# Patient Record
Sex: MALE | Race: WHITE | Employment: OTHER | URBAN - METROPOLITAN AREA
[De-identification: names, ages, dates, MRNs, and addresses within clinical notes are randomized per-mention and may not be internally consistent; named-entity substitution may affect disease eponyms.]

---

## 2017-02-12 ENCOUNTER — COMMUNICATION - HEALTHEAST (OUTPATIENT)
Dept: INTERNAL MEDICINE | Facility: CLINIC | Age: 72
End: 2017-02-12

## 2017-02-12 DIAGNOSIS — E11.9 TYPE 2 DIABETES MELLITUS (H): ICD-10-CM

## 2017-04-05 ENCOUNTER — COMMUNICATION - HEALTHEAST (OUTPATIENT)
Dept: INTERNAL MEDICINE | Facility: CLINIC | Age: 72
End: 2017-04-05

## 2017-04-17 ENCOUNTER — RECORDS - HEALTHEAST (OUTPATIENT)
Dept: ADMINISTRATIVE | Facility: OTHER | Age: 72
End: 2017-04-17

## 2017-04-24 ENCOUNTER — OFFICE VISIT - HEALTHEAST (OUTPATIENT)
Dept: SLEEP MEDICINE | Facility: CLINIC | Age: 72
End: 2017-04-24

## 2017-04-24 DIAGNOSIS — G47.33 OSA ON CPAP: ICD-10-CM

## 2017-04-24 ASSESSMENT — MIFFLIN-ST. JEOR: SCORE: 1560.92

## 2017-04-25 ENCOUNTER — COMMUNICATION - HEALTHEAST (OUTPATIENT)
Dept: SLEEP MEDICINE | Facility: CLINIC | Age: 72
End: 2017-04-25

## 2017-04-26 ENCOUNTER — COMMUNICATION - HEALTHEAST (OUTPATIENT)
Dept: INTERNAL MEDICINE | Facility: CLINIC | Age: 72
End: 2017-04-26

## 2017-05-01 ENCOUNTER — OFFICE VISIT - HEALTHEAST (OUTPATIENT)
Dept: INTERNAL MEDICINE | Facility: CLINIC | Age: 72
End: 2017-05-01

## 2017-05-01 DIAGNOSIS — H26.9 CATARACT: ICD-10-CM

## 2017-05-01 DIAGNOSIS — Z01.818 PRE-OP EXAMINATION: ICD-10-CM

## 2017-05-01 ASSESSMENT — MIFFLIN-ST. JEOR: SCORE: 1552.98

## 2017-05-30 ENCOUNTER — COMMUNICATION - HEALTHEAST (OUTPATIENT)
Dept: INTERNAL MEDICINE | Facility: CLINIC | Age: 72
End: 2017-05-30

## 2017-05-30 DIAGNOSIS — E11.9 TYPE 2 DIABETES MELLITUS (H): ICD-10-CM

## 2017-05-31 ENCOUNTER — OFFICE VISIT - HEALTHEAST (OUTPATIENT)
Dept: INTERNAL MEDICINE | Facility: CLINIC | Age: 72
End: 2017-05-31

## 2017-05-31 ENCOUNTER — OFFICE VISIT - HEALTHEAST (OUTPATIENT)
Dept: SLEEP MEDICINE | Facility: CLINIC | Age: 72
End: 2017-05-31

## 2017-05-31 ENCOUNTER — RECORDS - HEALTHEAST (OUTPATIENT)
Dept: ADMINISTRATIVE | Facility: OTHER | Age: 72
End: 2017-05-31

## 2017-05-31 ENCOUNTER — COMMUNICATION - HEALTHEAST (OUTPATIENT)
Dept: INTERNAL MEDICINE | Facility: CLINIC | Age: 72
End: 2017-05-31

## 2017-05-31 ENCOUNTER — RECORDS - HEALTHEAST (OUTPATIENT)
Dept: GENERAL RADIOLOGY | Facility: CLINIC | Age: 72
End: 2017-05-31

## 2017-05-31 DIAGNOSIS — E11.9 TYPE 2 DIABETES MELLITUS (H): ICD-10-CM

## 2017-05-31 DIAGNOSIS — M25.559 HIP PAIN: ICD-10-CM

## 2017-05-31 DIAGNOSIS — M25.559 PAIN IN UNSPECIFIED HIP: ICD-10-CM

## 2017-05-31 DIAGNOSIS — R53.83 FATIGUE: ICD-10-CM

## 2017-05-31 DIAGNOSIS — G47.33 OBSTRUCTIVE SLEEP APNEA (ADULT) (PEDIATRIC): ICD-10-CM

## 2017-05-31 DIAGNOSIS — G47.33 OSA (OBSTRUCTIVE SLEEP APNEA): ICD-10-CM

## 2017-05-31 DIAGNOSIS — F33.9 MAJOR DEPRESSIVE DISORDER, RECURRENT EPISODE (H): ICD-10-CM

## 2017-05-31 DIAGNOSIS — D69.6 THROMBOCYTOPENIA, UNSPECIFIED (H): ICD-10-CM

## 2017-05-31 DIAGNOSIS — E55.9 VITAMIN D DEFICIENCY: ICD-10-CM

## 2017-05-31 LAB — HBA1C MFR BLD: 7.5 % (ref 3.5–6)

## 2017-05-31 ASSESSMENT — MIFFLIN-ST. JEOR: SCORE: 1559.78

## 2017-06-07 ENCOUNTER — OFFICE VISIT - HEALTHEAST (OUTPATIENT)
Dept: PHYSICAL THERAPY | Facility: REHABILITATION | Age: 72
End: 2017-06-07

## 2017-06-07 DIAGNOSIS — M24.551 HIP FLEXOR TIGHTNESS, RIGHT: ICD-10-CM

## 2017-06-07 DIAGNOSIS — M62.81 GENERALIZED MUSCLE WEAKNESS: ICD-10-CM

## 2017-06-07 DIAGNOSIS — M25.551 RIGHT HIP PAIN: ICD-10-CM

## 2017-06-09 ENCOUNTER — COMMUNICATION - HEALTHEAST (OUTPATIENT)
Dept: INTERNAL MEDICINE | Facility: CLINIC | Age: 72
End: 2017-06-09

## 2017-06-09 DIAGNOSIS — E78.5 HYPERLIPEMIA: ICD-10-CM

## 2017-06-14 ENCOUNTER — COMMUNICATION - HEALTHEAST (OUTPATIENT)
Dept: PHYSICAL THERAPY | Facility: REHABILITATION | Age: 72
End: 2017-06-14

## 2017-06-15 ENCOUNTER — COMMUNICATION - HEALTHEAST (OUTPATIENT)
Dept: INTERNAL MEDICINE | Facility: CLINIC | Age: 72
End: 2017-06-15

## 2017-06-16 ENCOUNTER — COMMUNICATION - HEALTHEAST (OUTPATIENT)
Dept: INTERNAL MEDICINE | Facility: CLINIC | Age: 72
End: 2017-06-16

## 2017-06-16 DIAGNOSIS — I63.9 CVA (CEREBRAL VASCULAR ACCIDENT) (H): ICD-10-CM

## 2017-06-21 ENCOUNTER — OFFICE VISIT - HEALTHEAST (OUTPATIENT)
Dept: PHYSICAL THERAPY | Facility: REHABILITATION | Age: 72
End: 2017-06-21

## 2017-06-21 DIAGNOSIS — M62.81 GENERALIZED MUSCLE WEAKNESS: ICD-10-CM

## 2017-06-21 DIAGNOSIS — M25.551 RIGHT HIP PAIN: ICD-10-CM

## 2017-06-21 DIAGNOSIS — M24.551 HIP FLEXOR TIGHTNESS, RIGHT: ICD-10-CM

## 2017-06-28 ENCOUNTER — OFFICE VISIT - HEALTHEAST (OUTPATIENT)
Dept: PHYSICAL THERAPY | Facility: REHABILITATION | Age: 72
End: 2017-06-28

## 2017-06-28 DIAGNOSIS — M24.551 HIP FLEXOR TIGHTNESS, RIGHT: ICD-10-CM

## 2017-06-28 DIAGNOSIS — M62.81 GENERALIZED MUSCLE WEAKNESS: ICD-10-CM

## 2017-06-28 DIAGNOSIS — M25.551 RIGHT HIP PAIN: ICD-10-CM

## 2017-07-12 ENCOUNTER — OFFICE VISIT - HEALTHEAST (OUTPATIENT)
Dept: PHYSICAL THERAPY | Facility: REHABILITATION | Age: 72
End: 2017-07-12

## 2017-07-12 DIAGNOSIS — M62.81 GENERALIZED MUSCLE WEAKNESS: ICD-10-CM

## 2017-07-12 DIAGNOSIS — M24.551 HIP FLEXOR TIGHTNESS, RIGHT: ICD-10-CM

## 2017-07-12 DIAGNOSIS — M25.551 RIGHT HIP PAIN: ICD-10-CM

## 2017-07-14 ENCOUNTER — COMMUNICATION - HEALTHEAST (OUTPATIENT)
Dept: INTERNAL MEDICINE | Facility: CLINIC | Age: 72
End: 2017-07-14

## 2017-08-15 ENCOUNTER — AMBULATORY - HEALTHEAST (OUTPATIENT)
Dept: SLEEP MEDICINE | Facility: CLINIC | Age: 72
End: 2017-08-15

## 2017-08-18 ENCOUNTER — COMMUNICATION - HEALTHEAST (OUTPATIENT)
Dept: SLEEP MEDICINE | Facility: CLINIC | Age: 72
End: 2017-08-18

## 2017-08-30 ENCOUNTER — RECORDS - HEALTHEAST (OUTPATIENT)
Dept: ADMINISTRATIVE | Facility: OTHER | Age: 72
End: 2017-08-30

## 2017-09-18 ENCOUNTER — COMMUNICATION - HEALTHEAST (OUTPATIENT)
Dept: INTERNAL MEDICINE | Facility: CLINIC | Age: 72
End: 2017-09-18

## 2017-09-18 DIAGNOSIS — I63.9 CVA (CEREBRAL VASCULAR ACCIDENT) (H): ICD-10-CM

## 2017-09-20 ENCOUNTER — COMMUNICATION - HEALTHEAST (OUTPATIENT)
Dept: SLEEP MEDICINE | Facility: CLINIC | Age: 72
End: 2017-09-20

## 2017-09-20 DIAGNOSIS — G47.33 OSA (OBSTRUCTIVE SLEEP APNEA): ICD-10-CM

## 2017-10-12 ENCOUNTER — RECORDS - HEALTHEAST (OUTPATIENT)
Dept: SLEEP MEDICINE | Facility: CLINIC | Age: 72
End: 2017-10-12

## 2017-10-12 ENCOUNTER — RECORDS - HEALTHEAST (OUTPATIENT)
Dept: ADMINISTRATIVE | Facility: OTHER | Age: 72
End: 2017-10-12

## 2017-10-12 DIAGNOSIS — G47.33 OBSTRUCTIVE SLEEP APNEA (ADULT) (PEDIATRIC): ICD-10-CM

## 2017-10-20 ENCOUNTER — COMMUNICATION - HEALTHEAST (OUTPATIENT)
Dept: SLEEP MEDICINE | Facility: CLINIC | Age: 72
End: 2017-10-20

## 2017-11-20 ENCOUNTER — RECORDS - HEALTHEAST (OUTPATIENT)
Dept: ADMINISTRATIVE | Facility: OTHER | Age: 72
End: 2017-11-20

## 2017-12-07 ENCOUNTER — COMMUNICATION - HEALTHEAST (OUTPATIENT)
Dept: INTERNAL MEDICINE | Facility: CLINIC | Age: 72
End: 2017-12-07

## 2017-12-07 DIAGNOSIS — E78.5 HYPERLIPEMIA: ICD-10-CM

## 2017-12-27 ENCOUNTER — COMMUNICATION - HEALTHEAST (OUTPATIENT)
Dept: INTERNAL MEDICINE | Facility: CLINIC | Age: 72
End: 2017-12-27

## 2018-03-18 ENCOUNTER — COMMUNICATION - HEALTHEAST (OUTPATIENT)
Dept: INTERNAL MEDICINE | Facility: CLINIC | Age: 73
End: 2018-03-18

## 2018-03-18 DIAGNOSIS — I63.9 CVA (CEREBRAL VASCULAR ACCIDENT) (H): ICD-10-CM

## 2018-04-27 ENCOUNTER — OFFICE VISIT - HEALTHEAST (OUTPATIENT)
Dept: INTERNAL MEDICINE | Facility: CLINIC | Age: 73
End: 2018-04-27

## 2018-04-27 DIAGNOSIS — F33.9 MAJOR DEPRESSIVE DISORDER, RECURRENT EPISODE (H): ICD-10-CM

## 2018-04-27 DIAGNOSIS — E78.5 HYPERLIPEMIA: ICD-10-CM

## 2018-04-27 DIAGNOSIS — I63.9 CVA (CEREBRAL VASCULAR ACCIDENT) (H): ICD-10-CM

## 2018-04-27 DIAGNOSIS — E11.9 TYPE 2 DIABETES MELLITUS (H): ICD-10-CM

## 2018-04-27 DIAGNOSIS — Z12.5 ENCOUNTER FOR SCREENING FOR MALIGNANT NEOPLASM OF PROSTATE: ICD-10-CM

## 2018-04-27 DIAGNOSIS — N40.0 BPH (BENIGN PROSTATIC HYPERPLASIA): ICD-10-CM

## 2018-04-27 DIAGNOSIS — D69.6 THROMBOCYTOPENIA (H): ICD-10-CM

## 2018-04-27 DIAGNOSIS — E55.9 VITAMIN D DEFICIENCY: ICD-10-CM

## 2018-04-27 DIAGNOSIS — Z00.00 HEALTH CARE MAINTENANCE: ICD-10-CM

## 2018-04-27 DIAGNOSIS — Z98.890 H/O CAROTID ENDARTERECTOMY: ICD-10-CM

## 2018-04-27 DIAGNOSIS — G47.33 OBSTRUCTIVE SLEEP APNEA: ICD-10-CM

## 2018-04-27 DIAGNOSIS — Z00.00 ROUTINE GENERAL MEDICAL EXAMINATION AT A HEALTH CARE FACILITY: ICD-10-CM

## 2018-04-27 ASSESSMENT — MIFFLIN-ST. JEOR: SCORE: 1546.18

## 2018-04-30 ENCOUNTER — AMBULATORY - HEALTHEAST (OUTPATIENT)
Dept: LAB | Facility: CLINIC | Age: 73
End: 2018-04-30

## 2018-04-30 DIAGNOSIS — Z12.5 ENCOUNTER FOR SCREENING FOR MALIGNANT NEOPLASM OF PROSTATE: ICD-10-CM

## 2018-04-30 DIAGNOSIS — N40.0 BPH (BENIGN PROSTATIC HYPERPLASIA): ICD-10-CM

## 2018-04-30 DIAGNOSIS — E11.9 TYPE 2 DIABETES MELLITUS (H): ICD-10-CM

## 2018-04-30 DIAGNOSIS — E55.9 VITAMIN D DEFICIENCY: ICD-10-CM

## 2018-04-30 DIAGNOSIS — Z00.00 HEALTH CARE MAINTENANCE: ICD-10-CM

## 2018-04-30 DIAGNOSIS — E78.5 HYPERLIPEMIA: ICD-10-CM

## 2018-04-30 LAB
ALBUMIN SERPL-MCNC: 3.7 G/DL (ref 3.5–5)
ALBUMIN UR-MCNC: NEGATIVE MG/DL
ALP SERPL-CCNC: 89 U/L (ref 45–120)
ALT SERPL W P-5'-P-CCNC: 48 U/L (ref 0–45)
ANION GAP SERPL CALCULATED.3IONS-SCNC: 10 MMOL/L (ref 5–18)
APPEARANCE UR: CLEAR
AST SERPL W P-5'-P-CCNC: 28 U/L (ref 0–40)
BILIRUB SERPL-MCNC: 0.5 MG/DL (ref 0–1)
BILIRUB UR QL STRIP: NEGATIVE
BUN SERPL-MCNC: 15 MG/DL (ref 8–28)
CALCIUM SERPL-MCNC: 9.6 MG/DL (ref 8.5–10.5)
CHLORIDE BLD-SCNC: 103 MMOL/L (ref 98–107)
CHOLEST SERPL-MCNC: 133 MG/DL
CO2 SERPL-SCNC: 27 MMOL/L (ref 22–31)
COLOR UR AUTO: YELLOW
CREAT SERPL-MCNC: 0.81 MG/DL (ref 0.7–1.3)
ERYTHROCYTE [DISTWIDTH] IN BLOOD BY AUTOMATED COUNT: 11.6 % (ref 11–14.5)
FASTING STATUS PATIENT QL REPORTED: YES
GFR SERPL CREATININE-BSD FRML MDRD: >60 ML/MIN/1.73M2
GLUCOSE BLD-MCNC: 164 MG/DL (ref 70–125)
GLUCOSE UR STRIP-MCNC: NEGATIVE MG/DL
HBA1C MFR BLD: 7 % (ref 3.5–6)
HCT VFR BLD AUTO: 41.5 % (ref 40–54)
HDLC SERPL-MCNC: 41 MG/DL
HGB BLD-MCNC: 14.7 G/DL (ref 14–18)
HGB UR QL STRIP: NEGATIVE
KETONES UR STRIP-MCNC: NEGATIVE MG/DL
LDLC SERPL CALC-MCNC: 57 MG/DL
LEUKOCYTE ESTERASE UR QL STRIP: NEGATIVE
MCH RBC QN AUTO: 35.2 PG (ref 27–34)
MCHC RBC AUTO-ENTMCNC: 35.4 G/DL (ref 32–36)
MCV RBC AUTO: 99 FL (ref 80–100)
NITRATE UR QL: NEGATIVE
PH UR STRIP: 6 [PH] (ref 5–8)
PLATELET # BLD AUTO: 124 THOU/UL (ref 140–440)
PMV BLD AUTO: 8.1 FL (ref 7–10)
POTASSIUM BLD-SCNC: 4.4 MMOL/L (ref 3.5–5)
PROT SERPL-MCNC: 6.8 G/DL (ref 6–8)
PSA SERPL-MCNC: 0.4 NG/ML (ref 0–6.5)
RBC # BLD AUTO: 4.17 MILL/UL (ref 4.4–6.2)
SODIUM SERPL-SCNC: 140 MMOL/L (ref 136–145)
SP GR UR STRIP: 1.01 (ref 1–1.03)
TRIGL SERPL-MCNC: 174 MG/DL
UROBILINOGEN UR STRIP-ACNC: NORMAL
WBC: 4.6 THOU/UL (ref 4–11)

## 2018-05-01 LAB
25(OH)D3 SERPL-MCNC: 53.6 NG/ML (ref 30–80)
25(OH)D3 SERPL-MCNC: 53.6 NG/ML (ref 30–80)

## 2018-05-13 ENCOUNTER — COMMUNICATION - HEALTHEAST (OUTPATIENT)
Dept: INTERNAL MEDICINE | Facility: CLINIC | Age: 73
End: 2018-05-13

## 2018-05-13 DIAGNOSIS — E11.9 TYPE 2 DIABETES MELLITUS (H): ICD-10-CM

## 2018-05-21 ENCOUNTER — COMMUNICATION - HEALTHEAST (OUTPATIENT)
Dept: INTERNAL MEDICINE | Facility: CLINIC | Age: 73
End: 2018-05-21

## 2018-05-21 DIAGNOSIS — E78.5 HYPERLIPEMIA: ICD-10-CM

## 2018-06-18 ENCOUNTER — COMMUNICATION - HEALTHEAST (OUTPATIENT)
Dept: INTERNAL MEDICINE | Facility: CLINIC | Age: 73
End: 2018-06-18

## 2018-06-18 DIAGNOSIS — I63.9 CVA (CEREBRAL VASCULAR ACCIDENT) (H): ICD-10-CM

## 2018-06-20 ENCOUNTER — COMMUNICATION - HEALTHEAST (OUTPATIENT)
Dept: SLEEP MEDICINE | Facility: CLINIC | Age: 73
End: 2018-06-20

## 2018-06-20 ENCOUNTER — OFFICE VISIT - HEALTHEAST (OUTPATIENT)
Dept: INTERNAL MEDICINE | Facility: CLINIC | Age: 73
End: 2018-06-20

## 2018-06-20 DIAGNOSIS — R53.83 FATIGUE: ICD-10-CM

## 2018-06-20 LAB
ALBUMIN SERPL-MCNC: 4 G/DL (ref 3.5–5)
ALP SERPL-CCNC: 93 U/L (ref 45–120)
ALT SERPL W P-5'-P-CCNC: 54 U/L (ref 0–45)
ANION GAP SERPL CALCULATED.3IONS-SCNC: 10 MMOL/L (ref 5–18)
AST SERPL W P-5'-P-CCNC: 28 U/L (ref 0–40)
BILIRUB SERPL-MCNC: 0.6 MG/DL (ref 0–1)
BUN SERPL-MCNC: 17 MG/DL (ref 8–28)
CALCIUM SERPL-MCNC: 10.1 MG/DL (ref 8.5–10.5)
CHLORIDE BLD-SCNC: 102 MMOL/L (ref 98–107)
CO2 SERPL-SCNC: 27 MMOL/L (ref 22–31)
CREAT SERPL-MCNC: 0.8 MG/DL (ref 0.7–1.3)
ERYTHROCYTE [DISTWIDTH] IN BLOOD BY AUTOMATED COUNT: 11.7 % (ref 11–14.5)
GFR SERPL CREATININE-BSD FRML MDRD: >60 ML/MIN/1.73M2
GLUCOSE BLD-MCNC: 175 MG/DL (ref 70–125)
HCT VFR BLD AUTO: 42.9 % (ref 40–54)
HGB BLD-MCNC: 14.6 G/DL (ref 14–18)
MCH RBC QN AUTO: 34.1 PG (ref 27–34)
MCHC RBC AUTO-ENTMCNC: 33.9 G/DL (ref 32–36)
MCV RBC AUTO: 101 FL (ref 80–100)
MONOCYTES NFR BLD AUTO: NEGATIVE %
PLATELET # BLD AUTO: 119 THOU/UL (ref 140–440)
PMV BLD AUTO: 8.6 FL (ref 7–10)
POTASSIUM BLD-SCNC: 4.4 MMOL/L (ref 3.5–5)
PROT SERPL-MCNC: 6.8 G/DL (ref 6–8)
RBC # BLD AUTO: 4.27 MILL/UL (ref 4.4–6.2)
SODIUM SERPL-SCNC: 139 MMOL/L (ref 136–145)
T4 FREE SERPL-MCNC: 1 NG/DL (ref 0.7–1.8)
TSH SERPL DL<=0.005 MIU/L-ACNC: 2.28 UIU/ML (ref 0.3–5)
VIT B12 SERPL-MCNC: 326 PG/ML (ref 213–816)
WBC: 4.5 THOU/UL (ref 4–11)

## 2018-06-21 LAB
B BURGDOR IGG+IGM SER QL: 0.04 INDEX VALUE
HCV AB SERPL QL IA: NEGATIVE

## 2018-08-03 ENCOUNTER — OFFICE VISIT - HEALTHEAST (OUTPATIENT)
Dept: SLEEP MEDICINE | Facility: CLINIC | Age: 73
End: 2018-08-03

## 2018-08-03 DIAGNOSIS — G47.31 CENTRAL SLEEP APNEA: ICD-10-CM

## 2018-08-03 DIAGNOSIS — G47.33 OSA (OBSTRUCTIVE SLEEP APNEA): ICD-10-CM

## 2018-08-03 ASSESSMENT — MIFFLIN-ST. JEOR: SCORE: 1537.1

## 2018-08-06 ENCOUNTER — AMBULATORY - HEALTHEAST (OUTPATIENT)
Dept: SLEEP MEDICINE | Facility: CLINIC | Age: 73
End: 2018-08-06

## 2018-08-10 ENCOUNTER — COMMUNICATION - HEALTHEAST (OUTPATIENT)
Dept: OTHER | Facility: CLINIC | Age: 73
End: 2018-08-10

## 2018-08-13 ENCOUNTER — AMBULATORY - HEALTHEAST (OUTPATIENT)
Dept: OTHER | Facility: CLINIC | Age: 73
End: 2018-08-13

## 2018-08-19 ENCOUNTER — COMMUNICATION - HEALTHEAST (OUTPATIENT)
Dept: INTERNAL MEDICINE | Facility: CLINIC | Age: 73
End: 2018-08-19

## 2018-08-19 DIAGNOSIS — E78.5 HYPERLIPEMIA: ICD-10-CM

## 2018-08-22 ENCOUNTER — COMMUNICATION - HEALTHEAST (OUTPATIENT)
Dept: OTHER | Facility: CLINIC | Age: 73
End: 2018-08-22

## 2018-08-30 ENCOUNTER — RECORDS - HEALTHEAST (OUTPATIENT)
Dept: ADMINISTRATIVE | Facility: OTHER | Age: 73
End: 2018-08-30

## 2018-09-04 ENCOUNTER — COMMUNICATION - HEALTHEAST (OUTPATIENT)
Dept: OTHER | Facility: CLINIC | Age: 73
End: 2018-09-04

## 2018-10-25 ENCOUNTER — OFFICE VISIT - HEALTHEAST (OUTPATIENT)
Dept: SLEEP MEDICINE | Facility: CLINIC | Age: 73
End: 2018-10-25

## 2018-10-25 DIAGNOSIS — G47.33 OSA (OBSTRUCTIVE SLEEP APNEA): ICD-10-CM

## 2018-10-25 DIAGNOSIS — G47.31 CENTRAL SLEEP APNEA: ICD-10-CM

## 2018-10-25 ASSESSMENT — MIFFLIN-ST. JEOR: SCORE: 1528.03

## 2018-10-30 ENCOUNTER — OFFICE VISIT - HEALTHEAST (OUTPATIENT)
Dept: INTERNAL MEDICINE | Facility: CLINIC | Age: 73
End: 2018-10-30

## 2018-10-30 DIAGNOSIS — I63.9 CVA (CEREBRAL VASCULAR ACCIDENT) (H): ICD-10-CM

## 2018-10-30 DIAGNOSIS — G47.33 OBSTRUCTIVE SLEEP APNEA: ICD-10-CM

## 2018-10-30 DIAGNOSIS — E11.9 TYPE 2 DIABETES MELLITUS (H): ICD-10-CM

## 2018-10-30 DIAGNOSIS — D69.6 THROMBOCYTOPENIA (H): ICD-10-CM

## 2018-10-30 DIAGNOSIS — Z00.00 ROUTINE HEALTH MAINTENANCE: ICD-10-CM

## 2018-10-30 LAB
ALBUMIN SERPL-MCNC: 4.1 G/DL (ref 3.5–5)
ALP SERPL-CCNC: 83 U/L (ref 45–120)
ALT SERPL W P-5'-P-CCNC: 46 U/L (ref 0–45)
ANION GAP SERPL CALCULATED.3IONS-SCNC: 10 MMOL/L (ref 5–18)
AST SERPL W P-5'-P-CCNC: 27 U/L (ref 0–40)
BILIRUB SERPL-MCNC: 0.7 MG/DL (ref 0–1)
BUN SERPL-MCNC: 16 MG/DL (ref 8–28)
CALCIUM SERPL-MCNC: 10 MG/DL (ref 8.5–10.5)
CHLORIDE BLD-SCNC: 99 MMOL/L (ref 98–107)
CO2 SERPL-SCNC: 28 MMOL/L (ref 22–31)
CREAT SERPL-MCNC: 0.86 MG/DL (ref 0.7–1.3)
ERYTHROCYTE [DISTWIDTH] IN BLOOD BY AUTOMATED COUNT: 11.7 % (ref 11–14.5)
GFR SERPL CREATININE-BSD FRML MDRD: >60 ML/MIN/1.73M2
GLUCOSE BLD-MCNC: 226 MG/DL (ref 70–125)
HBA1C MFR BLD: 7.6 % (ref 3.5–6)
HCT VFR BLD AUTO: 40.3 % (ref 40–54)
HGB BLD-MCNC: 13.8 G/DL (ref 14–18)
MCH RBC QN AUTO: 34.6 PG (ref 27–34)
MCHC RBC AUTO-ENTMCNC: 34.3 G/DL (ref 32–36)
MCV RBC AUTO: 101 FL (ref 80–100)
PLATELET # BLD AUTO: 122 THOU/UL (ref 140–440)
PMV BLD AUTO: 8.1 FL (ref 7–10)
POTASSIUM BLD-SCNC: 4.4 MMOL/L (ref 3.5–5)
PROT SERPL-MCNC: 6.6 G/DL (ref 6–8)
RBC # BLD AUTO: 4 MILL/UL (ref 4.4–6.2)
SODIUM SERPL-SCNC: 137 MMOL/L (ref 136–145)
WBC: 4 THOU/UL (ref 4–11)

## 2018-11-09 ENCOUNTER — COMMUNICATION - HEALTHEAST (OUTPATIENT)
Dept: INTERNAL MEDICINE | Facility: CLINIC | Age: 73
End: 2018-11-09

## 2018-11-09 DIAGNOSIS — E11.9 TYPE 2 DIABETES MELLITUS (H): ICD-10-CM

## 2018-11-17 ENCOUNTER — COMMUNICATION - HEALTHEAST (OUTPATIENT)
Dept: INTERNAL MEDICINE | Facility: CLINIC | Age: 73
End: 2018-11-17

## 2018-11-17 DIAGNOSIS — E78.5 HYPERLIPEMIA: ICD-10-CM

## 2018-11-19 ENCOUNTER — AMBULATORY - HEALTHEAST (OUTPATIENT)
Dept: EDUCATION SERVICES | Facility: CLINIC | Age: 73
End: 2018-11-19

## 2018-11-19 DIAGNOSIS — E11.9 TYPE 2 DIABETES MELLITUS WITHOUT COMPLICATION, WITHOUT LONG-TERM CURRENT USE OF INSULIN (H): ICD-10-CM

## 2018-12-16 ENCOUNTER — COMMUNICATION - HEALTHEAST (OUTPATIENT)
Dept: INTERNAL MEDICINE | Facility: CLINIC | Age: 73
End: 2018-12-16

## 2018-12-16 DIAGNOSIS — I63.9 CVA (CEREBRAL VASCULAR ACCIDENT) (H): ICD-10-CM

## 2019-02-28 ENCOUNTER — COMMUNICATION - HEALTHEAST (OUTPATIENT)
Dept: INTERNAL MEDICINE | Facility: CLINIC | Age: 74
End: 2019-02-28

## 2019-02-28 DIAGNOSIS — E11.9 TYPE 2 DIABETES MELLITUS (H): ICD-10-CM

## 2019-05-07 ENCOUNTER — OFFICE VISIT - HEALTHEAST (OUTPATIENT)
Dept: INTERNAL MEDICINE | Facility: CLINIC | Age: 74
End: 2019-05-07

## 2019-05-07 DIAGNOSIS — Z00.00 ROUTINE GENERAL MEDICAL EXAMINATION AT A HEALTH CARE FACILITY: ICD-10-CM

## 2019-05-07 DIAGNOSIS — E11.8 TYPE 2 DIABETES MELLITUS WITH COMPLICATION, WITHOUT LONG-TERM CURRENT USE OF INSULIN (H): ICD-10-CM

## 2019-05-07 DIAGNOSIS — I63.9 CEREBROVASCULAR ACCIDENT (CVA), UNSPECIFIED MECHANISM (H): ICD-10-CM

## 2019-05-07 DIAGNOSIS — E11.9 TYPE 2 DIABETES MELLITUS WITHOUT COMPLICATION, WITHOUT LONG-TERM CURRENT USE OF INSULIN (H): ICD-10-CM

## 2019-05-07 DIAGNOSIS — Z12.5 ENCOUNTER FOR SCREENING FOR MALIGNANT NEOPLASM OF PROSTATE: ICD-10-CM

## 2019-05-07 DIAGNOSIS — N40.0 BENIGN PROSTATIC HYPERPLASIA WITHOUT LOWER URINARY TRACT SYMPTOMS: ICD-10-CM

## 2019-05-07 DIAGNOSIS — R53.82 CHRONIC FATIGUE: ICD-10-CM

## 2019-05-07 DIAGNOSIS — R09.89 LUNG CRACKLES: ICD-10-CM

## 2019-05-07 DIAGNOSIS — D69.6 THROMBOCYTOPENIA (H): ICD-10-CM

## 2019-05-07 DIAGNOSIS — G47.33 OBSTRUCTIVE SLEEP APNEA: ICD-10-CM

## 2019-05-07 DIAGNOSIS — F33.0 MILD EPISODE OF RECURRENT MAJOR DEPRESSIVE DISORDER (H): ICD-10-CM

## 2019-05-07 DIAGNOSIS — E55.9 VITAMIN D DEFICIENCY: ICD-10-CM

## 2019-05-07 DIAGNOSIS — G25.0 BENIGN ESSENTIAL TREMOR: ICD-10-CM

## 2019-05-07 DIAGNOSIS — E78.5 HYPERLIPIDEMIA, UNSPECIFIED HYPERLIPIDEMIA TYPE: ICD-10-CM

## 2019-05-07 DIAGNOSIS — Z98.890 H/O CAROTID ENDARTERECTOMY: ICD-10-CM

## 2019-05-08 ENCOUNTER — HOSPITAL ENCOUNTER (OUTPATIENT)
Dept: ULTRASOUND IMAGING | Facility: CLINIC | Age: 74
Discharge: HOME | End: 2019-05-08
Attending: INTERNAL MEDICINE

## 2019-05-08 ENCOUNTER — COMMUNICATION - HEALTHEAST (OUTPATIENT)
Dept: INTERNAL MEDICINE | Facility: CLINIC | Age: 74
End: 2019-05-08

## 2019-05-08 DIAGNOSIS — E11.9 TYPE 2 DIABETES MELLITUS (H): ICD-10-CM

## 2019-05-08 DIAGNOSIS — I63.9 CEREBROVASCULAR ACCIDENT (CVA), UNSPECIFIED MECHANISM (H): ICD-10-CM

## 2019-05-09 ENCOUNTER — AMBULATORY - HEALTHEAST (OUTPATIENT)
Dept: LAB | Facility: CLINIC | Age: 74
End: 2019-05-09

## 2019-05-09 ENCOUNTER — COMMUNICATION - HEALTHEAST (OUTPATIENT)
Dept: INTERNAL MEDICINE | Facility: CLINIC | Age: 74
End: 2019-05-09

## 2019-05-09 DIAGNOSIS — R31.29 MICROSCOPIC HEMATURIA: ICD-10-CM

## 2019-05-09 DIAGNOSIS — Z00.00 ROUTINE GENERAL MEDICAL EXAMINATION AT A HEALTH CARE FACILITY: ICD-10-CM

## 2019-05-09 DIAGNOSIS — R53.82 CHRONIC FATIGUE: ICD-10-CM

## 2019-05-09 DIAGNOSIS — E78.5 HYPERLIPIDEMIA, UNSPECIFIED HYPERLIPIDEMIA TYPE: ICD-10-CM

## 2019-05-09 DIAGNOSIS — E55.9 VITAMIN D DEFICIENCY: ICD-10-CM

## 2019-05-09 DIAGNOSIS — E11.9 TYPE 2 DIABETES MELLITUS WITHOUT COMPLICATION, WITHOUT LONG-TERM CURRENT USE OF INSULIN (H): ICD-10-CM

## 2019-05-09 DIAGNOSIS — Z12.5 ENCOUNTER FOR SCREENING FOR MALIGNANT NEOPLASM OF PROSTATE: ICD-10-CM

## 2019-05-09 DIAGNOSIS — D69.6 THROMBOCYTOPENIA (H): ICD-10-CM

## 2019-05-09 LAB
ALBUMIN SERPL-MCNC: 4 G/DL (ref 3.5–5)
ALBUMIN UR-MCNC: NEGATIVE MG/DL
ALP SERPL-CCNC: 77 U/L (ref 45–120)
ALT SERPL W P-5'-P-CCNC: 26 U/L (ref 0–45)
ANION GAP SERPL CALCULATED.3IONS-SCNC: 8 MMOL/L (ref 5–18)
APPEARANCE UR: CLEAR
AST SERPL W P-5'-P-CCNC: 18 U/L (ref 0–40)
BACTERIA #/AREA URNS HPF: ABNORMAL HPF
BASOPHILS # BLD AUTO: 0 THOU/UL (ref 0–0.2)
BASOPHILS NFR BLD AUTO: 0 % (ref 0–2)
BILIRUB SERPL-MCNC: 0.8 MG/DL (ref 0–1)
BILIRUB UR QL STRIP: NEGATIVE
BUN SERPL-MCNC: 17 MG/DL (ref 8–28)
CALCIUM SERPL-MCNC: 10 MG/DL (ref 8.5–10.5)
CHLORIDE BLD-SCNC: 104 MMOL/L (ref 98–107)
CHOLEST SERPL-MCNC: 121 MG/DL
CO2 SERPL-SCNC: 30 MMOL/L (ref 22–31)
COLOR UR AUTO: YELLOW
CREAT SERPL-MCNC: 0.86 MG/DL (ref 0.7–1.3)
CREAT UR-MCNC: 106.7 MG/DL
EOSINOPHIL # BLD AUTO: 0.3 THOU/UL (ref 0–0.4)
EOSINOPHIL NFR BLD AUTO: 6 % (ref 0–6)
ERYTHROCYTE [DISTWIDTH] IN BLOOD BY AUTOMATED COUNT: 12.1 % (ref 11–14.5)
FASTING STATUS PATIENT QL REPORTED: YES
GFR SERPL CREATININE-BSD FRML MDRD: >60 ML/MIN/1.73M2
GLUCOSE BLD-MCNC: 163 MG/DL (ref 70–125)
GLUCOSE UR STRIP-MCNC: NEGATIVE MG/DL
HBA1C MFR BLD: 7.7 % (ref 3.5–6)
HCT VFR BLD AUTO: 41.5 % (ref 40–54)
HDLC SERPL-MCNC: 37 MG/DL
HGB BLD-MCNC: 14.3 G/DL (ref 14–18)
HGB UR QL STRIP: ABNORMAL
KETONES UR STRIP-MCNC: NEGATIVE MG/DL
LDLC SERPL CALC-MCNC: 66 MG/DL
LEUKOCYTE ESTERASE UR QL STRIP: NEGATIVE
LYMPHOCYTES # BLD AUTO: 1.8 THOU/UL (ref 0.8–4.4)
LYMPHOCYTES NFR BLD AUTO: 42 % (ref 20–40)
MAGNESIUM SERPL-MCNC: 1.8 MG/DL (ref 1.8–2.6)
MCH RBC QN AUTO: 34.6 PG (ref 27–34)
MCHC RBC AUTO-ENTMCNC: 34.5 G/DL (ref 32–36)
MCV RBC AUTO: 100 FL (ref 80–100)
MICROALBUMIN UR-MCNC: 1.36 MG/DL (ref 0–1.99)
MICROALBUMIN/CREAT UR: 12.7 MG/G
MONOCYTES # BLD AUTO: 0.4 THOU/UL (ref 0–0.9)
MONOCYTES NFR BLD AUTO: 8 % (ref 2–10)
NEUTROPHILS # BLD AUTO: 1.9 THOU/UL (ref 2–7.7)
NEUTROPHILS NFR BLD AUTO: 44 % (ref 50–70)
NITRATE UR QL: NEGATIVE
PH UR STRIP: 5.5 [PH] (ref 5–8)
PLATELET # BLD AUTO: 114 THOU/UL (ref 140–440)
PMV BLD AUTO: 8.1 FL (ref 7–10)
POTASSIUM BLD-SCNC: 5 MMOL/L (ref 3.5–5)
PROT SERPL-MCNC: 6.5 G/DL (ref 6–8)
PSA SERPL-MCNC: 0.3 NG/ML (ref 0–6.5)
RBC # BLD AUTO: 4.14 MILL/UL (ref 4.4–6.2)
RBC #/AREA URNS AUTO: ABNORMAL HPF
SODIUM SERPL-SCNC: 142 MMOL/L (ref 136–145)
SP GR UR STRIP: 1.02 (ref 1–1.03)
SQUAMOUS #/AREA URNS AUTO: ABNORMAL LPF
TRIGL SERPL-MCNC: 89 MG/DL
TSH SERPL DL<=0.005 MIU/L-ACNC: 2.22 UIU/ML (ref 0.3–5)
UROBILINOGEN UR STRIP-ACNC: ABNORMAL
VIT B12 SERPL-MCNC: 333 PG/ML (ref 213–816)
WBC #/AREA URNS AUTO: ABNORMAL HPF
WBC: 4.4 THOU/UL (ref 4–11)

## 2019-05-10 ENCOUNTER — AMBULATORY - HEALTHEAST (OUTPATIENT)
Dept: INTERNAL MEDICINE | Facility: CLINIC | Age: 74
End: 2019-05-10

## 2019-05-10 DIAGNOSIS — D61.818 PANCYTOPENIA (H): ICD-10-CM

## 2019-05-10 LAB
25(OH)D3 SERPL-MCNC: 43.1 NG/ML (ref 30–80)
25(OH)D3 SERPL-MCNC: 43.1 NG/ML (ref 30–80)
BASOPHILS # BLD AUTO: 0 THOU/UL (ref 0–0.2)
BASOPHILS NFR BLD AUTO: 1 % (ref 0–2)
EOSINOPHIL # BLD AUTO: 0.3 THOU/UL (ref 0–0.4)
EOSINOPHIL NFR BLD AUTO: 7 % (ref 0–6)
ERYTHROCYTE [DISTWIDTH] IN BLOOD BY AUTOMATED COUNT: 12.2 % (ref 11–14.5)
HCT VFR BLD AUTO: 40.2 % (ref 40–54)
HGB BLD-MCNC: 14.2 G/DL (ref 14–18)
LAB AP CHARGES (HE HISTORICAL CONVERSION): NORMAL
LYMPHOCYTES # BLD AUTO: 1.8 THOU/UL (ref 0.8–4.4)
LYMPHOCYTES NFR BLD AUTO: 42 % (ref 20–40)
MCH RBC QN AUTO: 34.4 PG (ref 27–34)
MCHC RBC AUTO-ENTMCNC: 35.3 G/DL (ref 32–36)
MCV RBC AUTO: 97 FL (ref 80–100)
MONOCYTES # BLD AUTO: 0.4 THOU/UL (ref 0–0.9)
MONOCYTES NFR BLD AUTO: 9 % (ref 2–10)
NEUTROPHILS # BLD AUTO: 1.8 THOU/UL (ref 2–7.7)
NEUTROPHILS NFR BLD AUTO: 42 % (ref 50–70)
PATH REPORT.COMMENTS IMP SPEC: NORMAL
PATH REPORT.COMMENTS IMP SPEC: NORMAL
PATH REPORT.FINAL DX SPEC: NORMAL
PATH REPORT.MICROSCOPIC SPEC OTHER STN: ABNORMAL
PATH REPORT.MICROSCOPIC SPEC OTHER STN: NORMAL
PLATELET # BLD AUTO: 115 THOU/UL (ref 140–440)
PMV BLD AUTO: 11.8 FL (ref 8.5–12.5)
RBC # BLD AUTO: 4.13 MILL/UL (ref 4.4–6.2)
WBC: 4.4 THOU/UL (ref 4–11)

## 2019-05-14 ENCOUNTER — AMBULATORY - HEALTHEAST (OUTPATIENT)
Dept: ONCOLOGY | Facility: HOSPITAL | Age: 74
End: 2019-05-14

## 2019-05-14 LAB
SHBG SERPL-SCNC: 51 NMOL/L (ref 11–80)
TESTOST FREE SERPL-MCNC: 6.32 NG/DL (ref 4.7–24.4)
TESTOST SERPL-MCNC: 413 NG/DL (ref 240–950)

## 2019-05-30 ENCOUNTER — RECORDS - HEALTHEAST (OUTPATIENT)
Dept: ADMINISTRATIVE | Facility: OTHER | Age: 74
End: 2019-05-30

## 2019-06-10 ENCOUNTER — OFFICE VISIT - HEALTHEAST (OUTPATIENT)
Dept: ONCOLOGY | Facility: CLINIC | Age: 74
End: 2019-06-10

## 2019-06-10 DIAGNOSIS — D69.6 THROMBOCYTOPENIA (H): ICD-10-CM

## 2019-06-10 DIAGNOSIS — D70.8 OTHER NEUTROPENIA (H): ICD-10-CM

## 2019-06-10 DIAGNOSIS — D75.89 MACROCYTOSIS WITHOUT ANEMIA: ICD-10-CM

## 2019-06-10 ASSESSMENT — MIFFLIN-ST. JEOR: SCORE: 1515.57

## 2019-06-14 ENCOUNTER — COMMUNICATION - HEALTHEAST (OUTPATIENT)
Dept: INTERNAL MEDICINE | Facility: CLINIC | Age: 74
End: 2019-06-14

## 2019-06-14 DIAGNOSIS — I63.9 CVA (CEREBRAL VASCULAR ACCIDENT) (H): ICD-10-CM

## 2019-09-04 ENCOUNTER — COMMUNICATION - HEALTHEAST (OUTPATIENT)
Dept: INTERNAL MEDICINE | Facility: CLINIC | Age: 74
End: 2019-09-04

## 2019-10-10 ENCOUNTER — RECORDS - HEALTHEAST (OUTPATIENT)
Dept: HEALTH INFORMATION MANAGEMENT | Facility: CLINIC | Age: 74
End: 2019-10-10

## 2019-10-15 ENCOUNTER — AMBULATORY - HEALTHEAST (OUTPATIENT)
Dept: MULTI SPECIALTY CLINIC | Facility: CLINIC | Age: 74
End: 2019-10-15

## 2019-10-25 ENCOUNTER — OFFICE VISIT - HEALTHEAST (OUTPATIENT)
Dept: SLEEP MEDICINE | Facility: CLINIC | Age: 74
End: 2019-10-25

## 2019-10-25 DIAGNOSIS — G47.39 COMPLEX SLEEP APNEA SYNDROME: ICD-10-CM

## 2019-10-25 ASSESSMENT — MIFFLIN-ST. JEOR: SCORE: 1538.25

## 2019-11-01 ENCOUNTER — OFFICE VISIT - HEALTHEAST (OUTPATIENT)
Dept: INTERNAL MEDICINE | Facility: CLINIC | Age: 74
End: 2019-11-01
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
## 2019-11-01 DIAGNOSIS — R31.29 MICROSCOPIC HEMATURIA: ICD-10-CM

## 2019-11-01 DIAGNOSIS — D69.6 THROMBOCYTOPENIA (H): ICD-10-CM

## 2019-11-01 DIAGNOSIS — E78.5 HYPERLIPIDEMIA, UNSPECIFIED HYPERLIPIDEMIA TYPE: ICD-10-CM

## 2019-11-01 DIAGNOSIS — E11.8 TYPE 2 DIABETES MELLITUS WITH COMPLICATION, WITHOUT LONG-TERM CURRENT USE OF INSULIN (H): ICD-10-CM

## 2019-11-01 LAB
ALBUMIN SERPL-MCNC: 4 G/DL (ref 3.5–5)
ALBUMIN UR-MCNC: NEGATIVE MG/DL
ALP SERPL-CCNC: 81 U/L (ref 45–120)
ALT SERPL W P-5'-P-CCNC: 27 U/L (ref 0–45)
ANION GAP SERPL CALCULATED.3IONS-SCNC: 13 MMOL/L (ref 5–18)
APPEARANCE UR: CLEAR
AST SERPL W P-5'-P-CCNC: 17 U/L (ref 0–40)
BASOPHILS # BLD AUTO: 0 THOU/UL (ref 0–0.2)
BASOPHILS NFR BLD AUTO: 0 % (ref 0–2)
BILIRUB SERPL-MCNC: 0.6 MG/DL (ref 0–1)
BILIRUB UR QL STRIP: NEGATIVE
BUN SERPL-MCNC: 14 MG/DL (ref 8–28)
CALCIUM SERPL-MCNC: 9.6 MG/DL (ref 8.5–10.5)
CHLORIDE BLD-SCNC: 102 MMOL/L (ref 98–107)
CO2 SERPL-SCNC: 25 MMOL/L (ref 22–31)
COLOR UR AUTO: YELLOW
CREAT SERPL-MCNC: 0.83 MG/DL (ref 0.7–1.3)
EOSINOPHIL # BLD AUTO: 0.1 THOU/UL (ref 0–0.4)
EOSINOPHIL NFR BLD AUTO: 2 % (ref 0–6)
ERYTHROCYTE [DISTWIDTH] IN BLOOD BY AUTOMATED COUNT: 11.1 % (ref 11–14.5)
GFR SERPL CREATININE-BSD FRML MDRD: >60 ML/MIN/1.73M2
GLUCOSE BLD-MCNC: 212 MG/DL (ref 70–125)
GLUCOSE UR STRIP-MCNC: NEGATIVE MG/DL
HBA1C MFR BLD: 7.9 % (ref 3.5–6)
HCT VFR BLD AUTO: 41.5 % (ref 40–54)
HGB BLD-MCNC: 14.5 G/DL (ref 14–18)
HGB UR QL STRIP: NEGATIVE
KETONES UR STRIP-MCNC: NEGATIVE MG/DL
LEUKOCYTE ESTERASE UR QL STRIP: NEGATIVE
LYMPHOCYTES # BLD AUTO: 1.2 THOU/UL (ref 0.8–4.4)
LYMPHOCYTES NFR BLD AUTO: 26 % (ref 20–40)
MCH RBC QN AUTO: 35.9 PG (ref 27–34)
MCHC RBC AUTO-ENTMCNC: 34.9 G/DL (ref 32–36)
MCV RBC AUTO: 103 FL (ref 80–100)
MONOCYTES # BLD AUTO: 0.3 THOU/UL (ref 0–0.9)
MONOCYTES NFR BLD AUTO: 7 % (ref 2–10)
NEUTROPHILS # BLD AUTO: 2.9 THOU/UL (ref 2–7.7)
NEUTROPHILS NFR BLD AUTO: 65 % (ref 50–70)
NITRATE UR QL: NEGATIVE
PH UR STRIP: 5.5 [PH] (ref 5–8)
PLATELET # BLD AUTO: 131 THOU/UL (ref 140–440)
PMV BLD AUTO: 8.7 FL (ref 7–10)
POTASSIUM BLD-SCNC: 4.1 MMOL/L (ref 3.5–5)
PROT SERPL-MCNC: 6.5 G/DL (ref 6–8)
RBC # BLD AUTO: 4.02 MILL/UL (ref 4.4–6.2)
SODIUM SERPL-SCNC: 140 MMOL/L (ref 136–145)
SP GR UR STRIP: 1.02 (ref 1–1.03)
UROBILINOGEN UR STRIP-ACNC: ABNORMAL
WBC: 4.4 THOU/UL (ref 4–11)

## 2019-11-04 LAB — B BURGDOR IGG+IGM SER QL: 0.07 INDEX VALUE

## 2019-11-13 ENCOUNTER — COMMUNICATION - HEALTHEAST (OUTPATIENT)
Dept: INTERNAL MEDICINE | Facility: CLINIC | Age: 74
End: 2019-11-13

## 2019-11-13 DIAGNOSIS — E78.5 HYPERLIPEMIA: ICD-10-CM

## 2019-11-25 ENCOUNTER — COMMUNICATION - HEALTHEAST (OUTPATIENT)
Dept: INTERNAL MEDICINE | Facility: CLINIC | Age: 74
End: 2019-11-25

## 2019-11-25 DIAGNOSIS — E11.9 TYPE 2 DIABETES MELLITUS (H): ICD-10-CM

## 2019-12-11 ENCOUNTER — COMMUNICATION - HEALTHEAST (OUTPATIENT)
Dept: INTERNAL MEDICINE | Facility: CLINIC | Age: 74
End: 2019-12-11

## 2019-12-11 DIAGNOSIS — I63.9 CVA (CEREBRAL VASCULAR ACCIDENT) (H): ICD-10-CM

## 2020-05-02 ENCOUNTER — COMMUNICATION - HEALTHEAST (OUTPATIENT)
Dept: INTERNAL MEDICINE | Facility: CLINIC | Age: 75
End: 2020-05-02

## 2020-05-02 DIAGNOSIS — E11.9 TYPE 2 DIABETES MELLITUS (H): ICD-10-CM

## 2020-06-08 ENCOUNTER — COMMUNICATION - HEALTHEAST (OUTPATIENT)
Dept: INTERNAL MEDICINE | Facility: CLINIC | Age: 75
End: 2020-06-08

## 2020-06-08 DIAGNOSIS — I63.9 CVA (CEREBRAL VASCULAR ACCIDENT) (H): ICD-10-CM

## 2020-07-29 ENCOUNTER — OFFICE VISIT - HEALTHEAST (OUTPATIENT)
Dept: INTERNAL MEDICINE | Facility: CLINIC | Age: 75
End: 2020-07-29

## 2020-07-29 DIAGNOSIS — E55.9 VITAMIN D DEFICIENCY: ICD-10-CM

## 2020-07-29 DIAGNOSIS — E78.5 HYPERLIPIDEMIA, UNSPECIFIED HYPERLIPIDEMIA TYPE: ICD-10-CM

## 2020-07-29 DIAGNOSIS — F33.0 MILD EPISODE OF RECURRENT MAJOR DEPRESSIVE DISORDER (H): ICD-10-CM

## 2020-07-29 DIAGNOSIS — E11.8 TYPE 2 DIABETES MELLITUS WITH COMPLICATION, WITHOUT LONG-TERM CURRENT USE OF INSULIN (H): ICD-10-CM

## 2020-07-29 DIAGNOSIS — D70.8 OTHER NEUTROPENIA (H): ICD-10-CM

## 2020-07-29 DIAGNOSIS — R53.83 FATIGUE, UNSPECIFIED TYPE: ICD-10-CM

## 2020-07-29 DIAGNOSIS — Z98.890 H/O CAROTID ENDARTERECTOMY: ICD-10-CM

## 2020-07-29 DIAGNOSIS — N40.0 BENIGN PROSTATIC HYPERPLASIA WITHOUT LOWER URINARY TRACT SYMPTOMS: ICD-10-CM

## 2020-07-29 DIAGNOSIS — D69.6 THROMBOCYTOPENIA (H): ICD-10-CM

## 2020-07-29 DIAGNOSIS — G47.39 COMPLEX SLEEP APNEA SYNDROME: ICD-10-CM

## 2020-07-29 DIAGNOSIS — Z12.5 ENCOUNTER FOR SCREENING FOR MALIGNANT NEOPLASM OF PROSTATE: ICD-10-CM

## 2020-07-29 DIAGNOSIS — Z00.00 ROUTINE GENERAL MEDICAL EXAMINATION AT A HEALTH CARE FACILITY: ICD-10-CM

## 2020-07-29 DIAGNOSIS — R25.1 TREMOR: ICD-10-CM

## 2020-07-29 DIAGNOSIS — I63.9 CEREBROVASCULAR ACCIDENT (CVA), UNSPECIFIED MECHANISM (H): ICD-10-CM

## 2020-07-29 ASSESSMENT — PATIENT HEALTH QUESTIONNAIRE - PHQ9: SUM OF ALL RESPONSES TO PHQ QUESTIONS 1-9: 4

## 2020-07-29 ASSESSMENT — MIFFLIN-ST. JEOR: SCORE: 1531.44

## 2020-08-03 RX ORDER — ATORVASTATIN CALCIUM 80 MG/1
TABLET, FILM COATED ORAL
COMMUNITY
Start: 2019-11-13 | End: 2022-01-16

## 2020-08-03 RX ORDER — CHLORAL HYDRATE 500 MG
2 CAPSULE ORAL
COMMUNITY

## 2020-08-03 RX ORDER — CLOPIDOGREL BISULFATE 75 MG/1
TABLET ORAL
COMMUNITY
Start: 2020-06-10 | End: 2022-02-15

## 2020-08-03 RX ORDER — CITALOPRAM HYDROBROMIDE 40 MG/1
40 TABLET ORAL
COMMUNITY
End: 2021-06-10

## 2020-08-03 RX ORDER — ACETAMINOPHEN 500 MG
650 TABLET ORAL
COMMUNITY

## 2020-08-03 RX ORDER — MULTIPLE VITAMINS W/ MINERALS TAB 9MG-400MCG
1 TAB ORAL
COMMUNITY

## 2020-08-03 RX ORDER — ASPIRIN 81 MG/1
81 TABLET ORAL
COMMUNITY

## 2020-08-03 NOTE — PROGRESS NOTES
"Yong Guillermo is a 74 year old male who is being evaluated via a billable telephone visit.      The patient has been notified of following:     \"This Video visit will be conducted via a call between you and your physician/provider. We have found that certain health care needs can be provided without the need for a physical exam.  This service lets us provide the care you need with a video conversation.  If a prescription is necessary we can send it directly to your pharmacy.  If lab work is needed we can place an order for that and you can then stop by our lab to have the test done at a later time.    Video visits are billed at different rates depending on your insurance coverage. During this emergency period, for some insurers they may be billed the same as an in-person visit.  Please reach out to your insurance provider with any questions.    If during the course of the call the physician/provider feels a video visit is not appropriate, you will not be charged for this service.\"    Patient has given verbal consent for Video visit?  Yes    What phone number would you like to be contacted at? [phone]    How would you like to obtain your AVS? Mail a copy    Thank you for the opportunity to participate in the care of Yong Guillermo.     He is a 74 year old y/o male patient who comes to the sleep medicine clinic for follow up.  Since the patient's last clinical visit with our clinic, he reports that despite adequate hours of usage on his ASV, he continued to feel tired upon awakening.  In fact, he that he has been feeling tired for at least a year.  He feels current pressure setting is comfortable.     Assessment and Plan:  In summary Yong Guillermo is a 74 year old year old male who is here for follow-up.    1. Obstructive sleep apnea  I congratulated the patient on his excellent ASV usage.  I will keep him on the same pressure settings for now.  - COMPREHENSIVE DME    2. Hypersomnia  I recommend that the patient take " scheduled naps with his ASV for at least 3 months.  Upon return, if his symptoms fail to improve, I may consider an in lab titration study with ALBERT CHARLY.  - COMPREHENSIVE DME    Compliance Download data for 30 days:  Pressure setting: ASV  Residual AHI: 0.1 events per hour  Leak: Minimal  Compliance: 100%  Mask Tolerance: Good  Skin irritation: None  DME: [facility]      Sleep-Wake Cycle:    The patient likes to initiate sleep at around 11 PM with a sleep latency of 30 minutes to 1 hour. The patient has 1 nocturnal awakenings. Final wake up time is around 8-10 AM.    No past medical history on file.    No past surgical history on file.    Social History     Socioeconomic History     Marital status:      Spouse name: Not on file     Number of children: Not on file     Years of education: Not on file     Highest education level: Not on file   Occupational History     Not on file   Social Needs     Financial resource strain: Not on file     Food insecurity     Worry: Not on file     Inability: Not on file     Transportation needs     Medical: Not on file     Non-medical: Not on file   Tobacco Use     Smoking status: Not on file   Substance and Sexual Activity     Alcohol use: Not on file     Drug use: Not on file     Sexual activity: Not on file   Lifestyle     Physical activity     Days per week: Not on file     Minutes per session: Not on file     Stress: Not on file   Relationships     Social connections     Talks on phone: Not on file     Gets together: Not on file     Attends Druze service: Not on file     Active member of club or organization: Not on file     Attends meetings of clubs or organizations: Not on file     Relationship status: Not on file     Intimate partner violence     Fear of current or ex partner: Not on file     Emotionally abused: Not on file     Physically abused: Not on file     Forced sexual activity: Not on file   Other Topics Concern     Not on file   Social History  Narrative     Not on file       Current Outpatient Medications   Medication Sig Dispense Refill     acetaminophen (TYLENOL) 500 MG tablet Take 500 mg by mouth       aspirin 81 MG EC tablet Take 81 mg by mouth       atorvastatin (LIPITOR) 80 MG tablet        Cholecalciferol (VITAMIN D3) 25 MCG (1000 UT) CAPS Take 1,000 Units by mouth       citalopram (CELEXA) 40 MG tablet Take 40 mg by mouth       clopidogrel (PLAVIX) 75 MG tablet TAKE 1 TABLET DAILY       metFORMIN (GLUCOPHAGE) 500 MG tablet Take 1,000 mg by mouth       multivitamin w/minerals (MULTI-VITAMIN) tablet Take 1 tablet by mouth       Omega-3 1000 MG capsule Take 2 g by mouth       sitagliptin (JANUVIA) 100 MG tablet          Allergies   Allergen Reactions     Sulfa Drugs        No flowsheet data found.    Physical Exam:  There were no vitals taken for this visit.  BMI:There is no height or weight on file to calculate BMI.   GEN: NAD,  Psych: normal mood, normal affect    Labs/Studies:    I reviewed the efficacy and compliance report from his device. Data summarized on the HPI and the PAP compliance flow sheet.        Patient verbalized understanding of these issues, agrees with the plan and all questions were answered today. Patient was given an opportuntity to voice any other symptoms or concerns not listed above. Patient did not have any other symptoms or concerns.      Andre Kemp DO  Board Certified in Internal Medicine and Sleep Medicine    (Note created with Dragon voice recognition and unintended spelling errors and word substitutions may occur)     I spent a total of 22 minutes of face-to-face encounter and more than 50% of the encounter was used for counseling or coordination of care.    St[employer] was used for the video visit.    Audio and visual devices were used for this virtual clinic visit with permission from patient.

## 2020-08-04 ENCOUNTER — TELEPHONE (OUTPATIENT)
Dept: SLEEP MEDICINE | Facility: CLINIC | Age: 75
End: 2020-08-04

## 2020-08-04 ENCOUNTER — RECORDS - HEALTHEAST (OUTPATIENT)
Dept: ADMINISTRATIVE | Facility: OTHER | Age: 75
End: 2020-08-04

## 2020-08-04 ENCOUNTER — VIRTUAL VISIT (OUTPATIENT)
Dept: SLEEP MEDICINE | Facility: CLINIC | Age: 75
End: 2020-08-04
Payer: MEDICARE

## 2020-08-04 DIAGNOSIS — G47.33 OBSTRUCTIVE SLEEP APNEA: Primary | ICD-10-CM

## 2020-08-04 DIAGNOSIS — G47.10 HYPERSOMNIA: ICD-10-CM

## 2020-08-04 PROCEDURE — 99213 OFFICE O/P EST LOW 20 MIN: CPT | Mod: 95 | Performed by: INTERNAL MEDICINE

## 2020-08-07 ENCOUNTER — RECORDS - HEALTHEAST (OUTPATIENT)
Dept: ADMINISTRATIVE | Facility: OTHER | Age: 75
End: 2020-08-07

## 2020-08-07 LAB — RETINOPATHY: NEGATIVE

## 2020-08-11 ENCOUNTER — AMBULATORY - HEALTHEAST (OUTPATIENT)
Dept: LAB | Facility: CLINIC | Age: 75
End: 2020-08-11

## 2020-08-11 ENCOUNTER — RECORDS - HEALTHEAST (OUTPATIENT)
Dept: HEALTH INFORMATION MANAGEMENT | Facility: CLINIC | Age: 75
End: 2020-08-11

## 2020-08-11 DIAGNOSIS — E11.8 TYPE 2 DIABETES MELLITUS WITH COMPLICATION, WITHOUT LONG-TERM CURRENT USE OF INSULIN (H): ICD-10-CM

## 2020-08-11 DIAGNOSIS — Z00.00 ROUTINE GENERAL MEDICAL EXAMINATION AT A HEALTH CARE FACILITY: ICD-10-CM

## 2020-08-11 DIAGNOSIS — D69.6 THROMBOCYTOPENIA (H): ICD-10-CM

## 2020-08-11 DIAGNOSIS — I63.9 CEREBROVASCULAR ACCIDENT (CVA), UNSPECIFIED MECHANISM (H): ICD-10-CM

## 2020-08-11 DIAGNOSIS — E55.9 VITAMIN D DEFICIENCY: ICD-10-CM

## 2020-08-11 DIAGNOSIS — Z12.5 ENCOUNTER FOR SCREENING FOR MALIGNANT NEOPLASM OF PROSTATE: ICD-10-CM

## 2020-08-11 DIAGNOSIS — N40.0 BENIGN PROSTATIC HYPERPLASIA WITHOUT LOWER URINARY TRACT SYMPTOMS: ICD-10-CM

## 2020-08-11 DIAGNOSIS — R53.83 FATIGUE, UNSPECIFIED TYPE: ICD-10-CM

## 2020-08-11 DIAGNOSIS — G47.39 COMPLEX SLEEP APNEA SYNDROME: ICD-10-CM

## 2020-08-11 LAB
ALBUMIN SERPL-MCNC: 4.1 G/DL (ref 3.5–5)
ALBUMIN UR-MCNC: NEGATIVE MG/DL
ALP SERPL-CCNC: 68 U/L (ref 45–120)
ALT SERPL W P-5'-P-CCNC: 43 U/L (ref 0–45)
ANION GAP SERPL CALCULATED.3IONS-SCNC: 10 MMOL/L (ref 5–18)
APPEARANCE UR: CLEAR
AST SERPL W P-5'-P-CCNC: 24 U/L (ref 0–40)
BILIRUB SERPL-MCNC: 0.8 MG/DL (ref 0–1)
BILIRUB UR QL STRIP: NEGATIVE
BUN SERPL-MCNC: 16 MG/DL (ref 8–28)
CALCIUM SERPL-MCNC: 9.9 MG/DL (ref 8.5–10.5)
CHLORIDE BLD-SCNC: 101 MMOL/L (ref 98–107)
CHOLEST SERPL-MCNC: 118 MG/DL
CO2 SERPL-SCNC: 30 MMOL/L (ref 22–31)
COLOR UR AUTO: YELLOW
CREAT SERPL-MCNC: 0.85 MG/DL (ref 0.7–1.3)
CREAT UR-MCNC: 74.7 MG/DL
ERYTHROCYTE [DISTWIDTH] IN BLOOD BY AUTOMATED COUNT: 11.6 % (ref 11–14.5)
FASTING STATUS PATIENT QL REPORTED: YES
GFR SERPL CREATININE-BSD FRML MDRD: >60 ML/MIN/1.73M2
GLUCOSE BLD-MCNC: 239 MG/DL (ref 70–125)
GLUCOSE UR STRIP-MCNC: NEGATIVE MG/DL
HBA1C MFR BLD: 7.6 %
HCT VFR BLD AUTO: 42.1 % (ref 40–54)
HDLC SERPL-MCNC: 33 MG/DL
HGB BLD-MCNC: 14.5 G/DL (ref 14–18)
HGB UR QL STRIP: NEGATIVE
KETONES UR STRIP-MCNC: NEGATIVE MG/DL
LDLC SERPL CALC-MCNC: 61 MG/DL
LEUKOCYTE ESTERASE UR QL STRIP: NEGATIVE
MAGNESIUM SERPL-MCNC: 1.6 MG/DL (ref 1.8–2.6)
MCH RBC QN AUTO: 34.6 PG (ref 27–34)
MCHC RBC AUTO-ENTMCNC: 34.3 G/DL (ref 32–36)
MCV RBC AUTO: 101 FL (ref 80–100)
MICROALBUMIN UR-MCNC: <0.5 MG/DL (ref 0–1.99)
MICROALBUMIN/CREAT UR: NORMAL MG/G{CREAT}
NITRATE UR QL: NEGATIVE
PH UR STRIP: 6 [PH] (ref 5–8)
PLATELET # BLD AUTO: 136 THOU/UL (ref 140–440)
PMV BLD AUTO: 8.1 FL (ref 7–10)
POTASSIUM BLD-SCNC: 4.6 MMOL/L (ref 3.5–5)
PROT SERPL-MCNC: 6.8 G/DL (ref 6–8)
PSA SERPL-MCNC: 0.3 NG/ML (ref 0–6.5)
RBC # BLD AUTO: 4.17 MILL/UL (ref 4.4–6.2)
SODIUM SERPL-SCNC: 141 MMOL/L (ref 136–145)
SP GR UR STRIP: 1.01 (ref 1–1.03)
TRIGL SERPL-MCNC: 118 MG/DL
TSH SERPL DL<=0.005 MIU/L-ACNC: 1.39 UIU/ML (ref 0.3–5)
UROBILINOGEN UR STRIP-ACNC: ABNORMAL
VIT B12 SERPL-MCNC: 331 PG/ML (ref 213–816)
WBC: 4.3 THOU/UL (ref 4–11)

## 2020-08-12 LAB
25(OH)D3 SERPL-MCNC: 50.5 NG/ML (ref 30–80)
25(OH)D3 SERPL-MCNC: 50.5 NG/ML (ref 30–80)

## 2020-08-13 ENCOUNTER — OFFICE VISIT (OUTPATIENT)
Dept: NEUROLOGY | Facility: CLINIC | Age: 75
End: 2020-08-13
Payer: MEDICARE

## 2020-08-13 VITALS — HEIGHT: 71 IN | WEIGHT: 170 LBS | BODY MASS INDEX: 23.8 KG/M2

## 2020-08-13 DIAGNOSIS — R25.1 TREMOR OF RIGHT HAND: ICD-10-CM

## 2020-08-13 DIAGNOSIS — G25.0 BENIGN HEAD TREMOR: Primary | ICD-10-CM

## 2020-08-13 PROBLEM — F32.A DEPRESSIVE DISORDER: Status: ACTIVE | Noted: 2020-08-13

## 2020-08-13 PROBLEM — E78.5 HYPERLIPEMIA: Status: ACTIVE | Noted: 2020-08-13

## 2020-08-13 PROBLEM — E11.8 TYPE 2 DIABETES MELLITUS WITH COMPLICATION, WITHOUT LONG-TERM CURRENT USE OF INSULIN (H): Status: ACTIVE | Noted: 2020-08-13

## 2020-08-13 PROCEDURE — 99203 OFFICE O/P NEW LOW 30 MIN: CPT | Mod: 95 | Performed by: PSYCHIATRY & NEUROLOGY

## 2020-08-13 ASSESSMENT — MIFFLIN-ST. JEOR: SCORE: 1539.27

## 2020-08-13 NOTE — PATIENT INSTRUCTIONS
I would agree that your head tremor is most likely related to an essential tremor and would not require any direct treatment at this time.  If it were to become problematic, use of a beta-blocker is the medication most likely to help.  I would like to see you in our office in a 3-4-week period of time to look at the right arm tremor.  I did see a slight tremor with you walking involving your right arm but I do not see other changes that I can do on a video visit that would clarify if it was Parkinson's or not.  We will plan to see you at that time.

## 2020-08-13 NOTE — LETTER
"    8/13/2020         RE: Yong Guillermo  [address]  [address]  [place] [address]        Dear Colleague,    Thank you for referring your patient, Yong Guillermo, to the [facility] NEUROLOG[facility] [place]. Please see a copy of my visit note below.        Yong Guillermo  Age:74 year old  MRN [id number]  PCP Anita Pineda    Consult requested by: Anita Pineda  Regarding: Tremor    Allergies: Sulfa drugs  Medications:  Current Outpatient Medications   Medication Sig Dispense Refill     acetaminophen (TYLENOL) 500 MG tablet Take 500 mg by mouth       aspirin 81 MG EC tablet Take 81 mg by mouth       atorvastatin (LIPITOR) 80 MG tablet        Cholecalciferol (VITAMIN D3) 25 MCG (1000 UT) CAPS Take 1,000 Units by mouth       citalopram (CELEXA) 40 MG tablet Take 40 mg by mouth       clopidogrel (PLAVIX) 75 MG tablet TAKE 1 TABLET DAILY       metFORMIN (GLUCOPHAGE) 500 MG tablet Take 1,000 mg by mouth       multivitamin w/minerals (MULTI-VITAMIN) tablet Take 1 tablet by mouth       Omega-3 1000 MG capsule Take 2 g by mouth       sitagliptin (JANUVIA) 100 MG tablet        Yong Guillermo is a 74 year old male who is being evaluated via a billable video visit.      The patient has been notified of following:     \"This video visit will be conducted via a call between you and your physician/provider. We have found that certain health care needs can be provided without the need for an in-person physical exam.  This service lets us provide the care you need with a video conversation.  If a prescription is necessary we can send it directly to your pharmacy.  If lab work is needed we can place an order for that and you can then stop by our lab to have the test done at a later time.    Video visits are billed at different rates depending on your insurance coverage.  Please reach out to your insurance provider with any questions.    If during the course of the call the physician/provider feels a video visit is not " "appropriate, you will not be charged for this service.\"    Patient has given verbal consent for Video visit? {YES  How would you like to obtain your AVS? Mail a copy  If you are dropped from the video visit, the video invite should be resent to:smart phone  Will anyone else be joining your video visitwife        Video-Visit Details    Type of service:  Video Visit    Video Start Time:  9:25 AM  Video End Time: 9:57AM    Originating Location (pt. Location): home    Distant Location (provider location):  [facility] NEUROLOGY [facility]          History of Present Illness: 74-year-old left handed male seen at the request of Dr Pineda regarding head tremor and also a question of tremor involving his right arm.  He has noted problems of head tremor that is gone on for a two-year period of time there is no family history of tremor that he knows of.  He has also noted an occasional tremor on the right side that may be pill-rolling.  He has a significant neurologic history of having had a CVA in January 2009 that affected the left side of his brain, with 2 small areas of stroke being noted, though had resolution of symptoms.  He had undergone a right carotid endarterectomy in 2007 as a result of a right hemisphere stroke with there being a stenosis of the right internal carotid artery noted.  In terms of the tremor he does drink caffeine in the morning 2 to 3 cups-but then will drink decaffeinated later in the day.  There is no voice tremor noted.  There is been no hallucinations.  He reports his gait is not been as good as he would like to be though some of it related to right hip problems.  No speech or swallowing difficulties.  No new weakness problems.  He reports his writing is poor.  He does have some numbness involving his right foot.  He has risk factors for stroke including hyperlipidemia, diabetes mellitus and sleep apnea.  He does know about Parkinson's disease as his wife has Parkinson's " disease.        PAST ILLNESSES:   Medical: History reviewed. No pertinent past medical history.   Patient Active Problem List   Diagnosis     Benign essential tremor     Complex sleep apnea syndrome     CVA (cerebral vascular accident) (H)     Type 2 diabetes mellitus with complication, without long-term current use of insulin (H)     H/O carotid endarterectomy     Hyperlipemia     Depressive disorder        Surgical:  Surgical History    Surgery Date Site/Laterality Comments   FL EXCIS CERV DISK,ONE LEVEL     Description: Laminectomy With Disc Removal; Recorded: 04/29/2008; Comments: Lumbar level     FL EXCIS TENDON SHEATH LESION, HAND/FINGER     Description: Hand Excision Of A Tendon Cyst; Recorded: 04/29/2008;     FL EXCISION,BENIGN TUMOR,MANDIBLE     Description: Jaw Excision Benign Cyst / Tumor; Proc Date: 05/01/2004;     TONSILLECTOMY          VASECTOMY          HERNIA REPAIR 5/21/104   Right inguinal hernia and umbilical hernia right cheek cyst on the face.     CAROTID ENDARTERECTOMY 09/27/2006 Right      ROTATOR CUFF REPAIR   Right      CERVICAL DISC SURGERY 07/29/2005   C5/6 and C6/7 lamina foraminotomy, scope with partial facetectomy and excision of hard disc herniation.       M        SOCIAL: Patient is .  Wife has Parkinson's disease.  Social History     Socioeconomic History     Marital status:      Spouse name: Not on file     Number of children: Not on file     Years of education: Not on file     Highest education level: Not on file   Occupational History     Not on file   Social Needs     Financial resource strain: Not on file     Food insecurity     Worry: Not on file     Inability: Not on file     Transportation needs     Medical: Not on file     Non-medical: Not on file   Tobacco Use     Smoking status: Former Smoker     Types: Cigars, Pipe     Smokeless tobacco: Current User     Tobacco comment: Quit 20 years ago.   Substance and Sexual Activity     Alcohol use: Not Currently      Drug use: Never     Sexual activity: Not on file   Lifestyle     Physical activity     Days per week: Not on file     Minutes per session: Not on file     Stress: Not on file   Relationships     Social connections     Talks on phone: Not on file     Gets together: Not on file     Attends Jehovah's witness service: Not on file     Active member of club or organization: Not on file     Attends meetings of clubs or organizations: Not on file     Relationship status: Not on file     Intimate partner violence     Fear of current or ex partner: Not on file     Emotionally abused: Not on file     Physically abused: Not on file     Forced sexual activity: Not on file   Other Topics Concern     Parent/sibling w/ CABG, MI or angioplasty before 65F 55M? Not Asked   Social History Narrative     Not on file     Employment: Retired.  FAMILY HISTORY:  Family History   Problem Relation Age of Onset     Snoring Mother      Diabetes Mother      Cancer Mother         Brain     Heart Disease Brother      Parents: Mother  from metastatic lung cancer with it having spread to the brain mother  age 68.  Father  at age 70.  Had diabetes mellitus.  :                        REVIEW OF SYSTEMS  Constitutional: No fever, chills, weight loss/weight gain.  Skin: No rash.  HEENT: No tinnitus.  No loss of vision or double vision.  No hearing loss or vertigo.  Respiratory: No shortness of breath or cough.  Cardiovascular no chest pain or rapid heart rate.  Gastrointestinal: No constipation.  No abdominal pain.  Genitourinary: Positive slow flow.  Frequency.  Musculoskeletal: Positive right hip pain.  Neurologic: See above.  Psychiatric: Ashen.  Hematologic/Lymphatic/Immunologic: Positive history of thrombocytopenia  Endocrine: Positive diabetes last A1c 7.1        PHYSICAL EXAMINATION:    General appearance: No acute distress.  :     NEUROLOGIC EXAMINATION:  Oriented x3.  Can tell me the name of the president but not the governor.  Could spell  "world forward and backward naming and repetition appeared normal.  Voice was of normal volume.  No tremor of voice.  [place] 3/3 objects.  Remembered 3/3 objects.  Positive head tremor \"no \"tremor.  Shoulder shrug equal.  Extraocular movements full.  Visual fields full to confrontation.  Face moves symmetrically.  Tongue midline.  Good facial expressivity.  Motor-no drift noted.  No significant arm tremor with hands held out straight.  No resting tremor seen.  Arose from a chair without difficulty    Gait: Normal stride.  Does have an occasional resting tremor of walking.  Armswing equal.    LAB DATA: B12 level 331.  TSH 1.39    IMPRESSION: 1.  Head tremor.  This is most consistent with essential tremor.  It is not causing him any difficulty at this time and as such I do not think any medical treatment would be necessary.  Typically beta-blockers will be used for individuals with head tremor that becomes  Right arm tremor.  He did see a slight tremor of his right arm with walking.  Otherwise I did not see other changes that would clearly suggest Parkinson's disease.  I do recommend a follow-up in the office in a 3 to 4-week period of time to look further at the possibility of Parkinson's disease.        Lester Luna MD    Again, thank you for allowing me to participate in the care of your patient.        Sincerely,        Lester Luna MD, MD    "

## 2020-08-13 NOTE — NURSING NOTE
Smart video visit  [phone].  Chief Complaint   Patient presents with     Tremors     Head      Britany Lamb on 8/13/2020 at 9:05 AM

## 2020-08-13 NOTE — PROGRESS NOTES
"    Yong Guillermo  Age:74 year old  MRN [id number]  PCP Anita Pineda    Consult requested by: Anita Pineda  Regarding: Tremor    Allergies: Sulfa drugs  Medications:  Current Outpatient Medications   Medication Sig Dispense Refill     acetaminophen (TYLENOL) 500 MG tablet Take 500 mg by mouth       aspirin 81 MG EC tablet Take 81 mg by mouth       atorvastatin (LIPITOR) 80 MG tablet        Cholecalciferol (VITAMIN D3) 25 MCG (1000 UT) CAPS Take 1,000 Units by mouth       citalopram (CELEXA) 40 MG tablet Take 40 mg by mouth       clopidogrel (PLAVIX) 75 MG tablet TAKE 1 TABLET DAILY       metFORMIN (GLUCOPHAGE) 500 MG tablet Take 1,000 mg by mouth       multivitamin w/minerals (MULTI-VITAMIN) tablet Take 1 tablet by mouth       Omega-3 1000 MG capsule Take 2 g by mouth       sitagliptin (JANUVIA) 100 MG tablet        Yong Guillermo is a 74 year old male who is being evaluated via a billable video visit.      The patient has been notified of following:     \"This video visit will be conducted via a call between you and your physician/provider. We have found that certain health care needs can be provided without the need for an in-person physical exam.  This service lets us provide the care you need with a video conversation.  If a prescription is necessary we can send it directly to your pharmacy.  If lab work is needed we can place an order for that and you can then stop by our lab to have the test done at a later time.    Video visits are billed at different rates depending on your insurance coverage.  Please reach out to your insurance provider with any questions.    If during the course of the call the physician/provider feels a video visit is not appropriate, you will not be charged for this service.\"    Patient has given verbal consent for Video visit? {YES  How would you like to obtain your AVS? Mail a copy  If you are dropped from the video visit, the video invite should be resent to:smart phone  Will " anyone else be joining your video visitwife        Video-Visit Details    Type of service:  Video Visit    Video Start Time:  9:25 AM  Video End Time: 9:57AM    Originating Location (pt. Location): home    Distant Location (provider location):  [facility] NEUROLOGY [facility]          History of Present Illness: 74-year-old left handed male seen at the request of Dr Pineda regarding head tremor and also a question of tremor involving his right arm.  He has noted problems of head tremor that is gone on for a two-year period of time there is no family history of tremor that he knows of.  He has also noted an occasional tremor on the right side that may be pill-rolling.  He has a significant neurologic history of having had a CVA in January 2009 that affected the left side of his brain, with 2 small areas of stroke being noted, though had resolution of symptoms.  He had undergone a right carotid endarterectomy in 2007 as a result of a right hemisphere stroke with there being a stenosis of the right internal carotid artery noted.  In terms of the tremor he does drink caffeine in the morning 2 to 3 cups-but then will drink decaffeinated later in the day.  There is no voice tremor noted.  There is been no hallucinations.  He reports his gait is not been as good as he would like to be though some of it related to right hip problems.  No speech or swallowing difficulties.  No new weakness problems.  He reports his writing is poor.  He does have some numbness involving his right foot.  He has risk factors for stroke including hyperlipidemia, diabetes mellitus and sleep apnea.  He does know about Parkinson's disease as his wife has Parkinson's disease.        PAST ILLNESSES:   Medical: History reviewed. No pertinent past medical history.   Patient Active Problem List   Diagnosis     Benign essential tremor     Complex sleep apnea syndrome     CVA (cerebral vascular accident) (H)     Type 2 diabetes  mellitus with complication, without long-term current use of insulin (H)     H/O carotid endarterectomy     Hyperlipemia     Depressive disorder        Surgical:  Surgical History    Surgery Date Site/Laterality Comments   WA EXCIS CERV DISK,ONE LEVEL     Description: Laminectomy With Disc Removal; Recorded: 04/29/2008; Comments: Lumbar level     WA EXCIS TENDON SHEATH LESION, HAND/FINGER     Description: Hand Excision Of A Tendon Cyst; Recorded: 04/29/2008;     WA EXCISION,BENIGN TUMOR,MANDIBLE     Description: Jaw Excision Benign Cyst / Tumor; Proc Date: 05/01/2004;     TONSILLECTOMY          VASECTOMY          HERNIA REPAIR 5/21/104   Right inguinal hernia and umbilical hernia right cheek cyst on the face.     CAROTID ENDARTERECTOMY 09/27/2006 Right      ROTATOR CUFF REPAIR   Right      CERVICAL DISC SURGERY 07/29/2005   C5/6 and C6/7 lamina foraminotomy, scope with partial facetectomy and excision of hard disc herniation.       M        SOCIAL: Patient is .  Wife has Parkinson's disease.  Social History     Socioeconomic History     Marital status:      Spouse name: Not on file     Number of children: Not on file     Years of education: Not on file     Highest education level: Not on file   Occupational History     Not on file   Social Needs     Financial resource strain: Not on file     Food insecurity     Worry: Not on file     Inability: Not on file     Transportation needs     Medical: Not on file     Non-medical: Not on file   Tobacco Use     Smoking status: Former Smoker     Types: Cigars, Pipe     Smokeless tobacco: Current User     Tobacco comment: Quit 20 years ago.   Substance and Sexual Activity     Alcohol use: Not Currently     Drug use: Never     Sexual activity: Not on file   Lifestyle     Physical activity     Days per week: Not on file     Minutes per session: Not on file     Stress: Not on file   Relationships     Social connections     Talks on phone: Not on file     Gets  "together: Not on file     Attends Gnosticist service: Not on file     Active member of club or organization: Not on file     Attends meetings of clubs or organizations: Not on file     Relationship status: Not on file     Intimate partner violence     Fear of current or ex partner: Not on file     Emotionally abused: Not on file     Physically abused: Not on file     Forced sexual activity: Not on file   Other Topics Concern     Parent/sibling w/ CABG, MI or angioplasty before 65F 55M? Not Asked   Social History Narrative     Not on file     Employment: Retired.  FAMILY HISTORY:  Family History   Problem Relation Age of Onset     Snoring Mother      Diabetes Mother      Cancer Mother         Brain     Heart Disease Brother      Parents: Mother  from metastatic lung cancer with it having spread to the brain mother  age 68.  Father  at age 70.  Had diabetes mellitus.  :                        REVIEW OF SYSTEMS  Constitutional: No fever, chills, weight loss/weight gain.  Skin: No rash.  HEENT: No tinnitus.  No loss of vision or double vision.  No hearing loss or vertigo.  Respiratory: No shortness of breath or cough.  Cardiovascular no chest pain or rapid heart rate.  Gastrointestinal: No constipation.  No abdominal pain.  Genitourinary: Positive slow flow.  Frequency.  Musculoskeletal: Positive right hip pain.  Neurologic: See above.  Psychiatric: Ashen.  Hematologic/Lymphatic/Immunologic: Positive history of thrombocytopenia  Endocrine: Positive diabetes last A1c 7.1        PHYSICAL EXAMINATION:    General appearance: No acute distress.  :     NEUROLOGIC EXAMINATION:  Oriented x3.  Can tell me the name of the president but not the governor.  Could spell world forward and backward naming and repetition appeared normal.  Voice was of normal volume.  No tremor of voice.  [place] 3/3 objects.  Remembered 3/3 objects.  Positive head tremor \"no \"tremor.  Shoulder shrug equal.  Extraocular movements full.  " Visual fields full to confrontation.  Face moves symmetrically.  Tongue midline.  Good facial expressivity.  Motor-no drift noted.  No significant arm tremor with hands held out straight.  No resting tremor seen.  Arose from a chair without difficulty    Gait: Normal stride.  Does have an occasional resting tremor of walking.  Armswing equal.    LAB DATA: B12 level 331.  TSH 1.39    IMPRESSION: 1.  Head tremor.  This is most consistent with essential tremor.  It is not causing him any difficulty at this time and as such I do not think any medical treatment would be necessary.  Typically beta-blockers will be used for individuals with head tremor that becomes  Right arm tremor.  He did see a slight tremor of his right arm with walking.  Otherwise I did not see other changes that would clearly suggest Parkinson's disease.  I do recommend a follow-up in the office in a 3 to 4-week period of time to look further at the possibility of Parkinson's disease.        Lester Luna MD

## 2020-09-06 ENCOUNTER — COMMUNICATION - HEALTHEAST (OUTPATIENT)
Dept: INTERNAL MEDICINE | Facility: CLINIC | Age: 75
End: 2020-09-06

## 2020-09-06 DIAGNOSIS — I63.9 CVA (CEREBRAL VASCULAR ACCIDENT) (H): ICD-10-CM

## 2020-09-08 ENCOUNTER — RECORDS - HEALTHEAST (OUTPATIENT)
Dept: ADMINISTRATIVE | Facility: OTHER | Age: 75
End: 2020-09-08

## 2020-09-08 ENCOUNTER — OFFICE VISIT (OUTPATIENT)
Dept: NEUROLOGY | Facility: CLINIC | Age: 75
End: 2020-09-08
Payer: MEDICARE

## 2020-09-08 VITALS
HEART RATE: 79 BPM | WEIGHT: 179 LBS | SYSTOLIC BLOOD PRESSURE: 113 MMHG | HEIGHT: 71 IN | DIASTOLIC BLOOD PRESSURE: 59 MMHG | BODY MASS INDEX: 25.06 KG/M2

## 2020-09-08 DIAGNOSIS — G25.0 BENIGN HEAD TREMOR: Primary | ICD-10-CM

## 2020-09-08 DIAGNOSIS — R25.1 TREMOR OF RIGHT HAND: ICD-10-CM

## 2020-09-08 PROCEDURE — 99214 OFFICE O/P EST MOD 30 MIN: CPT | Performed by: PSYCHIATRY & NEUROLOGY

## 2020-09-08 RX ORDER — MULTIVIT-MIN/IRON/FOLIC ACID/K 18-600-40
1000 CAPSULE ORAL DAILY
COMMUNITY

## 2020-09-08 RX ORDER — B-COMPLEX WITH VITAMIN C
1 TABLET ORAL DAILY
COMMUNITY
End: 2022-09-28

## 2020-09-08 ASSESSMENT — MIFFLIN-ST. JEOR: SCORE: 1571.13

## 2020-09-08 NOTE — PROGRESS NOTES
Yong Guillermo  75 year old  MRN:[id number]  PCP: Anita Pineda  No ref. provider found    Allergies   Allergen Reactions     Alteplase      Sulfa Drugs        Current Outpatient Medications   Medication Sig Dispense Refill     acetaminophen (TYLENOL) 500 MG tablet Take 500 mg by mouth       Ascorbic Acid (VITAMIN C) 500 MG CAPS Take by mouth daily       aspirin 81 MG EC tablet Take 81 mg by mouth       atorvastatin (LIPITOR) 80 MG tablet        Calcium Carbonate-Vitamin D (CALCIUM 600/VITAMIN D PO) Take by mouth daily       Cholecalciferol (VITAMIN D3) 25 MCG (1000 UT) CAPS Take 1,000 Units by mouth       citalopram (CELEXA) 40 MG tablet Take 40 mg by mouth       clopidogrel (PLAVIX) 75 MG tablet TAKE 1 TABLET DAILY       metFORMIN (GLUCOPHAGE) 500 MG tablet Take 1,000 mg by mouth       multivitamin w/minerals (MULTI-VITAMIN) tablet Take 1 tablet by mouth       Omega-3 1000 MG capsule Take 2 g by mouth       sitagliptin (JANUVIA) 100 MG tablet        vitamin B complex with vitamin C (VITAMIN  B COMPLEX) tablet Take 1 tablet by mouth daily         CHIEF COMPLAINT: Follow-up of tremor.    HISTORY SINCE LAST VISIT: I saw Mr. Guillermo in the office today in follow-up of tremor.  When I had seen him on a tele-visit previously, he did have evidence of a head tremor that there was an occasional right hand tremor noted.  He does have a history of a cervical laminectomy having been done in 2005 that was at C5-6 and C6-7.  He reports after the surgery had persistent numbness of his thumb and index finger on the right and will at times rub those fingers together.  He has not had any loss of vision or double vision no speech problems or swallowing difficulties at this time.  He does feel little bit unsteady on his walking and has inserts in his shoes.  He does report some difficulty with his handwriting-he is left-handed-which had gotten worse after his stroke.  There have been no hallucinations.  He has an active tremor.   He reports there have been some concerns of memory difficulties and [facility] where he was told some things he did well and some things he did not do well.      PAST MEDICAL HISTORY:   History reviewed. No pertinent past medical history.  Patient Active Problem List   Diagnosis     Benign essential tremor     Complex sleep apnea syndrome     CVA (cerebral vascular accident) (H)     Type 2 diabetes mellitus with complication, without long-term current use of insulin (H)     H/O carotid endarterectomy     Hyperlipemia     Depressive disorder   Status post cervical [id number] and C6-7.  Status post right carotid endarterectomy  Status post right inguinal hernia surgery   Excision of tendon cyst   Status post right rotator cuff repair   Status post vastectomy.  Status post tonsillectomy  FAMILY HISTORY:  Family History   Problem Relation Age of Onset     Snoring Mother      Diabetes Mother      Cancer Mother         Brain     Heart Disease Brother        SOCIAL HISTORY:  Social History     Socioeconomic History     Marital status:      Spouse name: Not on file     Number of children: Not on file     Years of education: Not on file     Highest education level: Not on file   Occupational History     Not on file   Social Needs     Financial resource strain: Not on file     Food insecurity     Worry: Not on file     Inability: Not on file     Transportation needs     Medical: Not on file     Non-medical: Not on file   Tobacco Use     Smoking status: Former Smoker     Types: Cigars, Pipe     Smokeless tobacco: Current User     Tobacco comment: Quit 40 years ago.   Substance and Sexual Activity     Alcohol use: Not Currently     Drug use: Never     Sexual activity: Not on file   Lifestyle     Physical activity     Days per week: Not on file     Minutes per session: Not on file     Stress: Not on file   Relationships     Social connections     Talks on phone: Not on file      "Gets together: Not on file     Attends Gnosticist service: Not on file     Active member of club or organization: Not on file     Attends meetings of clubs or organizations: Not on file     Relationship status: Not on file     Intimate partner violence     Fear of current or ex partner: Not on file     Emotionally abused: Not on file     Physically abused: Not on file     Forced sexual activity: Not on file   Other Topics Concern     Parent/sibling w/ CABG, MI or angioplasty before 65F 55M? Not Asked   Social History Narrative     Not on file       REVIEW OF SYSTEMS:    Constitutional: No fever chills weight loss weight gain.    Eyes: No loss of vision or double vision.     Respiratory: No shortness of breath.    Gastrointestinal: No abdominal pain or constipation.  Genitourinary:   Musculoskeletal: Does have rotator cuff problems on the right  Neurologic:   Psychiatric:         PHYSICAL EXAMINATION:    General appearance: No acute distress.  Appropriately attired and groomed.  :   HEENT:     Neck: No bruits.    Heart: S1-S2       NEUROLOGIC EXAMINATION:    Alert oriented x3.  Speech is fluent.  Good naming and repetition.  [place] 3/3 objects.  Remembered 3/3 objects at 15 minutes time.  Extraocular movements full.  Visual fields full.  Face moves symmetrically.  Palate symmetric.  Tongue midline.  Positive good facial expressivity.  Neck without bruits.  Motor strength 5/5.  Tone normal.  Mild decrease of opening and closing of hands on the left or right.  He occasionally will rub his index finger on his thumb but not antiarrhythmic.  DTRs were 1+/4 upper extremity 1/4 knees absent ankle reflexes Babinski downgoing.  He derick from a chair without problem.  Gait is of normal stride.  He turns in one-step.  Slightly slowed left arm swing relative to right.  No tremor noted.  Negative pull test.  Positive head tremor-\"no \" type of tremor      IMPRESSION: 1.  Right hand tremor.  Think this is more of a habit that he " has just rubbing his index finger and thumb on occasion.  He does not have other signs of Parkinson's disease and that there is no rigidity no decrease of opening closing of hands on the right (it was slower on the left which is likely related to his prior stroke) has no postural instability and otherwise does okay.  We did discuss that I would not consider doing anything with it anyways and he is in agreement with that.  We did discuss seeing him back in 1 year to see if there is anything different that might suggest the possibility of Parkinson's disease or other process.  He does report that he did have [place] dance as a child, which we discussed is most commonly associated with rheumatic fever but does not remember any history of rheumatic fever.  2.  Essential tremor-he does have a mild head tremor that is present which again he would not wish to do anything treatment wise with and I think it is appropriate as it is not causing any difficulties.  We discussed if that were to cause a problem, a low-dose of propranolol might provide greatest benefit.  Follow-up: 1 year.  Time with patient: 25 minutes, 15 minutes which was counseling/coordination of care.            Lester Luna MD, MD

## 2020-09-08 NOTE — NURSING NOTE
Rm 5  Chief Complaint   Patient presents with     Head tremor     Tremor is the same.     Britany Lamb on 9/8/2020 at 10:02 AM

## 2020-09-08 NOTE — LETTER
9/8/2020         RE: Yong Guillermo  [address]  [address]  [place] [address]        Dear Colleague,    Thank you for referring your patient, Yong Guillermo, to the [facility] NEUROLOGY [place]. Please see a copy of my visit note below.        Yong Guillermo  75 year old  MRN:[id number]  PCP: Anita Pineda  No ref. provider found    Allergies   Allergen Reactions     Alteplase      Sulfa Drugs        Current Outpatient Medications   Medication Sig Dispense Refill     acetaminophen (TYLENOL) 500 MG tablet Take 500 mg by mouth       Ascorbic Acid (VITAMIN C) 500 MG CAPS Take by mouth daily       aspirin 81 MG EC tablet Take 81 mg by mouth       atorvastatin (LIPITOR) 80 MG tablet        Calcium Carbonate-Vitamin D (CALCIUM 600/VITAMIN D PO) Take by mouth daily       Cholecalciferol (VITAMIN D3) 25 MCG (1000 UT) CAPS Take 1,000 Units by mouth       citalopram (CELEXA) 40 MG tablet Take 40 mg by mouth       clopidogrel (PLAVIX) 75 MG tablet TAKE 1 TABLET DAILY       metFORMIN (GLUCOPHAGE) 500 MG tablet Take 1,000 mg by mouth       multivitamin w/minerals (MULTI-VITAMIN) tablet Take 1 tablet by mouth       Omega-3 1000 MG capsule Take 2 g by mouth       sitagliptin (JANUVIA) 100 MG tablet        vitamin B complex with vitamin C (VITAMIN  B COMPLEX) tablet Take 1 tablet by mouth daily         CHIEF COMPLAINT: Follow-up of tremor.    HISTORY SINCE LAST VISIT: I saw Mr. Guillermo in the office today in follow-up of tremor.  When I had seen him on a tele-visit previously, he did have evidence of a head tremor that there was an occasional right hand tremor noted.  He does have a history of a cervical laminectomy having been done in 2005 that was at C5-6 and C6-7.  He reports after the surgery had persistent numbness of his thumb and index finger on the right and will at times rub those fingers together.  He has not had any loss of vision or double vision no speech problems or swallowing difficulties at  this time.  He does feel little bit unsteady on his walking and has inserts in his shoes.  He does report some difficulty with his handwriting-he is left-handed-which had gotten worse after his stroke.  There have been no hallucinations.  He has an active tremor.  He reports there have been some concerns of memory difficulties and [facility], where he was told some things he did well and some things he did not do well.      PAST MEDICAL HISTORY:   History reviewed. No pertinent past medical history.  Patient Active Problem List   Diagnosis     Benign essential tremor     Complex sleep apnea syndrome     CVA (cerebral vascular accident) (H)     Type 2 diabetes mellitus with complication, without long-term current use of insulin (H)     H/O carotid endarterectomy     Hyperlipemia     Depressive disorder   Status post cervical [id number] and C6-7.  Status post right carotid endarterectomy  Status post right inguinal hernia surgery   Excision of tendon cyst   Status post right rotator cuff repair   Status post vastectomy.  Status post tonsillectomy  FAMILY HISTORY:  Family History   Problem Relation Age of Onset     Snoring Mother      Diabetes Mother      Cancer Mother         Brain     Heart Disease Brother        SOCIAL HISTORY:  Social History     Socioeconomic History     Marital status:      Spouse name: Not on file     Number of children: Not on file     Years of education: Not on file     Highest education level: Not on file   Occupational History     Not on file   Social Needs     Financial resource strain: Not on file     Food insecurity     Worry: Not on file     Inability: Not on file     Transportation needs     Medical: Not on file     Non-medical: Not on file   Tobacco Use     Smoking status: Former Smoker     Types: Cigars, Pipe     Smokeless tobacco: Current User     Tobacco comment: Quit 40 years ago.   Substance and Sexual Activity     Alcohol use: Not  Currently     Drug use: Never     Sexual activity: Not on file   Lifestyle     Physical activity     Days per week: Not on file     Minutes per session: Not on file     Stress: Not on file   Relationships     Social connections     Talks on phone: Not on file     Gets together: Not on file     Attends Yarsanism service: Not on file     Active member of club or organization: Not on file     Attends meetings of clubs or organizations: Not on file     Relationship status: Not on file     Intimate partner violence     Fear of current or ex partner: Not on file     Emotionally abused: Not on file     Physically abused: Not on file     Forced sexual activity: Not on file   Other Topics Concern     Parent/sibling w/ CABG, MI or angioplasty before 65F 55M? Not Asked   Social History Narrative     Not on file       REVIEW OF SYSTEMS:    Constitutional: No fever chills weight loss weight gain.    Eyes: No loss of vision or double vision.     Respiratory: No shortness of breath.    Gastrointestinal: No abdominal pain or constipation.  Genitourinary:   Musculoskeletal: Does have rotator cuff problems on the right  Neurologic:   Psychiatric:         PHYSICAL EXAMINATION:    General appearance: No acute distress.  Appropriately attired and groomed.  :   HEENT:     Neck: No bruits.    Heart: S1-S2       NEUROLOGIC EXAMINATION:    Alert oriented x3.  Speech is fluent.  Good naming and repetition.  [place] 3/3 objects.  Remembered 3/3 objects at 15 minutes time.  Extraocular movements full.  Visual fields full.  Face moves symmetrically.  Palate symmetric.  Tongue midline.  Positive good facial expressivity.  Neck without bruits.  Motor strength 5/5.  Tone normal.  Mild decrease of opening and closing of hands on the left or right.  He occasionally will rub his index finger on his thumb but not antiarrhythmic.  DTRs were 1+/4 upper extremity 1/4 knees absent ankle reflexes Babinski downgoing.  He derick from a chair without problem.   "Gait is of normal stride.  He turns in one-step.  Slightly slowed left arm swing relative to right.  No tremor noted.  Negative pull test.  Positive head tremor-\"no \" type of tremor      IMPRESSION: 1.  Right hand tremor.  Think this is more of a habit that he has just rubbing his index finger and thumb on occasion.  He does not have other signs of Parkinson's disease and that there is no rigidity no decrease of opening closing of hands on the right (it was slower on the left which is likely related to his prior stroke) has no postural instability and otherwise does okay.  We did discuss that I would not consider doing anything with it anyways and he is in agreement with that.  We did discuss seeing him back in 1 year to see if there is anything different that might suggest the possibility of Parkinson's disease or other process.  He does report that he did have [employer] dance as a child, which we discussed is most commonly associated with rheumatic fever but does not remember any history of rheumatic fever.  2.  Essential tremor-he does have a mild head tremor that is present which again he would not wish to do anything treatment wise with and I think it is appropriate as it is not causing any difficulties.  We discussed if that were to cause a problem, a low-dose of propranolol might provide greatest benefit.  Follow-up: 1 year.  Time with patient: 25 minutes, 15 minutes which was counseling/coordination of care.            Lester Luna MD, MD    Again, thank you for allowing me to participate in the care of your patient.        Sincerely,        Lester Luna MD, MD    "

## 2020-09-08 NOTE — PATIENT INSTRUCTIONS
I don't think your right hand tremor is not related to Parkinson's disease. Would not do anything about it at this time and would recommend seeing you back in one year to see if the ris any difference.

## 2020-10-08 ENCOUNTER — OFFICE VISIT - HEALTHEAST (OUTPATIENT)
Dept: INTERNAL MEDICINE | Facility: CLINIC | Age: 75
End: 2020-10-08

## 2020-10-08 DIAGNOSIS — Z01.818 PREOPERATIVE EXAMINATION: ICD-10-CM

## 2020-10-08 DIAGNOSIS — H25.9 SENILE CATARACT OF RIGHT EYE, UNSPECIFIED AGE-RELATED CATARACT TYPE: ICD-10-CM

## 2020-10-08 ASSESSMENT — MIFFLIN-ST. JEOR: SCORE: 1534.84

## 2020-11-07 ENCOUNTER — COMMUNICATION - HEALTHEAST (OUTPATIENT)
Dept: INTERNAL MEDICINE | Facility: CLINIC | Age: 75
End: 2020-11-07

## 2020-11-07 DIAGNOSIS — E78.5 HYPERLIPEMIA: ICD-10-CM

## 2020-11-19 ENCOUNTER — COMMUNICATION - HEALTHEAST (OUTPATIENT)
Dept: INTERNAL MEDICINE | Facility: CLINIC | Age: 75
End: 2020-11-19

## 2020-11-19 DIAGNOSIS — E11.9 TYPE 2 DIABETES MELLITUS (H): ICD-10-CM

## 2020-12-29 DIAGNOSIS — G47.33 OBSTRUCTIVE SLEEP APNEA (ADULT) (PEDIATRIC): Primary | ICD-10-CM

## 2021-01-28 ENCOUNTER — OFFICE VISIT - HEALTHEAST (OUTPATIENT)
Dept: INTERNAL MEDICINE | Facility: CLINIC | Age: 76
End: 2021-01-28

## 2021-01-28 DIAGNOSIS — E78.5 HYPERLIPIDEMIA, UNSPECIFIED HYPERLIPIDEMIA TYPE: ICD-10-CM

## 2021-01-28 DIAGNOSIS — F33.1 MODERATE EPISODE OF RECURRENT MAJOR DEPRESSIVE DISORDER (H): ICD-10-CM

## 2021-01-28 DIAGNOSIS — D69.6 THROMBOCYTOPENIA (H): ICD-10-CM

## 2021-01-28 DIAGNOSIS — E11.8 TYPE 2 DIABETES MELLITUS WITH COMPLICATION, WITHOUT LONG-TERM CURRENT USE OF INSULIN (H): ICD-10-CM

## 2021-01-28 LAB
ALBUMIN SERPL-MCNC: 4.1 G/DL (ref 3.5–5)
ALP SERPL-CCNC: 87 U/L (ref 45–120)
ALT SERPL W P-5'-P-CCNC: 62 U/L (ref 0–45)
ANION GAP SERPL CALCULATED.3IONS-SCNC: 10 MMOL/L (ref 5–18)
AST SERPL W P-5'-P-CCNC: 28 U/L (ref 0–40)
BILIRUB SERPL-MCNC: 0.7 MG/DL (ref 0–1)
BUN SERPL-MCNC: 13 MG/DL (ref 8–28)
CALCIUM SERPL-MCNC: 9.8 MG/DL (ref 8.5–10.5)
CHLORIDE BLD-SCNC: 99 MMOL/L (ref 98–107)
CO2 SERPL-SCNC: 29 MMOL/L (ref 22–31)
CREAT SERPL-MCNC: 0.94 MG/DL (ref 0.7–1.3)
ERYTHROCYTE [DISTWIDTH] IN BLOOD BY AUTOMATED COUNT: 11.7 % (ref 11–14.5)
GFR SERPL CREATININE-BSD FRML MDRD: >60 ML/MIN/1.73M2
GLUCOSE BLD-MCNC: 287 MG/DL (ref 70–125)
HBA1C MFR BLD: 10.4 %
HCT VFR BLD AUTO: 43.5 % (ref 40–54)
HGB BLD-MCNC: 15.3 G/DL (ref 14–18)
MCH RBC QN AUTO: 33.6 PG (ref 27–34)
MCHC RBC AUTO-ENTMCNC: 35.2 G/DL (ref 32–36)
MCV RBC AUTO: 96 FL (ref 80–100)
PLATELET # BLD AUTO: 152 THOU/UL (ref 140–440)
PMV BLD AUTO: 10.3 FL (ref 7–10)
POTASSIUM BLD-SCNC: 4.5 MMOL/L (ref 3.5–5)
PROT SERPL-MCNC: 6.8 G/DL (ref 6–8)
RBC # BLD AUTO: 4.55 MILL/UL (ref 4.4–6.2)
SODIUM SERPL-SCNC: 138 MMOL/L (ref 136–145)
WBC: 5.2 THOU/UL (ref 4–11)

## 2021-02-01 ENCOUNTER — COMMUNICATION - HEALTHEAST (OUTPATIENT)
Dept: INTERNAL MEDICINE | Facility: CLINIC | Age: 76
End: 2021-02-01

## 2021-02-09 ENCOUNTER — OFFICE VISIT - HEALTHEAST (OUTPATIENT)
Dept: INTERNAL MEDICINE | Facility: CLINIC | Age: 76
End: 2021-02-09

## 2021-02-09 DIAGNOSIS — R03.0 ELEVATED BLOOD PRESSURE READING WITHOUT DIAGNOSIS OF HYPERTENSION: ICD-10-CM

## 2021-02-09 DIAGNOSIS — E11.8 TYPE 2 DIABETES MELLITUS WITH COMPLICATION, WITHOUT LONG-TERM CURRENT USE OF INSULIN (H): ICD-10-CM

## 2021-02-09 ASSESSMENT — PATIENT HEALTH QUESTIONNAIRE - PHQ9: SUM OF ALL RESPONSES TO PHQ QUESTIONS 1-9: 0

## 2021-03-02 ENCOUNTER — OFFICE VISIT - HEALTHEAST (OUTPATIENT)
Dept: INTERNAL MEDICINE | Facility: CLINIC | Age: 76
End: 2021-03-02

## 2021-03-02 DIAGNOSIS — R03.0 ELEVATED BLOOD PRESSURE READING WITHOUT DIAGNOSIS OF HYPERTENSION: ICD-10-CM

## 2021-03-02 DIAGNOSIS — E11.8 TYPE 2 DIABETES MELLITUS WITH COMPLICATION, WITHOUT LONG-TERM CURRENT USE OF INSULIN (H): ICD-10-CM

## 2021-03-11 ENCOUNTER — COMMUNICATION - HEALTHEAST (OUTPATIENT)
Dept: INTERNAL MEDICINE | Facility: CLINIC | Age: 76
End: 2021-03-11

## 2021-03-11 DIAGNOSIS — E11.9 TYPE 2 DIABETES MELLITUS (H): ICD-10-CM

## 2021-03-16 ENCOUNTER — AMBULATORY - HEALTHEAST (OUTPATIENT)
Dept: EDUCATION SERVICES | Facility: CLINIC | Age: 76
End: 2021-03-16

## 2021-03-16 ENCOUNTER — COMMUNICATION - HEALTHEAST (OUTPATIENT)
Dept: EDUCATION SERVICES | Facility: CLINIC | Age: 76
End: 2021-03-16

## 2021-03-16 ENCOUNTER — OFFICE VISIT - HEALTHEAST (OUTPATIENT)
Dept: EDUCATION SERVICES | Facility: CLINIC | Age: 76
End: 2021-03-16

## 2021-03-16 DIAGNOSIS — E11.8 TYPE 2 DIABETES MELLITUS WITH COMPLICATION, WITHOUT LONG-TERM CURRENT USE OF INSULIN (H): ICD-10-CM

## 2021-04-01 ENCOUNTER — AMBULATORY - HEALTHEAST (OUTPATIENT)
Dept: LAB | Facility: CLINIC | Age: 76
End: 2021-04-01

## 2021-04-01 DIAGNOSIS — E11.8 TYPE 2 DIABETES MELLITUS WITH COMPLICATION, WITHOUT LONG-TERM CURRENT USE OF INSULIN (H): ICD-10-CM

## 2021-04-01 LAB — HBA1C MFR BLD: 8.1 %

## 2021-04-07 ENCOUNTER — OFFICE VISIT - HEALTHEAST (OUTPATIENT)
Dept: EDUCATION SERVICES | Facility: CLINIC | Age: 76
End: 2021-04-07

## 2021-04-07 DIAGNOSIS — E11.9 TYPE 2 DIABETES MELLITUS (H): ICD-10-CM

## 2021-04-10 ENCOUNTER — COMMUNICATION - HEALTHEAST (OUTPATIENT)
Dept: SCHEDULING | Facility: CLINIC | Age: 76
End: 2021-04-10

## 2021-04-10 DIAGNOSIS — E11.9 DIABETES MELLITUS, TYPE 2 (H): ICD-10-CM

## 2021-04-27 ENCOUNTER — COMMUNICATION - HEALTHEAST (OUTPATIENT)
Dept: INTERNAL MEDICINE | Facility: CLINIC | Age: 76
End: 2021-04-27

## 2021-04-27 DIAGNOSIS — E11.9 TYPE 2 DIABETES MELLITUS (H): ICD-10-CM

## 2021-05-05 ENCOUNTER — COMMUNICATION - HEALTHEAST (OUTPATIENT)
Dept: INTERNAL MEDICINE | Facility: CLINIC | Age: 76
End: 2021-05-05

## 2021-05-05 ENCOUNTER — OFFICE VISIT - HEALTHEAST (OUTPATIENT)
Dept: EDUCATION SERVICES | Facility: CLINIC | Age: 76
End: 2021-05-05

## 2021-05-05 ENCOUNTER — RECORDS - HEALTHEAST (OUTPATIENT)
Dept: ADMINISTRATIVE | Facility: OTHER | Age: 76
End: 2021-05-05

## 2021-05-05 DIAGNOSIS — E11.9 TYPE 2 DIABETES MELLITUS (H): ICD-10-CM

## 2021-05-05 DIAGNOSIS — E11.8 TYPE 2 DIABETES MELLITUS WITH COMPLICATION, WITHOUT LONG-TERM CURRENT USE OF INSULIN (H): ICD-10-CM

## 2021-05-14 ENCOUNTER — RECORDS - HEALTHEAST (OUTPATIENT)
Dept: ADMINISTRATIVE | Facility: OTHER | Age: 76
End: 2021-05-14

## 2021-05-14 LAB — RETINOPATHY: NEGATIVE

## 2021-05-18 ENCOUNTER — RECORDS - HEALTHEAST (OUTPATIENT)
Dept: HEALTH INFORMATION MANAGEMENT | Facility: CLINIC | Age: 76
End: 2021-05-18

## 2021-05-26 ENCOUNTER — RECORDS - HEALTHEAST (OUTPATIENT)
Dept: ADMINISTRATIVE | Facility: CLINIC | Age: 76
End: 2021-05-26

## 2021-05-27 VITALS — HEART RATE: 97 BPM | DIASTOLIC BLOOD PRESSURE: 70 MMHG | OXYGEN SATURATION: 98 % | SYSTOLIC BLOOD PRESSURE: 140 MMHG

## 2021-05-27 ASSESSMENT — PATIENT HEALTH QUESTIONNAIRE - PHQ9
SUM OF ALL RESPONSES TO PHQ QUESTIONS 1-9: 4
SUM OF ALL RESPONSES TO PHQ QUESTIONS 1-9: 0

## 2021-05-28 NOTE — PROGRESS NOTES
In Basket message received for Hematology Consult,refering physician Dr. Anita Pineda.    Placed call to Yong to set up appointment. Same scheduled for June 10th 2019 at 1:00 pm  with , with a nurse apt at  12:30 pm. Packet sent via [employer]  to Yong with informational letter, MD bio card, [facility] intake form, medication and allergy list, and appointment letter. Work up for Pancytopenia has been done at [facility].     Medical records will collect any outside records. Yong was seen by Dr Garcia in the past 2013 and 2014. Other old hematology records in the epic record. [facility] were requested in Care everywhere on 5/9/2019 No [facility] records in Care Everywhere as of 5/14/2019.      I explained that Yong would becoming to a cancer center and that the doctors are both cancer and blood doctors. Explained the appointment process of arriving early and bringing Health History and medication allergy list along to appointment.     Given [facility] Number to call if Yong has further questions or concerns. [phone]

## 2021-05-28 NOTE — PROGRESS NOTES
Assessment and Plan:       1. Routine general medical examination at a health care facility    - PSA (Prostatic-Specific Antigen), Annual Screen; Future  - Urinalysis-UC if Indicated; Future    2. Mild episode of recurrent major depressive disorder (H)  On Citalopram, [facility].    3. Thrombocytopenia (H)  Stable. He does not drink alcohol for 1 year. No easy bruising or bleeding.  - HM1(CBC and Differential); Future  - Morphology,Smear Review (MORP); Future    4. Type 2 diabetes mellitus without complication, without long-term current use of insulin (H)  On metformin and Januvia.  - Glycosylated Hemoglobin A1c; Future  - Microalbumin, Random Urine; Future    5. Chronic fatigue  Since he retired, does not have a lot of energy and ideas. 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 He use BiPAP over the night, sleeps 8-9h. He lost 10 Ibs since last year, but intentionally.  - Thyroid Stimulating Hormone (TSH); Future  - Vitamin D, Total (25-Hydroxy); Future  - Comprehensive Metabolic Panel; Future  - Testosterone, Free and Total (BTEST); Future  - Magnesium; Future  - Vitamin B12; Future  - HM1(CBC and Differential); Future    6. Obstructive sleep apnea  On BiPAP    7. Hyperlipidemia, unspecified hyperlipidemia type  On statin  - Lipid Profile; Future    8. Cerebrovascular accident (CVA), unspecified mechanism (H)  He is due for US carotid, taking Plavix and ASA.  - US Carotid Bilateral; Future    9. Benign prostatic hyperplasia without lower urinary tract symptoms      10. Benign essential tremor      11. Vitamin D deficiency     - Vitamin D, Total (25-Hydroxy); Future    12. Encounter for screening for malignant neoplasm of prostate     - PSA (Prostatic-Specific Antigen), Annual Screen; Future    13. H/O carotid endarterectomy      14. Lung crackles  On the exam today, I heard left lung base crackles.  - XR Chest 2 Views     The patient's current medical problems were  reviewed.    I have had an Advance Directives discussion with the patient.  The following health maintenance schedule was reviewed with the patient and provided in printed form in the after visit summary:   Health Maintenance   Topic Date Due     ZOSTER VACCINES (2 of 3) 08/15/2012     DIABETES URINE MICROALBUMIN  05/31/2018     DEPRESSION FOLLOW UP  10/27/2018     DIABETES FOLLOW-UP  10/27/2018     DIABETES HEMOGLOBIN A1C  04/30/2019     DIABETES OPHTHALMOLOGY EXAM  05/03/2019     DIABETES FOOT EXAM  09/07/2019 (Originally 5/31/2018)     FALL RISK ASSESSMENT  05/07/2020     ADVANCE DIRECTIVES DISCUSSED WITH PATIENT  04/27/2023     COLONOSCOPY  08/14/2023     TD 18+ HE  04/27/2028     PNEUMOCOCCAL POLYSACCHARIDE VACCINE AGE 65 AND OVER  Completed     INFLUENZA VACCINE RULE BASED  Completed     PNEUMOCOCCAL CONJUGATE VACCINE FOR ADULTS (PCV13 OR PREVNAR)  Completed        Subjective:   Chief Complaint: Yong Guillermo is an 73 y.o. male here for an Annual Wellness visit.   HPI:  Acute an chronic medical problems discussed as above.    Review of Systems:    Please see above.  The rest of the review of systems are negative for all systems.    Patient Care Team:  Anita Pineda MD as PCP - General     Patient Active Problem List   Diagnosis     Thrombocytopenia (H)     Type 2 diabetes mellitus (H)     Hyperlipemia     Major Depression, Recurrent     Obstructive sleep apnea     BPH (benign prostatic hyperplasia)     H/O carotid endarterectomy     Benign essential tremor     CVA (cerebral vascular accident) (H)     Past Medical History:   Diagnosis Date     Diabetes mellitus (H)      Stroke (H)       Past Surgical History:   Procedure Laterality Date     HI ABDOMEN SURGERY PROC UNLISTED      Description: Hernia Repair;  Proc Date: 05/01/2004;  Comments: Right Inguinal and Umbilical     HI EXCIS CERV DISK,ONE LEVEL      Description: Laminectomy With Disc Removal;  Recorded: 04/29/2008;  Comments: Lumbar level     HI EXCIS  CERV DISK,ONE LEVEL      Description: Laminectomy With Disc Removal;  Proc Date: 07/01/2005;  Comments: C5/6 and C6/7     OR EXCIS TENDON SHEATH LESION, HAND/FINGER      Description: Hand Excision Of A Tendon Cyst;  Recorded: 04/29/2008;     OR EXCISION,BENIGN TUMOR,MANDIBLE      Description: Jaw Excision Benign Cyst / Tumor;  Proc Date: 05/01/2004;     OR REMOVAL OF SPERM DUCT(S)      Description: Surgery Of Male Genitalia Vasectomy;  Recorded: 04/29/2008;     OR REMOVAL OF TONSILS,<11 Y/O      Description: Tonsillectomy;  Recorded: 04/25/2008;     OR THROMBOENDARTECTMY NECK,NECK INCIS      Description: Carotid Thromboendarterectomy;  Proc Date: 09/01/2006;      Family History   Problem Relation Age of Onset     Diabetes Mother      Heart disease Brother      Diabetes Brother      Diabetes Paternal Grandmother       Social History     Socioeconomic History     Marital status:      Spouse name: Not on file     Number of children: Not on file     Years of education: Not on file     Highest education level: Not on file   Occupational History     Not on file   Social Needs     Financial resource strain: Not on file     Food insecurity:     Worry: Not on file     Inability: Not on file     Transportation needs:     Medical: Not on file     Non-medical: Not on file   Tobacco Use     Smoking status: Former Smoker     Types: Pipe     Smokeless tobacco: Current User   Substance and Sexual Activity     Alcohol use: No     Drug use: No     Sexual activity: Yes     Partners: Female     Birth control/protection: None   Lifestyle     Physical activity:     Days per week: Not on file     Minutes per session: Not on file     Stress: Not on file   Relationships     Social connections:     Talks on phone: Not on file     Gets together: Not on file     Attends [employer] service: Not on file     Active member of club or organization: Not on file     Attends meetings of clubs or organizations: Not on file     Relationship  status: Not on file     Intimate partner violence:     Fear of current or ex partner: Not on file     Emotionally abused: Not on file     Physically abused: Not on file     Forced sexual activity: Not on file   Other Topics Concern     Not on file   Social History Narrative     Not on file      Current Outpatient Medications   Medication Sig Dispense Refill     acetaminophen (TYLENOL) 500 MG tablet Take 500 mg by mouth every 6 (six) hours as needed for pain.       ascorbic acid (ASCORBIC ACID WITH DANNA HIPS) 500 MG tablet Take 1,000 mg by mouth daily.       aspirin 325 MG tablet Take 1 tablet (325 mg total) by mouth daily. (Patient taking differently: Take 81 mg by mouth daily.       )  0     atorvastatin (LIPITOR) 80 MG tablet TAKE 1 TABLET DAILY 90 tablet 3     blood glucose test (FREESTYLE LITE STRIPS) strips Use 1 strip as Directed daily 100 strip 3     CALCIUM CARBONATE/VITAMIN D3 (CALCIUM 600 + D,3, ORAL) Take by mouth.       cholecalciferol, vitamin D3, (VITAMIN D3) 1,000 unit capsule        citalopram (CELEXA) 40 MG tablet Take 40 mg by mouth daily.       clopidogrel (PLAVIX) 75 mg tablet TAKE 1 TABLET DAILY 90 tablet 1     JANUVIA 100 mg tablet TAKE 1 TABLET DAILY 90 tablet 1     lancets (BD ULTRA-FINE II LANCETS) 30 gauge Misc Once daily 100 each 0     metFORMIN (GLUCOPHAGE) 500 MG tablet Take 2 tablets (1,000 mg total) by mouth 2 (two) times a day with meals. 360 tablet 2     multivitamin (ONE A DAY) per tablet Take 1 tablet by mouth daily.       OMEGA-3/DHA/EPA/FISH OIL (FISH OIL-OMEGA-3 FATTY ACIDS) 300-1,000 mg capsule Take 2 g by mouth daily.       No current facility-administered medications for this visit.       Objective:   Vital Signs:   Visit Vitals  /53   Pulse 86   Wt 167 lb 11.2 oz (76.1 kg)   SpO2 98%   BMI 24.41 kg/m         VisionScreening:  No exam data present     PHYSICAL EXAM  General Appearance: Alert, cooperative, no distress, appears stated age.  Head: Normocephalic, without  obvious abnormality, atraumatic  Eyes: PERRL, conjunctiva/corneas clear, EOM's intact  Ears: Normal TM's and external ear canals, both ears  Nose: Nares normal, septum midline,mucosa normal, no drainage  Throat: Lips, mucosa, and tongue normal; teeth and gums normal  Neck: Supple, symmetrical, trachea midline, no adenopathy;  thyroid: not enlarged, symmetric, no tenderness/mass/nodules; no carotid bruit or JVD  Back: Symmetric, no curvature, ROM normal, no CVA tenderness.  Lungs: Clear to auscultation bilaterally, respirations unlabored.  Breasts: No breast masses, tenderness, asymmetry, or nipple discharge.  Heart: Regular rate and rhythm, S1 and S2 normal, no murmur, rub, or gallop.  Abdomen: Soft, non-tender, bowel sounds active all four quadrants,  no masses, no organomegaly.  Pelvic:Normal external genitalia, prostate exam - mildly enlarged, symmetric, non tender prostate.  Extremities: Extremities normal, atraumatic, no cyanosis or edema.  Skin: Skin color, texture, turgor normal, no rashes or lesions.  Lymph nodes: Cervical, supraclavicular, and axillary nodes normal.  Neurologic: No focal neurological findings.      Assessment Results 5/7/2019   Activities of Daily Living No help needed   Instrumental Activities of Daily Living No help needed   Get Up and Go Score Less than 12 seconds   Mini Cog Total Score 4   Some recent data might be hidden     A Mini-Cog score of 0-2 suggests the possibility of dementia, score of 3-5 suggests no dementia    Identified Health Risks:     He is at risk for lack of exercise and has been provided with information to increase physical activity for the benefit of his well-being.  The patient was counseled and encouraged to consider modifying their diet and eating habits. He was provided with information on recommended healthy diet options.  Information on urinary incontinence and treatment options given to patient.  The patient was provided with suggestions to help him develop  a healthy emotional lifestyle.   The patient's PHQ-9 score is consistent with mild depression. He was provided with information regarding depression and was advised to schedule a follow up appointment in 12 weeks to further address this issue.  He is at risk for falling and has been provided with information to reduce the risk of falling at home.  Patient's advanced directive was discussed and I am comfortable with the patient's wishes.

## 2021-05-28 NOTE — TELEPHONE ENCOUNTER
RN cannot approve Refill Request    RN can NOT refill this medication Protocol failed and NO refill given.       Kathya Reyes, Care Connection Triage/Med Refill 5/8/2019    Requested Prescriptions   Pending Prescriptions Disp Refills     JANUVIA 100 mg tablet [Pharmacy Med Name: JANUVIA TABS 100MG] 90 tablet 3     Sig: TAKE 1 TABLET DAILY       Oral Hypoglycemics Refill Protocol Failed - 5/8/2019  2:07 AM        Failed - A1C in last 6 months     Hemoglobin A1c   Date Value Ref Range Status   10/30/2018 7.6 (H) 3.5 - 6.0 % Final               Failed - Microalbumin in last year      Microalbumin, Random Urine   Date Value Ref Range Status   05/31/2017 <0.50 0.00 - 1.99 mg/dL Final                  Passed - Visit with PCP or prescribing provider visit in last 6 months       Last office visit with prescriber/PCP: Visit date not found OR same dept: 10/30/2018 Anita Pineda MD OR same specialty: 10/30/2018 Anita Pineda MD Last physical: 5/7/2019 Last MTM visit: Visit date not found         Next appt within 3 mo: Visit date not found  Next physical within 3 mo: Visit date not found  Prescriber OR PCP: Anita Pineda MD  Last diagnosis associated with med order: 1. Type 2 diabetes mellitus (H)  - JANUVIA 100 mg tablet [Pharmacy Med Name: JANUVIA TABS 100MG]; TAKE 1 TABLET DAILY  Dispense: 90 tablet; Refill: 1     If protocol passes may refill for 12 months if within 3 months of last provider visit (or a total of 15 months).           Passed - Blood pressure in last year     BP Readings from Last 1 Encounters:   05/07/19 115/53             Passed - Serum creatinine in last year     Creatinine   Date Value Ref Range Status   10/30/2018 0.86 0.70 - 1.30 mg/dL Final

## 2021-05-29 ENCOUNTER — RECORDS - HEALTHEAST (OUTPATIENT)
Dept: ADMINISTRATIVE | Facility: CLINIC | Age: 76
End: 2021-05-29

## 2021-05-29 NOTE — TELEPHONE ENCOUNTER
Refill Approved    Rx renewed per Medication Renewal Policy. Medication was last renewed on 12 17 18  LOV 05 07 19  [facility] Connection Triage/Med Refill 6/16/2019     Requested Prescriptions   Pending Prescriptions Disp Refills     clopidogrel (PLAVIX) 75 mg tablet [Pharmacy Med Name: CLOPIDOGREL BISULFATE TABS 75MG] 90 tablet 1     Sig: TAKE 1 TABLET DAILY       Clopidogrel/Prasugrel/Ticagrelor Refill Protocol Passed - 6/14/2019  2:33 AM        Passed - PCP or prescribing provider visit in past 6 months       Last office visit with prescriber/PCP: Visit date not found OR same dept: 10/30/2018 Anita Pineda MD OR same specialty: 10/30/2018 Anita Pineda MD Last physical: 5/7/2019 Last MTM visit: Visit date not found     Next appt within 3 mo: Visit date not found  Next physical within 3 mo: Visit date not found  Prescriber OR PCP: Anita Pineda MD  Last diagnosis associated with med order: 1. CVA (cerebral vascular accident) (H)  - clopidogrel (PLAVIX) 75 mg tablet [Pharmacy Med Name: CLOPIDOGREL BISULFATE TABS 75MG]; TAKE 1 TABLET DAILY  Dispense: 90 tablet; Refill: 1    If protocol passes may refill for 6 months if within 3 months of last provider visit (or a total of 9 months).              Passed - Hemoglobin in past 12 months     Hemoglobin   Date Value Ref Range Status   05/09/2019 14.2 14.0 - 18.0 g/dL Final   05/09/2019 14.3 14.0 - 18.0 g/dL Final

## 2021-05-29 NOTE — CONSULTS
[facility] Hematology and Oncology Consult Note    Patient: Yong Guillermo  MRN: [id number]  Date of Service: 06/10/2019        Reason for Visit    I was consulted by Dr. Pineda regarding pancytopenia.    Assessment/Plan    Mr. Yong Guillermo is a 73 y.o. gentleman who has a long-standing history of mild thrombocytopenia, mild neutropenia and macrocytosis.  This is a long-standing issue.  Goes back to at least 2009.  He has been seen by Dr. Garcia back in 2010 for the same reason.  Before that he was seen by Dr. Goldy Hyman.  These blood count issues have been going on for at least a decade.  Perhaps even longer.  The earliest blood count available to me is from 9/8/2009.  In the detailed work-up that has been done in the past nothing particular was found.  This was thought to be due to some bone marrow suppression that he had from heavy alcohol use in the past.  He has quit alcohol use completely.  Since then his blood counts have remained fairly stable.  He is really not anemic.  He has some mild microcytosis.  Generally his MCV has remained in the 100 to 101 range.  Thrombocytopenia has also been persistent for the last decade.  Platelets have always been more than 100,000.  He also has a mild neutropenia off and on.  The last neutrophil count was 1.8.  That is the lowest ANC on record.  Thus the neutropenia is also very mild.  He has had no problems with bleeding or bruising or infections.  I have gone through his past medical records in detail.  I have reviewed his labs.    1.  I discussed with the patient that I think that the mild cytopenias that he has is most likely due to some mild problem with the bone marrow.  However his blood counts have been quite stable over the last 10 years.  So far we have not really seen any progression.  He was worried about the possibility of preleukemia.  I told him that there is a possibility that something may be happening in his bone marrow but the fact that things have  not progressed in the last 10 years is reassuring.  Dr. Pineda has already read in some of the evaluation by checking his vitamin B12 level and a TSH level and the peripheral blood smear.  The levels are normal and in the peripheral blood smear we are not seeing any dysplastic cells or abnormal cells.  This is reassuring.    2.  I discussed with him that the only test left to do is a bone marrow aspiration biopsy.  If he wants we can proceed in that direction.  However I do not think there is a compelling reason to do the bone marrow biopsy at this point given how stable the blood counts are.  He was also not interested in doing a bone marrow biopsy.    3.  Finally I recommended to him that he should continue taking the multivitamin tablet daily.  He can have his blood counts checked may be 2 times a year when he is seen in the primary care clinic.  If there is any significant worsening of his blood counts then he can be sent back to me.  If it is slightly worsening her blood counts have been sometime in the future then we can go for further work-up.  He was agreeable to the plan.    4.  He has the contact information for the [facility] cancer clinic.      ECOG Performance   ECOG Performance Status: 1  Distress Assessment  Distress Assessment Score: No distress        Problem List    1. Thrombocytopenia (H)     2. Other neutropenia (H)     3. Macrocytosis without anemia             CC: Anita Pineda MD      ______________________________________________________________________________      History    Mr. Yong Guillermo is here for the consultation alone.    He is here to discuss about the mildly decreased platelet count and white blood cell count.  He remembers that he was seen by hematology years ago for the same problem but cannot remember the details of what he was told.  He admits that his memory has not been doing too well at nowadays.    He also feels that he is more tired and has lost some weight over the  last year.  Activity level has also come down somewhat.  On the other hand he says that he does not use any alcohol anymore.  He continues to chew tobacco.  He usually ends of chewing a cigar.  He says that he got into the habit of doing that when he was in the [employer].  He does not think that he can stop.    He says that he sleeps well but even then he feels sleepy during the day.  He feels somewhat lethargic.  He does not know what that is from.  He says that he had his BiPAP machine checked last year and it was working okay.    He has not had any recent infections or fevers.  No lumps or bumps anywhere.  He has not had any bleeding or bruising.    He says that he is eating well.  No nausea or vomiting.  No difficulty with breathing.  No dizziness.  No skin rashes.  No numbness or tingling.    Please see below.  A 14 point review system is otherwise completely negative.    Past History  Past Medical History:   Diagnosis Date     Benign essential tremor 9/29/2016     BPH (benign prostatic hyperplasia) 5/26/2015     CVA (cerebral vascular accident) (H) 06/22/2006    Left arm weakness.     CVA (cerebral vascular accident) (H) 01/14/2009    Right arm clumsiness.  MRI showed 2 infarcts in the left hemisphere.     Depression      Diabetes mellitus, type 2 (H)      Hyperlipidemia      Obstructive sleep apnea 07/22/2013     Shingles      Past Surgical History:   Procedure Laterality Date     CAROTID ENDARTERECTOMY Right 09/27/2006     CERVICAL DISC SURGERY  07/29/2005    C5/6 and C6/7 lamina foraminotomy, scope with partial facetectomy and excision of hard disc herniation.     HERNIA REPAIR  5/21/104    Right inguinal hernia and umbilical hernia right cheek cyst on the face.     GA EXCIS CERV DISK,ONE LEVEL      Description: Laminectomy With Disc Removal;  Recorded: 04/29/2008;  Comments: Lumbar level     GA EXCIS TENDON SHEATH LESION, HAND/FINGER      Description: Hand Excision Of A Tendon Cyst;  Recorded: 04/29/2008;      PA EXCISION,BENIGN TUMOR,MANDIBLE      Description: Jaw Excision Benign Cyst / Tumor;  Proc Date: 05/01/2004;     ROTATOR CUFF REPAIR Right      TONSILLECTOMY       VASECTOMY       Family History   Problem Relation Age of Onset     Diabetes Mother      Heart disease Brother      Diabetes Brother      Diabetes Paternal Grandmother      Social History     Socioeconomic History     Marital status:      Spouse name: None     Number of children: None     Years of education: None     Highest education level: None   Occupational History     Occupation: Retired.     Comment: Worked in the [employer] and the [employer].   Social Needs     Financial resource strain: None     Food insecurity:     Worry: None     Inability: None     Transportation needs:     Medical: None     Non-medical: None   Tobacco Use     Smoking status: Former Smoker     Types: Pipe     Smokeless tobacco: Current User     Types: Chew   Substance and Sexual Activity     Alcohol use: No     Drug use: No     Sexual activity: Yes     Partners: Female     Birth control/protection: None   Lifestyle     Physical activity:     Days per week: None     Minutes per session: None     Stress: None   Relationships     Social connections:     Talks on phone: None     Gets together: None     Attends [employer]ian service: None     Active member of club or organization: None     Attends meetings of clubs or organizations: None     Relationship status: None     Intimate partner violence:     Fear of current or ex partner: None     Emotionally abused: None     Physically abused: None     Forced sexual activity: None   Other Topics Concern     None   Social History Narrative     None     Allergies    Allergies   Allergen Reactions     Other Allergy (See Comments)      Other, 01/26/2009.  Action: TPA  - med to reverse TIA.  Muscles in back tensed up severely.       Sulfa (Sulfonamide Antibiotics)        Review of Systems    General  General (WDL): Exceptions to WDL  Fatigue: Yes  - Chronic (Greater than 3 months)  Fever: None  Generalized Muscle Weakness: Yes - Recent (Less than 3 months)  Weight Loss: Yes - Recent (Greater than 3 months)(10 lbs)  ENT  ENT (WDL): Exceptions to WDL  Vertigo (Dizziness): None  Changes in vision: Yes - Recent (Less than 3 months)  Glasses or Contacts: Yes - Chronic (Greater than 3 months)  Hearing loss: None  Hearing Aids: Yes - Chronic (Greater than 3 months)  Tinnitus: Yes - Chronic (Greater than 3 months)  Pain/Pressure in ears: None  Sinus Congestion/Drainage: Yes - Chronic (Greater than 3 months)  Hoarseness: None  Sore Throat: None  Dental Problems: None  Dentures: None  Respiratory  Respiratory (WDL): All respiratory elements are within defined limits  Cardiovascular  Cardiovascular (WDL): Exceptions to WDL  Palpitations: None  Edema Limbs: None  Irregular Heart Beat: None  Chest Pain: None  Lightheadedness: Yes - Recent (Less than 3 months)  Endocrine  Endocrine (WDL): All endocrine elements are within defined limits  Gastrointestinal  Gastrointestinal (WDL): Exceptions to WDL  Difficulty Swallowing: None  Heartburn: None  Constipation: Yes - Recent (Less than 3 months)  Yellowish skin and/or eyes: None  Blood from rectum: None  Nausea and Vomiting: None  Abdominal Pain: Yes - Recent (Less than 3 months)  Diarrhea: Yes - Recent (Less than 3 months)  Have had black or tan stools?: None  Hemorrhoids: None  Poor Appetite: None  Musculoskeletal  Musculoskeletal (WDL): Exceptions to WDL  Range of Motion Limitation: Yes - Chronic (Greater than 3 months)  Joint pain: Yes - Chronic (Greater than 3 months)  Back Pain: Yes - Chronic (Greater than 3 months)  Activity Assistance: None  Difficulty to lie flat for more than 30 minutes: None  Pain interfering with walking: Yes - Recent (Less than 3 months)  Muscle pain or stiffness: Yes - Recent (Less than 3 months)  Recent fall: None  Neurological  Neurological (WDL): Exceptions to WDL  History of LOC?:  "None  Headaches: None  Difficulty with speech: None  Difficulty with memory: Yes - Chronic (Greater than 3 months)  Vertigo (Dizziness): None  Dominant Hand: Left  Seizures: None  Difficulty with Balance: Yes - Recent (Less than 3 months)  Numbness and/or tingling: None  Psychological/Emotional  Psychological/Emotional (WDL): Exceptions to WDL  Depression: Yes - Chronic (Greater than 3 months)  Insomnia: None  Panic attacks: None  Anxiety: None  Daytime sleepiness: Yes - Recent (Less than 3 months)  Hallucinations: None  Psychosocial needs or not coping well: None  Hematological/Lymphatic  Hematological/Lymphatic (WDL): All hematological/lymphatic elements are within defined limits  Dermatological  Dermatologic (WDL): All dermatological elements are within defined limits  Genitourinary/Reproductive  Genitourinary/Reproductive (WDL): Exceptions to WDL  Urinary Frequency: Yes - Chronic (Greater than 3 months)  Urinary Incontinence: None  Painful urination: None  Urination more than 2 times a night: Yes - Recent (Less than 3 months)  Urinary Urgency: Yes - Chronic (Greater than 3 months)  Difficulty Initiating Urine Stream: None  Blood in urine: Yes - Recent (Less than 3 months)  Sensation of incomplete emptying of bladder: None  Reproductive (Females only)     Pain  Currently in Pain: Yes  Pain Score (Initial OR Reassessment): 4  Pain Frequency: Intermittent  Location: Rt hip, Rt low back  Pain Characteristics : Aching;Sore  Pain Intervention(s): Home medication  Response to Interventions: helps    Physical Exam    Recent Vitals 6/10/2019   Height 5' 9\"   Weight 172 lbs   BSA (m2) 1.95 m2   /61   Pulse 75   Temp 98.7   Temp src 1   SpO2 98   Some recent data might be hidden       GENERAL: Alert and oriented to time place and person. Seated comfortably. In no distress.  He looks well overall.  Normal build.    HEAD: Atraumatic and normocephalic.    EYES: COLTON, EOMI.  No pallor.  No icterus.    Oral cavity: no " mucosal lesion or tonsillar enlargement.    NECK: supple. JVP normal.  No thyroid enlargement.    LYMPH NODES: No palpable, cervical, axillary or inguinal lymphadenopathy.    CHEST: clear to auscultation bilaterally.  Resonant to percussion throughout bilaterally.  Symmetrical breath movements bilaterally.    CVS: S1 and S2 are heard. Regular rate and rhythm.  No murmur or gallop or rub heard.  No peripheral edema.    ABDOMEN: Soft. Not tender. Not distended.  No palpable hepatomegaly or splenomegaly.  No other mass palpable.  Bowel sounds heard.    EXTREMITIES: Warm.    SKIN: no rash, or bruising or purpura.  Has a full head of hair.      Lab Results  Results for WIN CHEN (MRN [id number]) as of 6/10/2019 13:22   Ref. Range 4/30/2018 08:24 6/20/2018 11:08 10/30/2018 16:54 5/9/2019 07:27 5/9/2019 07:27   WBC Latest Ref Range: 4.0 - 11.0 thou/uL 4.6 4.5 4.0 4.4 4.4   RBC Latest Ref Range: 4.40 - 6.20 mill/uL 4.17 (L) 4.27 (L) 4.00 (L) 4.14 (L) 4.13 (L)   Hemoglobin Latest Ref Range: 14.0 - 18.0 g/dL 14.7 14.6 13.8 (L) 14.3 14.2   Hematocrit Latest Ref Range: 40.0 - 54.0 % 41.5 42.9 40.3 41.5 40.2   MCV Latest Ref Range: 80 - 100 fL 99 101 (H) 101 (H) 100 97   MCH Latest Ref Range: 27.0 - 34.0 pg 35.2 (H) 34.1 (H) 34.6 (H) 34.6 (H) 34.4 (H)   MCHC Latest Ref Range: 32.0 - 36.0 g/dL 35.4 33.9 34.3 34.5 35.3   RDW Latest Ref Range: 11.0 - 14.5 % 11.6 11.7 11.7 12.1 12.2   Platelets Latest Ref Range: 140 - 440 thou/uL 124 (L) 119 (L) 122 (L) 114 (L) 115 (L)   MPV Latest Ref Range: 8.5 - 12.5 fL 8.1 8.6 8.1 8.1 11.8   Neutrophils % Latest Ref Range: 50 - 70 %    44 (L) 42 (L)   Lymphocytes % Latest Ref Range: 20 - 40 %    42 (H) 42 (H)   Monocytes % Latest Ref Range: 2 - 10 %    8 9   Eosinophils % Latest Ref Range: 0 - 6 %    6 7 (H)   Basophils % Latest Ref Range: 0 - 2 %    0 1   Neutrophils Absolute Latest Ref Range: 2.0 - 7.7 thou/uL    1.9 (L) 1.8 (L)   Lymphocytes Absolute Latest Ref Range: 0.8 - 4.4 thou/uL     1.8 1.8   Monocytes Absolute Latest Ref Range: 0.0 - 0.9 thou/uL    0.4 0.4   Eosinophils Absolute Latest Ref Range: 0.0 - 0.4 thou/uL    0.3 0.3   Basophils Absolute Latest Ref Range: 0.0 - 0.2 thou/uL    0.0 0.0   Results for WIN CHEN (MRN [id number]) as of 6/10/2019 13:22   Ref. Range 10/30/2018 16:54 5/9/2019 07:27   Sodium Latest Ref Range: 136 - 145 mmol/L 137 142   Potassium Latest Ref Range: 3.5 - 5.0 mmol/L 4.4 5.0   Chloride Latest Ref Range: 98 - 107 mmol/L 99 104   CO2 Latest Ref Range: 22 - 31 mmol/L 28 30   Anion Gap, Calculation Latest Ref Range: 5 - 18 mmol/L 10 8   BUN Latest Ref Range: 8 - 28 mg/dL 16 17   Creatinine Latest Ref Range: 0.70 - 1.30 mg/dL 0.86 0.86   GFR MDRD Af Amer Latest Ref Range: >60 mL/min/1.73m2 >60 >60   GFR MDRD Non Af Amer Latest Ref Range: >60 mL/min/1.73m2 >60 >60   Calcium Latest Ref Range: 8.5 - 10.5 mg/dL 10.0 10.0   AST Latest Ref Range: 0 - 40 U/L 27 18   ALT Latest Ref Range: 0 - 45 U/L 46 (H) 26   ALBUMIN Latest Ref Range: 3.5 - 5.0 g/dL 4.1 4.0   Protein, Total Latest Ref Range: 6.0 - 8.0 g/dL 6.6 6.5   Cholesterol Latest Ref Range: <=199 mg/dL  121   Alkaline Phosphatase Latest Ref Range: 45 - 120 U/L 83 77   Bilirubin, Total Latest Ref Range: 0.0 - 1.0 mg/dL 0.7 0.8   Magnesium Latest Ref Range: 1.8 - 2.6 mg/dL  1.8   Triglycerides Latest Ref Range: <=149 mg/dL  89   HDL Cholesterol Latest Ref Range: >=40 mg/dL  37 (L)   LDL Calculated Latest Ref Range: <=129 mg/dL  66   Glucose Latest Ref Range: 70 - 125 mg/dL 226 (H) 163 (H)   Vitamin D, Total (25-Hydroxy) Latest Ref Range: 30.0 - 80.0 ng/mL  43.1   Hemoglobin A1c Latest Ref Range: 3.5 - 6.0 % 7.6 (H) 7.7 (H)   Vitamin B-12 Latest Ref Range: 213 - 816 pg/mL  333   PSA Latest Ref Range: 0.0 - 6.5 ng/mL  0.3   TSH Latest Ref Range: 0.30 - 5.00 uIU/mL  2.22     Peripheral blood smear results from 5/9/2019 reviewed.    Imaging Results    No results found.      Signed by: Pat Solano MD

## 2021-05-30 ENCOUNTER — RECORDS - HEALTHEAST (OUTPATIENT)
Dept: ADMINISTRATIVE | Facility: CLINIC | Age: 76
End: 2021-05-30

## 2021-05-30 VITALS — HEIGHT: 70 IN | BODY MASS INDEX: 25.56 KG/M2 | WEIGHT: 178.5 LBS

## 2021-05-30 VITALS — HEIGHT: 70 IN | WEIGHT: 178.5 LBS | BODY MASS INDEX: 25.56 KG/M2

## 2021-05-31 ENCOUNTER — RECORDS - HEALTHEAST (OUTPATIENT)
Dept: ADMINISTRATIVE | Facility: CLINIC | Age: 76
End: 2021-05-31

## 2021-05-31 VITALS — BODY MASS INDEX: 26.2 KG/M2 | WEIGHT: 180 LBS

## 2021-05-31 VITALS — WEIGHT: 180 LBS | HEIGHT: 70 IN | BODY MASS INDEX: 25.77 KG/M2

## 2021-06-01 ENCOUNTER — RECORDS - HEALTHEAST (OUTPATIENT)
Dept: ADMINISTRATIVE | Facility: CLINIC | Age: 76
End: 2021-06-01

## 2021-06-01 VITALS — WEIGHT: 174 LBS | BODY MASS INDEX: 25.33 KG/M2

## 2021-06-01 VITALS — WEIGHT: 175 LBS | HEIGHT: 70 IN | BODY MASS INDEX: 25.05 KG/M2

## 2021-06-01 VITALS — WEIGHT: 177 LBS | HEIGHT: 70 IN | BODY MASS INDEX: 25.34 KG/M2

## 2021-06-02 ENCOUNTER — OFFICE VISIT - HEALTHEAST (OUTPATIENT)
Dept: INTERNAL MEDICINE | Facility: CLINIC | Age: 76
End: 2021-06-02

## 2021-06-02 ENCOUNTER — RECORDS - HEALTHEAST (OUTPATIENT)
Dept: ADMINISTRATIVE | Facility: CLINIC | Age: 76
End: 2021-06-02

## 2021-06-02 VITALS — HEIGHT: 70 IN | BODY MASS INDEX: 24.77 KG/M2 | WEIGHT: 173 LBS

## 2021-06-02 VITALS — WEIGHT: 170 LBS | BODY MASS INDEX: 24.74 KG/M2

## 2021-06-02 VITALS — BODY MASS INDEX: 24.86 KG/M2 | WEIGHT: 170.8 LBS

## 2021-06-02 DIAGNOSIS — E11.9 DIABETES MELLITUS, TYPE 2 (H): ICD-10-CM

## 2021-06-02 DIAGNOSIS — E11.9 TYPE 2 DIABETES MELLITUS (H): ICD-10-CM

## 2021-06-02 DIAGNOSIS — E78.5 HYPERLIPIDEMIA, UNSPECIFIED HYPERLIPIDEMIA TYPE: ICD-10-CM

## 2021-06-02 DIAGNOSIS — G47.39 COMPLEX SLEEP APNEA SYNDROME: ICD-10-CM

## 2021-06-02 DIAGNOSIS — D69.6 THROMBOCYTOPENIA (H): ICD-10-CM

## 2021-06-02 DIAGNOSIS — D70.8 OTHER NEUTROPENIA (H): ICD-10-CM

## 2021-06-02 DIAGNOSIS — Z12.5 SCREENING FOR PROSTATE CANCER: ICD-10-CM

## 2021-06-02 DIAGNOSIS — E11.8 TYPE 2 DIABETES MELLITUS WITH COMPLICATION, WITHOUT LONG-TERM CURRENT USE OF INSULIN (H): ICD-10-CM

## 2021-06-02 DIAGNOSIS — I63.9 CEREBROVASCULAR ACCIDENT (CVA), UNSPECIFIED MECHANISM (H): ICD-10-CM

## 2021-06-02 DIAGNOSIS — R63.4 WEIGHT LOSS: ICD-10-CM

## 2021-06-02 LAB
ALBUMIN SERPL-MCNC: 4.3 G/DL (ref 3.5–5)
ALP SERPL-CCNC: 98 U/L (ref 45–120)
ALT SERPL W P-5'-P-CCNC: 68 U/L (ref 0–45)
ANION GAP SERPL CALCULATED.3IONS-SCNC: 12 MMOL/L (ref 5–18)
AST SERPL W P-5'-P-CCNC: 36 U/L (ref 0–40)
BASOPHILS # BLD AUTO: 0 THOU/UL (ref 0–0.2)
BASOPHILS NFR BLD AUTO: 0 % (ref 0–2)
BILIRUB SERPL-MCNC: 0.6 MG/DL (ref 0–1)
BUN SERPL-MCNC: 18 MG/DL (ref 8–28)
CALCIUM SERPL-MCNC: 10.1 MG/DL (ref 8.5–10.5)
CHLORIDE BLD-SCNC: 102 MMOL/L (ref 98–107)
CO2 SERPL-SCNC: 28 MMOL/L (ref 22–31)
CREAT SERPL-MCNC: 0.78 MG/DL (ref 0.7–1.3)
CREAT UR-MCNC: 39.5 MG/DL
EOSINOPHIL # BLD AUTO: 0.4 THOU/UL (ref 0–0.4)
EOSINOPHIL NFR BLD AUTO: 8 % (ref 0–6)
ERYTHROCYTE [DISTWIDTH] IN BLOOD BY AUTOMATED COUNT: 11.9 % (ref 11–14.5)
GFR SERPL CREATININE-BSD FRML MDRD: >60 ML/MIN/1.73M2
GLUCOSE BLD-MCNC: 147 MG/DL (ref 70–125)
HBA1C MFR BLD: 7.1 %
HCT VFR BLD AUTO: 43.5 % (ref 40–54)
HGB BLD-MCNC: 15.1 G/DL (ref 14–18)
IMM GRANULOCYTES # BLD: 0 THOU/UL
IMM GRANULOCYTES NFR BLD: 0 %
LDLC SERPL CALC-MCNC: 72 MG/DL
LYMPHOCYTES # BLD AUTO: 1.4 THOU/UL (ref 0.8–4.4)
LYMPHOCYTES NFR BLD AUTO: 26 % (ref 20–40)
MCH RBC QN AUTO: 34.3 PG (ref 27–34)
MCHC RBC AUTO-ENTMCNC: 34.7 G/DL (ref 32–36)
MCV RBC AUTO: 99 FL (ref 80–100)
MICROALBUMIN UR-MCNC: <0.5 MG/DL (ref 0–1.99)
MICROALBUMIN/CREAT UR: NORMAL MG/G{CREAT}
MONOCYTES # BLD AUTO: 0.5 THOU/UL (ref 0–0.9)
MONOCYTES NFR BLD AUTO: 9 % (ref 2–10)
NEUTROPHILS # BLD AUTO: 3.1 THOU/UL (ref 2–7.7)
NEUTROPHILS NFR BLD AUTO: 56 % (ref 50–70)
PLATELET # BLD AUTO: 130 THOU/UL (ref 140–440)
PMV BLD AUTO: 10 FL (ref 7–10)
POTASSIUM BLD-SCNC: 5.3 MMOL/L (ref 3.5–5)
PROT SERPL-MCNC: 7 G/DL (ref 6–8)
PSA SERPL-MCNC: 0.4 NG/ML (ref 0–6.5)
RBC # BLD AUTO: 4.4 MILL/UL (ref 4.4–6.2)
SODIUM SERPL-SCNC: 142 MMOL/L (ref 136–145)
TSH SERPL DL<=0.005 MIU/L-ACNC: 1.89 UIU/ML (ref 0.3–5)
WBC: 5.5 THOU/UL (ref 4–11)

## 2021-06-02 NOTE — PROGRESS NOTES
Assessment/Plan:        1. Type 2 diabetes mellitus with complication, without long-term current use of insulin (H)  Glycosylated Hemoglobin A1c    Comprehensive Metabolic Panel   2. Hyperlipidemia, unspecified hyperlipidemia type     3. Thrombocytopenia (H)  HM1(CBC and Differential)    HM1 (CBC with Diff)   4. Microscopic hematuria  Urinalysis-UC if Indicated   5. Tick bite, initial encounter  Lyme Antibody Agra       1. DM II - HgbA1c today  2. Hyperlipidemia - on statin  3. Tick bite 2 months ago - Lyme test today.        This note has been dictated using voice recognition software. Any grammatical or context distortions are unintentional and inherent to the software.      Return in about 6 months (around 5/1/2020) for Annual physical.    Patient Instructions   Eye exam 2 weeks ago          Subjective:    Yong Guillermo is a 74 y.o. male  here for    Chief Complaint   Patient presents with     Diabetes     Diabetic checkup. Nonfasting.     The patient is here with the multiple chronic medical problems.  1.  Patient has DM II, which is controlled with current medications.No reported hypoglycemia.  2.  Patient had microscopic hematuria in May and saw Urologist. Had cystoscopy and CT done.  3.  Patient has hyperlipidemia and is taking statin.  4. Low PLt and WBC - saw Oncology in June. Recommended CBC q 6 months.  5. He had a tick bite and some rash that looked like bull eye rash on the right upper arm  2 months ago. No other symptoms.    Social History     Socioeconomic History     Marital status:      Spouse name: Not on file     Number of children: Not on file     Years of education: Not on file     Highest education level: Not on file   Occupational History     Occupation: Retired.     Comment: Worked in the [employer] and the [employer].   Social Needs     Financial resource strain: Not on file     Food insecurity:     Worry: Not on file     Inability: Not on file     Transportation needs:     Medical: Not on  file     Non-medical: Not on file   Tobacco Use     Smoking status: Former Smoker     Types: Pipe     Smokeless tobacco: Current User     Types: Chew   Substance and Sexual Activity     Alcohol use: No     Drug use: No     Sexual activity: Yes     Partners: Female     Birth control/protection: None   Lifestyle     Physical activity:     Days per week: Not on file     Minutes per session: Not on file     Stress: Not on file   Relationships     Social connections:     Talks on phone: Not on file     Gets together: Not on file     Attends Gnosticist service: Not on file     Active member of club or organization: Not on file     Attends meetings of clubs or organizations: Not on file     Relationship status: Not on file     Intimate partner violence:     Fear of current or ex partner: Not on file     Emotionally abused: Not on file     Physically abused: Not on file     Forced sexual activity: Not on file   Other Topics Concern     Not on file   Social History Narrative     Not on file       Family History   Problem Relation Age of Onset     Diabetes Mother      Heart disease Brother      Diabetes Brother      Diabetes Paternal Grandmother      Review of Systems:  A 12 point comprehensive review of systems was negative except as noted in HPI.        Objective:    Physical Exam   /60 (Patient Site: Right Arm, Patient Position: Sitting, Cuff Size: Adult Regular)   Pulse 96   Wt 176 lb 14.4 oz (80.2 kg)   BMI 26.12 kg/m    Body mass index is 26.12 kg/m .    Constitutional: oriented to person, place, and time, appears well-nourished. No distress.   HENT:   Head: Normocephalic.   Mouth/Throat: Oropharynx is clear and moist.   Eyes: Conjunctivae are normal. Pupils are equal, round, and reactive to light.   Neck: Normal range of motion. Neck supple.   Cardiovascular: Normal rate, regular rhythm and normal heart sounds.    Pulmonary/Chest: Effort normal and breath sounds normal.  Abdominal: Soft. Bowel sounds are  normal.   Musculoskeletal: Normal range of motion.   Neurological: alert and oriented to person, place, and time.  Psychiatric:  normal mood and affect.      Patient Active Problem List   Diagnosis     Thrombocytopenia (H)     Type 2 diabetes mellitus with complication, without long-term current use of insulin (H)     Hyperlipemia     Major Depression, Recurrent     Complex sleep apnea syndrome     BPH (benign prostatic hyperplasia)     H/O carotid endarterectomy     Benign essential tremor     CVA (cerebral vascular accident) (H)     Other neutropenia (H)     Macrocytosis without anemia       Current Outpatient Medications on File Prior to Visit   Medication Sig Dispense Refill     acetaminophen (TYLENOL) 500 MG tablet Take 500 mg by mouth every 6 (six) hours as needed for pain.       ascorbic acid (ASCORBIC ACID WITH DANNA HIPS) 500 MG tablet Take 1,000 mg by mouth daily.       aspirin 81 MG EC tablet Take 81 mg by mouth daily.       atorvastatin (LIPITOR) 80 MG tablet TAKE 1 TABLET DAILY 90 tablet 3     blood glucose test (FREESTYLE LITE STRIPS) strips Use 1 strip as Directed daily 100 strip 3     CALCIUM CARBONATE/VITAMIN D3 (CALCIUM 600 + D,3, ORAL) Take 1 tablet by mouth daily.              cholecalciferol, vitamin D3, (VITAMIN D3) 1,000 unit capsule Take 1,000 Units by mouth daily.              citalopram (CELEXA) 40 MG tablet Take 40 mg by mouth daily.       clopidogrel (PLAVIX) 75 mg tablet TAKE 1 TABLET DAILY 90 tablet 1     JANUVIA 100 mg tablet TAKE 1 TABLET DAILY 90 tablet 3     lancets (BD ULTRA-FINE II LANCETS) 30 gauge Misc Once daily 100 each 0     metFORMIN (GLUCOPHAGE) 500 MG tablet Take 2 tablets (1,000 mg total) by mouth 2 (two) times a day with meals. 360 tablet 2     multivitamin (ONE A DAY) per tablet Take 1 tablet by mouth daily.       OMEGA-3/DHA/EPA/FISH OIL (FISH OIL-OMEGA-3 FATTY ACIDS) 300-1,000 mg capsule Take 2 g by mouth daily.       No current facility-administered medications on  file prior to visit.                Anita Pineda  11/1/2019

## 2021-06-02 NOTE — PROGRESS NOTES
"He is a 74 y.o. y/o male patient who comes to the sleep medicine clinic for PAP therapy follow up.    DME: [facility]    History of moderate DANIELLE on CPAP.  Had elevated residual and periodic breathing on CPAP so switched to MAD, but developed jaw problems.   Titration study demonstrated high burden of CSA (presumed treatment emergent) so he was put on ASV.    Current PAP settings: ASV EPAP 6 PS 3 - 10 cmH2O with FFM    His symptoms are improved since he started using ASV.  He feels more refreshed when he wakes up.    He denies any PAP intolerance. He is using the device nightly and tolerates the pressure well.     30-Days Efficacy and Compliance Data  Residual AHI: 0.3  Leak: 9.8 LPM (95%)  Days used > 4 hours: 28/30 (30/30 with use)  Average usage per night: 7:42 hours  95th percentile P: 12.3 / 6 cmH2O  Median VT: 491 mL  Median RR: 13 bpm  Median MV: 6.5 LPM    Mask Tolerance: Fine  Skin irritation: None    Physical Exam:  /54 (Patient Site: Right Arm, Patient Position: Sitting, Cuff Size: Adult Regular)   Pulse 86   Ht 5' 9\" (1.753 m)   Wt 177 lb (80.3 kg)   SpO2 95%   BMI 26.14 kg/m    General appearance: No apparent distress, well groomed.  Head: Normocephalic, atraumatic.  Musculoskeletal: No edema noted.  No clubbing or cyanosis.  Skin: Warm, dry, intact.  Neurologic: Alert and oriented to person, place and time   Gait is normal.  Psychiatric: Affect pleasant.  Mood good.     Labs/Studies:  I reviewed the efficacy and compliance report from his device. Data summarized on the HPI.     Assessment and Plan:  1. Complex sleep apnea syndrome  - CPAP DME Sleep Medicine [facility]    Residual AHI is great  Compliance is good  Patient is benefiting from using PAP therapy.    Continue with P = same.    We counseled the patient on the importance of using his PAP device every night and the risks of not treating sleep apnea.      Patient verbalized understanding of these issues, agrees with the plan and all " questions were answered today. Patient was given an opportuntity to voice any other symptoms or concerns not listed above. Patient did not have any other symptoms or concerns.      Patient told to return in 12 months.     Khadar TRAYLOR Board Certified in Internal Medicine and Sleep Medicine  [facility].    We spent a total of 15 minutes of face-to-face encounter and more than 50% of the encounter was used for counseling or coordination of care.

## 2021-06-02 NOTE — PROGRESS NOTES
Order for Durable Medical Equipment was processed and equipment ordered.     Date: 10/25/2019    Equipment Ordered: CPAP supplies    DME Company: [employer]    Fax Number: Pacee Her ([employer]/[email or web address])    Ordering Provider: Khadar Guevara MD Danielle S Gant, [facility] 10/25/2019 2:30 PM

## 2021-06-03 VITALS
BODY MASS INDEX: 26.22 KG/M2 | WEIGHT: 177 LBS | SYSTOLIC BLOOD PRESSURE: 120 MMHG | HEART RATE: 86 BPM | DIASTOLIC BLOOD PRESSURE: 54 MMHG | OXYGEN SATURATION: 95 % | HEIGHT: 69 IN

## 2021-06-03 VITALS — WEIGHT: 167.7 LBS | BODY MASS INDEX: 24.41 KG/M2

## 2021-06-03 VITALS — WEIGHT: 172 LBS | HEIGHT: 69 IN | BODY MASS INDEX: 25.48 KG/M2

## 2021-06-03 VITALS
HEART RATE: 96 BPM | BODY MASS INDEX: 26.12 KG/M2 | DIASTOLIC BLOOD PRESSURE: 60 MMHG | SYSTOLIC BLOOD PRESSURE: 138 MMHG | WEIGHT: 176.9 LBS

## 2021-06-03 NOTE — TELEPHONE ENCOUNTER
Refill Approved    Rx renewed per Medication Renewal Policy. Medication was last renewed on 2/28/2019 for 360/2.  Last OV 11/1/2019  Brandy Taylor, [facility] Triage/Med Refill 11/25/2019     Requested Prescriptions   Pending Prescriptions Disp Refills     metFORMIN (GLUCOPHAGE) 500 MG tablet [Pharmacy Med Name: METFORMIN HCL TABS 500MG] 360 tablet 4     Sig: TAKE 2 TABLETS TWICE A DAY WITH MEALS       Metformin Refill Protocol Passed - 11/25/2019  1:18 AM        Passed - Blood pressure in last 12 months     BP Readings from Last 1 Encounters:   11/01/19 138/60             Passed - LFT or AST or ALT in last 12 months     Albumin   Date Value Ref Range Status   11/01/2019 4.0 3.5 - 5.0 g/dL Final     Bilirubin, Total   Date Value Ref Range Status   11/01/2019 0.6 0.0 - 1.0 mg/dL Final     Alkaline Phosphatase   Date Value Ref Range Status   11/01/2019 81 45 - 120 U/L Final     AST   Date Value Ref Range Status   11/01/2019 17 0 - 40 U/L Final     ALT   Date Value Ref Range Status   11/01/2019 27 0 - 45 U/L Final     Protein, Total   Date Value Ref Range Status   11/01/2019 6.5 6.0 - 8.0 g/dL Final                Passed - GFR or Serum Creatinine in last 6 months     GFR MDRD Non Af Amer   Date Value Ref Range Status   11/01/2019 >60 >60 mL/min/1.73m2 Final     GFR MDRD Af Amer   Date Value Ref Range Status   11/01/2019 >60 >60 mL/min/1.73m2 Final             Passed - Visit with PCP or prescribing provider visit in last 6 months or next 3 months     Last office visit with prescriber/PCP: 11/1/2019 OR same dept: 11/1/2019 Anita Pineda MD OR same specialty: 11/1/2019 Anita Pineda MD Last physical: Visit date not found Last MTM visit: Visit date not found         Next appt within 3 mo: Visit date not found  Next physical within 3 mo: Visit date not found  Prescriber OR PCP: Anita Pineda MD  Last diagnosis associated with med order: 1. Type 2 diabetes mellitus (H)  - metFORMIN (GLUCOPHAGE) 500 MG tablet  [Pharmacy Med Name: METFORMIN HCL TABS 500MG]; TAKE 2 TABLETS TWICE A DAY WITH MEALS  Dispense: 360 tablet; Refill: 4     If protocol passes may refill for 12 months if within 3 months of last provider visit (or a total of 15 months).           Passed - A1C in last 6 months     Hemoglobin A1c   Date Value Ref Range Status   11/01/2019 7.9 (H) 3.5 - 6.0 % Final               Passed - Microalbumin in last year      Microalbumin, Random Urine   Date Value Ref Range Status   05/09/2019 1.36 0.00 - 1.99 mg/dL Final

## 2021-06-03 NOTE — TELEPHONE ENCOUNTER
Refill Approved    Rx renewed per Medication Renewal Policy. Medication was last renewed on 11/19/18.    Kathya Reyes, [facility] Triage/Med Refill 11/13/2019     Requested Prescriptions   Pending Prescriptions Disp Refills     atorvastatin (LIPITOR) 80 MG tablet [Pharmacy Med Name: ATORVASTATIN TABS 80MG] 90 tablet 4     Sig: TAKE 1 TABLET DAILY       Statins Refill Protocol (Hmg CoA Reductase Inhibitors) Passed - 11/13/2019  1:25 AM        Passed - PCP or prescribing provider visit in past 12 months      Last office visit with prescriber/PCP: 11/1/2019 Anita Pineda MD OR same dept: 11/1/2019 Anita Pineda MD OR same specialty: 11/1/2019 Anita Pineda MD  Last physical: 5/7/2019 Last MTM visit: Visit date not found   Next visit within 3 mo: Visit date not found  Next physical within 3 mo: Visit date not found  Prescriber OR PCP: Anita Pineda MD  Last diagnosis associated with med order: 1. Hyperlipemia  - atorvastatin (LIPITOR) 80 MG tablet [Pharmacy Med Name: ATORVASTATIN TABS 80MG]; TAKE 1 TABLET DAILY  Dispense: 90 tablet; Refill: 4    If protocol passes may refill for 12 months if within 3 months of last provider visit (or a total of 15 months).

## 2021-06-04 VITALS
DIASTOLIC BLOOD PRESSURE: 60 MMHG | BODY MASS INDEX: 26 KG/M2 | OXYGEN SATURATION: 96 % | SYSTOLIC BLOOD PRESSURE: 137 MMHG | HEIGHT: 69 IN | HEART RATE: 83 BPM | WEIGHT: 175.5 LBS

## 2021-06-04 NOTE — TELEPHONE ENCOUNTER
Droplet+Contact precautions Refill Approved    Rx renewed per Medication Renewal Policy. Medication was last renewed on 6/16/19.    Kathya Reyes, [facility] Triage/Med Refill 12/12/2019     Requested Prescriptions   Pending Prescriptions Disp Refills     clopidogrel (PLAVIX) 75 mg tablet [Pharmacy Med Name: CLOPIDOGREL BISULFATE TABS 75MG] 90 tablet 4     Sig: TAKE 1 TABLET DAILY       Clopidogrel/Prasugrel/Ticagrelor Refill Protocol Passed - 12/11/2019  1:20 AM        Passed - PCP or prescribing provider visit in past 6 months       Last office visit with prescriber/PCP: 11/1/2019 OR same dept: 11/1/2019 Anita Pineda MD OR same specialty: 11/1/2019 Anita Pineda MD Last physical: Visit date not found Last MTM visit: Visit date not found     Next appt within 3 mo: Visit date not found  Next physical within 3 mo: Visit date not found  Prescriber OR PCP: Anita Pineda MD  Last diagnosis associated with med order: 1. CVA (cerebral vascular accident) (H)  - clopidogrel (PLAVIX) 75 mg tablet [Pharmacy Med Name: CLOPIDOGREL BISULFATE TABS 75MG]; TAKE 1 TABLET DAILY  Dispense: 90 tablet; Refill: 4    If protocol passes may refill for 6 months if within 3 months of last provider visit (or a total of 9 months).              Passed - Hemoglobin in past 12 months     Hemoglobin   Date Value Ref Range Status   11/01/2019 14.5 14.0 - 18.0 g/dL Final

## 2021-06-05 VITALS
HEART RATE: 98 BPM | OXYGEN SATURATION: 99 % | BODY MASS INDEX: 24.02 KG/M2 | SYSTOLIC BLOOD PRESSURE: 122 MMHG | WEIGHT: 165 LBS | DIASTOLIC BLOOD PRESSURE: 68 MMHG

## 2021-06-05 VITALS
HEIGHT: 70 IN | HEART RATE: 76 BPM | BODY MASS INDEX: 24.98 KG/M2 | WEIGHT: 174.5 LBS | DIASTOLIC BLOOD PRESSURE: 70 MMHG | SYSTOLIC BLOOD PRESSURE: 128 MMHG | OXYGEN SATURATION: 97 %

## 2021-06-07 ENCOUNTER — AMBULATORY - HEALTHEAST (OUTPATIENT)
Dept: INTERNAL MEDICINE | Facility: CLINIC | Age: 76
End: 2021-06-07

## 2021-06-07 DIAGNOSIS — E87.5 HYPERKALEMIA: ICD-10-CM

## 2021-06-07 NOTE — TELEPHONE ENCOUNTER
RN cannot approve Refill Request    RN can NOT refill this medication Protocol failed and NO refill given.     Kathya Reyes, [facility] Triage/Med Refill 5/4/2020    Requested Prescriptions   Pending Prescriptions Disp Refills     JANUVIA 100 mg tablet [Pharmacy Med Name: JANUVIA TABS 100MG] 90 tablet 3     Sig: TAKE 1 TABLET DAILY       Oral Hypoglycemics Refill Protocol Failed - 5/2/2020  2:28 AM        Failed - Visit with PCP or prescribing provider visit in last 6 months       Last office visit with prescriber/PCP: Visit date not found OR same dept: 11/1/2019 Anita Pineda MD OR same specialty: 11/1/2019 Anita Pineda MD Last physical: Visit date not found Last MTM visit: Visit date not found         Next appt within 3 mo: Visit date not found  Next physical within 3 mo: Visit date not found  Prescriber OR PCP: Anita Pineda MD  Last diagnosis associated with med order: 1. Type 2 diabetes mellitus (H)  - JANUVIA 100 mg tablet [Pharmacy Med Name: JANUVIA TABS 100MG]; TAKE 1 TABLET DAILY  Dispense: 90 tablet; Refill: 3     If protocol passes may refill for 12 months if within 3 months of last provider visit (or a total of 15 months).           Failed - A1C in last 6 months     Hemoglobin A1c   Date Value Ref Range Status   11/01/2019 7.9 (H) 3.5 - 6.0 % Final               Passed - Microalbumin in last year      Microalbumin, Random Urine   Date Value Ref Range Status   05/09/2019 1.36 0.00 - 1.99 mg/dL Final                  Passed - Blood pressure in last year     BP Readings from Last 1 Encounters:   11/01/19 138/60             Passed - Serum creatinine in last year     Creatinine   Date Value Ref Range Status   11/01/2019 0.83 0.70 - 1.30 mg/dL Final

## 2021-06-08 NOTE — TELEPHONE ENCOUNTER
Protocol not passed, upcoming appointment next month scheduled. Will send refill until then.     Latanya Rae), PharmD  Medication Therapy Management Pharmacist  [facility]

## 2021-06-10 ENCOUNTER — APPOINTMENT (OUTPATIENT)
Dept: CT IMAGING | Facility: CLINIC | Age: 76
End: 2021-06-10
Attending: EMERGENCY MEDICINE
Payer: MEDICARE

## 2021-06-10 ENCOUNTER — HOSPITAL ENCOUNTER (EMERGENCY)
Facility: CLINIC | Age: 76
Discharge: HOME | End: 2021-06-10
Attending: EMERGENCY MEDICINE | Admitting: EMERGENCY MEDICINE
Payer: MEDICARE

## 2021-06-10 VITALS
TEMPERATURE: 97.9 F | DIASTOLIC BLOOD PRESSURE: 69 MMHG | HEART RATE: 101 BPM | SYSTOLIC BLOOD PRESSURE: 126 MMHG | RESPIRATION RATE: 18 BRPM | WEIGHT: 150 LBS | OXYGEN SATURATION: 97 % | BODY MASS INDEX: 21.47 KG/M2 | HEIGHT: 70 IN

## 2021-06-10 DIAGNOSIS — N20.1 LEFT URETERAL STONE: ICD-10-CM

## 2021-06-10 LAB
ALBUMIN SERPL-MCNC: 3.8 G/DL (ref 3.4–5)
ALBUMIN UR-MCNC: 10 MG/DL
ALP SERPL-CCNC: 112 U/L (ref 40–150)
ALT SERPL W P-5'-P-CCNC: 135 U/L (ref 0–70)
ANION GAP SERPL CALCULATED.3IONS-SCNC: 11 MMOL/L (ref 3–14)
APPEARANCE UR: CLEAR
AST SERPL W P-5'-P-CCNC: 62 U/L (ref 0–45)
BASOPHILS # BLD AUTO: 0 10E9/L (ref 0–0.2)
BASOPHILS NFR BLD AUTO: 0.5 %
BILIRUB SERPL-MCNC: 0.7 MG/DL (ref 0.2–1.3)
BILIRUB UR QL STRIP: NEGATIVE
BUN SERPL-MCNC: 23 MG/DL (ref 7–30)
CALCIUM SERPL-MCNC: 9.7 MG/DL (ref 8.5–10.1)
CHLORIDE SERPL-SCNC: 103 MMOL/L (ref 94–109)
CO2 SERPL-SCNC: 21 MMOL/L (ref 20–32)
COLOR UR AUTO: ABNORMAL
CREAT SERPL-MCNC: 0.87 MG/DL (ref 0.66–1.25)
DIFFERENTIAL METHOD BLD: ABNORMAL
EOSINOPHIL # BLD AUTO: 0.1 10E9/L (ref 0–0.7)
EOSINOPHIL NFR BLD AUTO: 2.6 %
ERYTHROCYTE [DISTWIDTH] IN BLOOD BY AUTOMATED COUNT: 11.9 % (ref 10–15)
GFR SERPL CREATININE-BSD FRML MDRD: 85 ML/MIN/{1.73_M2}
GLUCOSE SERPL-MCNC: 182 MG/DL (ref 70–99)
GLUCOSE UR STRIP-MCNC: >1000 MG/DL
HCT VFR BLD AUTO: 39 % (ref 40–53)
HGB BLD-MCNC: 13.7 G/DL (ref 13.3–17.7)
HGB UR QL STRIP: ABNORMAL
IMM GRANULOCYTES # BLD: 0 10E9/L (ref 0–0.4)
IMM GRANULOCYTES NFR BLD: 0.2 %
KETONES UR STRIP-MCNC: 60 MG/DL
LACTATE BLD-SCNC: 3.3 MMOL/L (ref 0.7–2)
LEUKOCYTE ESTERASE UR QL STRIP: NEGATIVE
LIPASE SERPL-CCNC: 103 U/L (ref 73–393)
LYMPHOCYTES # BLD AUTO: 1.1 10E9/L (ref 0.8–5.3)
LYMPHOCYTES NFR BLD AUTO: 20 %
MCH RBC QN AUTO: 34.3 PG (ref 26.5–33)
MCHC RBC AUTO-ENTMCNC: 35.1 G/DL (ref 31.5–36.5)
MCV RBC AUTO: 98 FL (ref 78–100)
MONOCYTES # BLD AUTO: 0.4 10E9/L (ref 0–1.3)
MONOCYTES NFR BLD AUTO: 7.1 %
NEUTROPHILS # BLD AUTO: 3.8 10E9/L (ref 1.6–8.3)
NEUTROPHILS NFR BLD AUTO: 69.6 %
NITRATE UR QL: NEGATIVE
NRBC # BLD AUTO: 0 10*3/UL
NRBC BLD AUTO-RTO: 0 /100
PH UR STRIP: 6.5 PH (ref 5–7)
PLATELET # BLD AUTO: 133 10E9/L (ref 150–450)
POTASSIUM SERPL-SCNC: 4.5 MMOL/L (ref 3.4–5.3)
PROT SERPL-MCNC: 7.4 G/DL (ref 6.8–8.8)
RBC # BLD AUTO: 4 10E12/L (ref 4.4–5.9)
RBC #/AREA URNS AUTO: 16 /HPF (ref 0–2)
SODIUM SERPL-SCNC: 135 MMOL/L (ref 133–144)
SOURCE: ABNORMAL
SP GR UR STRIP: 1.03 (ref 1–1.03)
SQUAMOUS #/AREA URNS AUTO: <1 /HPF (ref 0–1)
UROBILINOGEN UR STRIP-MCNC: 0 MG/DL (ref 0–2)
WBC # BLD AUTO: 5.5 10E9/L (ref 4–11)
WBC #/AREA URNS AUTO: 2 /HPF (ref 0–5)

## 2021-06-10 PROCEDURE — 83690 ASSAY OF LIPASE: CPT | Performed by: EMERGENCY MEDICINE

## 2021-06-10 PROCEDURE — 250N000013 HC RX MED GY IP 250 OP 250 PS 637: Performed by: EMERGENCY MEDICINE

## 2021-06-10 PROCEDURE — 74177 CT ABD & PELVIS W/CONTRAST: CPT | Mod: ME

## 2021-06-10 PROCEDURE — 96375 TX/PRO/DX INJ NEW DRUG ADDON: CPT

## 2021-06-10 PROCEDURE — 258N000003 HC RX IP 258 OP 636: Performed by: EMERGENCY MEDICINE

## 2021-06-10 PROCEDURE — 250N000011 HC RX IP 250 OP 636: Performed by: EMERGENCY MEDICINE

## 2021-06-10 PROCEDURE — 81001 URINALYSIS AUTO W/SCOPE: CPT | Performed by: EMERGENCY MEDICINE

## 2021-06-10 PROCEDURE — 96374 THER/PROPH/DIAG INJ IV PUSH: CPT | Mod: 59

## 2021-06-10 PROCEDURE — 80053 COMPREHEN METABOLIC PANEL: CPT | Performed by: EMERGENCY MEDICINE

## 2021-06-10 PROCEDURE — 250N000009 HC RX 250: Performed by: EMERGENCY MEDICINE

## 2021-06-10 PROCEDURE — 85025 COMPLETE CBC W/AUTO DIFF WBC: CPT | Performed by: EMERGENCY MEDICINE

## 2021-06-10 PROCEDURE — 83605 ASSAY OF LACTIC ACID: CPT | Performed by: EMERGENCY MEDICINE

## 2021-06-10 PROCEDURE — 76705 ECHO EXAM OF ABDOMEN: CPT

## 2021-06-10 PROCEDURE — 96361 HYDRATE IV INFUSION ADD-ON: CPT

## 2021-06-10 PROCEDURE — 99285 EMERGENCY DEPT VISIT HI MDM: CPT | Mod: 25

## 2021-06-10 RX ORDER — VENLAFAXINE 37.5 MG/1
37.5 TABLET ORAL DAILY
COMMUNITY
End: 2024-05-20

## 2021-06-10 RX ORDER — IOPAMIDOL 755 MG/ML
75 INJECTION, SOLUTION INTRAVASCULAR ONCE
Status: COMPLETED | OUTPATIENT
Start: 2021-06-10 | End: 2021-06-10

## 2021-06-10 RX ORDER — TAMSULOSIN HYDROCHLORIDE 0.4 MG/1
0.4 CAPSULE ORAL ONCE
Status: COMPLETED | OUTPATIENT
Start: 2021-06-10 | End: 2021-06-10

## 2021-06-10 RX ORDER — VENLAFAXINE 100 MG/1
150 TABLET ORAL
COMMUNITY
End: 2024-05-20

## 2021-06-10 RX ORDER — KETOROLAC TROMETHAMINE 15 MG/ML
10 INJECTION, SOLUTION INTRAMUSCULAR; INTRAVENOUS ONCE
Status: COMPLETED | OUTPATIENT
Start: 2021-06-10 | End: 2021-06-10

## 2021-06-10 RX ORDER — HYDROMORPHONE HYDROCHLORIDE 1 MG/ML
0.3 INJECTION, SOLUTION INTRAMUSCULAR; INTRAVENOUS; SUBCUTANEOUS ONCE
Status: COMPLETED | OUTPATIENT
Start: 2021-06-10 | End: 2021-06-10

## 2021-06-10 RX ORDER — EMPAGLIFLOZIN 25 MG/1
25 TABLET, FILM COATED ORAL
COMMUNITY
Start: 2021-05-08 | End: 2022-01-19

## 2021-06-10 RX ORDER — OXYCODONE HYDROCHLORIDE 5 MG/1
2.5-5 TABLET ORAL EVERY 6 HOURS PRN
Qty: 12 TABLET | Refills: 0 | Status: SHIPPED | OUTPATIENT
Start: 2021-06-10 | End: 2021-09-02

## 2021-06-10 RX ADMIN — IOPAMIDOL 75 ML: 755 INJECTION, SOLUTION INTRAVENOUS at 20:39

## 2021-06-10 RX ADMIN — SODIUM CHLORIDE 62 ML: 9 INJECTION, SOLUTION INTRAVENOUS at 20:39

## 2021-06-10 RX ADMIN — HYDROMORPHONE HYDROCHLORIDE 0.3 MG: 1 INJECTION, SOLUTION INTRAMUSCULAR; INTRAVENOUS; SUBCUTANEOUS at 20:20

## 2021-06-10 RX ADMIN — KETOROLAC TROMETHAMINE 10 MG: 15 INJECTION, SOLUTION INTRAMUSCULAR; INTRAVENOUS at 21:32

## 2021-06-10 RX ADMIN — TAMSULOSIN HYDROCHLORIDE 0.4 MG: 0.4 CAPSULE ORAL at 21:51

## 2021-06-10 RX ADMIN — SODIUM CHLORIDE 500 ML: 9 INJECTION, SOLUTION INTRAVENOUS at 21:31

## 2021-06-10 RX ADMIN — SODIUM CHLORIDE 1000 ML: 9 INJECTION, SOLUTION INTRAVENOUS at 20:19

## 2021-06-10 ASSESSMENT — MIFFLIN-ST. JEOR: SCORE: 1431.65

## 2021-06-10 ASSESSMENT — ENCOUNTER SYMPTOMS
VOMITING: 0
ABDOMINAL PAIN: 1
DYSURIA: 0
HEMATURIA: 0
DIAPHORESIS: 1
NAUSEA: 1
FEVER: 0

## 2021-06-10 NOTE — PROGRESS NOTES
Dear Dr. Anita Pineda MD  [address] [place] [address],    Thank you for the opportunity to participate in the care of Yong Guillermo.     He is a 71 y.o. y/o male patient who comes to the sleep medicine clinic for follow up.    He is a 71 y.o. y/o male patient who comes to the sleep medicine clinic for his yearly follow up.     DANIELLE chronology  Dx: AHI= 23.6 CPAP of 9  We changed the pressure to APAP due to a residual AHI of 9.3 (to 9-14)  Second residual AHI= 7.1  He used CPAP of 12 cwp and the residual AHI was too high (AHI=13)  We changed his device to APAP mode with a pressure range from 7 cwp to 17 cwp.    Residual AHI: 13.0  Leak: 0%  Compliance: 100%  90th P: 11.8    Mask Tolerance: excellent  Skin irritation: no      Past Medical History:   Diagnosis Date     Diabetes mellitus      Stroke        Past Surgical History:   Procedure Laterality Date     PA ABDOMEN SURGERY PROC UNLISTED      Description: Hernia Repair;  Proc Date: 05/01/2004;  Comments: Right Inguinal and Umbilical     PA EXCIS CERV DISK,ONE LEVEL      Description: Laminectomy With Disc Removal;  Recorded: 04/29/2008;  Comments: Lumbar level     PA EXCIS CERV DISK,ONE LEVEL      Description: Laminectomy With Disc Removal;  Proc Date: 07/01/2005;  Comments: C5/6 and C6/7     PA EXCIS TENDON SHEATH LESION, HAND/FINGER      Description: Hand Excision Of A Tendon Cyst;  Recorded: 04/29/2008;     PA EXCISION,BENIGN TUMOR,MANDIBLE      Description: Jaw Excision Benign Cyst / Tumor;  Proc Date: 05/01/2004;     PA REMOVAL OF SPERM DUCT(S)      Description: Surgery Of Male Genitalia Vasectomy;  Recorded: 04/29/2008;     PA REMOVAL OF TONSILS,<13 Y/O      Description: Tonsillectomy;  Recorded: 04/25/2008;     PA THROMBOENDARTECTMY NECK,NECK INCIS      Description: Carotid Thromboendarterectomy;  Proc Date: 09/01/2006;       Social History     Social History     Marital status:      Spouse name: N/A     Number of children: N/A     Years  of education: N/A     Occupational History     Not on file.     Social History Main Topics     Smoking status: Former Smoker     Types: Pipe     Smokeless tobacco: Current User     Alcohol use No     Drug use: No     Sexual activity: Not on file     Other Topics Concern     Not on file     Social History Narrative       Review of Systems:  General: No weight gain, no weight loss  Eyes: No vision changes  ENT: No hearing changes  Cardio: No chest pain, no nocturnal dyspnea  Respiratory: No shortness of breath, no cough  Gastrointestinal: No diarrhea, no constipation  Genitourinary: No excessive urination  Tegumentary: No rashes  Neurological: No seizures, no loss of consciousness  Endo: No heat or cold intolerance.    Current Outpatient Prescriptions   Medication Sig Dispense Refill     acetaminophen (TYLENOL) 500 MG tablet Take 500 mg by mouth every 6 (six) hours as needed for pain.       ascorbic acid (ASCORBIC ACID WITH DANNA HIPS) 500 MG tablet Take 1,000 mg by mouth daily.       aspirin 325 MG tablet Take 1 tablet (325 mg total) by mouth daily.  0     atorvastatin (LIPITOR) 80 MG tablet TAKE 1 TABLET DAILY 90 tablet 2     blood glucose test (FREESTYLE LITE STRIPS) strips Use 1 strip as Directed daily 100 strip 3     CALCIUM CARBONATE/VITAMIN D3 (CALCIUM 600 + D,3, ORAL) Take by mouth.       cholecalciferol, vitamin D3, (VITAMIN D3) 1,000 unit capsule        citalopram (CELEXA) 40 MG tablet Take 40 mg by mouth daily.       clopidogrel (PLAVIX) 75 mg tablet TAKE 1 TABLET DAILY 90 tablet 1     lancets (BD ULTRA-FINE II LANCETS) 30 gauge Misc Once daily 100 each 0     metFORMIN (GLUCOPHAGE) 500 MG tablet TAKE 1 TABLET THREE TIMES A DAY WITH EVERY MEAL 270 tablet 2     multivitamin (ONE A DAY) per tablet Take 1 tablet by mouth daily.       OMEGA-3/DHA/EPA/FISH OIL (FISH OIL-OMEGA-3 FATTY ACIDS) 300-1,000 mg capsule Take 2 g by mouth daily.       sitaGLIPtin (JANUVIA) 100 MG tablet Take 1 tablet (100 mg total) by mouth  "daily. 90 tablet 0     No current facility-administered medications for this visit.        Allergies   Allergen Reactions     Other Allergy (See Comments)      Other, 01/26/2009.  Action: TPA  - med to reverse TIA.  Muscles in back tensed up severely.       Sulfa (Sulfonamide Antibiotics)        Physical Exam:  Pulse 72  Ht 5' 9.5\" (1.765 m)  Wt 180 lb (81.6 kg)  SpO2 97%  BMI 26.2 kg/m2  BMI:Body mass index is 26.2 kg/(m^2).   GEN: NAD.  Neurological: Alert, oriented to time, place, and person.  Psych: normal mood, normal affect     Labs/Studies:    I reviewed the efficacy and compliance report from his device. Data summarized on the HPI and the CPAP compliance flow sheet.     Lab Results   Component Value Date    WBC 4.1 11/09/2016    HGB 14.9 11/09/2016    HCT 43.0 11/09/2016    MCV 99 11/09/2016     (L) 11/09/2016         Chemistry        Component Value Date/Time     11/09/2016 1203    K 5.0 11/09/2016 1203     11/09/2016 1203    CO2 29 11/09/2016 1203    BUN 19 11/09/2016 1203    CREATININE 0.81 11/09/2016 1203     (H) 11/09/2016 1203        Component Value Date/Time    CALCIUM 9.8 11/09/2016 1203    ALKPHOS 87 11/09/2016 1203    AST 24 11/09/2016 1203    ALT 36 11/09/2016 1203    BILITOT 0.6 11/09/2016 1203          Assessment and Plan:  In summary Yong Guillermo is a 71 y.o. year old male who is here for follow up.    1. Obstructive Sleep Apnea  He is having a high residual AHI with 22% periodic breathing.  At this point we have two main options: 1- exploring a MAD, 2- returning to the lab for a titration test.  We will explore dental options and cost first.     Patient verbalized understanding of these issues, agrees with the plan and all questions were answered today. Patient was given an opportuntity to voice any other symptoms or concerns not listed above. Patient did not have any other symptoms or concerns.      FU depending on the results of dental evaluation.    Curt " MD LAYTON Hannah Board Certified in Internal Medicine and Sleep Medicine  [facility].

## 2021-06-10 NOTE — PROGRESS NOTES
"Preoperative H&P    Surgeon: Dr Silva  Date Of Surgery: 05/18/2017  Procedure: Lt Eye Cataract Extraction     History of Present Illness:  Yong Guillermo is a 71 y.o.  male who presents to the office today for a preoperative consultation at the request of Dr. Silva for left eye cataract extraction.     There is no history ofanesthesia issues in the past.     Denies history or recent abnormal bleeding     Review of Systems:   CV: Chest pain- not noted. Shortness of breath- not noted. Edema- not noted. SOLIS- not noted. Orthopnea- not noted.   GI: Abdominal pain- not noted. Nausea\vomiting\diarrhea-not noted. Melena or hematochezia- not noted.   : Urgency-not noted. Frequency- not noted. Dysuria- not noted. Hematuria- not noted. Incontinence- not noted.   CNS: Headaches- no significant symptoms. Dizziness- not noted. Visual Changes- left eye haziness    Physical Examination:    /56 (Patient Site: Right Arm, Patient Position: Sitting, Cuff Size: Adult Regular)  Pulse 75  Temp 97.8  F (36.6  C) (Oral)   Ht 5' 9.5\" (1.765 m)  Wt 178 lb 8 oz (81 kg)  SpO2 97%  BMI 25.98 kg/m2  Wt Readings from Last 3 Encounters:   05/01/17 178 lb 8 oz (81 kg)   04/24/17 178 lb 8 oz (81 kg)   11/09/16 177 lb 8 oz (80.5 kg)     Body mass index is 25.98 kg/(m^2). (>25?)    GEN: alert, cooperative, no acute distress  ENT: NCAT  Oropharynx: moist, no lesions, clear. Eyes: Conjunctiva: normal.   Neck: Supple, no adenopathy, no abnormal masses.  Chest: Within normal limits.  Cardiac: RRR, no rubs, no gallops.  Lungs: Breath Sounds normal, no crackles, no wheezing, no rhonchi.   Extr: Warm    Outpatient Encounter Prescriptions as of 5/1/2017   Medication Sig Dispense Refill     acetaminophen (TYLENOL) 500 MG tablet Take 500 mg by mouth every 6 (six) hours as needed for pain.       ascorbic acid (ASCORBIC ACID WITH DANNA HIPS) 500 MG tablet Take 1,000 mg by mouth daily.       aspirin 325 MG tablet Take 1 tablet (325 mg total) by " mouth daily.  0     atorvastatin (LIPITOR) 80 MG tablet TAKE 1 TABLET DAILY 90 tablet 2     BD ULTRA-FINE II LANCETS 30 gauge Misc USE TO TEST ONCE DAILY 100 each 2     blood sugar diagnostic (FREESTYLE LITE STRIPS) Strp Use 1 strip as Directed daily 100 strip 3     CALCIUM CARBONATE/VITAMIN D3 (CALCIUM 600 + D,3, ORAL) Take by mouth.       cholecalciferol, vitamin D3, (VITAMIN D3) 1,000 unit capsule        citalopram (CELEXA) 40 MG tablet Take 40 mg by mouth daily.       clopidogrel (PLAVIX) 75 mg tablet TAKE 1 TABLET DAILY 90 tablet 1     metFORMIN (GLUCOPHAGE) 500 MG tablet TAKE 1 TABLET THREE TIMES A DAY WITH EVERY MEAL 270 tablet 2     multivitamin (ONE A DAY) per tablet Take 1 tablet by mouth daily.       OMEGA-3/DHA/EPA/FISH OIL (FISH OIL-OMEGA-3 FATTY ACIDS) 300-1,000 mg capsule Take 2 g by mouth daily.       sitaGLIPtin (JANUVIA) 100 MG tablet Take 1 tablet (100 mg total) by mouth daily. 90 tablet 0     [DISCONTINUED] coenzyme Q10 400 mg cap Take by mouth as needed.        No facility-administered encounter medications on file as of 5/1/2017.      Past Medical History:   Diagnosis Date     Diabetes mellitus      Stroke      Past Surgical History:   Procedure Laterality Date     DC ABDOMEN SURGERY PROC UNLISTED      Description: Hernia Repair;  Proc Date: 05/01/2004;  Comments: Right Inguinal and Umbilical     DC EXCIS CERV DISK,ONE LEVEL      Description: Laminectomy With Disc Removal;  Recorded: 04/29/2008;  Comments: Lumbar level     DC EXCIS CERV DISK,ONE LEVEL      Description: Laminectomy With Disc Removal;  Proc Date: 07/01/2005;  Comments: C5/6 and C6/7     DC EXCIS TENDON SHEATH LESION, HAND/FINGER      Description: Hand Excision Of A Tendon Cyst;  Recorded: 04/29/2008;     DC EXCISION,BENIGN TUMOR,MANDIBLE      Description: Jaw Excision Benign Cyst / Tumor;  Proc Date: 05/01/2004;     DC REMOVAL OF SPERM DUCT(S)      Description: Surgery Of Male Genitalia Vasectomy;  Recorded: 04/29/2008;     DC  REMOVAL OF TONSILS,<13 Y/O      Description: Tonsillectomy;  Recorded: 04/25/2008;     GA THROMBOENDARTECTMY NECK,NECK INCIS      Description: Carotid Thromboendarterectomy;  Proc Date: 09/01/2006;     Social History   Substance Use Topics     Smoking status: Former Smoker     Types: Pipe     Smokeless tobacco: Current User     Alcohol use No     Allergies   Allergen Reactions     Other Allergy (See Comments)      Other, 01/26/2009.  Action: TPA  - med to reverse TIA.  Muscles in back tensed up severely.       Sulfa (Sulfonamide Antibiotics)        Diagnoses:     Encounter Diagnoses   Name Primary?     Pre-op examination Yes     Cataract         1. Pre-op examination    2. Cataract    Discussion-Plan:     Labs: none needed  EKG: not required    Labs reviewed Glucose within normal limits. Renal and LFTs within normal limits. Lytes within normal limits. PT, PTT within normal limits. CBC within normal limits.   UA within normal limits.     -Prophylaxis for cardiac events with perioperative beta-blockers: clinical risk factors  not indicated.  -can take daily meds with a sip of water morning of surgery     Cardiac Risk Estimation: RCRI for planned surgery is < 1%    Clinical Risk Factors:     Functional Capacity:     > 4 mets: able to climb stairs and walk 1-2 blocks w/o stopping    Procedure Risk:     Low - cataracts    Presently Clinically Stable for Scheduled Surgery. No contraindications to planned surgery    Yogesh Fabian MD

## 2021-06-10 NOTE — PROGRESS NOTES
Assessment and Plan:       1. Routine general medical examination at a health care facility    - PSA, Annual Screen (Prostatic-Specific Antigen); Future    2. Other neutropenia (H)  Stable.    3. Mild episode of recurrent major depressive disorder (H)  On citalopram 40 mg daily for many years, [facility]. I suggested possible change to different antidepressant since he has more symptoms of fatigue and anhedonia.    4. Type 2 diabetes mellitus with complication, without long-term current use of insulin (H)  Stable on Januvia and metformin.  - Microalbumin, Random Urine; Future  - Urinalysis-UC if Indicated; Future  - Lipid Profile; Future  - HM2(CBC w/o Differential); Future  - Glycosylated Hemoglobin A1c; Future  - Comprehensive Metabolic Panel; Future    5. Thrombocytopenia (H)  Stable.  - Vitamin D, Total (25-Hydroxy); Future  - HM2(CBC w/o Differential); Future    6. Benign prostatic hyperplasia without lower urinary tract symptoms    - Vitamin D, Total (25-Hydroxy); Future    7. Complex sleep apnea syndrome  On Bipap.  - Vitamin D, Total (25-Hydroxy); Future    8. Cerebrovascular accident (CVA), unspecified mechanism (H)  Left sided minimal residual weakness, on Plavix.  - Vitamin D, Total (25-Hydroxy); Future    9. H/O carotid endarterectomy  US was done last year.    10. Hyperlipidemia, unspecified hyperlipidemia type  On statin.    11. Tremor  I noticed today right hand pill rolling. He has head tremor too. Gait is wide based and right foot is dropping, not significant shuffling. He will see Neurologist.  - Ambulatory referral to Neurology    12. Vitamin D deficiency     - Vitamin D, Total (25-Hydroxy); Future    13. Encounter for screening for malignant neoplasm of prostate     - PSA, Annual Screen (Prostatic-Specific Antigen); Future    14. Fatigue, unspecified type  He is complanining of tiredness for few years, but sleeps well, and does not nap during the day. Works in his shop, and  finishes all daily tasks. No weakness. No falls.  - Thyroid Stimulating Hormone (TSH); Future  - Magnesium; Future  - Vitamin B12; Future     The patient's current medical problems were reviewed.    I have had an Advance Directives discussion with the patient.  The following health maintenance schedule was reviewed with the patient and provided in printed form in the after visit summary:   Health Maintenance   Topic Date Due     DEPRESSION ACTION PLAN  1945     DIABETIC FOOT EXAM  05/31/2018     A1C  05/01/2020     MEDICARE ANNUAL WELLNESS VISIT  05/07/2020     LIPID  05/09/2020     MICROALBUMIN  05/09/2020     ZOSTER VACCINES (3 of 3) 12/04/2019     INFLUENZA VACCINE RULE BASED (1) 08/01/2020     DIABETIC EYE EXAM  10/15/2020     BMP  11/01/2020     FALL RISK ASSESSMENT  11/01/2020     COLORECTAL CANCER SCREENING  08/14/2023     ADVANCE CARE PLANNING  05/07/2024     TD 18+ HE  04/27/2028     HEPATITIS C SCREENING  Completed     PNEUMOCOCCAL IMMUNIZATION 65+ LOW/MEDIUM RISK  Completed     HEPATITIS B VACCINES  Completed        Subjective:   Chief Complaint: Yong Guillermo is an 74 y.o. male here for an Annual Wellness visit.   HPI:  Acute and chronic medical problems discussed as above.    Review of Systems:    Please see above.  The rest of the review of systems are negative for all systems.    Patient Care Team:  Anita Pineda MD as PCP - Pat Morin MD as Physician (Hematology and Oncology)  Anita Pineda MD as Assigned PCP     Patient Active Problem List   Diagnosis     Thrombocytopenia (H)     Type 2 diabetes mellitus with complication, without long-term current use of insulin (H)     Hyperlipemia     Major Depression, Recurrent     Complex sleep apnea syndrome     BPH (benign prostatic hyperplasia)     H/O carotid endarterectomy     Benign essential tremor     CVA (cerebral vascular accident) (H)     Other neutropenia (H)     Macrocytosis without anemia     Past Medical History:    Diagnosis Date     Benign essential tremor 9/29/2016     BPH (benign prostatic hyperplasia) 5/26/2015     CVA (cerebral vascular accident) (H) 06/22/2006    Left arm weakness.     CVA (cerebral vascular accident) (H) 01/14/2009    Right arm clumsiness.  MRI showed 2 infarcts in the left hemisphere.     Depression      Diabetes mellitus, type 2 (H)      Hyperlipidemia      Obstructive sleep apnea 07/22/2013     Shingles       Past Surgical History:   Procedure Laterality Date     CAROTID ENDARTERECTOMY Right 09/27/2006     CERVICAL DISC SURGERY  07/29/2005    C5/6 and C6/7 lamina foraminotomy, scope with partial facetectomy and excision of hard disc herniation.     HERNIA REPAIR  5/21/104    Right inguinal hernia and umbilical hernia right cheek cyst on the face.     LA EXCIS CERV DISK,ONE LEVEL      Description: Laminectomy With Disc Removal;  Recorded: 04/29/2008;  Comments: Lumbar level     LA EXCIS TENDON SHEATH LESION, HAND/FINGER      Description: Hand Excision Of A Tendon Cyst;  Recorded: 04/29/2008;     LA EXCISION,BENIGN TUMOR,MANDIBLE      Description: Jaw Excision Benign Cyst / Tumor;  Proc Date: 05/01/2004;     ROTATOR CUFF REPAIR Right      TONSILLECTOMY       VASECTOMY        Family History   Problem Relation Age of Onset     Diabetes Mother      Heart disease Brother      Diabetes Brother      Diabetes Paternal Grandmother       Social History     Socioeconomic History     Marital status:      Spouse name: Not on file     Number of children: Not on file     Years of education: Not on file     Highest education level: Not on file   Occupational History     Occupation: Retired.     Comment: Worked in the [employer] and [employer].   Social Needs     Financial resource strain: Not on file     Food insecurity     Worry: Not on file     Inability: Not on file     Transportation needs     Medical: Not on file     Non-medical: Not on file   Tobacco Use     Smoking status: Former Smoker     Types: Pipe      Smokeless tobacco: Current User     Types: Chew   Substance and Sexual Activity     Alcohol use: No     Drug use: No     Sexual activity: Yes     Partners: Female     Birth control/protection: None   Lifestyle     Physical activity     Days per week: Not on file     Minutes per session: Not on file     Stress: Not on file   Relationships     Social connections     Talks on phone: Not on file     Gets together: Not on file     Attends Sabianism service: Not on file     Active member of club or organization: Not on file     Attends meetings of clubs or organizations: Not on file     Relationship status: Not on file     Intimate partner violence     Fear of current or ex partner: Not on file     Emotionally abused: Not on file     Physically abused: Not on file     Forced sexual activity: Not on file   Other Topics Concern     Not on file   Social History Narrative     Not on file      Current Outpatient Medications   Medication Sig Dispense Refill     acetaminophen (TYLENOL) 500 MG tablet Take 500 mg by mouth every 6 (six) hours as needed for pain.       ascorbic acid (ASCORBIC ACID WITH DANNA HIPS) 500 MG tablet Take 1,000 mg by mouth daily.       aspirin 81 MG EC tablet Take 81 mg by mouth daily.       atorvastatin (LIPITOR) 80 MG tablet TAKE 1 TABLET DAILY 90 tablet 3     blood glucose test (FREESTYLE LITE STRIPS) strips Use 1 strip as Directed daily 100 strip 3     CALCIUM CARBONATE/VITAMIN D3 (CALCIUM 600 + D,3, ORAL) Take 1 tablet by mouth daily.              cholecalciferol, vitamin D3, (VITAMIN D3) 1,000 unit capsule Take 1,000 Units by mouth daily.              citalopram (CELEXA) 40 MG tablet Take 40 mg by mouth daily.       clopidogreL (PLAVIX) 75 mg tablet TAKE 1 TABLET DAILY 90 tablet 0     JANUVIA 100 mg tablet TAKE 1 TABLET DAILY 90 tablet 3     lancets (BD ULTRA-FINE II LANCETS) 30 gauge Misc Once daily 100 each 0     metFORMIN (GLUCOPHAGE) 500 MG tablet Take 2 tablets (1,000 mg total) by mouth 2  "(two) times a day with meals. 360 tablet 3     multivitamin (ONE A DAY) per tablet Take 1 tablet by mouth daily.       OMEGA-3/DHA/EPA/FISH OIL (FISH OIL-OMEGA-3 FATTY ACIDS) 300-1,000 mg capsule Take 2 g by mouth daily.       No current facility-administered medications for this visit.       Objective:   Vital Signs:   Visit Vitals  /60   Pulse 83   Ht 5' 9\" (1.753 m)   Wt 175 lb 8 oz (79.6 kg)   SpO2 96%   BMI 25.92 kg/m           VisionScreening:  No exam data present     PHYSICAL EXAM  General Appearance: Alert, cooperative, no distress, appears stated age.  Head: Normocephalic, without obvious abnormality, atraumatic  Eyes: PERRL, conjunctiva/corneas clear, EOM's intact  Ears: Normal TM's and external ear canals, both ears  Nose: Nares normal, septum midline,mucosa normal, no drainage  Throat: Lips, mucosa, and tongue normal; teeth and gums normal  Neck: Supple, symmetrical, trachea midline, no adenopathy;  thyroid: not enlarged, symmetric, no tenderness/mass/nodules; no carotid bruit or JVD  Back: Symmetric, no curvature, ROM normal, no CVA tenderness.  Lungs: Clear to auscultation bilaterally, respirations unlabored.  Breasts: No breast masses, tenderness, asymmetry, or nipple discharge.  Heart: Regular rate and rhythm, S1 and S2 normal, no murmur, rub, or gallop.  Abdomen: Soft, non-tender, bowel sounds active all four quadrants,  no masses, no organomegaly.  Pelvic:declined  Extremities: Extremities normal, atraumatic, no cyanosis or edema.  Skin: Skin color, texture, turgor normal, no rashes or lesions.  Lymph nodes: Cervical, supraclavicular, and axillary nodes normal.  Neurologic: No focal neurological findings. Right hand pill rolling, wide based gait, some foot dropping on the right. Normal weakness, sensation and DTRs.      Assessment Results 7/29/2020   Activities of Daily Living No help needed   Instrumental Activities of Daily Living No help needed   Get Up and Go Score -   Mini Cog Total " Score 5   Some recent data might be hidden     A Mini-Cog score of 0-2 suggests the possibility of dementia, score of 3-5 suggests no dementia      Identified Health Risks:     The patient was provided with suggestions to help him develop a healthy physical lifestyle.   He is at risk for lack of exercise and has been provided with information to increase physical activity for the benefit of his well-being.  Information on urinary incontinence and treatment options given to patient.  The patient's PHQ-9 score is consistent with mild depression. He was provided with information regarding depression and was advised to schedule a follow up appointment in 12 weeks to further address this issue.  He is at risk for falling and has been provided with information to reduce the risk of falling at home.  Patient's advanced directive was discussed and I am comfortable with the patient's wishes.

## 2021-06-10 NOTE — PROGRESS NOTES
Dear Dr. Anita Pineda MD  [address] [place] [address],    Thank you for the opportunity to participate in the care of Yong Guillermo.     He is a 71 y.o. y/o male patient who comes to the sleep medicine clinic for his yearly follow up.    DANIELLE chronology  Dx: AHI= 23.6 CPAP of 9  We changed the pressure to APAP due to a residual AHI of 9.3 (to 9-14)  Second residual AHI= 7.1  He has been using CPAP of 12 cwp.    He feels more tired during the day. He is not sure if his CPAP is working as well.    Residual AHI: 13  Leak: 0%  Compliance: 100%  Periodic breathin%        Past Medical History:   Diagnosis Date     Diabetes mellitus      Stroke        Past Surgical History:   Procedure Laterality Date     [place] ABDOMEN SURGERY PROC UNLISTED      Description: Hernia Repair;  Proc Date: 2004;  Comments: Right Inguinal and Umbilical     WV EXCIS CERV DISK,ONE LEVEL      Description: Laminectomy With Disc Removal;  Recorded: 2008;  Comments: Lumbar level     WV EXCIS CERV DISK,ONE LEVEL      Description: Laminectomy With Disc Removal;  Proc Date: 2005;  Comments: C5/6 and C6/7     WV EXCIS TENDON SHEATH LESION, HAND/FINGER      Description: Hand Excision Of A Tendon Cyst;  Recorded: 2008;     WV EXCISION,BENIGN TUMOR,MANDIBLE      Description: Jaw Excision Benign Cyst / Tumor;  Proc Date: 2004;     [place] REMOVAL OF SPERM DUCT(S)      Description: Surgery Of Male Genitalia Vasectomy;  Recorded: 2008;     [place] REMOVAL OF TONSILS,<13 Y/O      Description: Tonsillectomy;  Recorded: 2008;     [place] THROMBOENDARTECTMY NECK,NECK INCIS      Description: Carotid Thromboendarterectomy;  Proc Date: 2006;       Social History     Social History     Marital status:      Spouse name: N/A     Number of children: N/A     Years of education: N/A     Occupational History     Not on file.     Social History Main Topics     Smoking status: Former Smoker     Types: Pipe     Smokeless  tobacco: Current User     Alcohol use No     Drug use: No     Sexual activity: Not on file     Other Topics Concern     Not on file     Social History Narrative       Review of Systems:  General: No weight gain, no weight loss  Eyes: No vision changes  ENT: No hearing changes  Cardio: No chest pain, no nocturnal dyspnea  Respiratory: No shortness of breath, no cough  Gastrointestinal: No diarrhea, no constipation  Genitourinary: No excessive urination  Tegumentary: No rashes  Neurological: No seizures, no loss of consciousness  Endo: No heat or cold intolerance.    Current Outpatient Prescriptions   Medication Sig Dispense Refill     acetaminophen (TYLENOL) 500 MG tablet Take 500 mg by mouth every 6 (six) hours as needed for pain.       ascorbic acid (ASCORBIC ACID WITH DANNA HIPS) 500 MG tablet Take 1,000 mg by mouth daily.       aspirin 325 MG tablet Take 325 mg by mouth daily.       atorvastatin (LIPITOR) 80 MG tablet TAKE 1 TABLET DAILY 90 tablet 2     BD ULTRA-FINE II LANCETS 30 gauge Misc USE TO TEST ONCE DAILY 100 each 2     blood sugar diagnostic (FREESTYLE LITE STRIPS) Strp Use 1 strip as Directed daily 100 strip 3     CALCIUM CARBONATE/VITAMIN D3 (CALCIUM 600 + D,3, ORAL) Take by mouth.       cholecalciferol, vitamin D3, (VITAMIN D3) 1,000 unit capsule        citalopram (CELEXA) 40 MG tablet Take 40 mg by mouth daily.       clopidogrel (PLAVIX) 75 mg tablet TAKE 1 TABLET DAILY 90 tablet 1     coenzyme Q10 400 mg cap Take by mouth as needed.        metFORMIN (GLUCOPHAGE) 500 MG tablet TAKE 1 TABLET THREE TIMES A DAY WITH EVERY MEAL 270 tablet 2     multivitamin (ONE A DAY) per tablet Take 1 tablet by mouth daily.       OMEGA-3/DHA/EPA/FISH OIL (FISH OIL-OMEGA-3 FATTY ACIDS) 300-1,000 mg capsule Take 2 g by mouth daily.       sitaGLIPtin (JANUVIA) 100 MG tablet Take 1 tablet (100 mg total) by mouth daily. 90 tablet 0     No current facility-administered medications for this visit.        Allergies   Allergen  "Reactions     Other Allergy (See Comments)      Other, 01/26/2009.  Action: TPA  - med to reverse TIA.  Muscles in back tensed up severely.       Sulfa (Sulfonamide Antibiotics)        Physical Exam:  /58 (Patient Site: Right Arm, Patient Position: Sitting, Cuff Size: Adult Regular)  Pulse 66  Ht 5' 10\" (1.778 m)  Wt 178 lb 8 oz (81 kg)  BMI 25.61 kg/m2  BMI:Body mass index is 25.61 kg/(m^2).   GEN: NAD,  Neurological: Alert, oriented to time, place, and person.  Psych: normal mood, normal affect     Labs/Studies:     I reviewed the efficacy and compliance report from his device.     Lab Results   Component Value Date    WBC 4.1 11/09/2016    HGB 14.9 11/09/2016    HCT 43.0 11/09/2016    MCV 99 11/09/2016     (L) 11/09/2016         Chemistry        Component Value Date/Time     11/09/2016 1203    K 5.0 11/09/2016 1203     11/09/2016 1203    CO2 29 11/09/2016 1203    BUN 19 11/09/2016 1203    CREATININE 0.81 11/09/2016 1203     (H) 11/09/2016 1203        Component Value Date/Time    CALCIUM 9.8 11/09/2016 1203    ALKPHOS 87 11/09/2016 1203    AST 24 11/09/2016 1203    ALT 36 11/09/2016 1203    BILITOT 0.6 11/09/2016 1203            Assessment and Plan:  In summary Yong Guillermo is a 71 y.o. year old male who is here for follow up.    1. Obstructive Sleep Apnea  His residual AHI is too high.  He is having periodic breathing at night.   We will first try APAP with a range from 7 to 17 cwp.  We will reevaluate in 4 weeks. Depending on the results of the compliance report, we will decide if a titration for possible ASV is needed.     Patient verbalized understanding of these issues, agrees with the plan and all questions were answered today. Patient was given an opportuntity to voice any other symptoms or concerns not listed above. Patient did not have any other symptoms or concerns.      Patient instructed to stop at  to schedule appointment before leaving today.    Curt" MD LAYTON Hannah Board Certified in Internal Medicine and Sleep Medicine  [facility].    We spent a total of 25 minutes of face-to-face encounter and more than 50% of the encounter was used for counseling or coordination of care.

## 2021-06-11 NOTE — ED PROVIDER NOTES
"  History   Chief Complaint:  Abdominal Pain       The history is provided by the patient.      Yong Guillermo is a 75 year old male with history of CVA, type II diabetes and hyperlipidemia who presents with left lower quadrant abdominal pain starting around two and a half hours ago. The patient states that the pain was initially small and has since grown to severe. He notes that he did become very diaphoretic at one point as well as nausea without vomiting. He states that the pain radiates slightly into lower left pelvis and left lower back. He denies any recent fever, hematuria or dysuria.     Review of Systems   Constitutional: Positive for diaphoresis. Negative for fever.   Gastrointestinal: Positive for abdominal pain and nausea. Negative for vomiting.   Genitourinary: Negative for dysuria and hematuria.   All other systems reviewed and are negative.    Allergies:  Alteplase  Sulfa Drugs    Medications:  Atorvastatin  Citalopram  Plavix  Metformin  Sitgliptin    Past Medical History:    Type II diabetes  Hyperlipidemia  CVA  Depressive disorder  Benign essential tremor  Sleep apnea    Past Surgical History:    Carotid angiogram x2  Aortic arch 4 vessel angiogram     Family History:    Diabetes  Brain cancer  Heart disease    Social History:  The patient presents to the emergency department with his wife.  The patient is .     Physical Exam     Patient Vitals for the past 24 hrs:   BP Temp Pulse Resp SpO2 Height Weight   06/10/21 2200 126/69 -- 101 -- -- -- --   06/10/21 2145 133/71 -- 98 -- 97 % -- --   06/10/21 2130 137/74 -- 90 -- 96 % -- --   06/10/21 2115 (!) 145/77 -- 87 -- 98 % -- --   06/10/21 2100 (!) 147/75 -- 86 -- 97 % -- --   06/10/21 2030 (!) 151/72 -- 71 -- 100 % -- --   06/10/21 2015 (!) 146/79 -- 75 -- 100 % -- --   06/10/21 2000 (!) 147/72 -- -- -- 100 % -- --   06/10/21 1957 -- -- -- -- -- 1.778 m (5' 10\") 68 kg (150 lb)   06/10/21 1956 (!) 147/72 97.9  F (36.6  C) 75 18 100 % -- -- "       Physical Exam  Eyes:               Sclera white; Pupils are equal and round  ENT:                External ears and nares normal  CV:                  Rate as above with regular rhythm   Resp:               Breath sounds clear and equal bilaterally                          Non-labored, no retractions or accessory muscle use  GI:                   Abdomen is soft, LLQ tenderness and firmness                          No rebound tenderness or peritoneal features  MS:                  Moves all extremities  Skin:                Warm and dry  Neuro:             Speech is normal and fluent. No apparent deficit.    Emergency Department Course     Imaging:  CT Abdomen/Pelvis with IV contrast:   1.  Left distal ureter 3 mm obstructing calculus with mild left hydroureteronephrosis and perinephric stranding.  As per radiology.    Laboratory:  CBC: WBC: 5.5, HB.7, PLT: 133 (low)     CMP: Glucose: 182 (high), ALT: 135 (high), AST: 62 (high), o/w WNL (Creatinine: 0.87)    Lactic Acid Whole Blood (): 3.3 (high)    Lipase: 103    UA with Microscopic: Glucose > 1000, Ketones 60, Blood Moderate, Albumin 10, RBC 16 (high) o/w Negative      Procedures    Limited Bedside Abdominal Ultrasound:    Performed by: Dr. Del Angel  Indication: Pain  Body area(s) imaged: Suprapubic, Aorta, Hatfield's, Splenorenal  Findings: No free fluid, no AAA  Impression: Same  Images archived to PACS      Emergency Department Course:    Reviewed:  I reviewed the patient's nursing notes, vitals, past medical records, Care Everywhere.     Assessments:  : I assessed the patient and performed a physical exam at this time.  : I reassessed the patient and performed a bedside ultrasound at this time.   : I reassessed the patient and informed them of their workup thus far. At this point I feel that the patient is safe for discharge, and the patient agrees.     Interventions:   NS 1L IV   Dilaudid 0.3mg IV   NS 0.5L IV    Toradol  10mg IV  2151 Flomax 0.4mg PO    Disposition:  The patient was discharged to home.     Impression & Plan     Medical Decision Making:  Yong Guillermo is a 75 year old male who is here for evaluation of left lower quadrant abdominal pain. Exam shows some firmness in this area concerning for underlying peritonitis. Pain was elevating early in his ED course and he was appearing pale. Based on this, bedside ultrasound was obtained to ensure that there was no evidence of vascular abnormalities or free fluid. This was not found. CT showed the source of the symptoms to be a left sided ureteral stone. There was no evidence for diverticulitis or intraabdominal infection. Symptoms were well controlled here. Lactic acid was elevated here but ultimately no evidence for infection was found.  I think that this is likely due to dehydration based on his UA results. Return immediately for worsening or uncontrolled symptoms.      Diagnosis:    ICD-10-CM    1. Left ureteral stone  N20.1        Discharge Medications:  Discharge Medication List as of 6/10/2021 11:11 PM      START taking these medications    Details   oxyCODONE (ROXICODONE) 5 MG tablet Take 0.5-1 tablets (2.5-5 mg) by mouth every 6 hours as needed for pain, Disp-12 tablet, R-0, E-Prescribe             Scribe Disclosure:  I, Patrick Luna, am serving as a scribe at 8:01 PM on 6/10/2021 to document services personally performed by [place] Del Angel MD based on my observations and the provider's statements to me.          [place] Del Angel MD  06/11/21 0050

## 2021-06-11 NOTE — DISCHARGE INSTRUCTIONS
If needed, take miralax or senna for constipation while on oxycodone.  Take regular doses of tylenol until pain is gone.  Strain urine.  Bring stone to clinic if caught.    Discharge Instructions  Kidney Stones    Kidney stones are a common problem that can cause a lot of pain but fortunately are usually not dangerous. Kidney stones form in the kidney and then can cause a blockage (obstruction) of the flow of urine from the kidney which leads to pain. Most patients can manage kidney stones at home (without a hospital stay).  However, sometimes your condition may be worse than it seemed at first, or may get worse with time. Most kidney stones will pass on their own, but occasionally stones may need to be removed by an urologist.    Generally, every Emergency Department visit should have a follow-up clinic visit with either a primary or a specialty clinic/provider. Please follow-up as instructed by your emergency provider today.      Return to the Emergency Department if:  Your pain is not controlled despite the medications provided or recommended.  You are vomiting (throwing up) and cannot keep fluids or medications down.  You develop a fever (>100.4 F).  You feel much more ill or develop new symptoms.  What can I do to help myself?  Be sure to drink plenty of fluids.  If instructed to do so, strain your urine (pee) with the urine strainer you were provided with today. Your stone may look like a grain of sand or a small pebble. Collect any stones in the cup provided and bring to your follow-up appointment.  Staying active is good, and may help the stone to pass. You may do whatever you feel up to doing without restrictions.   Treatment:  Non-steroidal anti-inflammatory drugs (NSAIDs). This includes prescription medicines like Toradol  (ketorolac) and non-prescription medicines like Advil  (ibuprofen) and Nuprin  (ibuprofen) and Naproxen. These pain relievers are very effective for kidney stones.  Nausea (sick to your  stomach) medication.  Nausea and vomiting are common with kidney stones, so your provider may send you home with medicine for this.   Flomax  (tamsulosin). This medicine is sometimes used for men with prostate problems, but also can help kidney stones to pass. Its effectiveness is controversial or questionable so it is prescribed in certain situations. This medicine can lower blood pressure, and you may feel faint/lightheaded, especially when you first stand up. Be sure to get up gradually, sit down if you feel faint, and avoid activity where feeling faint would be dangerous, such as climbing ladders.  If you were given a prescription for medicine here today, be sure to read all of the information (including the package insert) that comes with your prescription.  This will include important information about the medicine, its side effects, and any warnings that you need to know about.  The pharmacist who fills the prescription can provide more information and answer questions you may have about the medicine.  If you have questions or concerns that the pharmacist cannot address, please call or return to the Emergency Department.   Remember that you can always come back to the Emergency Department if you are not able to see your regular provider in the amount of time listed above, if you get any new symptoms, or if there is anything that worries you.    Opioid Medication Information    You have been given a prescription for an opioid (narcotic) pain medicine and/or have received a pain medicine while here in the Emergency Department. These medicines can make you drowsy or impaired. You must not drive, operate dangerous equipment, or engage in any other dangerous activities while taking these medications. If you drive while taking these medications, you could be arrested for driving under the influence (DUI). Do not drink any alcohol while you are taking these medications.     Opioid pain medications can cause  addiction. If you have a history of chemical dependency of any type, you are at a higher risk of becoming addicted to pain medications.  Only take these prescribed medications to treat your pain when all other options have been tried. Take it for as short a time and as few doses as possible. Store your pain pills in a secure place, as they are frequently stolen and provide a dangerous opportunity for children or visitors in your house to start abusing these powerful medications. We will not replace any lost or stolen medicine.    If you do not finish your medication, it is a good idea to get rid of it but please do not flush it down the toilet. Please dispose of the remaining medication at a local pharmacy or law enforcement facility. The [place] [employer] has additional information on medication disposal: https://www.pca.[facility].[place]/living-green/managing-unwanted-medications.      Many prescription pain medications contain Tylenol  (acetaminophen), including Vicodin , Tylenol #3 , Norco , Lortab , and Percocet .  You should not take any extra pills of Tylenol  if you are using these prescription medications or you can get very sick.  Do not ever take more than 3000 mg of acetaminophen in any 24 hour period.    All opioids tend to cause constipation. Drink plenty of water and eat foods that have a lot of fiber, such as fruits, vegetables, prune juice, apple juice and high fiber cereal.  Take a laxative if you don t move your bowels at least every other day. Miralax , Milk of Magnesia, Colace , or Senna  can be used to keep you regular.

## 2021-06-11 NOTE — PROGRESS NOTES
[facility] Daily Progress     Patient Name: Yong Guillermo  Date: 2017  Visit #: 4  Referral Diagnosis: Right hip pain  Referring provider: Anita Pineda MD  Visit Diagnosis:     ICD-10-CM    1. Hip flexor tightness, right M24.551    2. Right hip pain M25.551    3. Generalized muscle weakness M62.81          Assessment:   Patient presented with decreased pain this session, noting a 75% improvement since starting therapy. PT progressed HEP and reassessed goals this session. Patient LEFS score improved to 61/80 from 54/80 originally. Patient would like to continue with independent exercise and has met his goals for therapy. PT will keep patient open for 4 weeks during independent trial so patient can contact as needed.         HEP/POC compliance is  good .  Patient is benefitting from skilled physical therapy and is making steady progress toward functional goals.  Patient is appropriate to continue with skilled physical therapy intervention, as indicated by initial plan of care.    Goal Status:  Pt. will be independent with home exercise program in : Met  Pt will: increase LEFS score by 8 point in 8 weeks- CLOSE- by 7  Pt will: report decreased incdence of hip pain when rising from a chair by 30% in 8 weeks- MET    Plan / Patient Education:   Patient to start independent home program. PT will hold chart open for one month.    Subjective:   Patient is doing good. Pain is down considerably and he feels as if he has the tools to help control the pain with stretches. He reports 75% improvement since starting therapy. He has been working at [employer] a lot recently noting little to no limitation from pain. Patient reported not being very compliant to exercises, but will do them as much as he can and as needed. Patient would like to continue with independent exercise and does not feel as if he needs to come into therapy.  Pain Ratin  Objective:   PT educated patient on proper form for hamstring stretch  and educated on making sure not to over stretch.     Exercises: see flow sheet  Exercise #1: half kneeling hip flexor stretch 30sec/leg   Comment #1: supine butterfly stretch 30sec   Exercise #2: bridge x10  Comment #2: standing hip abduction at bar x10/leg brittaney  Exercise #3: standing hamstring curl at ar x10/leg brittaney  Comment #3: standing quad stretch x30 sec at bar brittaney  Exercise #4: seated hamstring stretch x30 sec brittaney   Comment #4: side stepping x5 laps with orange band  Exercise #5: SLS x 30 sec brittaney  Comment #5: foam balance 1) front and back step overs x10 2) lateral step overs x10  Exercise #6: step ups 8in 1) lateral x15 brittaney 2) foward x15 brittaney  Comment #6: squating x10  Exercise #7: nustep x5min  Lumbar ROM:    Date: 6/7/17 6/28/17    *Indicate scale AROM AROM AROM   Lumbar Flexion 35cm fingertip to floor 24cm    Lumbar Extension Moderate to major Minimal to moderate     Right Left Right Left Right Left   Lumbar Sidebending 59cm fingertip to floor 58 cm finger tip to floor 52cm 49cm           Treatment Today     TREATMENT MINUTES COMMENTS   Evaluation     Self-care/ Home management     Manual therapy     Neuromuscular Re-education     Therapeutic Activity     Therapeutic Exercises 26 See flow sheet. PT remeasured objectives and LEFS remeasured this visit   Gait training     Modality__________________                Total 26    Blank areas are intentional and mean the treatment did not include these items.     PT  present for and directed all treatment and not engaged in treating other patients for the duration of this appointment.    Natacha Wick, LUL [facility]  7/12/2017

## 2021-06-11 NOTE — PROGRESS NOTES
[facility] Daily Progress     Patient Name: Yong Guillermo  Date: 2017  Visit #: 2  Referral Diagnosis: Right hip pain  Referring provider: Anita Pineda MD  Visit Diagnosis:     ICD-10-CM    1. Hip flexor tightness, right M24.551    2. Right hip pain M25.551    3. Generalized muscle weakness M62.81          Assessment:   Patient reported compliance to home exercise program and feeling benefit from stretching exercises given. He presented with less pain and increased motion in his hip joint. PT reviewed and progressed home exercise program with addition of standing exercise, quad and hamstring stretching. PT plan to work on balance and progress strengthening program next session.     HEP/POC compliance is  good .  Patient is benefitting from skilled physical therapy and is making steady progress toward functional goals.  Patient is appropriate to continue with skilled physical therapy intervention, as indicated by initial plan of care.    Goal Status:  Pt. will be independent with home exercise program in : 4 weeks  Pt will: increase LEFS score by 8 point in 8 weeks  Pt will: report decreased incdence of hip pain when rising from a chair by 30% in 8 weeks    Plan / Patient Education:     Continue with initial plan of care.  Progress with home program as tolerated.  Plan for next time: side stepping, after that add band, balance, SLS, foam side stepping    Subjective:   Patient is doing pretty well this week.. He thinks the stretches have really been helping him and he is noticing greater ROM. He has cut down on use of pain medication considerably because he has had decreased pain. He goes to exercise class twice a week for balance and general weight fitness using wrist and leg weights.   Pain Ratin        Objective:     Exercises: see flow sheet  Exercise #1: half kneeling hip flexor stretch 30sec/leg   Comment #1: supine butterfly stretch 30sec   Exercise #2: bridge x10  Comment #2: standing  hip abduction at bar x10/leg brittaney  Exercise #3: standing hamstring curl at ar x10/leg brittaney  Comment #3: standing quad stretch x30 sec at bar brittaney  Exercise #4: seated hamstring stretch x30 sec brittaney     Treatment Today     TREATMENT MINUTES COMMENTS   Evaluation     Self-care/ Home management     Manual therapy     Neuromuscular Re-education     Therapeutic Activity     Therapeutic Exercises 28 See flow sheet   Gait training     Modality__________________                Total 28    Blank areas are intentional and mean the treatment did not include these items.     PT  present for and directed all treatment and not engaged in treating other patients for the duration of this appointment.    Natacha Wick, SPT  [facility]  6/22/2017

## 2021-06-11 NOTE — PROGRESS NOTES
[facility] Daily Progress     Patient Name: Yong Guillermo  Date: 2017  Visit #: 3  Referral Diagnosis: Right hip pain  Referring provider: Anita Pineda MD  Visit Diagnosis:     ICD-10-CM    1. Hip flexor tightness, right M24.551    2. Right hip pain M25.551    3. Generalized muscle weakness M62.81          Assessment:   Patient continues to report compliance to home exercise program and feeling benefit from stretching exercises given. He presented with less pain and increased motion in his hip joint. PT remeasured objectives finding increased lumbar flexion, extension and lateral flexion motions. PT plan to work on balance and progress strengthening program next session.     HEP/POC compliance is  good .  Patient is benefitting from skilled physical therapy and is making steady progress toward functional goals.  Patient is appropriate to continue with skilled physical therapy intervention, as indicated by initial plan of care.    Goal Status:  Pt. will be independent with home exercise program in : 4 weeks  Pt will: increase LEFS score by 8 point in 8 weeks  Pt will: report decreased incdence of hip pain when rising from a chair by 30% in 8 weeks    Plan / Patient Education:     Continue with initial plan of care.  Progress with home program as tolerated.  Plan for next time: side stepping add band, step ups (bigger step) front and lateral.    Subjective:   Patient is doing okay, his hamstring stretches are difficult for him. He has been working on a door and has spent hours bending over causing stiffness in his pain. He used acetaminophen and ice. Patient states since starting therapy he has felt a lot better especially with the hip flexor stretch. Patient discussed wanting continuation of therapy for a couple more times.      Pain Ratin  Objective:   PT educated patient on proper form for hamstring stretch and educated on making sure not to over stretch.     Exercises: see flow  sheet  Exercise #1: half kneeling hip flexor stretch 30sec/leg   Comment #1: supine butterfly stretch 30sec   Exercise #2: bridge x10  Comment #2: standing hip abduction at bar x10/leg brittaney  Exercise #3: standing hamstring curl at ar x10/leg brittaney  Comment #3: standing quad stretch x30 sec at bar brittaney  Exercise #4: seated hamstring stretch x30 sec brittaney   Comment #4: side stepping x5 laps   Exercise #5: SLS x 30 sec brittaney  Comment #5: foam balance 1) front and back step overs x10 2) lateral step overs x10  Lumbar ROM:    Date: 6/7/17 6/28/17    *Indicate scale AROM AROM AROM   Lumbar Flexion 35cm fingertip to floor 24cm    Lumbar Extension Moderate to major Minimal to moderate     Right Left Right Left Right Left   Lumbar Sidebending 59cm fingertip to floor 58 cm finger tip to floor 52cm 49cm           Treatment Today     TREATMENT MINUTES COMMENTS   Evaluation     Self-care/ Home management     Manual therapy     Neuromuscular Re-education     Therapeutic Activity     Therapeutic Exercises 28 See flow sheet. PT remeasured objectives   Gait training     Modality__________________                Total 28    Blank areas are intentional and mean the treatment did not include these items.     PT  present for and directed all treatment and not engaged in treating other patients for the duration of this appointment.    Natacha Wick, LUL [facility]  6/28/2017

## 2021-06-11 NOTE — TELEPHONE ENCOUNTER
Refill Approved    Rx renewed per Medication Renewal Policy. Medication was last renewed on 06/10/2020.  Last office visit was 07/29/2020 with PCP.    Jennifer Perkins, [facility] Triage/Med Refill 9/7/2020     Requested Prescriptions   Pending Prescriptions Disp Refills     clopidogreL (PLAVIX) 75 mg tablet [Pharmacy Med Name: CLOPIDOGREL BISULFATE TABS 75MG] 90 tablet 3     Sig: TAKE 1 TABLET DAILY       Clopidogrel/Prasugrel/Ticagrelor Refill Protocol Passed - 9/6/2020  5:19 AM        Passed - PCP or prescribing provider visit in past 6 months       Last office visit with prescriber/PCP: Visit date not found OR same dept: 11/1/2019 Anita Pineda MD OR same specialty: 11/1/2019 Anita Pineda MD Last physical: 7/29/2020 Last MTM visit: Visit date not found     Next appt within 3 mo: Visit date not found  Next physical within 3 mo: Visit date not found  Prescriber OR PCP: Anita Pineda MD  Last diagnosis associated with med order: 1. CVA (cerebral vascular accident) (H)  - clopidogreL (PLAVIX) 75 mg tablet [Pharmacy Med Name: CLOPIDOGREL BISULFATE TABS 75MG]; TAKE 1 TABLET DAILY  Dispense: 90 tablet; Refill: 3    If protocol passes may refill for 6 months if within 3 months of last provider visit (or a total of 9 months).              Passed - Hemoglobin in past 12 months     Hemoglobin   Date Value Ref Range Status   08/11/2020 14.5 14.0 - 18.0 g/dL Final

## 2021-06-11 NOTE — PROGRESS NOTES
Assessment/Plan:        1. Type 2 diabetes mellitus  Glycosylated Hemoglobin A1c    Microalbumin, Random Urine    Comprehensive Metabolic Panel    sitaGLIPtin (JANUVIA) 100 MG tablet    HM2(CBC w/o Differential)    Thyroid Stimulating Hormone (TSH)    Vitamin D, Total (25-Hydroxy)    Ferritin    Vitamin B12    Testosterone, Total and Free    Magnesium   2. Major depressive disorder, recurrent episode     3. Obstructive sleep apnea (adult) (pediatric)     4. Thrombocytopenia  HM2(CBC w/o Differential)    Thyroid Stimulating Hormone (TSH)    Vitamin D, Total (25-Hydroxy)    Ferritin    Vitamin B12    Testosterone, Total and Free    Magnesium   5. Fatigue  HM2(CBC w/o Differential)    Thyroid Stimulating Hormone (TSH)    Vitamin D, Total (25-Hydroxy)    Ferritin    Vitamin B12    Testosterone, Total and Free    Magnesium   6. Vitamin D deficiency   Vitamin D, Total (25-Hydroxy)   7. Hip pain  Ambulatory referral to PT/OT    XR Hip Right 2 or More VWS       DM II, will stay on metformin tid + Januvia qd. HgbA1c today, he gained 3-4 Ibs  Hyperlipidemia and carotid artery disease - will stay on Lipitor 80 mg daily, LDL today  Low Plt - CBC today  DANIELLE, will follow up Sleep specialist recommendations  Chronic fatigue, most probably related to not so well controlled sleep apnea, I will recheck thyroid, vitamins and minerals today.        This note has been dictated using voice recognition software. Any grammatical or context distortions are unintentional and inherent to the software.      Return in about 6 months (around 11/30/2017) for Recheck.    There are no Patient Instructions on file for this visit.        Subjective:    Yong Guillermo is a 71 y.o. male  here for    Chief Complaint   Patient presents with     Diabetes     Hip Pain     right hip pain     Fatigue     The patient is here with the multiple chronic medical problems.  1.  Patient has DM II, which is controlled with current medications.No reported  hypoglycemia. -200.  2. Patient has history of carotid artery disease, had US in Nov.  3. Patient has hyperlipidemia and is taking statin.  4. Chronic thrombocytopenia, he drinks alcohol very rarely  5. [facility], it will be scanned in his chart. They did no think he has significant memory loss.  6. DANIELLE, saw sleep specialist today  7. Chonic fatigue    Social History     Social History     Marital status:      Spouse name: N/A     Number of children: N/A     Years of education: N/A     Occupational History     Not on file.     Social History Main Topics     Smoking status: Former Smoker     Types: Pipe     Smokeless tobacco: Current User     Alcohol use No     Drug use: No     Sexual activity: Not on file     Other Topics Concern     Not on file     Social History Narrative       Family History   Problem Relation Age of Onset     Diabetes Mother      Heart disease Brother      Diabetes Brother      Diabetes Paternal Grandmother      Review of Systems:  A 12 point comprehensive review of systems was negative except as noted in HPI.        Objective:    Physical Exam   /60  Pulse 64  Wt 180 lb (81.6 kg)  BMI 26.2 kg/m2  Body mass index is 26.2 kg/(m^2).    Constitutional: oriented to person, place, and time, appears well-nourished. No distress.   HENT:   Head: Normocephalic.   Mouth/Throat: Oropharynx is clear and moist.   Eyes: Conjunctivae are normal. Pupils are equal, round, and reactive to light.   Neck: Normal range of motion. Neck supple.   Cardiovascular: Normal rate, regular rhythm and normal heart sounds.    Pulmonary/Chest: Effort normal and breath sounds normal.  Abdominal: Soft. Bowel sounds are normal.   Musculoskeletal: Normal range of motion.   Neurological: alert and oriented to person, place, and time.  Psychiatric:  normal mood and affect.      Patient Active Problem List   Diagnosis     Thrombocytopenia     Type 2 diabetes  mellitus     Hyperlipemia     Major Depression, Recurrent     Obstructive sleep apnea (adult) (pediatric)     BPH (benign prostatic hyperplasia)     H/O carotid endarterectomy     Benign essential tremor     CVA (cerebral vascular accident)       Current Outpatient Prescriptions on File Prior to Visit   Medication Sig Dispense Refill     acetaminophen (TYLENOL) 500 MG tablet Take 500 mg by mouth every 6 (six) hours as needed for pain.       ascorbic acid (ASCORBIC ACID WITH DANNA HIPS) 500 MG tablet Take 1,000 mg by mouth daily.       aspirin 325 MG tablet Take 1 tablet (325 mg total) by mouth daily.  0     atorvastatin (LIPITOR) 80 MG tablet TAKE 1 TABLET DAILY 90 tablet 2     blood glucose test (FREESTYLE LITE STRIPS) strips Use 1 strip as Directed daily 100 strip 3     CALCIUM CARBONATE/VITAMIN D3 (CALCIUM 600 + D,3, ORAL) Take by mouth.       cholecalciferol, vitamin D3, (VITAMIN D3) 1,000 unit capsule        citalopram (CELEXA) 40 MG tablet Take 40 mg by mouth daily.       clopidogrel (PLAVIX) 75 mg tablet TAKE 1 TABLET DAILY 90 tablet 1     lancets (BD ULTRA-FINE II LANCETS) 30 gauge Misc Once daily 100 each 0     multivitamin (ONE A DAY) per tablet Take 1 tablet by mouth daily.       OMEGA-3/DHA/EPA/FISH OIL (FISH OIL-OMEGA-3 FATTY ACIDS) 300-1,000 mg capsule Take 2 g by mouth daily.       [DISCONTINUED] sitaGLIPtin (JANUVIA) 100 MG tablet Take 1 tablet (100 mg total) by mouth daily. 90 tablet 0     [DISCONTINUED] metFORMIN (GLUCOPHAGE) 500 MG tablet TAKE 1 TABLET THREE TIMES A DAY WITH EVERY MEAL 270 tablet 2     No current facility-administered medications on file prior to visit.                Anita Pineda  5/31/2017

## 2021-06-11 NOTE — ED NOTES
Bed: ED23  Expected date:   Expected time:   Means of arrival:   Comments:  423 73m LLQ abd pain eta 10

## 2021-06-12 NOTE — PROGRESS NOTES
Preoperative Exam    Scheduled Procedure: Cataract Surgery  Surgery Date:  10-  Surgery Location: [facility]    Surgeon:      Assessment/Plan:     1. Preoperative examination      2. Senile cataract of right eye, unspecified age-related cataract type       Surgical Procedure Risk: Low (reported cardiac risk generally < 1%)  Have you had prior anesthesia?: Yes  Have you or any family members had a previous anesthesia reaction:  No  Do you or any family members have a history of a clotting or bleeding disorder?: No  Cardiac Risk Assessment: no increased risk for major cardiac complications    APPROVAL GIVEN to proceed with proposed procedure, without further diagnostic evaluation    Please Note:  Patient uses a CPAP machine.    Functional Status: Independent  Patient plans to recover at home with family.     Subjective:      Yong Guillermo is a 75 y.o. male who presents for a preoperative consultation.  See above.    All other systems reviewed and are negative, other than those listed in the HPI.    Pertinent History  Do you have difficulty breathing or chest pain after walking up a flight of stairs: No  History of obstructive sleep apnea: Yes  Steroid use in the last 6 months: No  Frequent Aspirin/NSAID use: Yes Low Dose Aspirin  Prior Blood Transfusion: No  Prior Blood Transfusion Reaction: No  If for some reason prior to, during or after the procedure, if it is medically indicated, would you be willing to have a blood transfusion?:  There is no transfusion refusal.    Current Outpatient Medications   Medication Sig Dispense Refill     acetaminophen (TYLENOL) 500 MG tablet Take 500 mg by mouth every 6 (six) hours as needed for pain.       ascorbic acid (ASCORBIC ACID WITH DANNA HIPS) 500 MG tablet Take 1,000 mg by mouth daily.       aspirin 81 MG EC tablet Take 81 mg by mouth daily.       atorvastatin (LIPITOR) 80 MG tablet TAKE 1 TABLET DAILY 90 tablet 3     blood glucose test (FREESTYLE LITE  STRIPS) strips Use 1 strip as Directed daily 100 strip 3     CALCIUM CARBONATE/VITAMIN D3 (CALCIUM 600 + D,3, ORAL) Take 1 tablet by mouth daily.              cholecalciferol, vitamin D3, (VITAMIN D3) 1,000 unit capsule Take 1,000 Units by mouth daily.              citalopram (CELEXA) 40 MG tablet Take 40 mg by mouth daily.       clopidogreL (PLAVIX) 75 mg tablet TAKE 1 TABLET DAILY 90 tablet 3     JANUVIA 100 mg tablet TAKE 1 TABLET DAILY 90 tablet 3     lancets (BD ULTRA-FINE II LANCETS) 30 gauge Misc Once daily 100 each 0     metFORMIN (GLUCOPHAGE) 500 MG tablet Take 2 tablets (1,000 mg total) by mouth 2 (two) times a day with meals. 360 tablet 3     multivitamin (ONE A DAY) per tablet Take 1 tablet by mouth daily.       OMEGA-3/DHA/EPA/FISH OIL (FISH OIL-OMEGA-3 FATTY ACIDS) 300-1,000 mg capsule Take 2 g by mouth daily.       No current facility-administered medications for this visit.         Allergies   Allergen Reactions     Other Allergy (See Comments)      Other, 01/26/2009.  Action: TPA  - med to reverse TIA.  Muscles in back tensed up severely.       Sulfa (Sulfonamide Antibiotics)        Patient Active Problem List   Diagnosis     Thrombocytopenia (H)     Type 2 diabetes mellitus with complication, without long-term current use of insulin (H)     Hyperlipemia     Major Depression, Recurrent     Complex sleep apnea syndrome     BPH (benign prostatic hyperplasia)     H/O carotid endarterectomy     Benign essential tremor     CVA (cerebral vascular accident) (H)     Other neutropenia (H)     Macrocytosis without anemia       Past Medical History:   Diagnosis Date     Benign essential tremor 9/29/2016     BPH (benign prostatic hyperplasia) 5/26/2015     CVA (cerebral vascular accident) (H) 06/22/2006    Left arm weakness.     CVA (cerebral vascular accident) (H) 01/14/2009    Right arm clumsiness.  MRI showed 2 infarcts in the left hemisphere.     Depression      Diabetes mellitus, type 2 (H)       Hyperlipidemia      Obstructive sleep apnea 07/22/2013     Shingles        Past Surgical History:   Procedure Laterality Date     CAROTID ENDARTERECTOMY Right 09/27/2006     CERVICAL DISC SURGERY  07/29/2005    C5/6 and C6/7 lamina foraminotomy, scope with partial facetectomy and excision of hard disc herniation.     HERNIA REPAIR  5/21/104    Right inguinal hernia and umbilical hernia right cheek cyst on the face.     NV EXCIS CERV DISK,ONE LEVEL      Description: Laminectomy With Disc Removal;  Recorded: 04/29/2008;  Comments: Lumbar level     NV EXCIS TENDON SHEATH LESION, HAND/FINGER      Description: Hand Excision Of A Tendon Cyst;  Recorded: 04/29/2008;     NV EXCISION,BENIGN TUMOR,MANDIBLE      Description: Jaw Excision Benign Cyst / Tumor;  Proc Date: 05/01/2004;     ROTATOR CUFF REPAIR Right      TONSILLECTOMY       VASECTOMY         Social History     Socioeconomic History     Marital status:      Spouse name: Not on file     Number of children: Not on file     Years of education: Not on file     Highest education level: Not on file   Occupational History     Occupation: Retired.     Comment: Worked in the [employer] and the [employer].   Social Needs     Financial resource strain: Not on file     Food insecurity     Worry: Not on file     Inability: Not on file     Transportation needs     Medical: Not on file     Non-medical: Not on file   Tobacco Use     Smoking status: Former Smoker     Types: Pipe     Smokeless tobacco: Current User     Types: Chew   Substance and Sexual Activity     Alcohol use: No     Drug use: No     Sexual activity: Yes     Partners: Female     Birth control/protection: None   Lifestyle     Physical activity     Days per week: Not on file     Minutes per session: Not on file     Stress: Not on file   Relationships     Social connections     Talks on phone: Not on file     Gets together: Not on file     Attends Uats[employer] service: Not on file     Active member of club or  organization: Not on file     Attends meetings of clubs or organizations: Not on file     Relationship status: Not on file     Intimate partner violence     Fear of current or ex partner: Not on file     Emotionally abused: Not on file     Physically abused: Not on file     Forced sexual activity: Not on file   Other Topics Concern     Not on file   Social History Narrative     Not on file             Objective:     Vitals:    10/08/20 1036   BP: 128/70   Pulse: 76   SpO2: 97%   Weight: 174 lb 8 oz (79.2 kg)         Physical Exam:  Constitutional:  oriented to person, place, and time, appears well-nourished. No distress.   HENT:   Head: Normocephalic.   Mouth/Throat: Oropharynx is clear and moist.   Eyes: Conjunctivae are normal. Pupils are equal, round, and reactive to light.   Neck: Normal range of motion. Neck supple.   Cardiovascular: Normal rate, regular rhythm and normal heart sounds.    Pulmonary/Chest: Effort normal and breath sounds normal.   Abdominal: Soft. Bowel sounds are normal.   Musculoskeletal: Normal range of motion.   Neurological: alert and oriented to person, place, and time. Skin: Skin is warm.   Psychiatric: normal mood and affect.        Labs:  No labs were ordered during this visit    Immunization History   Administered Date(s) Administered     Hep A, historic 04/25/2008, 10/27/2008     Hep B, historic 04/25/2008, 05/30/2008, 10/27/2008     Influenza [id number], 11/01/2009     Influenza high dose,seasonal,PF, 65+ yrs 09/29/2016, 10/30/2018, 09/30/2019     Influenza, Seasonal, Inj PF IIV3 10/25/2010     Influenza, seasonal,quad inj 6-35 mos 10/01/2009, 10/15/2014     Influenza,seasonal quad, PF 12/05/2013     Pneumo Conj 13-V (2010&after) 02/25/2015     Pneumo Polysac 23-V 03/30/2009, 02/11/2014     Tdap 04/25/2008, 04/27/2018     ZOSTER, LIVE 06/20/2012     ZOSTER, RECOMBINANT, IM 10/09/2019           Electronically signed by Anita Pineda MD 10/08/20 10:38 AM

## 2021-06-12 NOTE — PROGRESS NOTES
We received a letter from the [facility] school of dentistry.  Yong was evaluated on 6/28/17 and will be fitted with a MAD.

## 2021-06-12 NOTE — TELEPHONE ENCOUNTER
Refill Approved    Rx renewed per Medication Renewal Policy. Medication was last renewed on 11/13/19.    Kathya Reyes, [facility] Triage/Med Refill 11/9/2020     Requested Prescriptions   Pending Prescriptions Disp Refills     atorvastatin (LIPITOR) 80 MG tablet [Pharmacy Med Name: ATORVASTATIN TABS 80MG] 90 tablet 3     Sig: TAKE 1 TABLET DAILY       Statins Refill Protocol (Hmg CoA Reductase Inhibitors) Passed - 11/7/2020  1:31 AM        Passed - PCP or prescribing provider visit in past 12 months      Last office visit with prescriber/PCP: 11/1/2019 Anita Pineda MD OR same dept: Visit date not found OR same specialty: 11/1/2019 Anita Pineda MD  Last physical: 10/8/2020 Last MTM visit: Visit date not found   Next visit within 3 mo: Visit date not found  Next physical within 3 mo: Visit date not found  Prescriber OR PCP: Anita Pineda MD  Last diagnosis associated with med order: 1. Hyperlipemia  - atorvastatin (LIPITOR) 80 MG tablet [Pharmacy Med Name: ATORVASTATIN TABS 80MG]; TAKE 1 TABLET DAILY  Dispense: 90 tablet; Refill: 3    If protocol passes may refill for 12 months if within 3 months of last provider visit (or a total of 15 months).

## 2021-06-13 NOTE — TELEPHONE ENCOUNTER
Refill Approved    Rx renewed per Medication Renewal Policy. Medication was last renewed on 11/25/19.    Kathya Reyes, [facility] Triage/Med Refill 11/20/2020     Requested Prescriptions   Pending Prescriptions Disp Refills     metFORMIN (GLUCOPHAGE) 500 MG tablet [Pharmacy Med Name: METFORMIN HCL TABS 500MG] 360 tablet 3     Sig: TAKE 2 TABLETS TWICE A DAY WITH MEALS       Metformin Refill Protocol Passed - 11/19/2020  1:05 AM        Passed - Blood pressure in last 12 months     BP Readings from Last 1 Encounters:   10/08/20 128/70             Passed - LFT or AST or ALT in last 12 months     Albumin   Date Value Ref Range Status   08/11/2020 4.1 3.5 - 5.0 g/dL Final     Bilirubin, Total   Date Value Ref Range Status   08/11/2020 0.8 0.0 - 1.0 mg/dL Final     Alkaline Phosphatase   Date Value Ref Range Status   08/11/2020 68 45 - 120 U/L Final     AST   Date Value Ref Range Status   08/11/2020 24 0 - 40 U/L Final     ALT   Date Value Ref Range Status   08/11/2020 43 0 - 45 U/L Final     Protein, Total   Date Value Ref Range Status   08/11/2020 6.8 6.0 - 8.0 g/dL Final                Passed - GFR or Serum Creatinine in last 6 months     GFR MDRD Non Af Amer   Date Value Ref Range Status   08/11/2020 >60 >60 mL/min/1.73m2 Final     GFR MDRD Af Amer   Date Value Ref Range Status   08/11/2020 >60 >60 mL/min/1.73m2 Final             Passed - Visit with PCP or prescribing provider visit in last 6 months or next 3 months     Last office visit with prescriber/PCP: Visit date not found OR same dept: Visit date not found OR same specialty: 11/1/2019 Anita Pineda MD Last physical: 10/8/2020 Last MTM visit: Visit date not found         Next appt within 3 mo: Visit date not found  Next physical within 3 mo: Visit date not found  Prescriber OR PCP: Anita Pineda MD  Last diagnosis associated with med order: 1. Type 2 diabetes mellitus (H)  - metFORMIN (GLUCOPHAGE) 500 MG tablet [Pharmacy Med Name: METFORMIN HCL TABS  500MG]; TAKE 2 TABLETS TWICE A DAY WITH MEALS  Dispense: 360 tablet; Refill: 3     If protocol passes may refill for 12 months if within 3 months of last provider visit (or a total of 15 months).           Passed - A1C in last 6 months     Hemoglobin A1c   Date Value Ref Range Status   08/11/2020 7.6 (H) <=5.6 % Final     Comment:     Normal <5.7% Prediabete 5.7-6.4% Diabletes 6.5% or higher - adopted from ADA consensus guidelines               Passed - Microalbumin in last year      Microalbumin, Random Urine   Date Value Ref Range Status   08/11/2020 <0.50 0.00 - 1.99 mg/dL Final

## 2021-06-14 ENCOUNTER — COMMUNICATION - HEALTHEAST (OUTPATIENT)
Dept: INTERNAL MEDICINE | Facility: CLINIC | Age: 76
End: 2021-06-14

## 2021-06-14 NOTE — TELEPHONE ENCOUNTER
LVM for call back, please help pt schedule a virtual visit with Dr Pineda to discuss starting diabetes medication. thanks

## 2021-06-14 NOTE — TELEPHONE ENCOUNTER
----- Message from Anita Pineda MD sent at 1/31/2021  6:23 PM CST -----  Please call the pt and schedule virtual visit to discuss diabetes medications.

## 2021-06-14 NOTE — PROGRESS NOTES
[facility] Discharge Summary  Patient Name: Yong Guillermo  Date: 11/8/2017  Referral Diagnosis: R hip pain  Referring provider: Anita Pineda MD  Visit Diagnosis:   1. Hip flexor tightness, right     2. Right hip pain     3. Generalized muscle weakness         Goals:  See below    Patient was seen for 4 visits from 6/7/27 to 7/12/17 with 0 missed appointments.  The patient met goals and has demonstrated understanding of and independence in the home program for self-care, and progression to next steps.  He will initiate contact if questions or concerns arise.    Therapy will be discontinued at this time.  The patient will need a new referral to resume.    Thank you for your referral.  Heidy Rowley  11/8/2017  2:36 PM

## 2021-06-14 NOTE — PROGRESS NOTES
Assessment/Plan:        1. Type 2 diabetes mellitus with complication, without long-term current use of insulin (H)  Glycosylated Hemoglobin A1c    Comprehensive Metabolic Panel   2. Moderate episode of recurrent major depressive disorder (H)     3. Thrombocytopenia (H)  HM2(CBC w/o Differential)   4. Hyperlipidemia, unspecified hyperlipidemia type         1. DM II - HgbA1c today  2. Hyperlipidemia - on statin  3. BP higher today, and he will check it home daily for the next few weeks.  4. Low Plt - will check labs today.  5. Depression - on venlafaxine now 187.5 mg two times a day.        This note has been dictated using voice recognition software. Any grammatical or context distortions are unintentional and inherent to the software.      Return in about 6 months (around 7/28/2021) for Annual physical.    Patient Instructions   Check BP at home daily and send me the numbers through My Chart.            Subjective:    Yong Guillermo is a 75 y.o. male  here for    Chief Complaint   Patient presents with     Follow-up     Recheck Diabetes     The patient is here with the multiple chronic medical problems.  1.  Patient has DM II, which is controlled with current medications.No reported hypoglycemia.  2.  Patient does not have hypertension, but BP higher today.  3.  Patient has hyperlipidemia and is taking statin.  4. Thrombocytopenia, stable  5. Depression[facility] from citalopram to venlafaxine 2 months ago. Tolerating this change well.on venlafaxine now 187.5 mg two times a day.      Social History     Socioeconomic History     Marital status:      Spouse name: Not on file     Number of children: Not on file     Years of education: Not on file     Highest education level: Not on file   Occupational History     Occupation: Retired.     Comment: Worked in the [employer] and the [employer].   Social Needs     Financial resource strain: Not on file     Food insecurity     Worry: Not on  file     Inability: Not on file     Transportation needs     Medical: Not on file     Non-medical: Not on file   Tobacco Use     Smoking status: Former Smoker     Types: Pipe     Smokeless tobacco: Current User     Types: Chew   Substance and Sexual Activity     Alcohol use: No     Drug use: No     Sexual activity: Yes     Partners: Female     Birth control/protection: None   Lifestyle     Physical activity     Days per week: Not on file     Minutes per session: Not on file     Stress: Not on file   Relationships     Social connections     Talks on phone: Not on file     Gets together: Not on file     Attends Alevism service: Not on file     Active member of club or organization: Not on file     Attends meetings of clubs or organizations: Not on file     Relationship status: Not on file     Intimate partner violence     Fear of current or ex partner: Not on file     Emotionally abused: Not on file     Physically abused: Not on file     Forced sexual activity: Not on file   Other Topics Concern     Not on file   Social History Narrative     Not on file       Family History   Problem Relation Age of Onset     Diabetes Mother      Heart disease Brother      Diabetes Brother      Diabetes Paternal Grandmother      Review of Systems:  A 12 point comprehensive review of systems was negative except as noted in HPI.        Objective:    Physical Exam   /70   Pulse 97   SpO2 98%   There is no height or weight on file to calculate BMI.    Constitutional: oriented to person, place, and time, appears well-nourished. No distress.   HENT:   Head: Normocephalic.   Eyes: Conjunctivae are normal. Pupils are equal, round, and reactive to light.   Neck: Normal range of motion. Neck supple.   Cardiovascular: Normal rate, regular rhythm and normal heart sounds.    Pulmonary/Chest: Effort normal and breath sounds normal.  Abdominal: Soft. Bowel sounds are normal.   Musculoskeletal: Normal range of motion.   Neurological: alert  and oriented to person, place, and time.  Psychiatric:  normal mood and affect.      Patient Active Problem List   Diagnosis     Thrombocytopenia (H)     Type 2 diabetes mellitus with complication, without long-term current use of insulin (H)     Hyperlipemia     Major Depression, Recurrent     Complex sleep apnea syndrome     BPH (benign prostatic hyperplasia)     H/O carotid endarterectomy     Benign essential tremor     CVA (cerebral vascular accident) (H)     Other neutropenia (H)     Macrocytosis without anemia       Current Outpatient Medications on File Prior to Visit   Medication Sig Dispense Refill     acetaminophen (TYLENOL) 500 MG tablet Take 500 mg by mouth every 6 (six) hours as needed for pain.       ascorbic acid (ASCORBIC ACID WITH DANNA HIPS) 500 MG tablet Take 1,000 mg by mouth daily.       aspirin 81 MG EC tablet Take 81 mg by mouth daily.       atorvastatin (LIPITOR) 80 MG tablet TAKE 1 TABLET DAILY 90 tablet 3     blood glucose test (FREESTYLE LITE STRIPS) strips Use 1 strip as Directed daily 100 strip 3     CALCIUM CARBONATE/VITAMIN D3 (CALCIUM 600 + D,3, ORAL) Take 1 tablet by mouth daily.              cholecalciferol, vitamin D3, (VITAMIN D3) 1,000 unit capsule Take 1,000 Units by mouth daily.              clopidogreL (PLAVIX) 75 mg tablet TAKE 1 TABLET DAILY 90 tablet 3     JANUVIA 100 mg tablet TAKE 1 TABLET DAILY 90 tablet 3     lancets (BD ULTRA-FINE II LANCETS) 30 gauge Misc Once daily 100 each 0     metFORMIN (GLUCOPHAGE) 500 MG tablet TAKE 2 TABLETS TWICE A DAY WITH MEALS 360 tablet 2     multivitamin (ONE A DAY) per tablet Take 1 tablet by mouth daily.       OMEGA-3/DHA/EPA/FISH OIL (FISH OIL-OMEGA-3 FATTY ACIDS) 300-1,000 mg capsule Take 2 g by mouth daily.       venlafaxine (EFFEXOR) 100 MG tablet Take 150 mg by mouth 2 (two) times a day.       venlafaxine (EFFEXOR) 37.5 MG tablet Take 37.5 mg by mouth 2 (two) times a day.       [DISCONTINUED] citalopram (CELEXA) 40 MG tablet Take 40  mg by mouth daily.       No current facility-administered medications on file prior to visit.                Anita Pineda  1/28/2021

## 2021-06-15 ENCOUNTER — OFFICE VISIT - HEALTHEAST (OUTPATIENT)
Dept: UROLOGY | Facility: CLINIC | Age: 76
End: 2021-06-15

## 2021-06-15 ENCOUNTER — AMBULATORY - HEALTHEAST (OUTPATIENT)
Dept: LAB | Facility: CLINIC | Age: 76
End: 2021-06-15

## 2021-06-15 ENCOUNTER — RECORDS - HEALTHEAST (OUTPATIENT)
Dept: RADIOLOGY | Facility: CLINIC | Age: 76
End: 2021-06-15

## 2021-06-15 DIAGNOSIS — N20.1 CALCULUS OF URETER: ICD-10-CM

## 2021-06-15 DIAGNOSIS — E87.5 HYPERKALEMIA: ICD-10-CM

## 2021-06-15 DIAGNOSIS — R79.89 ABNORMAL LIVER FUNCTION TESTS: ICD-10-CM

## 2021-06-15 LAB
ALBUMIN SERPL-MCNC: 3.6 G/DL (ref 3.5–5)
ALP SERPL-CCNC: 101 U/L (ref 45–120)
ALT SERPL W P-5'-P-CCNC: 55 U/L (ref 0–45)
ANION GAP SERPL CALCULATED.3IONS-SCNC: 14 MMOL/L (ref 5–18)
AST SERPL W P-5'-P-CCNC: 23 U/L (ref 0–40)
BILIRUB SERPL-MCNC: 1 MG/DL (ref 0–1)
BUN SERPL-MCNC: 29 MG/DL (ref 8–28)
CALCIUM SERPL-MCNC: 9.2 MG/DL (ref 8.5–10.5)
CHLORIDE BLD-SCNC: 95 MMOL/L (ref 98–107)
CO2 SERPL-SCNC: 25 MMOL/L (ref 22–31)
CREAT SERPL-MCNC: 1.15 MG/DL (ref 0.7–1.3)
GFR SERPL CREATININE-BSD FRML MDRD: >60 ML/MIN/1.73M2
GLUCOSE BLD-MCNC: 318 MG/DL (ref 70–125)
POTASSIUM BLD-SCNC: 5.4 MMOL/L (ref 3.5–5)
PROT SERPL-MCNC: 6.7 G/DL (ref 6–8)
SODIUM SERPL-SCNC: 134 MMOL/L (ref 136–145)

## 2021-06-15 NOTE — TELEPHONE ENCOUNTER
Order pended in other encounter.  Gricel [facility] ............... 4:21 PM, 03/11/21

## 2021-06-15 NOTE — PROGRESS NOTES
Assessment/Plan:        1. Elevated blood pressure reading without diagnosis of hypertension     2. Type 2 diabetes mellitus with complication, without long-term current use of insulin (H)  Ambulatory referral to Diabetes Education Program (Existing Diagnosis)    blood-glucose meter,continuous (DEXCOM G6 ) Misc    blood-glucose transmitter (DEXCOM G6 TRANSMITTER) Shanti    blood-glucose sensor (DEXCOM G6 SENSOR) Shanti    empagliflozin (JARDIANCE) 25 mg Tab     1. DM II - we will increase Jardiance to 25 mg daily. He will continue Januvia and metformin. He will meet with diabetes educator. Rx for Dexcom glucose monitor is sent.  2. Blood pressure was rechecked and was better, like his numbers are at home, in 120s. Low salt, low fat diet discussed.    This note has been dictated using voice recognition software. Any grammatical or context distortions are unintentional and inherent to the software.      Return in about 3 months (around 6/2/2021) for diabetes.    There are no Patient Instructions on file for this visit.        Subjective:    Yong Guillermo is a 75 y.o. male  here for    Chief Complaint   Patient presents with     Follow-up     DM     F/U:  1. DM II - Labs done 1/28/21 showed HgbA1c 10.4.   He is taking metformin 1000 mg two times a day and Januvia 100 mg daily. We added 10 mg Jardiance a month ago. BS fasting in 150-180, 2-3h after meal in 200s.  He would like to meet with diabetes educator and would like continuous glucose monitor.  2. BP was elevated initially and he was concerned that Jardiance is increasing the BP. We reviewed side affects of this med and HTN is not listed.    Social History     Socioeconomic History     Marital status:      Spouse name: Not on file     Number of children: Not on file     Years of education: Not on file     Highest education level: Not on file   Occupational History     Occupation: Retired.     Comment: Worked in the [employer] and the [employer].   Social Needs      Financial resource strain: Not on file     Food insecurity     Worry: Not on file     Inability: Not on file     Transportation needs     Medical: Not on file     Non-medical: Not on file   Tobacco Use     Smoking status: Former Smoker     Types: Pipe     Smokeless tobacco: Current User     Types: Chew   Substance and Sexual Activity     Alcohol use: No     Drug use: No     Sexual activity: Yes     Partners: Female     Birth control/protection: None   Lifestyle     Physical activity     Days per week: Not on file     Minutes per session: Not on file     Stress: Not on file   Relationships     Social connections     Talks on phone: Not on file     Gets together: Not on file     Attends [employer] service: Not on file     Active member of club or organization: Not on file     Attends meetings of clubs or organizations: Not on file     Relationship status: Not on file     Intimate partner violence     Fear of current or ex partner: Not on file     Emotionally abused: Not on file     Physically abused: Not on file     Forced sexual activity: Not on file   Other Topics Concern     Not on file   Social History Narrative     Not on file       Family History   Problem Relation Age of Onset     Diabetes Mother      Heart disease Brother      Diabetes Brother      Diabetes Paternal Grandmother      Review of Systems:     A 12 point comprehensive review of systems was negative except as noted in HPI.            Objective:    Physical Exam   /68   Pulse 98   Wt 165 lb (74.8 kg)   SpO2 99%   BMI 24.02 kg/m      Constitutional: oriented to person, place, and time, appears well-nourished. No distress.   HENT:   Head: Normocephalic.   Eyes: Conjunctivae are normal.   Neck: Normal range of motion.    Pulmonary/Chest: Effort normal   Musculoskeletal: Normal range of motion.   Neurological: alert and oriented to person, place, and time.  Psychiatric:  normal mood and affect.    Patient Active Problem List   Diagnosis      Thrombocytopenia (H)     Type 2 diabetes mellitus with complication, without long-term current use of insulin (H)     Hyperlipemia     Major Depression, Recurrent     Complex sleep apnea syndrome     BPH (benign prostatic hyperplasia)     H/O carotid endarterectomy     Benign essential tremor     CVA (cerebral vascular accident) (H)     Other neutropenia (H)     Macrocytosis without anemia       Current Outpatient Medications on File Prior to Visit   Medication Sig Dispense Refill     acetaminophen (TYLENOL) 500 MG tablet Take 500 mg by mouth every 6 (six) hours as needed for pain.       ascorbic acid (ASCORBIC ACID WITH DANNA HIPS) 500 MG tablet Take 1,000 mg by mouth daily.       aspirin 81 MG EC tablet Take 81 mg by mouth daily.       atorvastatin (LIPITOR) 80 MG tablet TAKE 1 TABLET DAILY 90 tablet 3     blood glucose test (FREESTYLE LITE STRIPS) strips Use 1 strip as Directed daily 100 strip 3     CALCIUM CARBONATE/VITAMIN D3 (CALCIUM 600 + D,3, ORAL) Take 1 tablet by mouth daily.              cholecalciferol, vitamin D3, (VITAMIN D3) 1,000 unit capsule Take 1,000 Units by mouth daily.              clopidogreL (PLAVIX) 75 mg tablet TAKE 1 TABLET DAILY 90 tablet 3     JANUVIA 100 mg tablet TAKE 1 TABLET DAILY 90 tablet 3     lancets (BD ULTRA-FINE II LANCETS) 30 gauge Misc Once daily 100 each 0     metFORMIN (GLUCOPHAGE) 500 MG tablet TAKE 2 TABLETS TWICE A DAY WITH MEALS 360 tablet 2     multivitamin (ONE A DAY) per tablet Take 1 tablet by mouth daily.       OMEGA-3/DHA/EPA/FISH OIL (FISH OIL-OMEGA-3 FATTY ACIDS) 300-1,000 mg capsule Take 2 g by mouth daily.       venlafaxine (EFFEXOR) 100 MG tablet Take 150 mg by mouth 2 (two) times a day.       venlafaxine (EFFEXOR) 37.5 MG tablet Take 37.5 mg by mouth 2 (two) times a day.       [DISCONTINUED] empagliflozin (JARDIANCE) 10 mg Tab Take 1 tablet (10 mg total) by mouth daily. 30 tablet 1     No current facility-administered medications on file prior to visit.                 Anita Pineda  3/2/2021

## 2021-06-15 NOTE — PROGRESS NOTES
Yong Guillermo is a 75 y.o. male who is being evaluated via a billable telephone visit.        Assessment & Plan     Type 2 diabetes mellitus with complication, without long-term current use of insulin (H)    Labs done 1/28/21 showed HgbA1c 10.4.   He is taking metformin 1000 mg two times a day and Januvia 100 mg daily. He did eat more sweets than before. He declined visit with diabetes educator. We discussed medications and available treatment options. He would rather take another pill than do injections. He will check BS fasting and 2-3h after meal and see me again in 4 weeks. He will let me know if Jardiance is too expensive. If he tolerates this medication well, we can increase the dose to 25 mg.  - empagliflozin (JARDIANCE) 10 mg Tab  Dispense: 30 tablet; Refill: 1    Elevated blood pressure reading without diagnosis of hypertension  BP checked at home was in 120s/70s range.         Anita Pineda MD  [facility]      Phone call duration: 14 minutes

## 2021-06-16 LAB
HAV IGM SERPL QL IA: NEGATIVE
HBV CORE IGM SERPL QL IA: NEGATIVE
HBV SURFACE AG SERPL QL IA: NEGATIVE
HCV AB SERPL QL IA: NEGATIVE

## 2021-06-16 NOTE — TELEPHONE ENCOUNTER
"S:Medication     B: Visit with PCP 4/7, Lantus vial sent to pharmacy instead of pen.     A: Spoke to pharmacist, gave order for pen from Dr. Pineda's office note.    R: Called patient to let him know pen has been sent to pharmacy. Answered any questions. Advised f any questions arise at bedtime FNA's are available.     Yulisa Valerio RN 04/10/21 2:38 PM   Health Triage Nurse Advisor    Additional Information    Negative: Drug overdose and nurse unable to answer question    Negative: Caller requesting information not related to medicine    Negative: Caller requesting a prescription for Strep throat and has a positive culture result    Negative: Rash while taking a medication or within 3 days of stopping it    Negative: Immunization reaction suspected    Negative: [1] Asthma and [2] having symptoms of asthma (cough, wheezing, etc)    Negative: MORE THAN A DOUBLE DOSE of a prescription or over-the-counter (OTC) drug    Negative: [1] DOUBLE DOSE (an extra dose or lesser amount) of over-the-counter (OTC) drug AND [2] any symptoms (e.g., dizziness, nausea, pain, sleepiness)    Negative: [1] DOUBLE DOSE (an extra dose or lesser amount) of prescription drug AND [2] any symptoms (e.g., dizziness, nausea, pain, sleepiness)    Negative: Took another person's prescription drug    Negative: [1] DOUBLE DOSE (an extra dose or lesser amount) of prescription drug AND [2] NO symptoms (Exception: a double dose of antibiotics)    Negative: Diabetes drug error or overdose (e.g., insulin or extra dose)    Negative: [1] Request for URGENT new prescription or refill of \"essential\" medication (i.e., likelihood of harm to patient if not taken) AND [2] triager unable to fill per unit policy    Negative: [1] Prescription not at pharmacy AND [2] was prescribed today by PCP    Negative: Pharmacy calling with prescription questions and triager unable to answer question    Negative: Caller has URGENT medication question about med that PCP " prescribed and triager unable to answer question    Caller requesting a refill, no triage required, and triager able to refill per unit policy    Negative: [1] DOUBLE DOSE (an extra dose or lesser amount) of over-the-counter (OTC) drug AND [2] NO symptoms    Negative: [1] DOUBLE DOSE (an extra dose or lesser amount) of antibiotic drug AND [2] NO symptoms    Negative: Caller has medication question about med not prescribed by PCP and triager unable to answer question (e.g., compatibility with other med, storage)    Negative: Caller has NON-URGENT medication question about med that PCP prescribed and triager unable to answer question    Negative: Caller requesting a NON-URGENT new prescription or refill and triager unable to refill per unit policy    Negative: Caller requesting information about medication use with breastfeeding; neither adult nor infant is ill, and triager answers question    Negative: Caller requesting information about medication during pregnancy; adult is not ill and triager answers question    Negative: Caller has medication question, adult has minor symptoms, caller declines triage, and triager answers question    Negative: Caller has medication question only, adult not sick, and triager answers question    Protocols used: MEDICATION QUESTION CALL-A-    COVID 19 Nurse Triage Plan/Patient Instructions    Please be aware that novel coronavirus (COVID-19) may be circulating in the community. If you develop symptoms such as fever, cough, or SOB or if you have concerns about the presence of another infection including coronavirus (COVID-19), please contact your health care provider or visit  [email or web address].    Disposition/Instructions    Home care recommended. Follow home care protocol based instructions.    Thank you for taking steps to prevent the spread of this virus.  o Limit your contact with others.  o Wear a simple mask to cover your cough.  o Wash your hands well and  often.    Resources    [facility]: About COVID-19: [email or web address]    CDC: What to Do If You're Sick: www.cdc.gov/coronavirus/2019-ncov/about/steps-when-sick.html    CDC: Ending Home Isolation: www.cdc.gov/coronavirus/2019-ncov/hcp/disposition-in-home-patients.html     CDC: Caring for Someone: www.cdc.gov/coronavirus/2019-ncov/if-you-are-sick/care-for-someone.html     [facility]: Interim Guidance for Hospital Discharge to Home: www.health.[facility].[place]./diseases/coronavirus/hcp/hospdischarge.pdf    [facility] clinical trials (COVID-19 research studies): [email or web address]-clinical-trials     Below are the COVID-19 hotlines at the [place] Department of Health ([facility]). Interpreters are available.   o For health questions: Call 666-903-4901 or 1-243.254.6273 (7 a.m. to 7 p.m.)  o For questions about schools and childcare: Call 562-179-8083 or 1-744.447.7663 (7 a.m. to 7 p.m.)

## 2021-06-17 ENCOUNTER — HOSPITAL ENCOUNTER (OUTPATIENT)
Dept: CT IMAGING | Facility: CLINIC | Age: 76
Discharge: HOME | End: 2021-06-17
Attending: UROLOGY
Payer: MEDICARE

## 2021-06-17 ENCOUNTER — OFFICE VISIT - HEALTHEAST (OUTPATIENT)
Dept: UROLOGY | Facility: CLINIC | Age: 76
End: 2021-06-17

## 2021-06-17 ENCOUNTER — AMBULATORY - HEALTHEAST (OUTPATIENT)
Dept: UROLOGY | Facility: CLINIC | Age: 76
End: 2021-06-17

## 2021-06-17 DIAGNOSIS — N20.1 CALCULUS OF URETER: ICD-10-CM

## 2021-06-17 NOTE — PROGRESS NOTES
Assessment and Plan:       1. Type 2 diabetes mellitus  On metformin and Januvia  - Comprehensive Metabolic Panel; Future  - Glycosylated Hemoglobin A1c; Future  - metFORMIN (GLUCOPHAGE) 500 MG tablet; Take 2 tablets (1,000 mg total) by mouth 2 (two) times a day with meals.  Dispense: 360 tablet; Refill: 2    2. Hyperlipemia  On Lipitor  - Lipid Profile; Future    3. Major depressive disorder, recurrent episode  PHQ9=2 on Celexa    4. BPH (benign prostatic hyperplasia)    - PSA, Annual Screen (Prostatic-Specific Antigen); Future    5. Obstructive sleep apnea, seeing Sleep specialist.      6. Health care maintenance    - HM2(CBC w/o Differential); Future  - Vitamin D, Total (25-Hydroxy); Future  - Urinalysis; Future    7. CVA (cerebral vascular accident)   mg + Lipitor    8. Vitamin D deficiency     - HM2(CBC w/o Differential); Future  - Vitamin D, Total (25-Hydroxy); Future    9. Encounter for screening for malignant neoplasm of prostate     - PSA, Annual Screen (Prostatic-Specific Antigen); Future    10. H/O carotid endarterectomy      11. Thrombocytopenia      12. Routine general medical examination at a health care facility       The patient's current medical problems were reviewed.    I have had an Advance Directives discussion with the patient.  The following health maintenance schedule was reviewed with the patient and provided in printed form in the after visit summary:   Health Maintenance   Topic Date Due     DIABETES OPHTHALMOLOGY EXAM  09/03/1955     INFLUENZA VACCINE RULE BASED (1) 08/01/2017     TD 18+ HE  04/25/2018     DIABETES FOOT EXAM  05/31/2018     DIABETES URINE MICROALBUMIN  05/31/2018     DEPRESSION FOLLOW UP  10/27/2018     DIABETES HEMOGLOBIN A1C  10/27/2018     DIABETES FOLLOW-UP  10/27/2018     FALL RISK ASSESSMENT  04/27/2019     ADVANCE DIRECTIVES DISCUSSED WITH PATIENT  10/02/2020     COLONOSCOPY  08/14/2023     PNEUMOCOCCAL POLYSACCHARIDE VACCINE AGE 65 AND OVER  Completed      PNEUMOCOCCAL CONJUGATE VACCINE FOR ADULTS (PCV13 OR PREVNAR)  Completed     ZOSTER VACCINE  Completed        Subjective:   Chief Complaint: Yong Guillermo is an 72 y.o. male here for an Annual Wellness visit.   HPI: Chronic medical problems reviewed.  No acute issues.    Review of Systems:    Please see above.  The rest of the review of systems are negative for all systems.    Patient Care Team:  Anita Pineda MD as PCP - General     Patient Active Problem List   Diagnosis     Thrombocytopenia     Type 2 diabetes mellitus     Hyperlipemia     Major Depression, Recurrent     Obstructive sleep apnea     BPH (benign prostatic hyperplasia)     H/O carotid endarterectomy     Benign essential tremor     CVA (cerebral vascular accident)     Past Medical History:   Diagnosis Date     Diabetes mellitus      Stroke       Past Surgical History:   Procedure Laterality Date     KS ABDOMEN SURGERY PROC UNLISTED      Description: Hernia Repair;  Proc Date: 05/01/2004;  Comments: Right Inguinal and Umbilical     KS EXCIS CERV DISK,ONE LEVEL      Description: Laminectomy With Disc Removal;  Recorded: 04/29/2008;  Comments: Lumbar level     KS EXCIS CERV DISK,ONE LEVEL      Description: Laminectomy With Disc Removal;  Proc Date: 07/01/2005;  Comments: C5/6 and C6/7     KS EXCIS TENDON SHEATH LESION, HAND/FINGER      Description: Hand Excision Of A Tendon Cyst;  Recorded: 04/29/2008;     KS EXCISION,BENIGN TUMOR,MANDIBLE      Description: Jaw Excision Benign Cyst / Tumor;  Proc Date: 05/01/2004;     KS REMOVAL OF SPERM DUCT(S)      Description: Surgery Of Male Genitalia Vasectomy;  Recorded: 04/29/2008;     KS REMOVAL OF TONSILS,<11 Y/O      Description: Tonsillectomy;  Recorded: 04/25/2008;     KS THROMBOENDARTECTMY NECK,NECK INCIS      Description: Carotid Thromboendarterectomy;  Proc Date: 09/01/2006;      Family History   Problem Relation Age of Onset     Diabetes Mother      Heart disease Brother      Diabetes Brother       Diabetes Paternal Grandmother       Social History     Social History     Marital status:      Spouse name: N/A     Number of children: N/A     Years of education: N/A     Occupational History     Not on file.     Social History Main Topics     Smoking status: Former Smoker     Types: Pipe     Smokeless tobacco: Current User     Alcohol use No     Drug use: No     Sexual activity: Yes     Partners: Female     Birth control/ protection: None     Other Topics Concern     Not on file     Social History Narrative      Current Outpatient Prescriptions   Medication Sig Dispense Refill     acetaminophen (TYLENOL) 500 MG tablet Take 500 mg by mouth every 6 (six) hours as needed for pain.       ascorbic acid (ASCORBIC ACID WITH DANNA HIPS) 500 MG tablet Take 1,000 mg by mouth daily.       aspirin 325 MG tablet Take 1 tablet (325 mg total) by mouth daily.  0     atorvastatin (LIPITOR) 80 MG tablet TAKE 1 TABLET DAILY 90 tablet 1     blood glucose test (FREESTYLE LITE STRIPS) strips Use 1 strip as Directed daily 100 strip 3     CALCIUM CARBONATE/VITAMIN D3 (CALCIUM 600 + D,3, ORAL) Take by mouth.       cholecalciferol, vitamin D3, (VITAMIN D3) 1,000 unit capsule        citalopram (CELEXA) 40 MG tablet Take 40 mg by mouth daily.       clopidogrel (PLAVIX) 75 mg tablet Take 1 tablet (75 mg total) by mouth daily. You must keep the 4/27 appointment for more refills. 90 tablet 0     lancets (BD ULTRA-FINE II LANCETS) 30 gauge Misc Once daily 100 each 0     metFORMIN (GLUCOPHAGE) 500 MG tablet Take 2 tablets (1,000 mg total) by mouth 2 (two) times a day with meals. 360 tablet 2     multivitamin (ONE A DAY) per tablet Take 1 tablet by mouth daily.       OMEGA-3/DHA/EPA/FISH OIL (FISH OIL-OMEGA-3 FATTY ACIDS) 300-1,000 mg capsule Take 2 g by mouth daily.       sitaGLIPtin (JANUVIA) 100 MG tablet Take 1 tablet (100 mg total) by mouth daily. 90 tablet 3     VIGAMOX 0.5 % ophthalmic solution        prednisoLONE acetate (PRED-FORTE)  "1 % ophthalmic suspension        No current facility-administered medications for this visit.       Objective:   Vital Signs:   Visit Vitals     /50     Pulse 68     Ht 5' 9.5\" (1.765 m)     Wt 177 lb (80.3 kg)     BMI 25.76 kg/m2        VisionScreening:  No exam data present     PHYSICAL EXAM  General Appearance: Alert, cooperative, no distress, appears stated age.  Head: Normocephalic, without obvious abnormality, atraumatic  Eyes: PERRL, conjunctiva/corneas clear, EOM's intact  Ears: Normal TM's and external ear canals, both ears  Nose: Nares normal, septum midline,mucosa normal, no drainage  Throat: Lips, mucosa, and tongue normal; teeth and gums normal  Neck: Supple, symmetrical, trachea midline, no adenopathy;  thyroid: not enlarged, symmetric, no tenderness/mass/nodules; no carotid bruit or JVD  Back: Symmetric, no curvature, ROM normal, no CVA tenderness.  Lungs: Clear to auscultation bilaterally, respirations unlabored.  Breasts: No breast masses, tenderness, asymmetry, or nipple discharge.  Heart: Regular rate and rhythm, S1 and S2 normal, no murmur, rub, or gallop.  Abdomen: Soft, non-tender, bowel sounds active all four quadrants,  no masses, no organomegaly.  Pelvic:Normal external genitalia, prostate exam normal.  Extremities: Extremities normal, atraumatic, no cyanosis or edema.  Skin: Skin color, texture, turgor normal, has small dry patch of skin on the left anterior shoulder.  Lymph nodes: Cervical, supraclavicular, and axillary nodes normal.  Neurologic: No focal neurological findings.      Assessment Results 4/27/2018   Activities of Daily Living No help needed   Instrumental Activities of Daily Living No help needed   Mini Cog Total Score 5   Some recent data might be hidden     A Mini-Cog score of 0-2 suggests the possibility of dementia, score of 3-5 suggests no dementia    Identified Health Risks:     He is at risk for lack of exercise and has been provided with information to increase " physical activity for the benefit of his well-being.  The patient was counseled and encouraged to consider modifying their diet and eating habits. He was provided with information on recommended healthy diet options.  Patient's advanced directive was discussed and I am comfortable with the patient's wishes.

## 2021-06-17 NOTE — TELEPHONE ENCOUNTER
Refill Request  Medication name:   Requested Prescriptions     Pending Prescriptions Disp Refills     blood glucose test (FREESTYLE LITE STRIPS) strips 200 strip 3     Sig: Use 1 each As Directed 4 (four) times a day.     Who prescribed the medication: shaheen  Last refill on medication: 4/7/2021 but need it resent to fill  Requested Pharmacy: [facility]  Last appointment with PCP: 3/2/2021  Next appointment: 6/2/2021    Nata Murphy, CMA

## 2021-06-17 NOTE — TELEPHONE ENCOUNTER
Refill Approved    Rx renewed per Medication Renewal Policy. Medication was last renewed on 5/4/20.    Jose Ku, [facility] Triage/Med Refill 4/27/2021     Requested Prescriptions   Pending Prescriptions Disp Refills     JANUVIA 100 mg tablet [Pharmacy Med Name: JANUVIA TABS 100MG] 90 tablet 3     Sig: TAKE 1 TABLET DAILY       Oral Hypoglycemics Refill Protocol Passed - 4/27/2021  2:32 AM        Passed - Visit with PCP or prescribing provider visit in last 6 months       Last office visit with prescriber/PCP: 3/2/2021 OR same dept: 3/2/2021 Anita Pineda MD OR same specialty: 3/2/2021 Anita Pineda MD Last physical: Visit date not found Last MTM visit: Visit date not found         Next appt within 3 mo: Visit date not found  Next physical within 3 mo: Visit date not found  Prescriber OR PCP: Anita Pineda MD  Last diagnosis associated with med order: 1. Type 2 diabetes mellitus (H)  - JANUVIA 100 mg tablet [Pharmacy Med Name: JANUVIA TABS 100MG]; TAKE 1 TABLET DAILY  Dispense: 90 tablet; Refill: 3     If protocol passes may refill for 12 months if within 3 months of last provider visit (or a total of 15 months).           Passed - A1C in last 6 months     Hemoglobin A1c   Date Value Ref Range Status   04/01/2021 8.1 (H) <=5.6 % Final               Passed - Microalbumin in last year      Microalbumin, Random Urine   Date Value Ref Range Status   08/11/2020 <0.50 0.00 - 1.99 mg/dL Final                  Passed - Blood pressure in last year     BP Readings from Last 1 Encounters:   03/02/21 122/68             Passed - Serum creatinine in last year     Creatinine   Date Value Ref Range Status   01/28/2021 0.94 0.70 - 1.30 mg/dL Final

## 2021-06-18 ENCOUNTER — COMMUNICATION - HEALTHEAST (OUTPATIENT)
Dept: ADMINISTRATIVE | Facility: CLINIC | Age: 76
End: 2021-06-18

## 2021-06-18 NOTE — PATIENT INSTRUCTIONS - HE
Patient Instructions by Anita Pineda MD at 7/29/2020  2:20 PM     Author: Anita Pineda MD Service: -- Author Type: Physician    Filed: 7/29/2020  2:29 PM Encounter Date: 7/29/2020 Status: Signed    : Anita Pineda MD (Physician)         Patient Education     Your Health Risk Assessment indicates you feel you are not in good physical health.    A healthy lifestyle helps keep the body fit and the mind alert. It helps protect you from disease, helps you fight disease, and helps prevent chronic disease (disease that doesn't go away) from getting worse. This is important as you get older and begin to notice twinges in muscles and joints and a decline in the strength and stamina you once took for granted. A healthy lifestyle includes good healthcare, good nutrition, weight control, recreation, and regular exercise. Avoid harmful substances and do what you can to keep safe. Another part of a healthy lifestyle is stay mentally active and socially involved.    Good healthcare     Have a wellness visit every year.     If you have new symptoms, let us know right away. Don't wait until the next checkup.     Take medicines exactly as prescribed and keep your medicines in a safe place. Tell us if your medicine causes problems.   Healthy diet and weight control     Eat 3 or 4 small, nutritious, low-fat, high-fiber meals a day. Include a variety of fruits, vegetables, and whole-grain foods.     Make sure you get enough calcium in your diet. Calcium, vitamin D, and exercise help prevent osteoporosis (bone thinning).     If you live alone, try eating with others when you can. That way you get a good meal and have company while you eat it.     Try to keep a healthy weight. If you eat more calories than your body uses for energy, it will be stored as fat and you will gain weight.     Recreation   Recreation is not limited to sports and team events. It includes any activity that provides relaxation, interest, enjoyment,  and exercise. Recreation provides an outlet for physical, mental, and social energy. It can give a sense of worth and achievement. It can help you stay healthy.       Patient Education     Exercise for a Healthier Heart  You may wonder how you can improve the health of your heart. If youre thinking about exercise, youre on the right track. You dont need to become an athlete, but you do need a certain amount of brisk exercise to help strengthen your heart. If you have been diagnosed with a heart condition, your doctor may recommend exercise to help stabilize your condition. To help make exercise a habit, choose safe, fun activities.       Be sure to check with your health care provider before starting an exercise program.    Why exercise?  Exercising regularly offers many healthy rewards. It can help you do all of the following:    Improve your blood cholesterol levels to help prevent further heart trouble    Lower your blood pressure to help prevent a stroke or heart attack    Control diabetes, or reduce your risk of getting this disease    Improve your heart and lung function    Reach and maintain a healthy weight    Make your muscles stronger and more limber so you can stay active    Prevent falls and fractures by slowing the loss of bone mass (osteoporosis)    Manage stress better  Exercise tips  Ease into your routine. Set small goals. Then build on them.  Exercise on most days. Aim for a total of 150 or more minutes of moderate to  vigorous intensity activity each week. Consider 40 minutes, 3 to 4 times a week. For best results, activity should last for 40 minutes on average. It is OK to work up to the 40 minute period over time. Examples of moderate-intensity activity is walking one mile in 15 minutes or 30 to 45 minutes of yard work.  Step up your daily activity level. Along with your exercise program, try being more active throughout the day. Walk instead of drive. Do more household tasks or yard  work.  Choose one or more activities you enjoy. Walking is one of the easiest things you can do. You can also try swimming, riding a bike, or taking an exercise class.  Stop exercising and call your doctor if you:    Have chest pain or feel dizzy or lightheaded    Feel burning, tightness, pressure, or heaviness in your chest, neck, shoulders, back, or arms    Have unusual shortness of breath    Have increased joint or muscle pain    Have palpitations or an irregular heartbeat      9030-4240 [employer]. [address] [place] [address]. All rights reserved. This information is not intended as a substitute for professional medical care. Always follow your healthcare professional's instructions.         Patient Education   Urinary Incontinence (Male)    Urinary incontinence means not being able to control the release of urine from the bladder.  Causes  Common causes of urinary incontinence in men include:    Infection    Certain medicines    Aging    Poor pelvic muscle tone    Bladder spasms    Obesity    Urinary retention  Nervous system diseases, diabetes, sleep apnea, urinary tract infections, prostate surgery, and pelvic trauma can also cause incontinence. Constipation and smoking have also been identified as risk factors.  Symptoms    Urge incontinence (also called overactive bladder) is a sudden urge to urinate even though there may not be much urine in the bladder. The need to urinate often during the night is common. It is due to bladder spasms.    Stress incontinence is involuntary urine leakage that can occur with sneezing, coughing, and other actions that put stress on the bladder.  Treatment  Treatment of urinary incontinence depends on the cause. Infections of the bladder are treated with antibiotics. Urinary retention is treated with a bladder catheter.  Home care  Follow these guidelines when caring for yourself at home:    Don't consume foods and drinks that may irritate the  bladder. These include drinks containing alcohol, caffeinate, or carbonation; chocolate; and acidic fruits and juices.    Limit fluid intake to 6 to 8 cups a day.    Lose weight if you are overweight. This will reduce your symptoms.    If needed, wear absorbent pads to catch urine. Change pads frequently to maintain hygiene and prevent skin and bladder infections.    Bathe daily to maintain good hygiene.    If an antibiotic was prescribed to treat a bladder infection, be sure to take it until finished, even if you are feeling better before then. This is to make sure your infection has cleared.    If a catheter was left in place, it is important to keep bacteria from getting into the collection bag. Don't disconnect the catheter from the collection bag.    Use a leg band to secure the catheter drainage tube, so it does not pull on the catheter. Drain the collection bag when it becomes full using the drain spout at the bottom of the bag. Don't disconnect the bag from the catheter.    Don't pull on or try to remove a catheter. The catheter must be removed by a healthcare provider.  Follow-up care  Follow up with your healthcare provider, or as advised.  When to seek medical advice  Call your healthcare provider right away if any of these occur:    Fever over 100.4 F (38 C), or as directed by your healthcare provider    Bladder pain or fullness    Abdominal swelling, nausea, or vomiting    Back pain    Weakness, dizziness, or fainting    If a catheter was left in place, return if:  ? Catheter falls out  ? Catheter stops draining for 6 hours  Date Last Reviewed: 10/1/2017    9726-5701 [employer]. [address] [place] [address], [place] [address]. All rights reserved. This information is not intended as a substitute for professional medical care. Always follow your healthcare professional's instructions.     Patient Education   Depression and Suicide in Older Adults  Nearly 2 million older Americans have some type  of depression. Sadly, some of them even take their own lives. Yet depression among older adults is often ignored. Learn the warning signs. You may help spare a loved one needless pain. You may also save a life.       What Is Depression?  Depression is a mood disorder that affects the way you think and feel. The most common symptom is a feeling of deep sadness. People who are depressed also may seem tired and listless. And nothing seems to give them pleasure. Its normal to grieve or be sad sometimes. But sadness lessens or passes with time. Depression rarely goes away or improves on its own. Other symptoms of depression are:    Sleeping more or less than normal    Eating more or less than normal    Having headaches, stomachaches, or other pains that dont go away    Feeling nervous, empty, or worthless    Crying a great deal    Thinking or talking about suicide or death    Feeling confused or forgetful  What Causes It?  The causes of depression arent fully known. Certain chemicals in the brain play a role. Depression does run in families. And life stresses can also trigger depression in some people. That may be the case with older adults. They often face great burdens, such as the death of friends or a spouse. They may have failing health. And they are more likely to be alone, lonely, or poor.  How You Can Help  Often, depressed people may not want to ask for help. When they do, they may be ignored. Or, they may receive the wrong treatment. You can help by showing parents and older friends love and support. If they seem depressed, help them find the right treatment. Talk to your doctor. Or contact a local mental health center, social service agency, or hospital. With modern treatment, no one has to suffer from depression.  Resources:    National [place] of Mental Health  747.750.2493  www.nimh.nih.gov    National [place] on Mental Illness  180.668.4014  www.viri.org    Mental Health  [facility]le  794.231.7729  www.[facility].org    National Suicide Hotline  870.457.2011 (800-SUICIDE)      6783-1508 [employer]. [address] [place] [address]. All rights reserved. This information is not intended as a substitute for professional medical care. Always follow your healthcare professional's instructions.         Patient Education   Preventing Falls in the Home  As you get older, falls are more likely. Thats because your reaction time slows. Your muscles and joints may also get stiffer, making them less flexible. Illness, medications, and vision changes can also affect your balance. A fall could leave you unable to live on your own. To make your home safer, follow these tips:    Floors    Put nonskid pads under area rugs.    Remove throw rugs.    Replace worn floor coverings.    Tack carpets firmly to each step on carpeted stairs. Put nonskid strips on the edges of uncarpeted stairs.    Keep floors and stairs free of clutter and cords.    Arrange furniture so there are clear pathways.    Clean up any spills right away.    Bathrooms    Install grab bars in the tub or shower.    Apply nonskid strips or put a nonskid rubber mat in the tub or shower.    Sit on a bath chair to bathe.    Use bathmats with nonskid backing.    Lighting    Keep a flashlight in each room.    Put a nightlight along the pathway between the bedroom and the bathroom.    5136-1895 [employer]. [address] [place] [address]. All rights reserved. This information is not intended as a substitute for professional medical care. Always follow your healthcare professional's instructions.           Advance Directive  Patients advance directive was discussed and I am comfortable with the patients wishes.  Patient Education   Personalized Prevention Plan  You are due for the preventive services outlined below.  Your care team is available to assist you in scheduling these services.  If you have already  completed any of these items, please share that information with your care team to update in your medical record.  Health Maintenance   Topic Date Due   ? DEPRESSION ACTION PLAN  1945   ? DIABETIC FOOT EXAM  05/31/2018   ? A1C  05/01/2020   ? MEDICARE ANNUAL WELLNESS VISIT  05/07/2020   ? LIPID  05/09/2020   ? MICROALBUMIN  05/09/2020   ? ZOSTER VACCINES (3 of 3) 12/04/2019   ? INFLUENZA VACCINE RULE BASED (1) 08/01/2020   ? DIABETIC EYE EXAM  10/15/2020   ? BMP  11/01/2020   ? FALL RISK ASSESSMENT  11/01/2020   ? COLORECTAL CANCER SCREENING  08/14/2023   ? ADVANCE CARE PLANNING  05/07/2024   ? TD 18+ HE  04/27/2028   ? HEPATITIS C SCREENING  Completed   ? PNEUMOCOCCAL IMMUNIZATION 65+ LOW/MEDIUM RISK  Completed   ? HEPATITIS B VACCINES  Completed

## 2021-06-18 NOTE — PROGRESS NOTES
"Clinic Note    Assessment:     Assessment and Plan:  1. Fatigue  We are going to move ahead with the testing below.  I did discuss with him that should his laboratory testing from today come back normal it would be a good idea for him to go back [facility]  They are in agreement with this plan.    - Comprehensive Metabolic Panel  - HM2(CBC w/o Differential)  - Thyroid Stimulating Hormone (TSH)  - T4, Free  - Hepatitis C Antibody (Anti-HCV)  - Vitamin B12  - Lyme Antibody Cascade  - Mononucleosis Screen     Patient Instructions   The false negative testing rate for lyme disease is about 1/60    We will let you know about the lab testing results on [facility]. Otherwise, we will call if anything more emergent results.     If your lab testing is normal, I would encourage you to see your sleep specialist for further workup.     No Follow-up on file.         Subjective:      Yong Guillermo is a 72 y.o. male who comes to the clinic with fatigue.     Started about two months ago. Seems to be especially bad over the past month. Feels foggy. Has sleep apnea and says that he has a dental device because the CPAP apparently was not working. He says that he was \"fighting\" his machine. He had another sleep study and was told that his dental device was working effectively enough; this all happened about one year ago.     He says that he has no energy or ambition. He is not having problems with napping or incidental sleeping. His PHQ-9 is 8 today, but does not know if this is related to his depression. He says that he \"technically\" feels depressed in a sense of losing drive or ambition, although he does not feel sad. He denies suicidal ideation. He has not been worked up for fatigue before besides his sleep study.     Eating less. Stools have changed- they used to be hard and now they are softer; they are not black or tarry. No problems with GERD. No changes to his medications. He has been on Celexa for ten " years.  No swollen lymph nodes.  No significant weight loss.  He has not been using any antihistamines.  No night sweats.  No rashes or bruising    Wife seems to think that he continues to have apneic episodes while asleep.      The following portions of the patient's history were reviewed and updated as appropriate: Allergies, medications, problems, prior note.    Review of Systems:    Review is otherwise negative except for what is mentioned above.     Social Hx:    History   Smoking Status     Former Smoker     Types: Pipe   Smokeless Tobacco     Current User         Objective:     Vitals:    06/20/18 1023   BP: 110/50   Pulse: 64   Weight: 174 lb (78.9 kg)       Exam:    General: No apparent distress. Calm. Alert and Oriented X3. Pt behavior is appropriate.  Head:Atraumatic. Normocephalic, non-tender to palpation  Neck: Supple. No JVD. Full ROM. No adenopathy  Eyes: PERRL, No discharge. No strabismus. No nystagmus.  Ears: TMs pearly gray with landmarks visible.   Nose/Mouth/Throat: Patent nares, no oral lesions, pharynx clear and without exudate. Uvula mid-line. Nasal septum mid-line. Clear turbinates.   Lymph: No axillar or cervical adenopathy.   Chest/Lungs: Normal chest wall, clear to auscultation, normal respiratory effort and rate.   Heart/Pulses: Regular rate and rhythm, strong and equal radial pulses, no murmurs. Capillary refill <2 seconds. No edema.   Abdomen: Soft, no palpable masses. No hepatosplenomegaly, no tenderness with palpation noted. Bowel sounds active in all quadrants. No increased tympany.   Genitalia: Not examined.   Musculoskeletal: No CVA tenderness with palpation. Good ROM with extremities.   Neurologic: Interactive, alert, no focal findings, CNs intact.   Skin: Warm, dry. Normal hair pattern. Free of lesions. Normal skin turgor.       Patient Active Problem List   Diagnosis     Thrombocytopenia (H)     Type 2 diabetes mellitus (H)     Hyperlipemia     Major Depression, Recurrent      Obstructive sleep apnea     BPH (benign prostatic hyperplasia)     H/O carotid endarterectomy     Benign essential tremor     CVA (cerebral vascular accident) (H)     Current Outpatient Prescriptions   Medication Sig Dispense Refill     acetaminophen (TYLENOL) 500 MG tablet Take 500 mg by mouth every 6 (six) hours as needed for pain.       ascorbic acid (ASCORBIC ACID WITH DANNA HIPS) 500 MG tablet Take 1,000 mg by mouth daily.       aspirin 325 MG tablet Take 1 tablet (325 mg total) by mouth daily.  0     atorvastatin (LIPITOR) 80 MG tablet Take 1 tablet (80 mg total) by mouth daily. 90 tablet 0     blood glucose test (FREESTYLE LITE STRIPS) strips Use 1 strip as Directed daily 100 strip 3     CALCIUM CARBONATE/VITAMIN D3 (CALCIUM 600 + D,3, ORAL) Take by mouth.       cholecalciferol, vitamin D3, (VITAMIN D3) 1,000 unit capsule        citalopram (CELEXA) 40 MG tablet Take 40 mg by mouth daily.       clopidogrel (PLAVIX) 75 mg tablet Take 1 tablet (75 mg total) by mouth daily. 90 tablet 1     lancets (BD ULTRA-FINE II LANCETS) 30 gauge Misc Once daily 100 each 0     metFORMIN (GLUCOPHAGE) 500 MG tablet Take 2 tablets (1,000 mg total) by mouth 2 (two) times a day with meals. 360 tablet 2     multivitamin (ONE A DAY) per tablet Take 1 tablet by mouth daily.       OMEGA-3/DHA/EPA/FISH OIL (FISH OIL-OMEGA-3 FATTY ACIDS) 300-1,000 mg capsule Take 2 g by mouth daily.       prednisoLONE acetate (PRED-FORTE) 1 % ophthalmic suspension        sitaGLIPtin (JANUVIA) 100 MG tablet Take 1 tablet (100 mg total) by mouth daily. 90 tablet 1     VIGAMOX 0.5 % ophthalmic solution        No current facility-administered medications for this visit.        I spent 30 minutes with patient face to face, of which >50% was counseling regarding the above plan       Lester Vincent (Rob), HOOD    6/20/2018

## 2021-06-19 NOTE — PROGRESS NOTES
Dear  Anita Pineda Md  [address] [place] [address]    Thank you for the opportunity to participate in the care of  Yong Guillermo.    He is a 72 y.o. y/o who comes to the clinic for a follow up visit.     Yong comes back to clinic because he is having daytime sleepiness. He describes feeling very tired when he wakes up in the morning and feels like going back to sleep. He is taking naps 50% of the days. His naps tend to last 1 to 2 hours and he feels that he would not be able to stay awake unless he took a nap.    Yong has a history of moderate obstructive sleep apnea/hypopnea that was diagnosed in 2013 with an in-lab PSG. He used CPAP therapy for some time but he had a high residual apnea index. He had a repeat titration test in October of 2017 and we found central respiratory events when he was using CPAP therapy. He had a trial of servo-ventilation and had excellent results with this form of therapy.    He did not start ASV back in October because his dental device was working well.    His wife has noticed that he is having frequent apneas in the early hours of the morning.     ESS: 5    Past Medical History  Past Medical History:   Diagnosis Date     Diabetes mellitus (H)      Stroke (H)         Past Surgical History  Past Surgical History:   Procedure Laterality Date     IL ABDOMEN SURGERY PROC UNLISTED      Description: Hernia Repair;  Proc Date: 05/01/2004;  Comments: Right Inguinal and Umbilical     IL EXCIS CERV DISK,ONE LEVEL      Description: Laminectomy With Disc Removal;  Recorded: 04/29/2008;  Comments: Lumbar level     IL EXCIS CERV DISK,ONE LEVEL      Description: Laminectomy With Disc Removal;  Proc Date: 07/01/2005;  Comments: C5/6 and C6/7     IL EXCIS TENDON SHEATH LESION, HAND/FINGER      Description: Hand Excision Of A Tendon Cyst;  Recorded: 04/29/2008;     IL EXCISION,BENIGN TUMOR,MANDIBLE      Description: Jaw Excision Benign Cyst / Tumor;  Proc Date: 05/01/2004;     IL  REMOVAL OF SPERM DUCT(S)      Description: Surgery Of Male Genitalia Vasectomy;  Recorded: 04/29/2008;     ND REMOVAL OF TONSILS,<13 Y/O      Description: Tonsillectomy;  Recorded: 04/25/2008;     ND THROMBOENDARTECTMY NECK,NECK INCIS      Description: Carotid Thromboendarterectomy;  Proc Date: 09/01/2006;        Meds  Current Outpatient Prescriptions   Medication Sig Dispense Refill     acetaminophen (TYLENOL) 500 MG tablet Take 500 mg by mouth every 6 (six) hours as needed for pain.       ascorbic acid (ASCORBIC ACID WITH DANNA HIPS) 500 MG tablet Take 1,000 mg by mouth daily.       aspirin 325 MG tablet Take 1 tablet (325 mg total) by mouth daily.  0     atorvastatin (LIPITOR) 80 MG tablet Take 1 tablet (80 mg total) by mouth daily. 90 tablet 0     blood glucose test (FREESTYLE LITE STRIPS) strips Use 1 strip as Directed daily 100 strip 3     CALCIUM CARBONATE/VITAMIN D3 (CALCIUM 600 + D,3, ORAL) Take by mouth.       cholecalciferol, vitamin D3, (VITAMIN D3) 1,000 unit capsule        citalopram (CELEXA) 40 MG tablet Take 40 mg by mouth daily.       clopidogrel (PLAVIX) 75 mg tablet Take 1 tablet (75 mg total) by mouth daily. 90 tablet 1     lancets (BD ULTRA-FINE II LANCETS) 30 gauge Misc Once daily 100 each 0     metFORMIN (GLUCOPHAGE) 500 MG tablet Take 2 tablets (1,000 mg total) by mouth 2 (two) times a day with meals. 360 tablet 2     multivitamin (ONE A DAY) per tablet Take 1 tablet by mouth daily.       OMEGA-3/DHA/EPA/FISH OIL (FISH OIL-OMEGA-3 FATTY ACIDS) 300-1,000 mg capsule Take 2 g by mouth daily.       sitaGLIPtin (JANUVIA) 100 MG tablet Take 1 tablet (100 mg total) by mouth daily. 90 tablet 1     No current facility-administered medications for this visit.         Allergies  Other allergy (see comments) and Sulfa (sulfonamide antibiotics)     Social History  Social History     Social History     Marital status:      Spouse name: N/A     Number of children: N/A     Years of education: N/A  "    Occupational History     Not on file.     Social History Main Topics     Smoking status: Former Smoker     Types: Pipe     Smokeless tobacco: Current User     Alcohol use No     Drug use: No     Sexual activity: Yes     Partners: Female     Birth control/ protection: None     Other Topics Concern     Not on file     Social History Narrative        Family History  Family History   Problem Relation Age of Onset     Diabetes Mother      Heart disease Brother      Diabetes Brother      Diabetes Paternal Grandmother            Review of Systems:  Constitutional: Negative except as noted in HPI.   Eyes: Negative except as noted in HPI.   ENT: Negative except as noted in HPI.   Cardiovascular: Negative except as noted in HPI.   Respiratory: Negative except as noted in HPI.   Gastrointestinal: Negative except as noted in HPI.   Genitourinary: Negative except as noted in HPI.   Musculoskeletal: Negative except as noted in HPI.   Integumentary: Negative except as noted in HPI.   Neurological: Negative except as noted in HPI.   Psychiatric: Negative except as noted in HPI.   Endocrine: Negative except as noted in HPI.   Hematologic/Lymphatic: Negative except as noted in HPI.      Physical Exam:  /59  Pulse 82  Ht 5' 9.5\" (1.765 m)  Wt 175 lb (79.4 kg)  SpO2 97%  BMI 25.47 kg/m2  BMI:Body mass index is 25.47 kg/(m^2).   GEN: NAD  Neurological: Alert, oriented to time, place, and person.  Psych: normal mood, normal affect     Labs/Studies:     Lab Results   Component Value Date    WBC 4.5 06/20/2018    HGB 14.6 06/20/2018    HCT 42.9 06/20/2018     (H) 06/20/2018     (L) 06/20/2018         Chemistry        Component Value Date/Time     06/20/2018 1108    K 4.4 06/20/2018 1108     06/20/2018 1108    CO2 27 06/20/2018 1108    BUN 17 06/20/2018 1108    CREATININE 0.80 06/20/2018 1108     (H) 06/20/2018 1108        Component Value Date/Time    CALCIUM 10.1 06/20/2018 1108    ALKPHOS 93 " 06/20/2018 1108    AST 28 06/20/2018 1108    ALT 54 (H) 06/20/2018 1108    BILITOT 0.6 06/20/2018 1108            Lab Results   Component Value Date    FERRITIN 54 05/31/2017     Lab Results   Component Value Date    TSH 2.28 06/20/2018     Lab Results   Component Value Date    HGBA1C 7.0 (H) 04/30/2018       Assessment and Plan:  In summary Yong Guillermo is a 72 y.o. year old male here for follow up.    1. Hypersomnia/ history of complex sleep apnea.  He has moderate sleep apnea/hypopnea and has been using a mandibular advancement device.  He seems to be having central respiratory events and this can certainly cause tiredness.   He had a titration test in 2017 that confirmed his central respiratory events and ASV worked well.  We will start ASV therapy.     Patient verbalized understanding of these issues, agrees with the plan and all questions were answered today. Patient was given an opportuntity to voice any other symptoms or concerns not listed above. Patient did not have any other symptoms or concerns.      Patient told to return in 13 weeks.     MD LAYTON Woo Board Certified in Internal Medicine and Sleep Medicine  [facility].    We spent a total of 25 minutes of face-to-face encounter and more than 50% of the encounter was used for counseling or coordination of care.

## 2021-06-19 NOTE — LETTER
Letter by Anita Pineda MD at      Author: Anita Pineda MD Service: -- Author Type: --    Filed:  Encounter Date: 9/4/2019 Status: (Other)         Yong Guillermo  [address] [place] [id number]          September 4, 2019      Dear Mr. Guillermo,    This letter is to inform you that according to our records, you are overdue for a diabetic opthalmology exam. This is an exam recommended yearly to rule out diabetic retinopathy. If you have had this exam done, please contact us with the updated information of when and where your last visit was performed. Otherwise, please schedule this eye exam and remind your eye doctor to fax this information to our clinic so we can update your chart at [phone].        Sincerely,        Electronically signed by Anita Pineda MD

## 2021-06-19 NOTE — PROGRESS NOTES
Patient was offered choice of vendor and chose [facility].  Patient Yong Guillermo was set up at [facility] on August 13, 2018. Patient received a ResMed Aircurve ASV. Pressures were set at EPAP 6, PS Min 3, PS Max 10.   Patient s ramp is off.  Patient received a ResMed Mask name: Airfit [id number]  Full Face Mask Size Large, heated tubing and heated humidifier.  Patient does need to meet compliance. Patient has a follow up on 10/25/18 with .    Jennifer Wagner

## 2021-06-19 NOTE — LETTER
Letter by Taisha Adams RN at      Author: Taisha Adams RN Service: -- Author Type: --    Filed:  Encounter Date: 5/14/2019 Status: (Other)       Dear oYng Guillermo    Thank you for choosing [facility] for your care.  We are committed to providing you with the highest quality and compassionate healthcare services.  The following information pertains to your first appointment with our clinic.    Date/Time of appointment:  Monday June 10th at 12:30 pm.  This allows time to complete forms, possible labs and nursing assessment.    Name of your Physician:  Pat Solano MD    What to bring to your appointment:    Completed Patient History/Initial Nursing Assessment and Medication/Allergy List (these forms were sent to you).    Any paperwork or films from your physician that we have asked you to bring.    Your current insurance card(s).    Parking:    Please refer to the map included to direct you to [facility].    You can park in any visitor/patient parking area you choose. There is no charge for parking at [facility].     Enter the hospital at the front/main entrance.      Please check in with our  representatives who will escort you to the clinic located in [address] of the [facility].    Please note appointments last 1.5 to 2 hours.     We hope these instructions are helpful to you.  If you have any questions or concerns, please call us at [phone].  It is our pleasure to assist you.    Warm Regards,  Taisha Adams  Nurse Navigator  [phone]

## 2021-06-19 NOTE — PROGRESS NOTES
Order for Durable Medical Equipment was processed and equipment ordered.     DME provider: Benjamin    Date Faxed: 8/6/18    Ordering Provider: Dr. Hannah    Equipment ordered: ResMed [email or web address]

## 2021-06-21 NOTE — PROGRESS NOTES
Dear Dr. Anita Pineda MD  [address] [place] [address],    Thank you for the opportunity to participate in the care of Yong Guillermo.     He is a 73 y.o. y/o male patient who comes to the sleep medicine clinic for his initial ASV follow up.      His symptoms are improved since he started using ASV.  He feels more refreshed when he wakes up.    He denies any PAP intolerance. He is using the device every night and tolerates the pressure well.     30-Days Efficacy and Compliance Data  Residual AHI: 0.4  Leak: 0.1  Days used > 4 hours: 100%  Average usage per night: 8 hours 56 minutes.      Mask Tolerance: excellent  Skin irritation: no      Past Medical History:   Diagnosis Date     Diabetes mellitus (H)      Stroke (H)        Past Surgical History:   Procedure Laterality Date     TX ABDOMEN SURGERY PROC UNLISTED      Description: Hernia Repair;  Proc Date: 05/01/2004;  Comments: Right Inguinal and Umbilical     TX EXCIS CERV DISK,ONE LEVEL      Description: Laminectomy With Disc Removal;  Recorded: 04/29/2008;  Comments: Lumbar level     TX EXCIS CERV DISK,ONE LEVEL      Description: Laminectomy With Disc Removal;  Proc Date: 07/01/2005;  Comments: C5/6 and C6/7     TX EXCIS TENDON SHEATH LESION, HAND/FINGER      Description: Hand Excision Of A Tendon Cyst;  Recorded: 04/29/2008;     TX EXCISION,BENIGN TUMOR,MANDIBLE      Description: Jaw Excision Benign Cyst / Tumor;  Proc Date: 05/01/2004;     TX REMOVAL OF SPERM DUCT(S)      Description: Surgery Of Male Genitalia Vasectomy;  Recorded: 04/29/2008;     TX REMOVAL OF TONSILS,<11 Y/O      Description: Tonsillectomy;  Recorded: 04/25/2008;     TX THROMBOENDARTECTMY NECK,NECK INCIS      Description: Carotid Thromboendarterectomy;  Proc Date: 09/01/2006;       Social History     Social History     Marital status:      Spouse name: N/A     Number of children: N/A     Years of education: N/A     Occupational History     Not on file.     Social History  Main Topics     Smoking status: Former Smoker     Types: Pipe     Smokeless tobacco: Current User     Alcohol use No     Drug use: No     Sexual activity: Yes     Partners: Female     Birth control/ protection: None     Other Topics Concern     Not on file     Social History Narrative       Review of Systems:  General: No weight gain, no weight loss  Eyes: No vision changes  ENT: No hearing changes  Cardio: No chest pain, no nocturnal dyspnea  Respiratory: No shortness of breath, no cough  Gastrointestinal: No diarrhea, no constipation  Genitourinary: No excessive urination  Tegumentary: No rashes  Neurological: No seizures, no loss of consciousness  Endo: No heat or cold intolerance.    Current Outpatient Prescriptions   Medication Sig Dispense Refill     acetaminophen (TYLENOL) 500 MG tablet Take 500 mg by mouth every 6 (six) hours as needed for pain.       ascorbic acid (ASCORBIC ACID WITH DANNA HIPS) 500 MG tablet Take 1,000 mg by mouth daily.       aspirin 325 MG tablet Take 1 tablet (325 mg total) by mouth daily.  0     atorvastatin (LIPITOR) 80 MG tablet TAKE 1 TABLET DAILY 90 tablet 0     blood glucose test (FREESTYLE LITE STRIPS) strips Use 1 strip as Directed daily 100 strip 3     CALCIUM CARBONATE/VITAMIN D3 (CALCIUM 600 + D,3, ORAL) Take by mouth.       cholecalciferol, vitamin D3, (VITAMIN D3) 1,000 unit capsule        citalopram (CELEXA) 40 MG tablet Take 40 mg by mouth daily.       clopidogrel (PLAVIX) 75 mg tablet Take 1 tablet (75 mg total) by mouth daily. 90 tablet 1     lancets (BD ULTRA-FINE II LANCETS) 30 gauge Misc Once daily 100 each 0     metFORMIN (GLUCOPHAGE) 500 MG tablet Take 2 tablets (1,000 mg total) by mouth 2 (two) times a day with meals. 360 tablet 2     multivitamin (ONE A DAY) per tablet Take 1 tablet by mouth daily.       OMEGA-3/DHA/EPA/FISH OIL (FISH OIL-OMEGA-3 FATTY ACIDS) 300-1,000 mg capsule Take 2 g by mouth daily.       sitaGLIPtin (JANUVIA) 100 MG tablet Take 1 tablet (100  "mg total) by mouth daily. 90 tablet 1     No current facility-administered medications for this visit.        Allergies   Allergen Reactions     Other Allergy (See Comments)      Other, 01/26/2009.  Action: TPA  - med to reverse TIA.  Muscles in back tensed up severely.       Sulfa (Sulfonamide Antibiotics)        Physical Exam:  Pulse 72  Resp 14  Ht 5' 9.5\" (1.765 m)  Wt 173 lb (78.5 kg)  BMI 25.18 kg/m2  BMI:Body mass index is 25.18 kg/(m^2).   GEN: NAD  Neurological: Alert, oriented to time, place, and person.  Psych: normal mood, normal affect     Labs/Studies:     I reviewed the efficacy and compliance report from his device. Data summarized on the HPI.     Lab Results   Component Value Date    WBC 4.5 06/20/2018    HGB 14.6 06/20/2018    HCT 42.9 06/20/2018     (H) 06/20/2018     (L) 06/20/2018         Chemistry        Component Value Date/Time     06/20/2018 1108    K 4.4 06/20/2018 1108     06/20/2018 1108    CO2 27 06/20/2018 1108    BUN 17 06/20/2018 1108    CREATININE 0.80 06/20/2018 1108     (H) 06/20/2018 1108        Component Value Date/Time    CALCIUM 10.1 06/20/2018 1108    ALKPHOS 93 06/20/2018 1108    AST 28 06/20/2018 1108    ALT 54 (H) 06/20/2018 1108    BILITOT 0.6 06/20/2018 1108            Lab Results   Component Value Date    FERRITIN 54 05/31/2017           Assessment and Plan:  In summary Yong Guillermo is a 73 y.o. year old male who is here for follow up.    1. Complex Sleep Apnea.  Residual AHI is excellent  Compliance is excellent  Patient is benefiting from using PAP therapy.  Continue with current pressure settings.    We counseled the patient on the importannce of using his PAP device every night and the risks of not treating sleep apnea.      Patient verbalized understanding of these issues, agrees with the plan and all questions were answered today. Patient was given an opportuntity to voice any other symptoms or concerns not listed above. " Patient did not have any other symptoms or concerns.      Patient told to return in 12 months.     MD LAYTON Woo Board Certified in Internal Medicine and Sleep Medicine  [facility].

## 2021-06-21 NOTE — PROGRESS NOTES
Assessment/Plan:        1. Routine health maintenance  Influenza High Dose, Seasonal   2. Type 2 diabetes mellitus (H)  Ambulatory referral to Diabetes Education Program (Existing Diagnosis)    HM2(CBC w/o Differential)    Comprehensive Metabolic Panel    Glycosylated Hemoglobin A1c   3. Thrombocytopenia (H)     4. Obstructive sleep apnea     5. CVA (cerebral vascular accident) (H)       DM II, will stay on metformin 1000 mg bid + Januvia qd. HgbA1c today,   Hyperlipidemia and carotid artery disease - will stay on Lipitor 80 mg daily, LDL today  Low Plt - CBC today  DANIELLE, will follow up Sleep specialist recommendations    This note has been dictated using voice recognition software. Any grammatical or context distortions are unintentional and inherent to the software.      Return in about 6 months (around 4/30/2019) for Annual physical.    There are no Patient Instructions on file for this visit.        Subjective:    Yong Guillermo is a 73 y.o. male  here for    Chief Complaint   Patient presents with     6 month follow-up     Haven't tested blood sugars. Got a Bi-PAP and it is working well.      The patient is here with the multiple chronic medical problems.  1.  Patient has DM II, which is controlled with current medications.No reported hypoglycemia. He did not check his BS in the last few months.  2. Patient has history of carotid artery disease, had US in Nov 2016.  3. Patient has hyperlipidemia and is taking statin.  4. Chronic thrombocytopenia, he drinks alcohol very rarely  5. DANIELLE, saw sleep specialist few days ago    Social History     Social History     Marital status:      Spouse name: N/A     Number of children: N/A     Years of education: N/A     Occupational History     Not on file.     Social History Main Topics     Smoking status: Former Smoker     Types: Pipe     Smokeless tobacco: Current User     Alcohol use No     Drug use: No     Sexual activity: Yes     Partners: Female     Birth control/  protection: None     Other Topics Concern     Not on file     Social History Narrative       Family History   Problem Relation Age of Onset     Diabetes Mother      Heart disease Brother      Diabetes Brother      Diabetes Paternal Grandmother      Review of Systems:     A 12 point comprehensive review of systems was negative except as noted in HPI.            Objective:    Physical Exam   /58 (Patient Site: Right Arm, Patient Position: Sitting, Cuff Size: Adult Large)  Pulse 76  Wt 170 lb (77.1 kg)  BMI 24.74 kg/m2    Constitutional: oriented to person, place, and time, appears well-nourished. No distress.   HENT:   Head: Normocephalic.   Mouth/Throat: Oropharynx is clear and moist.   Eyes: Conjunctivae are normal. Pupils are equal, round, and reactive to light.   Neck: Normal range of motion. Neck supple.   Cardiovascular: Normal rate, regular rhythm and normal heart sounds.    Pulmonary/Chest: Effort normal and breath sounds normal.  Abdominal: Soft. Bowel sounds are normal.   Musculoskeletal: Normal range of motion.   Neurological: alert and oriented to person, place, and time.  Psychiatric:  normal mood and affect.    Patient Active Problem List   Diagnosis     Thrombocytopenia (H)     Type 2 diabetes mellitus (H)     Hyperlipemia     Major Depression, Recurrent     Obstructive sleep apnea     BPH (benign prostatic hyperplasia)     H/O carotid endarterectomy     Benign essential tremor     CVA (cerebral vascular accident) (H)       Current Outpatient Prescriptions on File Prior to Visit   Medication Sig Dispense Refill     acetaminophen (TYLENOL) 500 MG tablet Take 500 mg by mouth every 6 (six) hours as needed for pain.       ascorbic acid (ASCORBIC ACID WITH DANNA HIPS) 500 MG tablet Take 1,000 mg by mouth daily.       aspirin 325 MG tablet Take 1 tablet (325 mg total) by mouth daily.  0     atorvastatin (LIPITOR) 80 MG tablet TAKE 1 TABLET DAILY 90 tablet 0     blood glucose test (FREESTYLE LITE  STRIPS) strips Use 1 strip as Directed daily 100 strip 3     CALCIUM CARBONATE/VITAMIN D3 (CALCIUM 600 + D,3, ORAL) Take by mouth.       cholecalciferol, vitamin D3, (VITAMIN D3) 1,000 unit capsule        citalopram (CELEXA) 40 MG tablet Take 40 mg by mouth daily.       clopidogrel (PLAVIX) 75 mg tablet Take 1 tablet (75 mg total) by mouth daily. 90 tablet 1     lancets (BD ULTRA-FINE II LANCETS) 30 gauge Misc Once daily 100 each 0     metFORMIN (GLUCOPHAGE) 500 MG tablet Take 2 tablets (1,000 mg total) by mouth 2 (two) times a day with meals. 360 tablet 2     multivitamin (ONE A DAY) per tablet Take 1 tablet by mouth daily.       OMEGA-3/DHA/EPA/FISH OIL (FISH OIL-OMEGA-3 FATTY ACIDS) 300-1,000 mg capsule Take 2 g by mouth daily.       sitaGLIPtin (JANUVIA) 100 MG tablet Take 1 tablet (100 mg total) by mouth daily. 90 tablet 1     No current facility-administered medications on file prior to visit.                Anita Pineda  10/30/2018

## 2021-06-21 NOTE — PROGRESS NOTES
"Moved about 3 weeks ago  Prior to A1C test-not watching diet as close      Silver sneakers  Strength and strecth  1 hours twice weekly  Not so much since moving   [employer]  Hp acting up  Cortisone shot  Stiff and painful    Uses a bipap-doing well with this      Assessment: Yong is here with his wife, Edith, for education today.  Stated they moved into a new place about 3 weeks ago and have been trying to get life back to \"normal\".    He is currently taking Metformin  1000mg two times a day, with breakfast and after supper.  He is also taking Januvia 100mg after lunch.  No medication changes were made when his last A1C came back higher.    He is checking his blood sugars 1-3 times per day.  Before  Meeting with his primary the end of October, he was not checking at all.  Readings are noted below:  Taking BG a couple times a day now  141 164  131 191 184  118 252   194 197  7 days average of 14 readings 161  Reviewed blood sugars goals for before and after meals.    Stated in the months before his last A1C, he was not watching his diet as close.  We reviewed his current meal plan:  B-homemade granola and yogurt   water and coffee-2 cups of regular with caramel [place], not sure how mcuh. drinks decaf the rest of the day, adds hot water to keep warm during day  Activa-regular flvored  Granola made of-Oatmeal, maple syrup, nuts, raisin, unsweetened coconut  1/2 cup scoop, little more than  Makes yogurt with whole milk    L-peanut butter and banana, 2 bread whole wheat   Meat sandwich, leftovers  Hot dog  Homemade soup  stew or borsched, lasagna, lighter at lunch    D-lasanga-frozen 1 portion had about 26 grams of carb and broccoli , salt  Chicken pot pie-crustless    S-skinny pop, peanuts, sweets-after exercise class-ladies bake bars, cake, cookies not at home usually    Reviewed carb counting and carb ID.  Discussed getting protein at every meal and snack.  Needs to include coffee creamer in carb count " for breakfast.    Yong stated he has not been as good about activity with the move and the holidays.  He goes to a [employer] like program, typically twice a week.  They do stretching and strength training, lasting about 1 hour.  Stated his hip has been causing him more pain and this has been preventing him for walking as much.  He had a cortisone injection before the move and this helped some.  Discussed how the steroid can effect glucose.    Also reviewed stress, illness, pain and not sleeping well at night could also effect glucose.    All additional questions and concerns addressed.    Plan: Yong will work on carb portion and eating more protein.  They plan to leave for [place] shortly after Thanksgiving and will work on getting more activity while there.  Will call if questions or concerns, otherwise follow-up when return.     Subjective and Objective:      Yong Guillermo is referred by Dr. Anita Pineda for Diabetes Education.     Lab Results   Component Value Date    HGBA1C 7.6 (H) 10/30/2018           Follow up:   Primary care visit  CDE (certified diabetic educator)      Education:     Monitoring   Meter (per above goals): Assessed and Discussed  Monitoring: Assessed and Discussed  BG goals: Assessed, Discussed and Literature provided    Nutrition Management  Nutrition Management: Assessed and Discussed  Weight: Assessed and Discussed  Portions/Balance: Assessed, Discussed and Literature provided  Carb ID/Count: Assessed, Discussed and Literature provided  Label Reading: Assessed, Discussed and Literature provided  Heart Healthy Fats: Assessed, Discussed and Literature provided  Menu Planning: Assessed, Discussed and Literature provided  Dining Out: Assessed and Discussed  Physical Activity: Assessed and Discussed  Medications: Assessed and Discussed  Orals: Assessed and Discussed  Injected Medications: Not addressed   Storage/Exp:Not addressed   Site Rotation: Not addressed   Sites Assessed:  no    Diabetes Disease Process: Assessed and Discussed    Acute Complications: Prevent, Detect, Treat:  Hypoglycemia: Assessed and Discussed  Hyperglycemia: Assessed and Discussed  Sick Days: Assessed and Discussed  Driving: Not addressed    Chronic Complications  Foot Care:Not addressed  Skin Care: Not addressed  Eye: Not addressed  ABC: Not addressed  Teeth:Not addressed  Goal Setting and Problem Solving: Assessed and Discussed  Barriers: Assessed and Discussed  Psychosocial Adjustments: Assessed and Discussed      Time spent with the patient: 60 minutes for diabetes education and counseling.   Previous Education: no  Visit Type:DSMT  Hours Remaining: DSMT 9 and MNT 3      Linda Andres  11/19/2018

## 2021-06-21 NOTE — LETTER
Letter by Anita Pineda MD at      Author: Anita Pineda MD Service: -- Author Type: --    Filed:  Encounter Date: 2/1/2021 Status: (Other)         Yong Guillermo  [address]  [facility] [address]             February 1, 2021         Dear Mr. Guillermo,    Below are the results from your recent visit:    Resulted Orders   Glycosylated Hemoglobin A1c   Result Value Ref Range    Hemoglobin A1c 10.4 (H) <=5.6 %   Comprehensive Metabolic Panel   Result Value Ref Range    Sodium 138 136 - 145 mmol/L    Potassium 4.5 3.5 - 5.0 mmol/L    Chloride 99 98 - 107 mmol/L    CO2 29 22 - 31 mmol/L    Anion Gap, Calculation 10 5 - 18 mmol/L    Glucose 287 (H) 70 - 125 mg/dL    BUN 13 8 - 28 mg/dL    Creatinine 0.94 0.70 - 1.30 mg/dL    GFR MDRD Af Amer >60 >60 mL/min/1.73m2    GFR MDRD Non Af Amer >60 >60 mL/min/1.73m2    Bilirubin, Total 0.7 0.0 - 1.0 mg/dL    Calcium 9.8 8.5 - 10.5 mg/dL    Protein, Total 6.8 6.0 - 8.0 g/dL    Albumin 4.1 3.5 - 5.0 g/dL    Alkaline Phosphatase 87 45 - 120 U/L    AST 28 0 - 40 U/L    ALT 62 (H) 0 - 45 U/L    Narrative    Fasting Glucose reference range is 70-99 mg/dL per  American Diabetes Association (ADA) guidelines.   HM2(CBC w/o Differential)   Result Value Ref Range    WBC 5.2 4.0 - 11.0 thou/uL    RBC 4.55 4.40 - 6.20 mill/uL    Hemoglobin 15.3 14.0 - 18.0 g/dL    Hematocrit 43.5 40.0 - 54.0 %    MCV 96 80 - 100 fL    MCH 33.6 27.0 - 34.0 pg    MCHC 35.2 32.0 - 36.0 g/dL    RDW 11.7 11.0 - 14.5 %    Platelets 152 140 - 440 thou/uL    MPV 10.3 (H) 7.0 - 10.0 fL        Please call to schedule a virtual visit to discuss diabetes medications. [phone]        Please call with questions or contact us using [facility].    Sincerely,        Electronically signed by Anita Pineda MD

## 2021-06-22 ENCOUNTER — AMBULATORY - HEALTHEAST (OUTPATIENT)
Dept: UROLOGY | Facility: CLINIC | Age: 76
End: 2021-06-22

## 2021-06-22 DIAGNOSIS — N20.1 CALCULUS OF URETER: ICD-10-CM

## 2021-06-23 ENCOUNTER — RECORDS - HEALTHEAST (OUTPATIENT)
Dept: INTERNAL MEDICINE | Facility: CLINIC | Age: 76
End: 2021-06-23

## 2021-06-23 ENCOUNTER — AMBULATORY - HEALTHEAST (OUTPATIENT)
Dept: LAB | Facility: CLINIC | Age: 76
End: 2021-06-23

## 2021-06-23 DIAGNOSIS — N20.1 CALCULUS OF URETER: ICD-10-CM

## 2021-06-24 NOTE — TELEPHONE ENCOUNTER
Refill Approved    Rx renewed per Medication Renewal Policy. Medication was last renewed on 4/27/18.    Ov: 10/30/18    Wendi Sanchez, [facility] Triage/Med Refill 2/28/2019     Requested Prescriptions   Pending Prescriptions Disp Refills     metFORMIN (GLUCOPHAGE) 500 MG tablet [Pharmacy Med Name: METFORMIN HCL TABS 500MG] 360 tablet 2     Sig: TAKE 2 TABLETS TWICE A DAY WITH MEALS    Metformin Refill Protocol Failed - 2/28/2019  1:30 AM       Failed - Microalbumin in last year     Microalbumin, Random Urine   Date Value Ref Range Status   05/31/2017 <0.50 0.00 - 1.99 mg/dL Final                 Passed - Blood pressure in last 12 months    BP Readings from Last 1 Encounters:   10/30/18 112/58            Passed - LFT or AST or ALT in last 12 months    Albumin   Date Value Ref Range Status   10/30/2018 4.1 3.5 - 5.0 g/dL Final     Bilirubin, Total   Date Value Ref Range Status   10/30/2018 0.7 0.0 - 1.0 mg/dL Final     Alkaline Phosphatase   Date Value Ref Range Status   10/30/2018 83 45 - 120 U/L Final     AST   Date Value Ref Range Status   10/30/2018 27 0 - 40 U/L Final     ALT   Date Value Ref Range Status   10/30/2018 46 (H) 0 - 45 U/L Final     Protein, Total   Date Value Ref Range Status   10/30/2018 6.6 6.0 - 8.0 g/dL Final               Passed - GFR or Serum Creatinine in last 6 months    GFR MDRD Non Af Amer   Date Value Ref Range Status   10/30/2018 >60 >60 mL/min/1.73m2 Final     GFR MDRD Af Amer   Date Value Ref Range Status   10/30/2018 >60 >60 mL/min/1.73m2 Final            Passed - Visit with PCP or prescribing provider visit in last 6 months or next 3 months    Last office visit with prescriber/PCP: 10/30/2018 OR same dept: 10/30/2018 Anita Pineda MD OR same specialty: 10/30/2018 Anita Pineda MD Last physical: Visit date not found Last MTM visit: Visit date not found         Next appt within 3 mo: Visit date not found  Next physical within 3 mo: Visit date not found  Prescriber OR  PCP: Anita Pineda MD  Last diagnosis associated with med order: 1. Type 2 diabetes mellitus (H)  - metFORMIN (GLUCOPHAGE) 500 MG tablet [Pharmacy Med Name: METFORMIN HCL TABS 500MG]; TAKE 2 TABLETS TWICE A DAY WITH MEALS  Dispense: 360 tablet; Refill: 2     If protocol passes may refill for 12 months if within 3 months of last provider visit (or a total of 15 months).          Passed - A1C in last 6 months    Hemoglobin A1c   Date Value Ref Range Status   10/30/2018 7.6 (H) 3.5 - 6.0 % Final

## 2021-06-25 NOTE — TELEPHONE ENCOUNTER
Please call the pt and help to schedule the visit [facility] or with [facility] today or tomorrow. If they are not able to see him and he is in a lot of pain, he should go back to the ER.

## 2021-06-25 NOTE — PROGRESS NOTES
Assessment/Plan:        1. Type 2 diabetes mellitus with complication, without long-term current use of insulin (H)     2. Thrombocytopenia (H)  HM1(CBC and Differential)   3. Other neutropenia (H)  HM1(CBC and Differential)   4. Hyperlipidemia, unspecified hyperlipidemia type     5. Weight loss  CT Chest Abdomen Pelvis With Oral With IV Cont    Thyroid Stimulating Hormone (TSH)   6. Diabetes mellitus, type 2 (H)  Comprehensive Metabolic Panel    Glycosylated Hemoglobin A1c    Microalbumin, Random Urine    LDL Cholesterol, Direct    insulin glargine (LANTUS SOLOSTAR PEN) 100 unit/mL (3 mL) pen   7. Type 2 diabetes mellitus (H)  lancets (FREESTYLE) 28 gauge Misc   8. Screening for prostate cancer  PSA (Prostatic-Specific Antigen), Annual Screen   9. Cerebrovascular accident (CVA), unspecified mechanism (H)     10. Complex sleep apnea syndrome         1. DM II - HgbA1c today  2. Hyperlipidemia - on statin  3. S/p CVA - will stay on Plavix.  4. Weight loss, significant and persistent fatigue - will do labs and CT chest , abdomen and pelvis.        This note has been dictated using voice recognition software. Any grammatical or context distortions are unintentional and inherent to the software.      Return in about 3 months (around 9/2/2021) for Annual physical.    Patient Instructions   Schedule CT scan [phone].              Subjective:    Yong Guillermo is a 75 y.o. male  here for    Chief Complaint   Patient presents with     Follow-up     3 Month F/U     The patient is here with the multiple chronic medical problems.  1.  Patient has DM II, which is controlled with current medications.No reported hypoglycemia. He is on Lantus now 12 U at bedtime, and Metformin, januvia and Jardiance. Last HgbA1c was 8.1 in April.  2.  Patient has h/o CVA and is on Plavix, had carotid endarterectomy. Last carotid US 2 years ago was good.   3.  Patient has hyperlipidemia and is taking statin.  4. He lost 20 - 25 lbs since last  July. He did cut down on the carbs, but is eating regular 3 meals and occasional snacks. He has h/o neutropenia and thrombocytopenia. He feels tired most of the day. He has DANIELLE and using BiPAP. He tried to stay active and is doing yoga twice a week. He denies abdominal pain, blood in stool or urine, chest pain or shortness of breath. For many years, has some mild night sweats.      Social History     Socioeconomic History     Marital status:      Spouse name: Not on file     Number of children: Not on file     Years of education: Not on file     Highest education level: Not on file   Occupational History     Occupation: Retired.     Comment: Worked in the [employer] and the [employer].   Social Needs     Financial resource strain: Not on file     Food insecurity     Worry: Not on file     Inability: Not on file     Transportation needs     Medical: Not on file     Non-medical: Not on file   Tobacco Use     Smoking status: Former Smoker     Types: Pipe     Smokeless tobacco: Current User     Types: Chew   Substance and Sexual Activity     Alcohol use: No     Drug use: No     Sexual activity: Yes     Partners: Female     Birth control/protection: None   Lifestyle     Physical activity     Days per week: Not on file     Minutes per session: Not on file     Stress: Not on file   Relationships     Social connections     Talks on phone: Not on file     Gets together: Not on file     Attends [employer] service: Not on file     Active member of club or organization: Not on file     Attends meetings of clubs or organizations: Not on file     Relationship status: Not on file     Intimate partner violence     Fear of current or ex partner: Not on file     Emotionally abused: Not on file     Physically abused: Not on file     Forced sexual activity: Not on file   Other Topics Concern     Not on file   Social History Narrative     Not on file       Family History   Problem Relation Age of Onset     Diabetes Mother      Heart  disease Brother      Diabetes Brother      Diabetes Paternal Grandmother      Review of Systems:  A 12 point comprehensive review of systems was negative except as noted in HPI.        Objective:    Physical Exam   /61   Pulse 81   Wt 154 lb 9.6 oz (70.1 kg)   SpO2 99%   BMI 22.50 kg/m    Body mass index is 22.5 kg/m .    Constitutional: oriented to person, place, and time, appears well-nourished. No distress.   HENT:   Head: Normocephalic.   Mouth/Throat: Oropharynx is clear and moist.   Eyes: Conjunctivae are normal. Pupils are equal, round, and reactive to light.   Neck: Normal range of motion. Neck supple.   Cardiovascular: Normal rate, regular rhythm and normal heart sounds.    Pulmonary/Chest: Effort normal and breath sounds normal.  Abdominal: Soft. Bowel sounds are normal.   Musculoskeletal: Normal range of motion.   Neurological: alert and oriented to person, place, and time.  Psychiatric:  normal mood and affect.      Patient Active Problem List   Diagnosis     Thrombocytopenia (H)     Type 2 diabetes mellitus with complication, without long-term current use of insulin (H)     Hyperlipemia     Major Depression, Recurrent     Complex sleep apnea syndrome     BPH (benign prostatic hyperplasia)     H/O carotid endarterectomy     Benign essential tremor     CVA (cerebral vascular accident) (H)     Other neutropenia (H)     Macrocytosis without anemia       Current Outpatient Medications on File Prior to Visit   Medication Sig Dispense Refill     acetaminophen (TYLENOL) 500 MG tablet Take 500 mg by mouth every 6 (six) hours as needed for pain.       ascorbic acid (ASCORBIC ACID WITH DANNA HIPS) 500 MG tablet Take 1,000 mg by mouth daily.       aspirin 81 MG EC tablet Take 81 mg by mouth daily.       atorvastatin (LIPITOR) 80 MG tablet TAKE 1 TABLET DAILY 90 tablet 3     blood glucose test (FREESTYLE LITE STRIPS) strips Use 1 each As Directed 4 (four) times a day. 200 strip 3     blood-glucose  "meter,continuous (DEXCOM G6 ) Misc For continuous glucose monitoring 1 each 0     CALCIUM CARBONATE/VITAMIN D3 (CALCIUM 600 + D,3, ORAL) Take 1 tablet by mouth daily.              cholecalciferol, vitamin D3, (VITAMIN D3) 1,000 unit capsule Take 1,000 Units by mouth daily.              clopidogreL (PLAVIX) 75 mg tablet TAKE 1 TABLET DAILY 90 tablet 3     empagliflozin (JARDIANCE) 25 mg Tab Take 1 tablet (25 mg total) by mouth daily. 90 tablet 3     JANUVIA 100 mg tablet TAKE 1 TABLET DAILY 90 tablet 1     metFORMIN (GLUCOPHAGE) 500 MG tablet TAKE 2 TABLETS TWICE A DAY WITH MEALS 360 tablet 2     multivitamin (ONE A DAY) per tablet Take 1 tablet by mouth daily.       OMEGA-3/DHA/EPA/FISH OIL (FISH OIL-OMEGA-3 FATTY ACIDS) 300-1,000 mg capsule Take 2 g by mouth daily.       pen needle, diabetic (BD ULTRA-FINE JERRY PEN NEEDLE) 32 gauge x 5/32\" Ndle Inject 1 each under the skin daily. 100 each 3     venlafaxine (EFFEXOR) 100 MG tablet Take 150 mg by mouth 2 (two) times a day.       venlafaxine (EFFEXOR) 37.5 MG tablet Take 37.5 mg by mouth 2 (two) times a day.       [DISCONTINUED] insulin glargine (LANTUS SOLOSTAR PEN) 100 unit/mL (3 mL) pen Inject 10 Units under the skin at bedtime. (Patient taking differently: Inject 12 Units under the skin at bedtime. ) 30 mL 3     [DISCONTINUED] lancets (BD ULTRA-FINE II LANCETS) 30 gauge Misc Once daily 100 each 0     [DISCONTINUED] lancets (FREESTYLE) 28 gauge Misc 1 Units by Other route daily.  each 3     blood-glucose sensor (DEXCOM G6 SENSOR) Shanti Use as directed for continuous blood glucose monitoring 3 Device 11     blood-glucose transmitter (DEXCOM G6 TRANSMITTER) Shanti Use as directed for continuous glucose monitoring 1 Device 3     No current facility-administered medications on file prior to visit.                Anita Pineda  6/2/2021  "

## 2021-06-25 NOTE — TELEPHONE ENCOUNTER
I spoke to Yong and his wife, I gave them the phone numbers to both Clinics along alberto Ferrari message  They understood and had no further questions.  Tereza Cummings CMA  9:00 AM  6/15/2021

## 2021-06-26 LAB
APPEARANCE STONE: NORMAL
COMPN STONE: NORMAL
NUMBER STONE: 1
SIZE STONE: NORMAL MM
SPECIMEN WT: 7 MG

## 2021-06-26 NOTE — PATIENT INSTRUCTIONS - HE
Patient Stated Goal: Pass my stone  Symptom Control While Passing A Stone    The goal of [facility] is to let a smaller kidney stone (less than 4 to 5 mm) pass without intervention if possible. Giving your body a chance to clear the stone may take a few hours up to a few weeks.  Keeping you well-informed, safe and fairly comfortable is important.    Drink to thirst  Do not attempt to  flush out  your stone by drinking too much fluid. This does not work and may increase nausea. Drink enough to satisfy your body s thirst. Eating your normal diet is fine.   Medications (that may be suggested or prescribed)    Ibuprofen (Advil or Motrin) Available over the counter  o Take two (200mg) tablets every six hours until the stone passes.  o Prevents spasm of the ureter.    o Decreases pain.      Dramamine* (drowsy version, non-generic formulation) Available over the counter  o Take 50mg at bedtime  o Decreases spasms of the ureter  o Decreases nausea  o Decreases acute pain  o Decreases recurrence of pain for 24 hours  o Will help you sleep  *This medication will cause increase drowsiness, do not drive or operate machinery for 6 hours.      Narcotics (Percocet, Vicodin, Dilaudid) Take as prescribed for severe pain unrelieved by ibuprofen and Dramamine  o Narcotics have significant side effects and only  cover-up  pain. They have no effect on the cause of pain.  o Common side effects  - Confusion, disorientation and sedation - DO NOT DRIVE OR OPERATE MACHINERY WITHIN 24 HOURS  - Nausea - take Dramamine or Zofran or Haldol to help control  - Constipation  - Sleep disturbances      Ondansetron (Zofran) Take as prescribed  o Reserve for severe nausea  o May cause constipation, start over the counter Miralax if needed      Second Line Anti-Nausea Medication: Adding a different anti-nausea medication maybe helpful for persistent nausea.  The combined effect of different types of anti-nausea medications maybe more  effective than either medication by itself, even in higher doses.  o Compazine: Take as prescribed      Information about kidney stones    Crystals can form if chemicals are too concentrated in your urine. If the crystal grows over time, a stone may form. A stone usually isn t painful while it is still in the kidney.    As the stone begins to leave the kidney, you may experience episodes of flank pain as the kidney stone approaches the entrance to the ureter. Some people feel a vague ache in the side.    Kidney stones may fall into the ureter. Some stones are tiny and pass through without causing symptoms. The ureter is a small tube (approximately 1/8 of an inch wide). A kidney stone can get stuck and block the ureter. If this happens, urine backs up and flows back to the kidney. Back pressure on the kidney can cause:  o Severe flank pain radiating to the groin.  o Severe nausea and vomiting.  o The pain can occur in the lower back, side, groin or all three.      When the stone reaches the lower ureter, this can irritate the bladder and sensations of feeling the urge to urinate frequently and urgently may occur.      Once the stone passes out of your ureter and into your bladder, the symptoms of urgency and frequency will often disappear. Sometimes pain will come back for a short period and will not be as severe as before. The passage of the stone from your bladder and out of your body is usually not a problem. The urethra is at least twice as wide as the normal ureter, so the stone doesn t usually block it.    Strain all urine  If you pass the stone, save it. Place it in the container we have provided and bring it to the [facility] within a week of passing it. Your stone will then be sent for analysis which takes about a month.     Signs and symptoms you might experience    Nausea    Decreased appetite    Urinary frequency    Bloody urine     Chills    Fatigue    When to call [facility] or  go to the Emergency Room    Fever with a temperature greater than 100.1    Severe pain    Persistent nausea/vomiting    If the pain worsens or nausea/vomiting is uncontrolled with medications, STOP eating & drinking. You need to have an empty stomach for 8 hours prior to surgery. Call the [facility] immediately at [phone].           Follow-up    Low dose CT scan with doctor visit 1-2 weeks after initial clinic visit per doctor s instructions    Please cancel the CT scan visit if you pass a stone. Reschedule for a one month follow-up with doctor to discuss stone composition and future prevention.    Preventing future stones    Approximately a month after your stone is sent out for analysis, a prevention visit will occur with your provider, to discuss an individualized plan for prevention of new stones and to discuss managing stones that you may still have. Along with the analysis of the kidney stone, blood and urine tests may be indicated to develop this plan. Knowing the type of kidney stones you make, and why, allows the providers at the [facility] to recommend specific ways to prevent them.    Follow-up visits are an important part of monitoring and preventing future re-occurrences.    The [facility] is available for questions or concerns 24 hours a day at [phone]

## 2021-06-26 NOTE — TELEPHONE ENCOUNTER
I am not sure, but I think it has to go through Dr [facility] and [facility]. If he placed the order for kidney stone analysis, I am guessing they can bring it to any lab. Please check with Dr Stanley's office.

## 2021-06-26 NOTE — PROGRESS NOTES
Patient is roomed via telephone for a telehealth visit.  Patient confirmed he is in the state of [place].  Patient understands that this telehealth visit is billable and agree to proceed with appointment.    Patient is being seen for a stone consultation from [facility] [place].

## 2021-06-26 NOTE — PROGRESS NOTES
Patient is roomed via telephone for a telehealth visit.  Patient confirmed he is in the [facility].  Patient understands that this telehealth visit is billable and agree to proceed with appointment.    Patient is following up at his request earlier than planned as he was having more pain yesterday.  A CT scan was done today.

## 2021-06-26 NOTE — PROGRESS NOTES
Assessment/Plan:    Assessment & Plan   Yong was seen today for new problem - ed referred.    Diagnoses and all orders for this visit:    Calculus of ureter  -     Symptom Control While Passing a Stone Education  -     Patient Stated Goal: Pass my stone  -     CT Abdomen Pelvis Without Oral Without IV Contrast; Future        Stone Management Plan  [facility] 6/15/2021   Urinary Tract Infection No suspicion of infection   Renal Colic Well controlled symptoms   Renal Failure No suspicion of renal failure   Current CT date 6/10/2021   Right sided stones? No   R Stone Event No current event   Left sided stones? Yes   L Number of ureteral stones 1   L GSD of ureteral stones 2   L Location of ureteral stone Distal   L Number of kidney stones  No renal stones   L Stone Event No current event   L Current Plan MET   MET 1 week F/U             PLAN    Will proceed with medical expulsive therapy. Risks and benefits were detailed of medical expulsive therapy including probability of stone passage, recurrent renal colic, and requirement of emergency medical and/or surgical care and further imaging. Patient verbalized understanding. Patient agrees with plan as discussed. He will return in 1 weeks with low dose CT scan.    Over the counter symptom control medications of Dramamine and Tylenol were recommended.    Phone call duration: 10 minutes  15 minutes spent on the date of the encounter doing chart review, history and exam, documentation and further activities per the note    Artemio Stanley MD  [facility]    Subjective:      HPI  . Yong Guillermo is a 75 y.o.  male who is being evaluated via a billable telephone visit by [facility] [place] new stone event    He is a first time unidentified composition stone former who has not required stone clearance procedures.     He presented to ED ~5 days ago with severe sudden onset left flank pain. Pain  has been persistent and he has also struggled with constipation. No fever or chills.    CT scan is personally reviewed and demonstrates a 2 mm left distal stone which seems consistent with tiny residual after passing larger stone. Surprising to see persistent pain with this finding..    Significant labs from presentation are summarized below.    Will recheck imaging in a week. Unusual situation.     ROS   Review of systems is negative except for HPI    Past Medical History:   Diagnosis Date     Benign essential tremor 9/29/2016     BPH (benign prostatic hyperplasia) 5/26/2015     CVA (cerebral vascular accident) (H) 06/22/2006    Left arm weakness.     CVA (cerebral vascular accident) (H) 01/14/2009    Right arm clumsiness.  MRI showed 2 infarcts in the left hemisphere.     Depression      Diabetes mellitus, type 2 (H)      Hyperlipidemia      Obstructive sleep apnea 07/22/2013     Shingles        Past Surgical History:   Procedure Laterality Date     CAROTID ENDARTERECTOMY Right 09/27/2006     CERVICAL DISC SURGERY  07/29/2005    C5/6 and C6/7 lamina foraminotomy, scope with partial facetectomy and excision of hard disc herniation.     HERNIA REPAIR  5/21/104    Right inguinal hernia and umbilical hernia right cheek cyst on the face.     [place] EXCIS CERV DISK,ONE LEVEL      Description: Laminectomy With Disc Removal;  Recorded: 04/29/2008;  Comments: Lumbar level     [place] EXCIS TENDON SHEATH LESION, HAND/FINGER      Description: Hand Excision Of A Tendon Cyst;  Recorded: 04/29/2008;     [place] EXCISION,BENIGN TUMOR,MANDIBLE      Description: Jaw Excision Benign Cyst / Tumor;  Proc Date: 05/01/2004;     ROTATOR CUFF REPAIR Right      TONSILLECTOMY       VASECTOMY         Current Outpatient Medications   Medication Sig Dispense Refill     acetaminophen (TYLENOL) 500 MG tablet Take 500 mg by mouth every 6 (six) hours as needed for pain.       ascorbic acid (ASCORBIC ACID WITH DANNA HIPS) 500 MG tablet Take 1,000 mg by mouth  "daily.       aspirin 81 MG EC tablet Take 81 mg by mouth daily.       atorvastatin (LIPITOR) 80 MG tablet TAKE 1 TABLET DAILY 90 tablet 3     blood glucose test (FREESTYLE LITE STRIPS) strips Use 1 each As Directed 4 (four) times a day. 200 strip 3     cholecalciferol, vitamin D3, (VITAMIN D3) 1,000 unit capsule Take 1,000 Units by mouth daily.              clopidogreL (PLAVIX) 75 mg tablet TAKE 1 TABLET DAILY 90 tablet 3     empagliflozin (JARDIANCE) 25 mg Tab Take 1 tablet (25 mg total) by mouth daily. 90 tablet 3     insulin glargine (LANTUS SOLOSTAR PEN) 100 unit/mL (3 mL) pen Inject 12 Units under the skin at bedtime.       JANUVIA 100 mg tablet TAKE 1 TABLET DAILY 90 tablet 1     lancets (FREESTYLE) 28 gauge Misc 1 Units by Other route daily.  each 3     metFORMIN (GLUCOPHAGE) 500 MG tablet TAKE 2 TABLETS TWICE A DAY WITH MEALS 360 tablet 2     multivitamin (ONE A DAY) per tablet Take 1 tablet by mouth daily.       OMEGA-3/DHA/EPA/FISH OIL (FISH OIL-OMEGA-3 FATTY ACIDS) 300-1,000 mg capsule Take 2 g by mouth daily.       oxyCODONE (ROXICODONE) 5 MG immediate release tablet Take 2.5-5 mg by mouth.       pen needle, diabetic (BD ULTRA-FINE JERRY PEN NEEDLE) 32 gauge x 5/32\" Ndle Inject 1 each under the skin daily. 100 each 3     venlafaxine (EFFEXOR) 100 MG tablet Take 150 mg by mouth 2 (two) times a day.       venlafaxine (EFFEXOR) 37.5 MG tablet Take 37.5 mg by mouth 2 (two) times a day.       No current facility-administered medications for this visit.        Allergies   Allergen Reactions     Other Allergy (See Comments)      Other, 01/26/2009.  Action: TPA  - med to reverse TIA.  Muscles in back tensed up severely.       Sulfa (Sulfonamide Antibiotics)        Social History     Socioeconomic History     Marital status:      Spouse name: Not on file     Number of children: Not on file     Years of education: Not on file     Highest education level: Not on file   Occupational History     " Occupation: Retired.     Comment: Worked in the [employer] and the [employer].   Social Needs     Financial resource strain: Not on file     Food insecurity     Worry: Not on file     Inability: Not on file     Transportation needs     Medical: Not on file     Non-medical: Not on file   Tobacco Use     Smoking status: Former Smoker     Types: Pipe     Smokeless tobacco: Current User     Types: Chew   Substance and Sexual Activity     Alcohol use: No     Drug use: No     Sexual activity: Yes     Partners: Female     Birth control/protection: None   Lifestyle     Physical activity     Days per week: Not on file     Minutes per session: Not on file     Stress: Not on file   Relationships     Social connections     Talks on phone: Not on file     Gets together: Not on file     Attends Confucianism service: Not on file     Active member of club or organization: Not on file     Attends meetings of clubs or organizations: Not on file     Relationship status: Not on file     Intimate partner violence     Fear of current or ex partner: Not on file     Emotionally abused: Not on file     Physically abused: Not on file     Forced sexual activity: Not on file   Other Topics Concern     Not on file   Social History Narrative     Not on file       Family History   Problem Relation Age of Onset     Diabetes Mother      Heart disease Brother      Diabetes Brother      Diabetes Paternal Grandmother        Objective:      No vitals or physical exam obtained due to virtual visit    Labs    Urinalysis POC (Office):  Nitrite, UA   Date Value Ref Range Status   08/11/2020 Negative Negative Final   11/01/2019 Negative Negative Final   05/09/2019 Negative Negative Final       Lab Urinalysis:  Blood, UA   Date Value Ref Range Status   08/11/2020 Negative Negative Final   11/01/2019 Negative Negative Final   05/09/2019 Large (!) Negative Final     Nitrite, UA   Date Value Ref Range Status   08/11/2020 Negative Negative Final   11/01/2019 Negative  Negative Final   05/09/2019 Negative Negative Final     Leukocytes, UA   Date Value Ref Range Status   08/11/2020 Negative Negative Final   11/01/2019 Negative Negative Final   05/09/2019 Negative Negative Final     pH, UA   Date Value Ref Range Status   08/11/2020 6.0 5.0 - 8.0 Final   11/01/2019 5.5 5.0 - 8.0 Final   05/09/2019 5.5 5.0 - 8.0 Final    and Acute Labs   CBC   WBC   Date Value Ref Range Status   06/02/2021 5.5 4.0 - 11.0 thou/uL Final   01/28/2021 5.2 4.0 - 11.0 thou/uL Final   08/11/2020 4.3 4.0 - 11.0 thou/uL Final   05/26/2015 4.4 4.0 - 11.0 thou/uL Final   06/19/2014 4.1 4.0 - 11.0 thou/uL Final     Hemoglobin   Date Value Ref Range Status   06/02/2021 15.1 14.0 - 18.0 g/dL Final   01/28/2021 15.3 14.0 - 18.0 g/dL Final   08/11/2020 14.5 14.0 - 18.0 g/dL Final     Platelets   Date Value Ref Range Status   06/02/2021 130 (L) 140 - 440 thou/uL Final   01/28/2021 152 140 - 440 thou/uL Final   08/11/2020 136 (L) 140 - 440 thou/uL Final   , C Reactive Protein  No results found for: CRP, Renal Panel  [facility]  Creatinine   Date Value Ref Range Status   06/02/2021 0.78 0.70 - 1.30 mg/dL Final   01/28/2021 0.94 0.70 - 1.30 mg/dL Final   08/11/2020 0.85 0.70 - 1.30 mg/dL Final     Potassium   Date Value Ref Range Status   06/02/2021 5.3 (H) 3.5 - 5.0 mmol/L Final   01/28/2021 4.5 3.5 - 5.0 mmol/L Final   08/11/2020 4.6 3.5 - 5.0 mmol/L Final     Calcium   Date Value Ref Range Status   06/02/2021 10.1 8.5 - 10.5 mg/dL Final   01/28/2021 9.8 8.5 - 10.5 mg/dL Final   08/11/2020 9.9 8.5 - 10.5 mg/dL Final    and Urine Culture  No results found for: CULTURE

## 2021-06-26 NOTE — TELEPHONE ENCOUNTER
Can we send this again please. Pharmacy says they did not receive this.  Gricel Min CMA ............... 8:40 AM, 06/23/21

## 2021-06-26 NOTE — TELEPHONE ENCOUNTER
Reason for Call:  Patients wife Pete calling and states that patient has been dealing with a kidney stone and was able to catch it today.  Pete is wondering if they are able to bring it in to the clinic for analysis and prefer to bring it to the [facility] since it is closer.      Phone Number Patient can be reached at: Other phone number:  [phone]*    Best Time: anytime    Can we leave a detailed message on this number?: Yes    Call taken on 6/18/2021 at 9:46 AM by Hyun Mayberry

## 2021-06-26 NOTE — PATIENT INSTRUCTIONS - HE
Patient Stated Goal: Prevent further stones  Calcium Oxalate Stone Prevention Self Management    Drink more fluids:    Drinking more liquids is the best way you can help prevent future stones. Stones can form when substances in the urine are too concentrated. The more you drink, the more urine you will make. This means all substances in the urine will be less concentrated.    How much urine should I be producing?    The usual recommended daily urine production is about 2 to 3 quarts (0101-8192 ml). If you are producing more than 3 quarts of urine on a regular basis, it is possible to deplete important minerals stored in the body.    To measure the amount of urine you produce in a day, you can either:    Collect all urine in a container and measure at the end of the day     Use a measuring cup each time you urinate and add up the amounts at the end of the day     Observe    Color - Dark ava urine is concentrated. Light straw color or lighter is dilute and desirable     Odor - Concentrated urine tends to smell stronger. Dilute urine is nearly odorless    Ways to increase your fluid intake    Increasing the amount of fluid you drink is effective for all types of kidney stones. While water is commonly recommended, all fluids are effective for increasing the amount of urine your body produces.    Focus on starting a lifelong habit, rather than a short-term solution.     Keep liquids on hand that you like. Crystal Light is a low calorie appropriate choice.    Drink out of larger glasses. You'll tend to drink more with each serving.     Have an additional glass of fluid with each meal.     Keep a water or drink bottle at work and fill it regularly.     *If you are prone to fluid retention, consult your doctor before making changes to your fluid habits.    Low Oxalate Diet:    Avoid excess amounts or daily consumption of these foods:    All nuts and nut products including peanuts, almonds, pecans, peanut butter, almond  milk    Rhubarb    Chocolate    Soybeans and soy products     Spinach    Wheat Germ    Beets    Maintain a normal calcium diet:    Researches have found that people with low calcium intakes tend to have more stones. Foods with high calcium content are acceptable and include:    Dairy products (including milk, cheese and yogurt)    Meat and fish    Enriched cereals    Dark green vegetables    What about calcium supplements?     Many people take calcium supplements, either on their own or as prescribed by a doctor. Research has indicated that calcium supplements do not usually pose a risk for stone formation.  Calcium citrate is a better choice for a supplement.    Avoid excess salt:    Salt (sodium chloride) is found in abundance in many foods. High sodium levels in the urine can interfere with the kidney's handling of calcium.     Tips for reducing the salt in your diet:    Don't use salt at the table    Reduce the salt used in food preparation. Try 1/2 teaspoon when recipes call for 1 teaspoon.    Use herbs and spices for flavoring instead of salt.    Avoid salty foods.    Check the label before you buy or use a product. Note sodium and portion size information.    Try to consume less than 2,000 mg/day. (1 teaspoon = 2,000 mg)    Foods with high sodium content include:    Processed meat (including luncheon meats, sausage)     Crackers     Instant cereal     Processed cheese     Canned soups     Chips and snack foods     Soy sauce    [facility] [place] can respond to your questions or concerns 24 hours a day at [phone].

## 2021-06-26 NOTE — PROGRESS NOTES
Assessment/Plan:    Assessment & Plan   Yong was seen today for met follow up.    Diagnoses and all orders for this visit:    Calculus of ureter  -     Patient Stated Goal: Prevent further stones  -     Calcium Oxalate Stone Prevention Education        Stone Management Plan  [facility] 6/15/2021 6/17/2021   Urinary Tract Infection No suspicion of infection No suspicion of infection   Renal Colic Well controlled symptoms Asymptomatic at this time   Renal Failure No suspicion of renal failure No suspicion of renal failure   Current CT date 6/10/2021 6/17/2021   Right sided stones? No No   R Stone Event No current event No current event   Left sided stones? Yes No   L Number of ureteral stones 1 -   L GSD of ureteral stones 2 -   L Location of ureteral stone Distal -   L Number of kidney stones  No renal stones -   L Stone Event No current event Resolved event   Resolved date - 6/17/2021   L MET Status - Passed   L Current Plan MET -   MET 1 week F/U -             PLAN    Return if symptoms worsen or fail to improve.    Phone call duration: 6 minutes  11 minutes spent on the date of the encounter doing chart review, history and exam, documentation and further activities per the note    Artemio Stanley MD  [facility]    Subjective:      HPI  . Yong Guillermo is a 75 y.o.  male who is being evaluated via a billable telephone visit by [facility] [place] for medical expulsive therapy follow up.     On last encounter, his 2 mm stone was in left distal ureter with mild hydronephrosis. He has had no unanticipated events.    Was having challenging persistent pain until sudden relief earlier today. Continues to struggle with constipation.    CT demonstrates passage of stone into the bladder    He is congratulated on passing his stone and will not proceed with stone risk evaluation.         ROS   Review of systems is negative except for  HPI.    Past Medical History:   Diagnosis Date     Benign essential tremor 9/29/2016     BPH (benign prostatic hyperplasia) 5/26/2015     CVA (cerebral vascular accident) (H) 06/22/2006    Left arm weakness.     CVA (cerebral vascular accident) (H) 01/14/2009    Right arm clumsiness.  MRI showed 2 infarcts in the left hemisphere.     Depression      Diabetes mellitus, type 2 (H)      Hyperlipidemia      Obstructive sleep apnea 07/22/2013     Shingles        Past Surgical History:   Procedure Laterality Date     CAROTID ENDARTERECTOMY Right 09/27/2006     CERVICAL DISC SURGERY  07/29/2005    C5/6 and C6/7 lamina foraminotomy, scope with partial facetectomy and excision of hard disc herniation.     HERNIA REPAIR  5/21/104    Right inguinal hernia and umbilical hernia right cheek cyst on the face.     [place] EXCIS CERV DISK,ONE LEVEL      Description: Laminectomy With Disc Removal;  Recorded: 04/29/2008;  Comments: Lumbar level     [place] EXCIS TENDON SHEATH LESION, HAND/FINGER      Description: Hand Excision Of A Tendon Cyst;  Recorded: 04/29/2008;     [place] EXCISION,BENIGN TUMOR,MANDIBLE      Description: Jaw Excision Benign Cyst / Tumor;  Proc Date: 05/01/2004;     ROTATOR CUFF REPAIR Right      TONSILLECTOMY       VASECTOMY         Current Outpatient Medications   Medication Sig Dispense Refill     acetaminophen (TYLENOL) 500 MG tablet Take 500 mg by mouth every 6 (six) hours as needed for pain.       ascorbic acid (ASCORBIC ACID WITH DANNA HIPS) 500 MG tablet Take 1,000 mg by mouth daily.       aspirin 81 MG EC tablet Take 81 mg by mouth daily.       atorvastatin (LIPITOR) 80 MG tablet TAKE 1 TABLET DAILY 90 tablet 3     blood glucose test (FREESTYLE LITE STRIPS) strips Use 1 each As Directed 4 (four) times a day. 200 strip 3     cholecalciferol, vitamin D3, (VITAMIN D3) 1,000 unit capsule Take 1,000 Units by mouth daily.              clopidogreL (PLAVIX) 75 mg tablet TAKE 1 TABLET DAILY 90 tablet 3     empagliflozin  "(JARDIANCE) 25 mg Tab Take 1 tablet (25 mg total) by mouth daily. 90 tablet 3     insulin glargine (LANTUS SOLOSTAR PEN) 100 unit/mL (3 mL) pen Inject 12 Units under the skin at bedtime.       JANUVIA 100 mg tablet TAKE 1 TABLET DAILY 90 tablet 1     lancets (FREESTYLE) 28 gauge Misc 1 Units by Other route daily.  each 3     metFORMIN (GLUCOPHAGE) 500 MG tablet TAKE 2 TABLETS TWICE A DAY WITH MEALS 360 tablet 2     multivitamin (ONE A DAY) per tablet Take 1 tablet by mouth daily.       OMEGA-3/DHA/EPA/FISH OIL (FISH OIL-OMEGA-3 FATTY ACIDS) 300-1,000 mg capsule Take 2 g by mouth daily.       oxyCODONE (ROXICODONE) 5 MG immediate release tablet Take 2.5-5 mg by mouth.       pen needle, diabetic (BD ULTRA-FINE JERRY PEN NEEDLE) 32 gauge x 5/32\" Ndle Inject 1 each under the skin daily. 100 each 3     venlafaxine (EFFEXOR) 100 MG tablet Take 150 mg by mouth 2 (two) times a day.       venlafaxine (EFFEXOR) 37.5 MG tablet Take 37.5 mg by mouth 2 (two) times a day.       No current facility-administered medications for this visit.        Allergies   Allergen Reactions     Other Allergy (See Comments)      Other, 01/26/2009.  Action: TPA  - med to reverse TIA.  Muscles in back tensed up severely.       Sulfa (Sulfonamide Antibiotics)        Social History     Socioeconomic History     Marital status:      Spouse name: Not on file     Number of children: Not on file     Years of education: Not on file     Highest education level: Not on file   Occupational History     Occupation: Retired.     Comment: Worked in the [employer] and the [employer].   Social Needs     Financial resource strain: Not on file     Food insecurity     Worry: Not on file     Inability: Not on file     Transportation needs     Medical: Not on file     Non-medical: Not on file   Tobacco Use     Smoking status: Former Smoker     Types: Pipe     Smokeless tobacco: Current User     Types: Chew   Substance and Sexual Activity     Alcohol use: No     " Drug use: No     Sexual activity: Yes     Partners: Female     Birth control/protection: None   Lifestyle     Physical activity     Days per week: Not on file     Minutes per session: Not on file     Stress: Not on file   Relationships     Social connections     Talks on phone: Not on file     Gets together: Not on file     Attends Congregational service: Not on file     Active member of club or organization: Not on file     Attends meetings of clubs or organizations: Not on file     Relationship status: Not on file     Intimate partner violence     Fear of current or ex partner: Not on file     Emotionally abused: Not on file     Physically abused: Not on file     Forced sexual activity: Not on file   Other Topics Concern     Not on file   Social History Narrative     Not on file       Family History   Problem Relation Age of Onset     Diabetes Mother      Heart disease Brother      Diabetes Brother      Diabetes Paternal Grandmother        Objective:      No vitals or physical exam obtained due to virtual visit  Labs   Urinalysis POC (Office):  Nitrite, UA   Date Value Ref Range Status   08/11/2020 Negative Negative Final   11/01/2019 Negative Negative Final   05/09/2019 Negative Negative Final       Lab Urinalysis:  Blood, UA   Date Value Ref Range Status   08/11/2020 Negative Negative Final   11/01/2019 Negative Negative Final   05/09/2019 Large (!) Negative Final     Nitrite, UA   Date Value Ref Range Status   08/11/2020 Negative Negative Final   11/01/2019 Negative Negative Final   05/09/2019 Negative Negative Final     Leukocytes, UA   Date Value Ref Range Status   08/11/2020 Negative Negative Final   11/01/2019 Negative Negative Final   05/09/2019 Negative Negative Final     pH, UA   Date Value Ref Range Status   08/11/2020 6.0 5.0 - 8.0 Final   11/01/2019 5.5 5.0 - 8.0 Final   05/09/2019 5.5 5.0 - 8.0 Final

## 2021-06-29 ENCOUNTER — RECORDS - HEALTHEAST (OUTPATIENT)
Dept: BONE DENSITY | Facility: CLINIC | Age: 76
End: 2021-06-29

## 2021-06-29 ENCOUNTER — RECORDS - HEALTHEAST (OUTPATIENT)
Dept: ADMINISTRATIVE | Facility: OTHER | Age: 76
End: 2021-06-29

## 2021-06-29 DIAGNOSIS — M81.0 AGE-RELATED OSTEOPOROSIS WITHOUT CURRENT PATHOLOGICAL FRACTURE: ICD-10-CM

## 2021-06-30 NOTE — PROGRESS NOTES
Progress Notes by Amita Funk RD at 5/5/2021 10:00 AM     Author: Amita Funk RD Service: -- Author Type: Registered Dietitian    Filed: 5/5/2021 10:46 AM Encounter Date: 5/5/2021 Status: Attested    : Amita Funk RD (Registered Dietitian) Cosigner: Anita Pineda MD at 5/5/2021 11:24 AM    Attestation signed by Anita Pineda MD at 5/5/2021 11:24 AM    ok                Type of service:  telephone   45 minutes     Assessment: Yong is here for follow up diabetes education.   Pete, spouse is also present   He has had type 2 diabetes for 10+years.  Currently taking metformin 1000 mg bid, 25 mg Jardiance once daily, and 100 mg Januvia once daily.  He started 10 units Lantus insulin on 4/10/21.   He has been very careful with food choices: eating 3 times daily, small portion of carbs, avoiding sweets and sweet beverages.  Reports some weight loss, 150 lbs currently.  He likes to spend time in [employer] -  losses track of time and may forget to eat.   Spouse Pete, does the cooking and is very supportive.     BF:  homemade granola/ fruit/milk    Lunch:  sandwich, fruit     dinner:  B rice, meat/vegetables         bedtime:  peanuts, or ice cream bar, or yogurt blueberries  likes fruit - he c/o hunger at night     FBS:   173, 124, 139, 134, 170, 122, 119 mg/dl  2 hr after meals:  159, 172, 139, 159, 190, 174, 158, 168, 180, 145     Plan:  Following protocol, to increase Lantus to 12 units at bedtime.   Reviewed insulin pen technique -  he is doing well with that.    Reviewed signs/treatment for hypoglycemia.  Discussed nutrition options; encouraged him to increase snacks during day that are heart healthy.     Reviewed BG target. Discussed diabetes ABC's, foot care, dental, skin, and eye care.     To call questions/concerns.   Has appt with Dr. Pineda early June, 2021, due for Alc at that time.       1)  follow carb guidelines- complete  2) increase activity- 3 days per  week, 30 minutes- sometimes, did start yoga twice weekly  3) stop to eat lunch- complete     Subjective and Objective:       Yong Guillermo is referred by Dr. Watson for Diabetes Education.                Lab Results   Component Value Date     HGBA1C 10.4 (H) 01/28/2021      Component      Latest Ref Rng & Units 10/30/2018 5/9/2019 11/1/2019 8/11/2020   Hemoglobin A1c      <=5.6 % 7.6 (H) 7.7 (H) 7.9 (H) 7.6 (H)      Component      Latest Ref Rng & Units 1/28/2021   Hemoglobin A1c      <=5.6 % 10.4 (H)      Follow up:   Diabetes CDE  July 2021        Education:      Monitoring   Meter (per above goals): assessment, discussed, pt returned demonstration,  competent  Monitoring: assessment, discussed, pt returned demonstration, literature provided   BG goals: assessment, discussed, pt returned demonstration, literature provided        Nutrition Management:  assessment, discussed, pt returned demo,  literature provided   Weight:assessment, discussed, pt returned demo,  literature provided   Portions/Balance: assessment, discussed, pt returned demo,  literature provided   Carb ID/Count: assessment, discussed, pt returned demo,  literature provided   Label Reading: assessment, discussed, pt returned demo,  literature provided   Heart Healthy Fats:assessment, discussed, pt returned demo,  literature provided - discussed ideas for heatlhy healthy snacks  Menu Planning: assessment, discussed, pt returned demo,  literature provided   Dining Out: assessment, discussed,  literature provided   Physical Activity: assessment, discussed,  literature provided   Medications: assessment, discussed  Orals: assessment, discussed  Injected Medications: Assessed and Discussed - emailed instruction sheet     Diabetes Disease Process: Discussed     Acute Complications: Prevent, Detect, Treat:  Hypoglycemia: Assessed and Discussed  Hyperglycemia: Discussed  Sick Days: Literature provided     Chronic Complications  Foot Care, skin, eye:  literature provided, discussed, reviewed  ABC: Literature provideddiscussed, reviewed  Teeth:Literature provided, discussed, reviewed  Goal Setting and Problem Solving: Discussed  Barriers: Discussed  Psychosocial Adjustments: Discussed        Time spent with the patient: 45 minutes for diabetes education and counseling.-- MNT   Previous Education: yes  Visit Type: MNT

## 2021-06-30 NOTE — PROGRESS NOTES
Progress Notes by Amita Funk RD at 4/7/2021 11:00 AM     Author: Amita Funk RD Service: -- Author Type: Registered Dietitian    Filed: 4/7/2021  2:00 PM Encounter Date: 4/7/2021 Status: Attested    : Amita Funk RD (Registered Dietitian) Cosigner: Anita Pineda MD at 4/7/2021  2:26 PM    Attestation signed by Anita Pineda MD at 4/7/2021  2:26 PM    ok                Type of service:  Video Visit    Originating Location (pt. Location): Home  Distant Location (provider location):  [facility]   Mode of Communication:  Video Conference via [employer]     Video Start Time: 11:00 am  Video End Time (time video stopped): 11:30 am     Patient would like to receive their AVS by AVS Preference: MyChart.     Assessment: Yong is here for follow up diabetes education.   Pete, spouse is also present   He has had type 2 diabetes for 10+years.  Currently taking metformin 1000 mg bid, 25 mg Jardiance once daily, and 100 mg Januvia once daily.   He has made significant changes to his eating --Now is eating 3 times daily, small portion of carbs, avoiding sweets and sweet beverages.  Reports 152 lbs,  he has had some weight loss.   He is active in woodworking shop and did start to do some walking.  Spouse Pete, does the cooking and is very supportive.     BF:  homemade granola/ fruit/milk    Lunch:  sandwich, fruit     dinner:  B rice, meat/vegetables  likes fruit     FBS:  170,215, 133, 165, 147  mg/dl  2 hr after meals:  160, 193, 161, 170,178     Plan: Consulted with Dr. Pineda regarding basal insulin start.   To take 10 units Lantus insulin at bedtime.   Reviewed insulin pen technique and emailed instruction sheet.   Reviewed signs/treatment for hypoglycemia.    Reviewed BG target.  CDE f/up in 3 weeks.   To call questions/concerns.    Suggestion made to have pharmacist demonstrate insulin pen injection.    1)  follow carb guidelines- complete  2)  increase activity- 3 days per week, 30 minutes- sometimes  3) stop to eat lunch- complete     Subjective and Objective:       Yong Guillermo is referred by Dr. Watson for Diabetes Education.            Lab Results   Component Value Date     HGBA1C 10.4 (H) 01/28/2021      Component      Latest Ref Rng & Units 10/30/2018 5/9/2019 11/1/2019 8/11/2020   Hemoglobin A1c      <=5.6 % 7.6 (H) 7.7 (H) 7.9 (H) 7.6 (H)      Component      Latest Ref Rng & Units 1/28/2021   Hemoglobin A1c      <=5.6 % 10.4 (H)      Follow up:   Diabetes classes for 3-4 weeks        Education:      Monitoring   Meter (per above goals): assessment, discussed, pt returned demonstration,  Monitoring: assessment, discussed, pt returned demonstration, literature provided   BG goals: assessment, discussed, pt returned demonstration, literature provided        Nutrition Management:  assessment, discussed, pt returned demo,  literature provided   Weight:assessment, discussed, pt returned demo,  literature provided   Portions/Balance: assessment, discussed, pt returned demo,  literature provided   Carb ID/Count: assessment, discussed, pt returned demo,  literature provided   Label Reading: assessment, discussed, pt returned demo,  literature provided   Heart Healthy Fats:assessment, discussed, pt returned demo,  literature provided   Menu Planning: assessment, discussed, pt returned demo,  literature provided   Dining Out: assessment, discussed,  literature provided   Physical Activity: assessment, discussed,  literature provided   Medications: assessment, discussed  Orals: assessment, discussed  Injected Medications: Assessed and Discussed - emailed instruction sheet     Diabetes Disease Process: Discussed     Acute Complications: Prevent, Detect, Treat:  Hypoglycemia: Assessed and Discussed  Hyperglycemia: Discussed  Sick Days: Literature provided     Chronic Complications  Foot Care: literature provided  Skin Care: Literature provided  Eye:  Literature provided  ABC: Literature provided  Teeth:Literature provided  Goal Setting and Problem Solving: Discussed  Barriers: Discussed  Psychosocial Adjustments: Discussed        Time spent with the patient: 60 minutes for diabetes education and counseling.   Previous Education: yes  Visit Type:DSMT

## 2021-06-30 NOTE — PROGRESS NOTES
Progress Notes by Amita Funk RD at 3/16/2021 11:00 AM     Author: Amita Funk RD Service: -- Author Type: Registered Dietitian    Filed: 3/16/2021  3:36 PM Encounter Date: 3/16/2021 Status: Attested    : Amita Funk RD (Registered Dietitian) Cosigner: Anita Pineda MD at 3/16/2021  4:33 PM    Attestation signed by Anita Pineda MD at 3/16/2021  4:33 PM    ok                Type of service:  Video Visit    Originating Location (pt. Location): Home  Distant Location (provider location):  [facility]   Mode of Communication:  Video Conference via [employer]    Video Start Time: 11:00 am  Video End Time (time video stopped): 12:00    Patient would like to receive their AVS by AVS Preference: MyChart.    Assessment: Yong is here for diabetes education.   Pete, spouse is also present   He has had type 2 diabetes for 10+years.  Currently taking metformin 1000 mg bid, 25 mg Jardiance once daily (recently increased), and 100 mg Januvia once daily.   He reports over the past few months stopped testing BG and was not very careful with his foods.  Now is eating 3 times daily, avoiding sweets and sweet beverages.  He communicates his frustrations with recent BG.   Weight 165 lbs, he report recent loss of weight, unintentional and feeling fatigued.  He is active in woodworking shop but not aerobic activity.     BF:  homemade granola/ fruit/milk    Lunch:  sandwich, fruit     dinner:  B rice, meat/vegetables  likes fruit    FBS:  157, 187, 188, 205, 196 mg/dl  2 hr BF:  199, 214, 206, 280    Plan: Reviewed BG goals, nutrition, activity, and medication guidelines.  Suggested higher protein at breakfast, less granola to see if that helps.   He may be candidate for basal insulin if BG are not in goal range after a couple of weeks.  Discussed progressive nature of type 2 diabetes.   CDE f/up in 3 weeks.  Educational materials will be mailed.    Goals:  1)  follow carb guidelines  2) increase activity- 3 days per week, 30 minutes  3) stop to eat lunch    Subjective and Objective:      Yong Guillermo is referred by Dr. Watson for Diabetes Education.     Lab Results   Component Value Date    HGBA1C 10.4 (H) 01/28/2021     Component      Latest Ref Rng & Units 10/30/2018 5/9/2019 11/1/2019 8/11/2020   Hemoglobin A1c      <=5.6 % 7.6 (H) 7.7 (H) 7.9 (H) 7.6 (H)     Component      Latest Ref Rng & Units 1/28/2021   Hemoglobin A1c      <=5.6 % 10.4 (H)     Follow up:   Diabetes classes for 3-4 weeks      Education:     Monitoring   Meter (per above goals): assessment, discussed, pt returned demonstration,  Monitoring: assessment, discussed, pt returned demonstration, literature provided   BG goals: assessment, discussed, pt returned demonstration, literature provided      Nutrition Management:  assessment, discussed, pt returned demo,  literature provided   Weight:assessment, discussed, pt returned demo,  literature provided   Portions/Balance: assessment, discussed, pt returned demo,  literature provided   Carb ID/Count: assessment, discussed, pt returned demo,  literature provided   Label Reading: assessment, discussed, pt returned demo,  literature provided   Heart Healthy Fats:assessment, discussed, pt returned demo,  literature provided   Menu Planning: assessment, discussed, pt returned demo,  literature provided   Dining Out: assessment, discussed,  literature provided   Physical Activity: assessment, discussed,  literature provided   Medications: assessment, discussed  Orals: assessment, discussed  Injected Medications: Assessed and Discussed     Diabetes Disease Process: Discussed    Acute Complications: Prevent, Detect, Treat:  Hypoglycemia: Assessed and Discussed  Hyperglycemia: Discussed  Sick Days: Literature provided    Chronic Complications  Foot Care: literature provided  Skin Care: Literature provided  Eye: Literature provided  ABC: Literature  provided  Teeth:Literature provided  Goal Setting and Problem Solving: Discussed  Barriers: Discussed  Psychosocial Adjustments: Discussed      Time spent with the patient: 60 minutes for diabetes education and counseling.   Previous Education: yes  Visit Type:JONATHAN Funk  3/16/2021

## 2021-07-01 ENCOUNTER — COMMUNICATION - HEALTHEAST (OUTPATIENT)
Dept: INTERNAL MEDICINE | Facility: CLINIC | Age: 76
End: 2021-07-01

## 2021-07-01 DIAGNOSIS — E11.9 DIABETES MELLITUS, TYPE 2 (H): ICD-10-CM

## 2021-07-04 NOTE — ADDENDUM NOTE
Addendum Note by Jatinder Pineda MD at 6/2/2021  9:00 AM     Author: Jatinder Pineda MD Service: -- Author Type: Physician    Filed: 6/2/2021 10:16 AM Encounter Date: 6/2/2021 Status: Signed    : Jatinder Pineda MD (Physician)    Addended by: JATINDER PINEDA on: 6/2/2021 10:16 AM        Modules accepted: Orders

## 2021-07-04 NOTE — TELEPHONE ENCOUNTER
Telephone Encounter by Gricel Min CMA at 7/1/2021  9:35 AM     Author: Gricel Min CMA Service: -- Author Type: Certified Medical Assistant    Filed: 7/1/2021  9:35 AM Encounter Date: 7/1/2021 Status: Signed    : Gricel Min CMA (Certified Medical Assistant)       Last prescription was set to no print. Please send again.  Gricel Min CMA ............... 9:35 AM, 07/01/21

## 2021-07-06 VITALS
SYSTOLIC BLOOD PRESSURE: 123 MMHG | DIASTOLIC BLOOD PRESSURE: 61 MMHG | WEIGHT: 154.6 LBS | HEART RATE: 81 BPM | BODY MASS INDEX: 22.5 KG/M2 | OXYGEN SATURATION: 99 %

## 2021-07-28 ENCOUNTER — VIRTUAL VISIT (OUTPATIENT)
Dept: EDUCATION SERVICES | Facility: CLINIC | Age: 76
End: 2021-07-28
Payer: MEDICARE

## 2021-07-28 DIAGNOSIS — E11.8 TYPE 2 DIABETES MELLITUS WITH COMPLICATION, WITHOUT LONG-TERM CURRENT USE OF INSULIN (H): Primary | ICD-10-CM

## 2021-07-28 PROCEDURE — G0108 DIAB MANAGE TRN  PER INDIV: HCPCS | Performed by: DIETITIAN, REGISTERED

## 2021-07-28 NOTE — PROGRESS NOTES
Type of service:  telephone   30 minutes     Assessment: Yong is on phone follow up diabetes education.   He has had type 2 diabetes for 10+years.  Currently taking metformin 1000 mg bid, 25 mg Jardiance once daily, and 100 mg Januvia once daily and 12 units Lantus insulin once daily between 9-11 pm.   He has been very careful with food choices: eating 3 times daily, small portion of carbs, avoiding sweets and sweet beverages.  He reports his weight is currently 145 lbs on home scale.  He doesn't have regular routine with activity other than yoga.   He does spend time in wood working shop.      BF:  homemade granola/ fruit/milk  or egg dish/vegetables   12-1  Lunch:  sandwich, fruit, cucumber       7 pm  dinner:  B rice, meat/vegetables         bedtime:  peanuts, or sm ice cream bar, or yogurt blueberries  likes fruit      FBS:   127, 149, 130, 129, 127, 130, 131 mg/dl  2 hr after meals:  129, 148 , 151, 156, 216 (ice cream),     Plan:  To continue current diabetes meds.  Reviewed signs/treatment for hypoglycemia.  Discussed nutrition options; encouraged him to increase snacks during day that are heart healthy.     Reviewed BG target. Discussed diabetes ABC's, foot care, dental, skin, and eye care.     To call questions/concerns.   Has appt with Dr. Pineda Sept 2021, due for Alc at that time.       1)  follow carb guidelines- complete  2) increase activity- 3 days per week, 30 minutes- sometimes, did start yoga twice weekly- complete  3) stop to eat lunch- complete     Subjective and Objective:       Yong Guillermo is referred by Dr. Watson for Diabetes Education.                Lab Results   Component Value Date     HGBA1C 10.4 (H) 01/28/2021      Component      Latest Ref Rng & Units 10/30/2018 5/9/2019 11/1/2019 8/11/2020   Hemoglobin A1c      <=5.6 % 7.6 (H) 7.7 (H) 7.9 (H) 7.6 (H)      Component      Latest Ref Rng & Units 1/28/2021   Hemoglobin A1c      <=5.6 % 10.4 (H)      Alc  7.1%    6/2021        Education:      Monitoring   Meter (per above goals): assessment, discussed, pt returned demonstration,  competent  Monitoring: assessment, discussed, pt returned demonstration, literature provided   BG goals: assessment, discussed, pt returned demonstration, literature provided        Nutrition Management:  assessment, discussed, pt returned demo,  literature provided   Weight:assessment, discussed, pt returned demo,  literature provided   Portions/Balance: assessment, discussed, pt returned demo,  literature provided   Carb ID/Count: assessment, discussed, pt returned demo,  literature provided   Label Reading: assessment, discussed, pt returned demo,  literature provided   Heart Healthy Fats:assessment, discussed, pt returned demo,  literature provided - discussed ideas for heatlhy healthy snacks  Menu Planning: assessment, discussed, pt returned demo,  literature provided   Dining Out: assessment, discussed,  literature provided   Physical Activity: assessment, discussed,  literature provided   Medications: assessment, discussed  Orals: assessment, discussed  Injected Medications: Assessed and Discussed - emailed instruction sheet     Diabetes Disease Process: Discussed     Acute Complications: Prevent, Detect, Treat:  Hypoglycemia: Assessed and Discussed  Hyperglycemia: Discussed  Sick Days: Literature provided     Chronic Complications  Foot Care, skin, eye: literature provided, discussed, reviewed  ABC: Literature provideddiscussed, reviewed  Teeth:Literature provided, discussed, reviewed  Goal Setting and Problem Solving: Discussed  Barriers: Discussed  Psychosocial Adjustments: Discussed        Time spent with the patient: 30 minutes for diabetes education and counseling.-- MNT   Previous Education: yes  Visit Type:  DSMT

## 2021-08-06 NOTE — PATIENT INSTRUCTIONS - HE
Patient Instructions by Anita Pineda MD at 5/7/2019  3:40 PM     Author: Anita Pineda MD Service: -- Author Type: Physician    Filed: 5/7/2019  3:58 PM Encounter Date: 5/7/2019 Status: Signed    : Anita Pineda MD (Physician)         Patient Education     Exercise for a Healthier Heart  You may wonder how you can improve the health of your heart. If youre thinking about exercise, youre on the right track. You dont need to become an athlete, but you do need a certain amount of brisk exercise to help strengthen your heart. If you have been diagnosed with a heart condition, your doctor may recommend exercise to help stabilize your condition. To help make exercise a habit, choose safe, fun activities.       Be sure to check with your health care provider before starting an exercise program.    Why exercise?  Exercising regularly offers many healthy rewards. It can help you do all of the following:    Improve your blood cholesterol levels to help prevent further heart trouble    Lower your blood pressure to help prevent a stroke or heart attack    Control diabetes, or reduce your risk of getting this disease    Improve your heart and lung function    Reach and maintain a healthy weight    Make your muscles stronger and more limber so you can stay active    Prevent falls and fractures by slowing the loss of bone mass (osteoporosis)    Manage stress better  Exercise tips  Ease into your routine. Set small goals. Then build on them.  Exercise on most days. Aim for a total of 150 or more minutes of moderate to  vigorous intensity activity each week. Consider 40 minutes, 3 to 4 times a week. For best results, activity should last for 40 minutes on average. It is OK to work up to the 40 minute period over time. Examples of moderate-intensity activity is walking one mile in 15 minutes or 30 to 45 minutes of yard work.  Step up your daily activity level. Along with your exercise program, try being more active  throughout the day. Walk instead of drive. Do more household tasks or yard work.  Choose one or more activities you enjoy. Walking is one of the easiest things you can do. You can also try swimming, riding a bike, or taking an exercise class.  Stop exercising and call your doctor if you:    Have chest pain or feel dizzy or lightheaded    Feel burning, tightness, pressure, or heaviness in your chest, neck, shoulders, back, or arms    Have unusual shortness of breath    Have increased joint or muscle pain    Have palpitations or an irregular heartbeat      4080-1108 [employer]. [address] [place] [address]. All rights reserved. This information is not intended as a substitute for professional medical care. Always follow your healthcare professional's instructions.         Patient Education   Understanding [employer] MyPlate  The USDA (US Department of Agriculture) has guidelines to help you make healthy food choices. These are called MyPlate. MyPlate shows the food groups that make up healthy meals using the image of a place setting. Before you eat, think about the healthiest choices for what to put onto your plate or into your cup or bowl. To learn more about building a healthy plate, visit www.choosemyplate.gov.       The Food Groups    Fruits: Any fruit or 100% fruit juice counts as part of the Fruit Group. Fruits may be fresh, canned, frozen, or dried, and may be whole, cut-up, or pureed. Make half your plate fruits and vegetables.    Vegetables: Any vegetable or 100% vegetable juice counts as a member of the Vegetable Group. Vegetables may be fresh, frozen, canned, or dried. They can be served raw or cooked and may be whole, cut-up, or mashed. Make half your plate fruits and vegetables.     Grains: All foods made from grains are part of the Grains Group. These include wheat, rice, oats, cornmeal, and barley such as bread, pasta, oatmeal, cereal, tortillas, and grits. Grains should be no more  than a quarter of your plate. At least half of your grains should be whole grains.    Protein: This group includes meat, poultry, seafood, beans and peas, eggs, processed soy products (like tofu), nuts (including nut butters), and seeds. Make protein choices no more than a quarter of your plate. Meat and poultry choices should be lean or low fat.    Dairy: All fluid milk products and foods made from milk that contain calcium, like yogurt and cheese are part of the Dairy Group. (Foods that have little calcium, such as cream, butter, and cream cheese, are not part of the group.) Most dairy choices should be low-fat or fat-free.    Oils: These are fats that are liquid at room temperature. They include canola, corn, olive, soybean, and sunflower oil. Foods that are mainly oil include mayonnaise, certain salad dressings, and soft margarines. You should have only 5 to 7 teaspoons of oils a day. You probably already get this much from the food you eat.  Use [employer] to Help Build Your Meals  The [employer] can help you plan and track your meals and activity. You can look up individual foods to see or compare their nutritional value. You can get guidelines for what and how much you should eat. You can compare your food choices. And you can assess personal physical activities and see ways you can improve. Go to www.[employer].gov/supertracker/.    7065-5401 [employer]. [address] [place] [address]. All rights reserved. This information is not intended as a substitute for professional medical care. Always follow your healthcare professional's instructions.           Patient Education   Urinary Incontinence (Male)    Urinary incontinence means not being able to control the release of urine from the bladder.  Causes  Common causes of urinary incontinence in men include:    Infection    Certain medicines    Aging    Poor pelvic muscle tone    Bladder spasms    Obesity    Urinary  retention  Nervous system diseases, diabetes, sleep apnea, urinary tract infections, prostate surgery, and pelvic trauma can also cause incontinence. Constipation and smoking have also been identified as risk factors.  Symptoms    Urge incontinence (also called overactive bladder) is a sudden urge to urinate even though there may not be much urine in the bladder. The need to urinate often during the night is common. It is due to bladder spasms.    Stress incontinence is involuntary urine leakage that can occur with sneezing, coughing, and other actions that put stress on the bladder.  Treatment  Treatment of urinary incontinence depends on the cause. Infections of the bladder are treated with antibiotics. Urinary retention is treated with a bladder catheter.  Home care  Follow these guidelines when caring for yourself at home:    Don't consume foods and drinks that may irritate the bladder. These include drinks containing alcohol, caffeinate, or carbonation; chocolate; and acidic fruits and juices.    Limit fluid intake to 6 to 8 cups a day.    Lose weight if you are overweight. This will reduce your symptoms.    If needed, wear absorbent pads to catch urine. Change pads frequently to maintain hygiene and prevent skin and bladder infections.    Bathe daily to maintain good hygiene.    If an antibiotic was prescribed to treat a bladder infection, be sure to take it until finished, even if you are feeling better before then. This is to make sure your infection has cleared.    If a catheter was left in place, it is important to keep bacteria from getting into the collection bag. Don't disconnect the catheter from the collection bag.    Use a leg band to secure the catheter drainage tube, so it does not pull on the catheter. Drain the collection bag when it becomes full using the drain spout at the bottom of the bag. Don't disconnect the bag from the catheter.    Don't pull on or try to remove a catheter. The catheter  must be removed by a healthcare provider.  Follow-up care  Follow up with your healthcare provider, or as advised.  When to seek medical advice  Call your healthcare provider right away if any of these occur:    Fever over 100.4 F (38 C), or as directed by your healthcare provider    Bladder pain or fullness    Abdominal swelling, nausea, or vomiting    Back pain    Weakness, dizziness, or fainting    If a catheter was left in place, return if:  ? Catheter falls out  ? Catheter stops draining for 6 hours  Date Last Reviewed: 10/1/2017    5405-4095 [employer]. [address] [place] [address]. All rights reserved. This information is not intended as a substitute for professional medical care. Always follow your healthcare professional's instructions.     Patient Education   Your Health Risk Assessment indicates you feel you are not in good emotional health.    Recreation   Recreation is not limited to sports and team events. It includes any activity that provides relaxation, interest, enjoyment, and exercise. Recreation provides an outlet for physical, mental, and social energy. It can give a sense of worth and achievement. It can help you stay healthy.    Mental Exercise and Social Involvement  Mental and emotional health is as important as physical health. Keep in touch with friends and family. Stay as active as possible. Continue to learn and challenge yourself.   Things you can do to stay mentally active are:    Learn something new, like a foreign language or musical instrument.     Play SCRABBLE or do crossword puzzles. If you cannot find people to play these games with you at home, you can play them with others on your computer through the Internet.     Join a games club--anything from card games to chess or checkers or lawn bowling.     Start a new hobby.     Go back to school.     Volunteer.     Read.     Keep up with world events.       Patient Education   Depression and Suicide in  Older Adults  Nearly 2 million older Americans have some type of depression. Sadly, some of them even take their own lives. Yet depression among older adults is often ignored. Learn the warning signs. You may help spare a loved one needless pain. You may also save a life.       What Is Depression?  Depression is a mood disorder that affects the way you think and feel. The most common symptom is a feeling of deep sadness. People who are depressed also may seem tired and listless. And nothing seems to give them pleasure. Its normal to grieve or be sad sometimes. But sadness lessens or passes with time. Depression rarely goes away or improves on its own. Other symptoms of depression are:    Sleeping more or less than normal    Eating more or less than normal    Having headaches, stomachaches, or other pains that dont go away    Feeling nervous, empty, or worthless    Crying a great deal    Thinking or talking about suicide or death    Feeling confused or forgetful  What Causes It?  The causes of depression arent fully known. Certain chemicals in the brain play a role. Depression does run in families. And life stresses can also trigger depression in some people. That may be the case with older adults. They often face great burdens, such as the death of friends or a spouse. They may have failing health. And they are more likely to be alone, lonely, or poor.  How You Can Help  Often, depressed people may not want to ask for help. When they do, they may be ignored. Or, they may receive the wrong treatment. You can help by showing parents and older friends love and support. If they seem depressed, help them find the right treatment. Talk to your doctor. Or contact a local mental health center, social service agency, or hospital. With modern treatment, no one has to suffer from depression.  Resources:    National [place] of Mental Health  654.370.8708  www.nimh.nih.gov    National [place] on Mental  Illness  629.986.3934  www.viri.org    Mental Health [facility]  610.757.8511  www.[facility].org    National Suicide Hotline  710.675.2790 (800-SUICIDE)      0342-1782 [employer]. [address] [place] [address]. All rights reserved. This information is not intended as a substitute for professional medical care. Always follow your healthcare professional's instructions.         Patient Education   Preventing Falls in the Home  As you get older, falls are more likely. Thats because your reaction time slows. Your muscles and joints may also get stiffer, making them less flexible. Illness, medications, and vision changes can also affect your balance. A fall could leave you unable to live on your own. To make your home safer, follow these tips:    Floors    Put nonskid pads under area rugs.    Remove throw rugs.    Replace worn floor coverings.    Tack carpets firmly to each step on carpeted stairs. Put nonskid strips on the edges of uncarpeted stairs.    Keep floors and stairs free of clutter and cords.    Arrange furniture so there are clear pathways.    Clean up any spills right away.    Bathrooms    Install grab bars in the tub or shower.    Apply nonskid strips or put a nonskid rubber mat in the tub or shower.    Sit on a bath chair to bathe.    Use bathmats with nonskid backing.    Lighting    Keep a flashlight in each room.    Put a nightlight along the pathway between the bedroom and the bathroom.    9454-5995 [employer]. [address] [place] [address]. All rights reserved. This information is not intended as a substitute for professional medical care. Always follow your healthcare professional's instructions.           Advance Directive  Patients advance directive was discussed and I am comfortable with the patients wishes.  Patient Education   Personalized Prevention Plan  You are due for the preventive services outlined below.  Your care team is available to assist  you in scheduling these services.  If you have already completed any of these items, please share that information with your care team to update in your medical record.  Health Maintenance   Topic Date Due   ? ZOSTER VACCINES (2 of 3) 08/15/2012   ? DIABETES URINE MICROALBUMIN  05/31/2018   ? DEPRESSION FOLLOW UP  10/27/2018   ? DIABETES FOLLOW-UP  10/27/2018   ? DIABETES HEMOGLOBIN A1C  04/30/2019   ? DIABETES OPHTHALMOLOGY EXAM  05/03/2019   ? DIABETES FOOT EXAM  09/07/2019 (Originally 5/31/2018)   ? FALL RISK ASSESSMENT  05/07/2020   ? ADVANCE DIRECTIVES DISCUSSED WITH PATIENT  04/27/2023   ? COLONOSCOPY  08/14/2023   ? TD 18+ HE  04/27/2028   ? PNEUMOCOCCAL POLYSACCHARIDE VACCINE AGE 65 AND OVER  Completed   ? INFLUENZA VACCINE RULE BASED  Completed   ? PNEUMOCOCCAL CONJUGATE VACCINE FOR ADULTS (PCV13 OR PREVNAR)  Completed

## 2021-09-02 ENCOUNTER — OFFICE VISIT (OUTPATIENT)
Dept: INTERNAL MEDICINE | Facility: CLINIC | Age: 76
End: 2021-09-02
Payer: MEDICARE

## 2021-09-02 VITALS
SYSTOLIC BLOOD PRESSURE: 117 MMHG | HEART RATE: 71 BPM | HEIGHT: 70 IN | DIASTOLIC BLOOD PRESSURE: 60 MMHG | OXYGEN SATURATION: 98 % | BODY MASS INDEX: 21.17 KG/M2 | WEIGHT: 147.9 LBS

## 2021-09-02 DIAGNOSIS — I63.9 CEREBROVASCULAR ACCIDENT (CVA), UNSPECIFIED MECHANISM (H): ICD-10-CM

## 2021-09-02 DIAGNOSIS — F32.A DEPRESSIVE DISORDER: ICD-10-CM

## 2021-09-02 DIAGNOSIS — G25.0 BENIGN ESSENTIAL TREMOR: ICD-10-CM

## 2021-09-02 DIAGNOSIS — Z00.00 ENCOUNTER FOR MEDICARE ANNUAL WELLNESS EXAM: Primary | ICD-10-CM

## 2021-09-02 DIAGNOSIS — R63.4 WEIGHT LOSS: ICD-10-CM

## 2021-09-02 DIAGNOSIS — G47.39 COMPLEX SLEEP APNEA SYNDROME: ICD-10-CM

## 2021-09-02 DIAGNOSIS — E78.5 HYPERLIPIDEMIA, UNSPECIFIED HYPERLIPIDEMIA TYPE: ICD-10-CM

## 2021-09-02 DIAGNOSIS — Z98.890 H/O CAROTID ENDARTERECTOMY: ICD-10-CM

## 2021-09-02 DIAGNOSIS — E11.8 TYPE 2 DIABETES MELLITUS WITH COMPLICATION, WITHOUT LONG-TERM CURRENT USE OF INSULIN (H): ICD-10-CM

## 2021-09-02 DIAGNOSIS — M85.89 OSTEOPENIA OF MULTIPLE SITES: ICD-10-CM

## 2021-09-02 LAB
ALBUMIN SERPL-MCNC: 4 G/DL (ref 3.5–5)
ALBUMIN UR-MCNC: NEGATIVE MG/DL
ALP SERPL-CCNC: 88 U/L (ref 45–120)
ALT SERPL W P-5'-P-CCNC: 58 U/L (ref 0–45)
ANION GAP SERPL CALCULATED.3IONS-SCNC: 12 MMOL/L (ref 5–18)
APPEARANCE UR: CLEAR
AST SERPL W P-5'-P-CCNC: 31 U/L (ref 0–40)
BASOPHILS # BLD AUTO: 0 10E3/UL (ref 0–0.2)
BASOPHILS NFR BLD AUTO: 1 %
BILIRUB SERPL-MCNC: 0.7 MG/DL (ref 0–1)
BILIRUB UR QL STRIP: NEGATIVE
BUN SERPL-MCNC: 16 MG/DL (ref 8–28)
CALCIUM SERPL-MCNC: 9.7 MG/DL (ref 8.5–10.5)
CHLORIDE BLD-SCNC: 105 MMOL/L (ref 98–107)
CHOLEST SERPL-MCNC: 121 MG/DL
CO2 SERPL-SCNC: 26 MMOL/L (ref 22–31)
COLOR UR AUTO: YELLOW
CREAT SERPL-MCNC: 0.82 MG/DL (ref 0.7–1.3)
CREAT UR-MCNC: 85 MG/DL
EOSINOPHIL # BLD AUTO: 0.2 10E3/UL (ref 0–0.7)
EOSINOPHIL NFR BLD AUTO: 6 %
ERYTHROCYTE [DISTWIDTH] IN BLOOD BY AUTOMATED COUNT: 12 % (ref 10–15)
FASTING STATUS PATIENT QL REPORTED: YES
GFR SERPL CREATININE-BSD FRML MDRD: 88 ML/MIN/1.73M2
GLUCOSE BLD-MCNC: 121 MG/DL (ref 70–125)
GLUCOSE UR STRIP-MCNC: >=1000 MG/DL
HBA1C MFR BLD: 6.6 % (ref 0–5.6)
HCT VFR BLD AUTO: 43.1 % (ref 40–53)
HDLC SERPL-MCNC: 46 MG/DL
HGB BLD-MCNC: 15 G/DL (ref 13.3–17.7)
HGB UR QL STRIP: NEGATIVE
IMM GRANULOCYTES # BLD: 0 10E3/UL
IMM GRANULOCYTES NFR BLD: 0 %
KETONES UR STRIP-MCNC: NEGATIVE MG/DL
LDLC SERPL CALC-MCNC: 58 MG/DL
LEUKOCYTE ESTERASE UR QL STRIP: NEGATIVE
LYMPHOCYTES # BLD AUTO: 1.3 10E3/UL (ref 0.8–5.3)
LYMPHOCYTES NFR BLD AUTO: 33 %
MCH RBC QN AUTO: 34.6 PG (ref 26.5–33)
MCHC RBC AUTO-ENTMCNC: 34.8 G/DL (ref 31.5–36.5)
MCV RBC AUTO: 100 FL (ref 78–100)
MICROALBUMIN UR-MCNC: 0.53 MG/DL (ref 0–1.99)
MICROALBUMIN/CREAT UR: 6.2 MG/G CR
MONOCYTES # BLD AUTO: 0.4 10E3/UL (ref 0–1.3)
MONOCYTES NFR BLD AUTO: 9 %
NEUTROPHILS # BLD AUTO: 1.9 10E3/UL (ref 1.6–8.3)
NEUTROPHILS NFR BLD AUTO: 51 %
NITRATE UR QL: NEGATIVE
PH UR STRIP: 5 [PH] (ref 5–8)
PLATELET # BLD AUTO: 123 10E3/UL (ref 150–450)
POTASSIUM BLD-SCNC: 5.1 MMOL/L (ref 3.5–5)
PROT SERPL-MCNC: 6.5 G/DL (ref 6–8)
PSA SERPL-MCNC: 0.38 UG/L (ref 0–6.5)
RBC # BLD AUTO: 4.33 10E6/UL (ref 4.4–5.9)
SODIUM SERPL-SCNC: 143 MMOL/L (ref 136–145)
SP GR UR STRIP: 1.02 (ref 1–1.03)
TRIGL SERPL-MCNC: 87 MG/DL
TSH SERPL DL<=0.005 MIU/L-ACNC: 1.64 UIU/ML (ref 0.3–5)
UROBILINOGEN UR STRIP-ACNC: 0.2 E.U./DL
WBC # BLD AUTO: 3.8 10E3/UL (ref 4–11)

## 2021-09-02 PROCEDURE — 81003 URINALYSIS AUTO W/O SCOPE: CPT | Performed by: INTERNAL MEDICINE

## 2021-09-02 PROCEDURE — 82306 VITAMIN D 25 HYDROXY: CPT | Performed by: INTERNAL MEDICINE

## 2021-09-02 PROCEDURE — 36415 COLL VENOUS BLD VENIPUNCTURE: CPT | Performed by: INTERNAL MEDICINE

## 2021-09-02 PROCEDURE — 83036 HEMOGLOBIN GLYCOSYLATED A1C: CPT | Performed by: INTERNAL MEDICINE

## 2021-09-02 PROCEDURE — 85025 COMPLETE CBC W/AUTO DIFF WBC: CPT | Performed by: INTERNAL MEDICINE

## 2021-09-02 PROCEDURE — G0439 PPPS, SUBSEQ VISIT: HCPCS | Performed by: INTERNAL MEDICINE

## 2021-09-02 PROCEDURE — 80061 LIPID PANEL: CPT | Performed by: INTERNAL MEDICINE

## 2021-09-02 PROCEDURE — 80053 COMPREHEN METABOLIC PANEL: CPT | Performed by: INTERNAL MEDICINE

## 2021-09-02 PROCEDURE — G0103 PSA SCREENING: HCPCS | Performed by: INTERNAL MEDICINE

## 2021-09-02 PROCEDURE — 82043 UR ALBUMIN QUANTITATIVE: CPT | Performed by: INTERNAL MEDICINE

## 2021-09-02 PROCEDURE — 84443 ASSAY THYROID STIM HORMONE: CPT | Performed by: INTERNAL MEDICINE

## 2021-09-02 ASSESSMENT — MIFFLIN-ST. JEOR: SCORE: 1414.18

## 2021-09-02 ASSESSMENT — ACTIVITIES OF DAILY LIVING (ADL): CURRENT_FUNCTION: NO ASSISTANCE NEEDED

## 2021-09-02 NOTE — PROGRESS NOTES
"SUBJECTIVE:   Yong Guillermo is a 75 year old male who presents for Preventive Visit.      Patient has been advised of split billing requirements and indicates understanding: Yes   Are you in the first 12 months of your Medicare coverage?  No    Healthy Habits:     In general, how would you rate your overall health?  Fair    Frequency of exercise:  None    Do you usually eat at least 4 servings of fruit and vegetables a day, include whole grains    & fiber and avoid regularly eating high fat or \"junk\" foods?  Yes    Taking medications regularly:  Yes    Medication side effects:  None    Ability to successfully perform activities of daily living:  No assistance needed    Home Safety:  No safety concerns identified    Hearing Impairment:  No hearing concerns    In the past 6 months, have you been bothered by leaking of urine? Yes    In general, how would you rate your overall mental or emotional health?  Fair      PHQ-2 Total Score: 1    Additional concerns today:  No    Do you feel safe in your environment? Yes    Have you ever done Advance Care Planning? (For example, a Health Directive, POLST, or a discussion with a medical provider or your loved ones about your wishes): Yes, advance care planning is on file.       Fall risk       Cognitive Screening   1) Repeat 3 items (Leader, Season, Table)    2) Clock draw: NORMAL  3) 3 item recall: Recalls 3 objects  Results: NORMAL clock, 1-2 items recalled: COGNITIVE IMPAIRMENT LESS LIKELY    Mini-CogTM Copyright PORTILLO Suggs. Licensed by the author for use in [facility]; reprinted with permission (joaquina@.[facility]). All rights reserved.      Do you have sleep apnea, excessive snoring or daytime drowsiness?: no    Reviewed and updated as needed this visit by clinical staff  Tobacco  Allergies  Meds              Reviewed and updated as needed this visit by Provider                Social History     Tobacco Use     Smoking status: Former Smoker     Types: Cigars, Pipe, " Pipe, Pipe     Smokeless tobacco: Current User     Types: Chew, Chew     Tobacco comment: Quit 40 years ago.   Substance Use Topics     Alcohol use: No     If you drink alcohol do you typically have >3 drinks per day or >7 drinks per week? Yes      Alcohol Use 9/2/2021   Prescreen: >3 drinks/day or >7 drinks/week? Not Applicable               Current providers sharing in care for this patient include:   Patient Care Team:  Anita Pineda MD as PCP - General (Internal Medicine)  Pat Solano MD as MD (Hematology & Oncology)  Lester Luna MD as Assigned Neuroscience Provider  Andre Kemp DO as Assigned Sleep Provider  Anita Pineda MD as Assigned PCP  Artemio Stanley MD as Assigned Surgical Provider    The following health maintenance items are reviewed in Epic and correct as of today:  Health Maintenance Due   Topic Date Due     DIABETIC FOOT EXAM  Never done     ANNUAL REVIEW OF  ORDERS  Never done     DEPRESSION ACTION PLAN  Never done     ZOSTER IMMUNIZATION (3 of 3) 12/04/2019     COVID-19 Vaccine (3 - Pfizer risk 3-dose series) 03/11/2021     MEDICARE ANNUAL WELLNESS VISIT  07/29/2021     PHQ-9  08/09/2021     LIPID  08/11/2021     INFLUENZA VACCINE (1) 09/01/2021               Review of Systems  CONSTITUTIONAL: NEGATIVE for fever, chills, change in weight  INTEGUMENTARY/SKIN: NEGATIVE for worrisome rashes, moles or lesions  EYES: NEGATIVE for vision changes or irritation  ENT/MOUTH: NEGATIVE for ear, mouth and throat problems  RESP: NEGATIVE for significant cough or SOB  BREAST: NEGATIVE for masses, tenderness or discharge  CV: NEGATIVE for chest pain, palpitations or peripheral edema  GI: NEGATIVE for nausea, abdominal pain, heartburn, or change in bowel habits  : NEGATIVE for frequency, dysuria, or hematuria  MUSCULOSKELETAL: NEGATIVE for significant arthralgias or myalgia  NEURO: NEGATIVE for weakness, dizziness or paresthesias  ENDOCRINE: NEGATIVE for temperature intolerance, skin/hair  "changes  HEME: NEGATIVE for bleeding problems  PSYCHIATRIC: NEGATIVE for changes in mood or affect    OBJECTIVE:   /60   Pulse 71   Ht 1.765 m (5' 9.5\")   Wt 67.1 kg (147 lb 14.4 oz)   SpO2 98%   BMI 21.53 kg/m   Estimated body mass index is 21.53 kg/m  as calculated from the following:    Height as of this encounter: 1.765 m (5' 9.5\").    Weight as of this encounter: 67.1 kg (147 lb 14.4 oz).  Physical Exam  General Appearance: Alert, cooperative, no distress, appears stated age.  Head: Normocephalic, without obvious abnormality, atraumatic  Eyes: PERRL, conjunctiva/corneas clear, EOM's intact  Ears: Normal TM's and external ear canals, both ears  Nose: Nares normal, septum midline,mucosa normal, no drainage  Throat: Lips, mucosa, and tongue normal; teeth and gums normal  Neck: Supple, symmetrical, trachea midline, no adenopathy;  thyroid: not enlarged, symmetric, no tenderness/mass/nodules; no carotid bruit or JVD  Back: Symmetric, no curvature, ROM normal, no CVA tenderness.  Lungs: Clear to auscultation bilaterally, respirations unlabored.  Breasts: No breast masses, tenderness, asymmetry, or nipple discharge.  Heart: Regular rate and rhythm, S1 and S2 normal, no murmur, rub, or gallop.  Abdomen: Soft, non-tender, bowel sounds active all four quadrants,  no masses, no organomegaly.  Extremities: Extremities normal, atraumatic, no cyanosis or edema.  Skin: Skin color, texture, turgor normal, no rashes or lesions.  Lymph nodes: Cervical, supraclavicular, and axillary nodes normal.  Neurologic: No focal neurological findings.          ASSESSMENT / PLAN:   (Z00.00) Encounter for Medicare annual wellness exam  (primary encounter diagnosis)  Comment:  Plan: PSA tumor marker            (I63.9) Cerebrovascular accident (CVA), unspecified mechanism (H)  Comment: On Plavix, stable.  Plan:     (E11.8) Type 2 diabetes mellitus with complication, without long-term current use of insulin (H)  Comment: On Lantus 12 " "U, metformin, Januvia and Jardiance. No hypoglycemia.  Plan: Albumin Random Urine Quantitative with Creat         Ratio, Comprehensive metabolic panel,         Hemoglobin A1c, TSH with free T4 reflex, UA         reflex to Microscopic and Culture, CBC with         Platelets & Differential            (E78.5) Hyperlipidemia, unspecified hyperlipidemia type  Comment: On statin.  Plan: Lipid panel reflex to direct LDL Fasting            (Z98.890) H/O carotid endarterectomy  Comment: US done in 2019 did not show any stenosis.  Plan:     (M85.89) Osteopenia of multiple sites  Comment: DXA done 6/2021.  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  Plan:     (G25.0) Benign essential tremor  Comment: stable  Plan:     (F32.9) Depressive disorder  Comment: On venafaxine  Plan:     (R63.4) Weight loss  Comment: Loss of 30 lbs since October. Jardiance added in February. Appetite is decreased but eating 3 meals a day, some snacks. Denies any red flag symptoms for possible malignancy. CT abdomen/pelvis done in June this year. Colonoscopy in 2013, but denies any change in BMs.  Plan: He will report weight to me every 4 weeks. If weight loss continues, we will discuss Oncology workup.    Patient has been advised of split billing requirements and indicates understanding: Yes  COUNSELING:  Reviewed preventive health counseling, as reflected in patient instructions       Healthy diet/nutrition    Estimated body mass index is 21.53 kg/m  as calculated from the following:    Height as of this encounter: 1.765 m (5' 9.5\").    Weight as of this encounter: 67.1 kg (147 lb 14.4 oz).        He reports that he has quit smoking. His smoking use included cigars, pipe, pipe, and pipe. His smokeless tobacco use includes chew and chew.      Appropriate preventive services were discussed with this patient, including applicable screening as appropriate for cardiovascular disease, diabetes, " osteopenia/osteoporosis, and glaucoma.  As appropriate for age/gender, discussed screening for colorectal cancer, prostate cancer, breast cancer, and cervical cancer. Checklist reviewing preventive services available has been given to the patient.    Reviewed patients plan of care and provided an AVS. The Basic Care Plan (routine screening as documented in Health Maintenance) for Yong meets the Care Plan requirement. This Care Plan has been established and reviewed with the Patient.    Counseling Resources:  ATP IV Guidelines  Pooled Cohorts Equation Calculator  Breast Cancer Risk Calculator  Breast Cancer: Medication to Reduce Risk  FRAX Risk Assessment  ICSI Preventive Guidelines  Dietary Guidelines for Americans, 2010  USDA's MyPlate  ASA Prophylaxis  Lung CA Screening    Anita Pineda MD  [facility]    Identified Health Risks:

## 2021-09-02 NOTE — PATIENT INSTRUCTIONS
Calcium 1000 mg a day from diet and supplements.    Report weight every 4 weeks through My Chart.      Patient Education   Personalized Prevention Plan  You are due for the preventive services outlined below.  Your care team is available to assist you in scheduling these services.  If you have already completed any of these items, please share that information with your care team to update in your medical record.  Health Maintenance Due   Topic Date Due     Diabetic Foot Exam  Never done     ANNUAL REVIEW OF HM ORDERS  Never done     Depression Action Plan  Never done     Zoster (Shingles) Vaccine (3 of 3) 12/04/2019     COVID-19 Vaccine (3 - Pfizer risk 3-dose series) 03/11/2021     Depression Assessment  08/09/2021     Cholesterol Lab  08/11/2021     Flu Vaccine (1) 09/01/2021     Your Health Risk Assessment indicates you feel you are not in good health    A healthy lifestyle helps keep the body fit and the mind alert. It helps protect you from disease, helps you fight disease, and helps prevent chronic disease (disease that doesn't go away) from getting worse. This is important as you get older and begin to notice twinges in muscles and joints and a decline in the strength and stamina you once took for granted. A healthy lifestyle includes good healthcare, good nutrition, weight control, recreation, and regular exercise. Avoid harmful substances and do what you can to keep safe. Another part of a healthy lifestyle is stay mentally active and socially involved.    Good healthcare     Have a wellness visit every year.     If you have new symptoms, let us know right away. Don't wait until the next checkup.     Take medicines exactly as prescribed and keep your medicines in a safe place. Tell us if your medicine causes problems.   Healthy diet and weight control     Eat 3 or 4 small, nutritious, low-fat, high-fiber meals a day. Include a variety of fruits, vegetables, and whole-grain foods.     Make sure you get  enough calcium in your diet. Calcium, vitamin D, and exercise help prevent osteoporosis (bone thinning).     If you live alone, try eating with others when you can. That way you get a good meal and have company while you eat it.     Try to keep a healthy weight. If you eat more calories than your body uses for energy, it will be stored as fat and you will gain weight.     Recreation   Recreation is not limited to sports and team events. It includes any activity that provides relaxation, interest, enjoyment, and exercise. Recreation provides an outlet for physical, mental, and social energy. It can give a sense of worth and achievement. It can help you stay healthy.    Mental Exercise and Social Involvement  Mental and emotional health is as important as physical health. Keep in touch with friends and family. Stay as active as possible. Continue to learn and challenge yourself.   Things you can do to stay mentally active are:    Learn something new, like a foreign language or musical instrument.     Play SCRABBLE or do crossword puzzles. If you cannot find people to play these games with you at home, you can play them with others on your computer through the Internet.     Join a games club--anything from card games to chess or checkers or lawn bowling.     Start a new hobby.     Go back to school.     Volunteer.     Read.   Keep up with world events.    Exercise for a Healthier Heart  You may wonder how you can improve the health of your heart. If you re thinking about exercise, you re on the right track. You don t need to become an athlete. But you do need a certain amount of brisk exercise to help strengthen your heart. If you have been diagnosed with a heart condition, your healthcare provider may advise exercise to help stabilize your condition. To help make exercise a habit, choose safe, fun activities.      Exercise with a friend. When activity is fun, you're more likely to stick with it.   Before you  start  Check with your healthcare provider before starting an exercise program. This is especially important if you have not been active for a while. It's also important if you have a long-term (chronic) health problem such as heart disease, diabetes, or obesity. Or if you are at high risk for having these problems.   Why exercise?  Exercising regularly offers many healthy rewards. It can help you do all of the following:     Improve your blood cholesterol level to help prevent further heart trouble    Lower your blood pressure to help prevent a stroke or heart attack    Control diabetes, or reduce your risk of getting this disease    Improve your heart and lung function    Reach and stay at a healthy weight    Make your muscles stronger so you can stay active    Prevent falls and fractures by slowing the loss of bone mass (osteoporosis)    Manage stress better    Reduce your blood pressure    Improve your sense of self and your body image  Exercise tips      Ease into your routine. Set small goals. Then build on them. If you are not sure what your activity level should be, talk with your healthcare provider first before starting an exercise routine.    Exercise on most days. Aim for a total of 150 minutes (2 hours and 30 minutes) or more of moderate-intensity aerobic activity each week. Or 75 minutes (1 hour and 15 minutes) or more of vigorous-intensity aerobic activity each week. Or try for a combination of both. Moderate activity means that you breathe heavier and your heart rate increases but you can still talk. Think about doing 40 minutes of moderate exercise, 3 to 4 times a week. For best results, activity should last for about 40 minutes to lower blood pressure and cholesterol. It's OK to work up to the 40-minute period over time. Examples of moderate-intensity activity are walking 1 mile in 15 minutes. Or doing 30 to 45 minutes of yard work.    Step up your daily activity level.  Along with your exercise  program, try being more active the whole day. Walk instead of drive. Or park further away so that you take more steps each day. Do more household tasks or yard work. You may not be able to meet the advised mount of physical activity. But doing some moderate- or vigorous-intensity aerobic activity can help reduce your risk for heart disease. Your healthcare provider can help you figure out what is best for you.    Choose 1 or more activities you enjoy.  Walking is one of the easiest things you can do. You can also try swimming, riding a bike, dancing, or taking an exercise class.    When to call your healthcare provider  Call your healthcare provider if you have any of these:     Chest pain or feel dizzy or lightheaded    Burning, tightness, pressure, or heaviness in your chest, neck, shoulders, back, or arms    Abnormal shortness of breath    More joint or muscle pain    A very fast or irregular heartbeat (palpitations)  Emma last reviewed this educational content on 7/1/2019 2000-2021 [employer]. All rights reserved. This information is not intended as a substitute for professional medical care. Always follow your healthcare professional's instructions.          Urinary Incontinence (Male)    Urinary incontinence means not being able to control the release of urine from the bladder.   Causes  Common causes of urinary incontinence in men include:    Infection    Certain medicines    Aging    Poor pelvic muscle tone    Bladder spasms    Obesity    Trouble urinating and fully emptying the bladder (urinary retention)  Other things that can cause incontinence are:     Nervous system diseases    Diabetes    Sleep apnea    Urinary tract infections    Prostate surgery    Pelvic injury  Constipation and smoking have also been identified as risk factors.   Symptoms    Urge incontinence (overactive bladder). This is a sudden urge to urinate. It occurs even though there may not be much urine in the  bladder. The need to urinate often during the night is common. It's due to bladder spasms.    Stress incontinence. This is urine leakage that you can't control. It can occur with sneezing, coughing, and other actions that put stress on the bladder.    Treatment  Treatment depends on what is causing the condition. Bladder infections are treated with antibiotics. Urinary retention is treated with a bladder catheter.   Home care  Follow these guidelines when caring for yourself at home:    Don't have any foods and drinks that may irritate the bladder. This includes:  ? Chocolate  ? Alcohol  ? Caffeine  ? Carbonated drinks  ? Acidic fruits and juices    Limit fluids to 6 to 8 cups a day.    Lose weight if you are overweight. This will reduce your symptoms.    If advised, do regular pelvic muscle-strengthening exercises such as Kegel exercises.    If needed, wear absorbent pads to catch urine. Change the pads often. This is for good hygiene and to prevent skin and bladder infections.    Bathe daily for good hygiene.    If an antibiotic was prescribed to treat a bladder infection, take it until it's finished. Keep taking it even if you are feeling better. This is to make sure your infection has cleared.    If a catheter was left in place, keep bacteria from getting into the collection bag. Don't disconnect the catheter from the collection bag.    Use a leg band to secure the catheter drainage tube, so it does not pull on the catheter. Drain the collection bag when it becomes full. To do this, use the drain spout at the bottom of the bag. Don't disconnect the bag from the catheter.    Don't pull on or try to remove a catheter. The catheter must be removed by a healthcare provider.    If you smoke, stop. Ask your provider for help if you can't do this on your own.  Follow-up care  Follow up with your healthcare provider, or as advised.  When to get medical advice  Call your healthcare provider right away if any of these  occur:    Fever over 100.4 F (38 C), or as directed by your provider    Bladder pain or fullness    Belly swelling, nausea, or vomiting    Back pain    Weakness, dizziness, or fainting    If a catheter was left in place, return if:  ? The catheter falls out  ? The catheter stops draining for 6 hours  ? Your urine gets cloudy or smells bad  [employer] last reviewed this educational content on 1/1/2020 2000-2021 [employer]. All rights reserved. This information is not intended as a substitute for professional medical care. Always follow your healthcare professional's instructions.        Your Health Risk Assessment indicates you feel you are not in good emotional health.    Recreation   Recreation is not limited to sports and team events. It includes any activity that provides relaxation, interest, enjoyment, and exercise. Recreation provides an outlet for physical, mental, and social energy. It can give a sense of worth and achievement. It can help you stay healthy.    Mental Exercise and Social Involvement  Mental and emotional health is as important as physical health. Keep in touch with friends and family. Stay as active as possible. Continue to learn and challenge yourself.   Things you can do to stay mentally active are:    Learn something new, like a foreign language or musical instrument.     Play SCRABBLE or do crossword puzzles. If you cannot find people to play these games with you at home, you can play them with others on your computer through the Internet.     Join a games club--anything from card games to chess or checkers or lawn bowling.     Start a new hobby.     Go back to school.     Volunteer.     Read.   Keep up with world events.

## 2021-09-02 NOTE — PROGRESS NOTES
The patient was provided with suggestions to help him develop a healthy physical lifestyle.  He is at risk for lack of exercise and has been provided with information to increase physical activity for the benefit of his well-being.  Information on urinary incontinence and treatment options given to patient.  The patient was provided with suggestions to help him develop a healthy emotional lifestyle.

## 2021-09-03 LAB — DEPRECATED CALCIDIOL+CALCIFEROL SERPL-MC: 44 UG/L (ref 30–80)

## 2021-09-19 ENCOUNTER — HEALTH MAINTENANCE LETTER (OUTPATIENT)
Age: 76
End: 2021-09-19

## 2021-10-13 DIAGNOSIS — E11.9 TYPE 2 DIABETES MELLITUS (H): Primary | ICD-10-CM

## 2021-10-14 NOTE — TELEPHONE ENCOUNTER
Disp Refills Start End JANICE    metFORMIN (GLUCOPHAGE) 500 MG tablet 360 tablet 2 11/20/2020  No   Sig: TAKE 2 TABLETS TWICE A DAY WITH MEALS   Sent to pharmacy as: metFORMIN 500 mg tablet (GLUCOPHAGE)   E-Prescribing Status: Receipt confirmed by pharmacy (11/20/2020  9:18 AM CST)       metFORMIN (GLUCOPHAGE) 500 MG tablet [[id number]]    Electronically signed by: Kathya Reyes RN on 11/20/20 0918 Status: Active   Ordering user: Kathya Reyes RN 11/20/20 0918 Ordering provider: Anita Pineda MD   Authorized by: Anita Pineda MD   Frequency:  11/20/20 - Until Discontinued Released by: Kathya Reyes RN 11/20/20 0918   Diagnoses  Type 2 diabetes mellitus (H) [E11.9]     Last Written Prescription Date:  11/20/00  Last Fill Quantity: 360,  # refills: 2   Last office visit provider:  9/2/21     Requested Prescriptions   Pending Prescriptions Disp Refills     metFORMIN (GLUCOPHAGE) 500 MG tablet [Pharmacy Med Name: METFORMIN HCL TABS 500MG] 360 tablet 3     Sig: TAKE 2 TABLETS TWICE A DAY WITH MEALS       Biguanide Agents Passed - 10/13/2021  4:07 PM        Passed - Patient is age 10 or older        Passed - Patient has documented A1c within the specified period of time.     If HgbA1C is 8 or greater, it needs to be on file within the past 3 months.  If less than 8, must be on file within the past 6 months.     Recent Labs   Lab Test 09/02/21 0937   A1C 6.6*             Passed - Patient's CR is NOT>1.4 OR Patient's EGFR is NOT<45 within past 12 mos.     Recent Labs   Lab Test 09/02/21 0937 06/15/21  0932   GFRESTIMATED 88 >60   GFRESTBLACK  --  >60       Recent Labs   Lab Test 09/02/21 0937   CR 0.82             Passed - Patient does NOT have a diagnosis of CHF.        Passed - Medication is active on med list        Passed - Recent (6 mo) or future (30 days) visit within the authorizing provider's specialty     Patient had office visit in the last 6 months or has a visit in the next 30 days with authorizing  "provider or within the authorizing provider's specialty.  See \"Patient Info\" tab in inbasket, or \"Choose Columns\" in Meds & Orders section of the refill encounter.                 Evelia Hernandez RN 10/14/21 11:58 AM  "

## 2021-10-23 ENCOUNTER — E-VISIT (OUTPATIENT)
Dept: URGENT CARE | Facility: URGENT CARE | Age: 76
End: 2021-10-23
Payer: MEDICARE

## 2021-10-23 ENCOUNTER — APPOINTMENT (OUTPATIENT)
Dept: RADIOLOGY | Facility: HOSPITAL | Age: 76
End: 2021-10-23
Attending: STUDENT IN AN ORGANIZED HEALTH CARE EDUCATION/TRAINING PROGRAM
Payer: MEDICARE

## 2021-10-23 ENCOUNTER — HOSPITAL ENCOUNTER (EMERGENCY)
Facility: HOSPITAL | Age: 76
Discharge: HOME | End: 2021-10-23
Attending: STUDENT IN AN ORGANIZED HEALTH CARE EDUCATION/TRAINING PROGRAM | Admitting: STUDENT IN AN ORGANIZED HEALTH CARE EDUCATION/TRAINING PROGRAM
Payer: MEDICARE

## 2021-10-23 VITALS
SYSTOLIC BLOOD PRESSURE: 110 MMHG | WEIGHT: 144 LBS | DIASTOLIC BLOOD PRESSURE: 64 MMHG | TEMPERATURE: 98.6 F | HEART RATE: 97 BPM | BODY MASS INDEX: 20.96 KG/M2 | RESPIRATION RATE: 18 BRPM | OXYGEN SATURATION: 98 %

## 2021-10-23 DIAGNOSIS — Z20.822 SUSPECTED COVID-19 VIRUS INFECTION: Primary | ICD-10-CM

## 2021-10-23 DIAGNOSIS — R06.00 DYSPNEA, UNSPECIFIED TYPE: ICD-10-CM

## 2021-10-23 LAB
ANION GAP SERPL CALCULATED.3IONS-SCNC: 11 MMOL/L (ref 5–18)
BUN SERPL-MCNC: 14 MG/DL (ref 8–28)
CALCIUM SERPL-MCNC: 10.1 MG/DL (ref 8.5–10.5)
CHLORIDE BLD-SCNC: 103 MMOL/L (ref 98–107)
CO2 SERPL-SCNC: 28 MMOL/L (ref 22–31)
CREAT SERPL-MCNC: 1 MG/DL (ref 0.7–1.3)
ERYTHROCYTE [DISTWIDTH] IN BLOOD BY AUTOMATED COUNT: 12.2 % (ref 10–15)
FLUAV RNA SPEC QL NAA+PROBE: NEGATIVE
FLUBV RNA RESP QL NAA+PROBE: NEGATIVE
GFR SERPL CREATININE-BSD FRML MDRD: 74 ML/MIN/1.73M2
GLUCOSE BLD-MCNC: 185 MG/DL (ref 70–125)
HCT VFR BLD AUTO: 42.2 % (ref 40–53)
HGB BLD-MCNC: 14.5 G/DL (ref 13.3–17.7)
MCH RBC QN AUTO: 34.5 PG (ref 26.5–33)
MCHC RBC AUTO-ENTMCNC: 34.4 G/DL (ref 31.5–36.5)
MCV RBC AUTO: 101 FL (ref 78–100)
PLATELET # BLD AUTO: 127 10E3/UL (ref 150–450)
POTASSIUM BLD-SCNC: 4.4 MMOL/L (ref 3.5–5)
RBC # BLD AUTO: 4.2 10E6/UL (ref 4.4–5.9)
RSV RNA SPEC NAA+PROBE: NEGATIVE
SARS-COV-2 RNA RESP QL NAA+PROBE: NEGATIVE
SODIUM SERPL-SCNC: 142 MMOL/L (ref 136–145)
TROPONIN I SERPL-MCNC: <0.01 NG/ML (ref 0–0.29)
WBC # BLD AUTO: 5.2 10E3/UL (ref 4–11)

## 2021-10-23 PROCEDURE — 87637 SARSCOV2&INF A&B&RSV AMP PRB: CPT | Performed by: STUDENT IN AN ORGANIZED HEALTH CARE EDUCATION/TRAINING PROGRAM

## 2021-10-23 PROCEDURE — 93005 ELECTROCARDIOGRAM TRACING: CPT | Performed by: STUDENT IN AN ORGANIZED HEALTH CARE EDUCATION/TRAINING PROGRAM

## 2021-10-23 PROCEDURE — 85014 HEMATOCRIT: CPT | Performed by: STUDENT IN AN ORGANIZED HEALTH CARE EDUCATION/TRAINING PROGRAM

## 2021-10-23 PROCEDURE — 99285 EMERGENCY DEPT VISIT HI MDM: CPT | Mod: 25

## 2021-10-23 PROCEDURE — 84484 ASSAY OF TROPONIN QUANT: CPT | Performed by: STUDENT IN AN ORGANIZED HEALTH CARE EDUCATION/TRAINING PROGRAM

## 2021-10-23 PROCEDURE — C9803 HOPD COVID-19 SPEC COLLECT: HCPCS

## 2021-10-23 PROCEDURE — 36415 COLL VENOUS BLD VENIPUNCTURE: CPT | Performed by: STUDENT IN AN ORGANIZED HEALTH CARE EDUCATION/TRAINING PROGRAM

## 2021-10-23 PROCEDURE — 80048 BASIC METABOLIC PNL TOTAL CA: CPT | Performed by: STUDENT IN AN ORGANIZED HEALTH CARE EDUCATION/TRAINING PROGRAM

## 2021-10-23 PROCEDURE — 71046 X-RAY EXAM CHEST 2 VIEWS: CPT

## 2021-10-23 ASSESSMENT — ENCOUNTER SYMPTOMS
FEVER: 0
SHORTNESS OF BREATH: 1
COUGH: 1

## 2021-10-23 NOTE — ED TRIAGE NOTES
Patient arrives by private car for evaluation of cough and hypoxia.  Patient reports home oximeter read 79%.  Patient is 100% in triage.  Reports chest pressure with deep breathing.

## 2021-10-23 NOTE — PATIENT INSTRUCTIONS
Dear Yong Guillermo,    We are sorry you are not feeling well. Based on the responses you provided including low oxygen you may be experiencing a serious health condition that needs immediate in-person attention. It is recommended that you be seen in the emergency room, not the Urgent Care, in order to better evaluate your symptoms. Please click here to find the nearest Emergency Room.     Sirisha Hernandez PA-C

## 2021-10-23 NOTE — DISCHARGE INSTRUCTIONS
Your oxygen readings were normal here. Your EKG and lab work looked good. Your chest x-ray was clear. We will call you if your COVID test is positive.    Return for worsening pain or worsening shortness of breath.

## 2021-10-23 NOTE — ED PROVIDER NOTES
EMERGENCY DEPARTMENT ENCOUNTER      NAME: Yong Guillermo  AGE: 76 year old male  YOB: 1945  MRN: [id number]  EVALUATION DATE & TIME: 10/23/2021  6:00 PM    PCP: Anita Pineda    ED PROVIDER: Fior Root MD    Chief Complaint   Patient presents with     Cough     hypoxia     FINAL IMPRESSION:  1. Dyspnea, unspecified type      ED COURSE & MEDICAL DECISION MAKIN year old old male who presents to the ED for evaluation of cough.   History and physical examination as documented. Vital signs reassuring.   Patient presented to the emergency department for evaluation of a cough and mild dyspnea that he has been experiencing over the last couple of days.  He primarily came in because his home pulse oximeter had a reading of 79%.  He states this is a new pulse oximeter.  He decided to check his reading because he has been having an intermittent cough and intermittent dyspnea.  He is not hypoxic in the emergency department on multiple checks.  Suspect that he had an erroneous reading.  We did obtain work-up including EKG, troponin, chest x-ray, labs, Covid testing.  All of this is reassuring.  Chest x-ray with no findings of pneumonia, pulmonary edema.  He is not anemic.  EKG and troponin are reassuring against ACS.  His Covid test is pending although clinically he appears quite well and is triple vaccinated.  Even that he looks well and continues to have normal vitals, I think he is appropriate for discharge home.  I did consider the possibility of a PE but he does not have any chest pain and his vital signs would not support this.  No specific risk factors.  I doubt PE is the etiology for his symptoms.  Most likely has a mild viral infection and would anticipate he will continue to improve.  Given close return precautions and discharge.       Scribe time stamps  6:08 PM I met with the patient for initial interview and exam. Discussed plan of care including labs, imaging, and EKG.  0 minutes of critical  care time     MEDICATIONS GIVEN IN THE EMERGENCY:  Medications - No data to display    NEW PRESCRIPTIONS STARTED AT TODAY'S ER VISIT  Discharge Medication List as of 10/23/2021  6:21 PM             =================================================================    HPI    Patient information was obtained from: patient     Use of : N/A    Yong Guillermo is a 76 year old male with a pertinent history of s/p carotid endarterectomy, hyperlipidemia, complex sleep apnea syndrome, DM type II who presents to this ED by car for evaluation of cough, hypoxia.    Patient reports with a few days of intermittent dry cough and occasional shortness of breath. Sometimes when he takes a deep breath, he can feel a pressure in his left chest. Wife just got a new pulse oximeter; Earlier today when he checked his O2 sat, he was in the 70s so he presented to the ED. Patient is triple COVID19 vaccinated. No known exposure. No history of DVT or PE. Denies fever.      REVIEW OF SYSTEMS   Review of Systems   Constitutional: Negative for fever.   Respiratory: Positive for cough (intermittent) and shortness of breath (occasional).    Cardiovascular:        Positive for chest pressure (left, upon deep breath).   All other systems reviewed and are negative.      PAST MEDICAL HISTORY:  Past Medical History:   Diagnosis Date     Benign essential tremor 9/29/2016     BPH (benign prostatic hyperplasia) 5/26/2015     CVA (cerebral vascular accident) (H) 01/14/2009    Right arm clumsiness.  MRI showed 2 infarcts in the left hemisphere.     CVA (cerebral vascular accident) (H) 06/22/2006    Left arm weakness.     Depression      Diabetes mellitus, type 2 (H)      Hyperlipidemia      Obstructive sleep apnea 07/22/2013     Shingles        PAST SURGICAL HISTORY:  Past Surgical History:   Procedure Laterality Date     ARTHROSCOPY SHOULDER ROTATOR CUFF REPAIR Right      C EXCIS CERV DISK,ONE LEVEL      Description: Laminectomy With Disc Removal;   Recorded: 04/29/2008;  Comments: Lumbar level     C EXCISION,BENIGN TUMOR,MANDIBLE      Description: Jaw Excision Benign Cyst / Tumor;  Proc Date: 05/01/2004;     CAROTID ENDARTERECTOMY Right 09/27/2006     CERVICAL DISC SURGERY  07/29/2005    C5/6 and C6/7 lamina foraminotomy, scope with partial facetectomy and excision of hard disc herniation.     HERNIA REPAIR  5/21/104    Right inguinal hernia and umbilical hernia right cheek cyst on the face.     IR AORTIC ARCH 4 VESSEL ANGIOGRAM  8/8/2006     IR CAROTID ANGIOGRAM  8/8/2006     IR CAROTID ANGIOGRAM  8/8/2006     [place] EXCIS TENDON SHEATH LESION, HAND/FINGER      Description: Hand Excision Of A Tendon Cyst;  Recorded: 04/29/2008;     TONSILLECTOMY       VASECTOMY         ALLERGIES:  Allergies   Allergen Reactions     Alteplase      Sulfa Drugs        FAMILY HISTORY:  Family History   Problem Relation Age of Onset     Snoring Mother      Diabetes Mother      Cancer Mother         Brain     Heart Disease Brother      Heart Disease Brother      Diabetes Brother      Diabetes Paternal Grandmother        SOCIAL HISTORY:   Social History     Socioeconomic History     Marital status:      Spouse name: Not on file     Number of children: Not on file     Years of education: Not on file     Highest education level: Not on file   Occupational History     Not on file   Tobacco Use     Smoking status: Former Smoker     Types: Cigars, Pipe, Pipe, Pipe     Smokeless tobacco: Current User     Types: Chew, Chew     Tobacco comment: Quit 40 years ago.   Substance and Sexual Activity     Alcohol use: No     Drug use: No     Sexual activity: Yes     Partners: Female     Birth control/protection: None   Other Topics Concern     Parent/sibling w/ CABG, MI or angioplasty before 65F 55M? Not Asked   Social History Narrative     Not on file     Social Determinants of Health     Financial Resource Strain:      Difficulty of Paying Living Expenses:    Food Insecurity:      Worried  About Running Out of Food in the Last Year:      Ran Out of Food in the Last Year:    Transportation Needs:      Lack of Transportation (Medical):      Lack of Transportation (Non-Medical):    Physical Activity:      Days of Exercise per Week:      Minutes of Exercise per Session:    Stress:      Feeling of Stress :    Social Connections:      Frequency of Communication with Friends and Family:      Frequency of Social Gatherings with Friends and Family:      Attends [employer] Services:      Active Member of Clubs or Organizations:      Attends Club or Organization Meetings:      Marital Status:    Intimate Partner Violence:      Fear of Current or Ex-Partner:      Emotionally Abused:      Physically Abused:      Sexually Abused:        VITALS:  /64   Pulse 97   Temp 98.6  F (37  C) (Tympanic)   Resp 18   Wt 65.3 kg (144 lb)   SpO2 98%   BMI 20.96 kg/m      PHYSICAL EXAM    General: Well appearing, very talkative.  HEENT: No external signs of trauma. No scleral icterus. Oropharynx clear and moist.  Chest/Pulm: No tenderness to palpation. Lungs clear to auscultation bilaterally.  CV: Regular rate and rhythm without murmurs.  Abdomen: Soft and non-distended without tenderness to palpation.  MSK/Extremities: Actively moving all four extremities. No pedal edema.  Skin: No visible rashes  Neuro: Alert and conversant.   Psych: Behavior normal.    LAB:  All pertinent labs reviewed and interpreted.  Results for orders placed or performed during the hospital encounter of 10/23/21   Chest XR,  PA & LAT    Impression    IMPRESSION: Tiny granuloma left upper lobe. Lungs otherwise clear, no etiology for symptoms. Heart size and pulmonary vessels normal.   CBC (+ platelets, no diff)   Result Value Ref Range    WBC Count 5.2 4.0 - 11.0 10e3/uL    RBC Count 4.20 (L) 4.40 - 5.90 10e6/uL    Hemoglobin 14.5 13.3 - 17.7 g/dL    Hematocrit 42.2 40.0 - 53.0 %     (H) 78 - 100 fL    MCH 34.5 (H) 26.5 - 33.0 pg    MCHC  34.4 31.5 - 36.5 g/dL    RDW 12.2 10.0 - 15.0 %    Platelet Count 127 (L) 150 - 450 10e3/uL   Basic metabolic panel   Result Value Ref Range    Sodium 142 136 - 145 mmol/L    Potassium 4.4 3.5 - 5.0 mmol/L    Chloride 103 98 - 107 mmol/L    Carbon Dioxide (CO2) 28 22 - 31 mmol/L    Anion Gap 11 5 - 18 mmol/L    Urea Nitrogen 14 8 - 28 mg/dL    Creatinine 1.00 0.70 - 1.30 mg/dL    Calcium 10.1 8.5 - 10.5 mg/dL    Glucose 185 (H) 70 - 125 mg/dL    GFR Estimate 74 >60 mL/min/1.73m2   Result Value Ref Range    Troponin I <0.01 0.00 - 0.29 ng/mL       RADIOLOGY:  Reviewed all pertinent imaging. Please see official radiology report.  Chest XR,  PA & LAT   Final Result   IMPRESSION: Tiny granuloma left upper lobe. Lungs otherwise clear, no etiology for symptoms. Heart size and pulmonary vessels normal.          EKG:    Performed at: 1609    Impression: EKG shows sinus rhythm of a normal rate. No ST elevation or depression. Normal intervals.     Rate: 92  CA Interval: 148  QRS Interval: 88  QTc Interval: 435    I have independently reviewed and interpreted the EKG(s) documented above.    PROCEDURES:   None.    I, Kevin Mata, am serving as a scribe to document services personally performed by Dr. Fior Root based on my observation and the provider's statements to me. I, Fior Root MD attest that Kevin Mata is acting in a scribe capacity, has observed my performance of the services and has documented them in accordance with my direction.    Fior Root M.D.  Emergency Medicine  [facility]     Fior Root MD  10/23/21 1900

## 2021-10-25 DIAGNOSIS — E11.9 TYPE 2 DIABETES MELLITUS (H): Primary | ICD-10-CM

## 2021-10-25 LAB
ATRIAL RATE - MUSE: 92 BPM
DIASTOLIC BLOOD PRESSURE - MUSE: NORMAL MMHG
INTERPRETATION ECG - MUSE: NORMAL
P AXIS - MUSE: 59 DEGREES
PR INTERVAL - MUSE: 148 MS
QRS DURATION - MUSE: 88 MS
QT - MUSE: 352 MS
QTC - MUSE: 435 MS
R AXIS - MUSE: 78 DEGREES
SYSTOLIC BLOOD PRESSURE - MUSE: NORMAL MMHG
T AXIS - MUSE: 68 DEGREES
VENTRICULAR RATE- MUSE: 92 BPM

## 2021-10-26 NOTE — TELEPHONE ENCOUNTER
"Disp Refills Start End JANICE    JANUVIA 100 mg tablet 90 tablet 1 4/27/2021  No   Sig: TAKE 1 TABLET DAILY   Sent to pharmacy as: Januvia 100 mg tablet (SITagliptin)   E-Prescribing Status: Receipt confirmed by pharmacy (4/27/2021  2:15 PM CDT)       Last office visit provider:  9/2/21     Requested Prescriptions   Pending Prescriptions Disp Refills     JANUVIA 100 MG tablet [Pharmacy Med Name: JANUVIA TABS 100MG] 90 tablet 3     Sig: TAKE 1 TABLET DAILY       DPP4 Inhibitors Protocol Passed - 10/25/2021  2:24 AM        Passed - HgbA1C in past 3 or 6 months     If HgbA1C is 8 or greater, it needs to be on file within the past 3 months.  If less than 8, must be on file within the past 6 months.     Recent Labs   Lab Test 09/02/21  0937   A1C 6.6*             Passed - Medication is active on med list        Passed - Patient is age 18 or older        Passed - Normal serum creatinine in past 12 months     Recent Labs   Lab Test 10/23/21  1706   CR 1.00       Ok to refill medication if creatinine is low          Passed - Recent (6 mo) or future (30 days) visit within the authorizing provider's specialty     Patient had office visit in the last 6 months or has a visit in the next 30 days with authorizing provider.  See \"Patient Info\" tab in inbasket, or \"Choose Columns\" in Meds & Orders section of the refill encounter.                 Jose Ku RN 10/26/21 10:20 AM  "

## 2021-11-29 VITALS — BODY MASS INDEX: 20.47 KG/M2 | HEIGHT: 70 IN | WEIGHT: 143 LBS

## 2021-11-29 ASSESSMENT — MIFFLIN-ST. JEOR: SCORE: 1386.95

## 2021-11-29 NOTE — PROGRESS NOTES
Yong is a 76 year old who is being evaluated via a billable video visit.      How would you like to obtain your AVS? MyChart  If the video visit is dropped, the invitation should be resent by: Send to e-mail at: [email or web address]  Will anyone else be joining your video visit? No  Heidy Marie CMA  Video Start Time: 8:01 AM  Video-Visit Details    Type of service:  Video Visit    Video End Time:8:09 AM    Originating Location (pt. Location): Home    Distant Location (provider location):  [facility] [place]     Platform used for Video Visit: [employer]     Additional 15 minutes on the date of service was spent performing the following:    -Preparing to see the patient  -Obtaining and/or reviewing separately obtained history   -Ordering medications, tests, or procedures   -Documenting clinical information in the electronic or other health record     Thank you for the opportunity to participate in the care of Yong Guillermo.     He is a 76 year old y/o male patient who comes to the sleep medicine clinic for follow up.  The patient was diagnosed with DANIELLE at our facility in the past (AHI=23.6). He was eventually started on ASV due to AHI being elevated despite being on CPAP. He states that he has lost weight since his last clinical visit with me. His sleep quality has improved with usage of ASV. He still has not scheduled naps with the ASV.     Assessment and Plan:  In summary Yong Guillermo is a 76 year old year old male who is here for follow up.    1. Complex sleep apnea syndrome  I congratulated the patient on his excellent ASV usage. I will keep him on the same pressure settings.  - COMPREHENSIVE DME       Compliance Download data for 30 Days:  Pressure setting:ASV  Residual AHI:0.3 events per hour  Leak:Minimal  Compliance:70%  Mask Tolerance:good  Skin irritation:None  DME:[facility]    Sleep-Wake Cycle:    The patient likes to initiate sleep at around 11-Midnight with a sleep  latency of less than 20 minutes. The patient has 1 nocturnal awakenings. Final wake up time is around 7-9 AM.    Total score - [place]: 4 (11/20/2021 10:54 AM)        Patient Active Problem List   Diagnosis     Benign essential tremor     Complex sleep apnea syndrome     CVA (cerebral vascular accident) (H)     Type 2 diabetes mellitus with complication, without long-term current use of insulin (H)     H/O carotid endarterectomy     Hyperlipemia     Depressive disorder     Osteopenia of multiple sites       Past Medical History:   Diagnosis Date     Benign essential tremor 9/29/2016     BPH (benign prostatic hyperplasia) 5/26/2015     CVA (cerebral vascular accident) (H) 01/14/2009    Right arm clumsiness.  MRI showed 2 infarcts in the left hemisphere.     CVA (cerebral vascular accident) (H) 06/22/2006    Left arm weakness.     Depression      Diabetes mellitus, type 2 (H)      Hyperlipidemia      Obstructive sleep apnea 07/22/2013     Shingles        Past Surgical History:   Procedure Laterality Date     ARTHROSCOPY SHOULDER ROTATOR CUFF REPAIR Right      C EXCIS CERV DISK,ONE LEVEL      Description: Laminectomy With Disc Removal;  Recorded: 04/29/2008;  Comments: Lumbar level     C EXCISION,BENIGN TUMOR,MANDIBLE      Description: Jaw Excision Benign Cyst / Tumor;  Proc Date: 05/01/2004;     CAROTID ENDARTERECTOMY Right 09/27/2006     CERVICAL DISC SURGERY  07/29/2005    C5/6 and C6/7 lamina foraminotomy, scope with partial facetectomy and excision of hard disc herniation.     HERNIA REPAIR  5/21/104    Right inguinal hernia and umbilical hernia right cheek cyst on the face.     IR AORTIC ARCH 4 VESSEL ANGIOGRAM  8/8/2006     IR CAROTID ANGIOGRAM  8/8/2006     IR CAROTID ANGIOGRAM  8/8/2006     [place] EXCIS TENDON SHEATH LESION, HAND/FINGER      Description: Hand Excision Of A Tendon Cyst;  Recorded: 04/29/2008;     TONSILLECTOMY       VASECTOMY         Social History     Socioeconomic History     Marital status:       Spouse name: Not on file     Number of children: Not on file     Years of education: Not on file     Highest education level: Not on file   Occupational History     Not on file   Tobacco Use     Smoking status: Former Smoker     Types: Cigars, Pipe     Smokeless tobacco: Current User     Types: Chew     Tobacco comment: Quit 40 years ago.   Substance and Sexual Activity     Alcohol use: No     Drug use: No     Sexual activity: Yes     Partners: Female     Birth control/protection: None   Other Topics Concern     Parent/sibling w/ CABG, MI or angioplasty before 65F 55M? Not Asked   Social History Narrative     Not on file     Social Determinants of Health     Financial Resource Strain: Not on file   Food Insecurity: Not on file   Transportation Needs: Not on file   Physical Activity: Not on file   Stress: Not on file   Social Connections: Not on file   Intimate Partner Violence: Not on file   Housing Stability: Not on file       Current Outpatient Medications   Medication Sig Dispense Refill     acetaminophen (TYLENOL) 500 MG tablet Take 650 mg by mouth        Ascorbic Acid (VITAMIN C) 500 MG CAPS Take 1,000 mg by mouth daily        aspirin 81 MG EC tablet Take 81 mg by mouth       atorvastatin (LIPITOR) 80 MG tablet        Calcium Carbonate-Vitamin D (CALCIUM 600/VITAMIN D PO) Take by mouth daily       Cholecalciferol (VITAMIN D3) 25 MCG (1000 UT) CAPS Take 1,000 Units by mouth       clopidogrel (PLAVIX) 75 MG tablet TAKE 1 TABLET DAILY       insulin glargine (LANTUS SOLOSTAR) 100 UNIT/ML pen Inject 12 Units Subcutaneous At Bedtime       JARDIANCE 25 MG TABS tablet Take 25 mg by mouth       metFORMIN (GLUCOPHAGE) 500 MG tablet TAKE 2 TABLETS TWICE A DAY WITH MEALS 360 tablet 1     multivitamin w/minerals (MULTI-VITAMIN) tablet Take 1 tablet by mouth       Omega-3 1000 MG capsule Take 2 g by mouth       sitagliptin (JANUVIA) 100 MG tablet Take 1 tablet (100 mg) by mouth daily 90 tablet 1     venlafaxine  (EFFEXOR) 100 MG tablet Take 150 mg by mouth       venlafaxine (EFFEXOR) 37.5 MG tablet Take 37.5 mg by mouth       vitamin B complex with vitamin C (VITAMIN  B COMPLEX) tablet Take 1 tablet by mouth daily          Allergies   Allergen Reactions     Alteplase      Sulfa Drugs        Physical Exam:  GEN: NAD,  Psych: normal mood, normal affect    Labs/Studies:    No results found for: MEMO    I reviewed the efficacy and compliance report from his device. Data summarized on the HPI and the PAP compliance flow sheet.     Patient verbalized understanding of these issues, agrees with the plan and all questions were answered today. Patient was given an opportuntity to voice any other symptoms or concerns not listed above. Patient did not have any other symptoms or concerns.      Andre Kemp DO  Board Certified in Internal Medicine and Sleep Medicine    (Note created with Dragon voice recognition and unintended spelling errors and word substitutions may occur)     Audio and visual devices were used for this virtual clinic visit with permission from patient.

## 2021-11-29 NOTE — PATIENT INSTRUCTIONS
Your BMI is Body mass index is 20.81 kg/m .  Weight management is a personal decision.  If you are interested in exploring weight loss strategies, the following discussion covers the approaches that may be successful. Body mass index (BMI) is one way to tell whether you are at a healthy weight, overweight, or obese. It measures your weight in relation to your height.  A BMI of 18.5 to 24.9 is in the healthy range. A person with a BMI of 25 to 29.9 is considered overweight, and someone with a BMI of 30 or greater is considered obese. More than two-thirds of American adults are considered overweight or obese.  Being overweight or obese increases the risk for further weight gain. Excess weight may lead to heart disease and diabetes.  Creating and following plans for healthy eating and physical activity may help you improve your health.  Weight control is part of healthy lifestyle and includes exercise, emotional health, and healthy eating habits. Careful eating habits lifelong are the mainstay of weight control. Though there are significant health benefits from weight loss, long-term weight loss with diet alone may be very difficult to achieve- studies show long-term success with dietary management in less than 10% of people. Attaining a healthy weight may be especially difficult to achieve in those with severe obesity. In some cases, medications, devices and surgical management might be considered.  What can you do?  If you are overweight or obese and are interested in methods for weight loss, you should discuss this with your provider.     Consider reducing daily calorie intake by 500 calories.     Keep a food journal.     Avoiding skipping meals, consider cutting portions instead.    Diet combined with exercise helps maintain muscle while optimizing fat loss. Strength training is particularly important for building and maintaining muscle mass. Exercise helps reduce stress, increase energy, and improves fitness.  Increasing exercise without diet control, however, may not burn enough calories to loose weight.       Start walking three days a week 10-20 minutes at a time    Work towards walking thirty minutes five days a week     Eventually, increase the speed of your walking for 1-2 minutes at time    In addition, we recommend that you review healthy lifestyles and methods for weight loss available through the National Institutes of Health patient information sites:  http://win.niddk.nih.gov/publications/index.htm    And look into health and wellness programs that may be available through your health insurance provider, employer, local community center, or [employer]beida club.

## 2021-11-30 ENCOUNTER — VIRTUAL VISIT (OUTPATIENT)
Dept: SLEEP MEDICINE | Facility: CLINIC | Age: 76
End: 2021-11-30
Payer: MEDICARE

## 2021-11-30 DIAGNOSIS — G47.39 COMPLEX SLEEP APNEA SYNDROME: Primary | ICD-10-CM

## 2021-11-30 PROCEDURE — 99213 OFFICE O/P EST LOW 20 MIN: CPT | Mod: 95 | Performed by: INTERNAL MEDICINE

## 2021-12-02 ENCOUNTER — OFFICE VISIT (OUTPATIENT)
Dept: INTERNAL MEDICINE | Facility: CLINIC | Age: 76
End: 2021-12-02
Payer: MEDICARE

## 2021-12-02 VITALS
SYSTOLIC BLOOD PRESSURE: 117 MMHG | HEART RATE: 60 BPM | DIASTOLIC BLOOD PRESSURE: 60 MMHG | OXYGEN SATURATION: 97 % | WEIGHT: 150.6 LBS | BODY MASS INDEX: 21.92 KG/M2

## 2021-12-02 DIAGNOSIS — Z87.891 HISTORY OF SMOKING: ICD-10-CM

## 2021-12-02 DIAGNOSIS — E11.8 TYPE 2 DIABETES MELLITUS WITH COMPLICATION, WITHOUT LONG-TERM CURRENT USE OF INSULIN (H): Primary | ICD-10-CM

## 2021-12-02 DIAGNOSIS — R93.89 ABNORMAL CXR: ICD-10-CM

## 2021-12-02 DIAGNOSIS — R07.81 PLEURITIC PAIN: ICD-10-CM

## 2021-12-02 DIAGNOSIS — F32.A DEPRESSIVE DISORDER: ICD-10-CM

## 2021-12-02 DIAGNOSIS — D69.6 THROMBOCYTOPENIA (H): ICD-10-CM

## 2021-12-02 DIAGNOSIS — E78.5 HYPERLIPIDEMIA, UNSPECIFIED HYPERLIPIDEMIA TYPE: ICD-10-CM

## 2021-12-02 DIAGNOSIS — R06.09 DYSPNEA ON EXERTION: ICD-10-CM

## 2021-12-02 PROBLEM — D70.8 OTHER NEUTROPENIA (H): Status: ACTIVE | Noted: 2019-06-10

## 2021-12-02 PROBLEM — F33.9 MAJOR DEPRESSION, RECURRENT (H): Status: ACTIVE | Noted: 2021-12-02

## 2021-12-02 PROBLEM — D75.89 MACROCYTOSIS WITHOUT ANEMIA: Status: ACTIVE | Noted: 2019-06-10

## 2021-12-02 LAB
ALBUMIN SERPL-MCNC: 4.1 G/DL (ref 3.5–5)
ALP SERPL-CCNC: 80 U/L (ref 45–120)
ALT SERPL W P-5'-P-CCNC: 43 U/L (ref 0–45)
ANION GAP SERPL CALCULATED.3IONS-SCNC: 6 MMOL/L (ref 5–18)
AST SERPL W P-5'-P-CCNC: 26 U/L (ref 0–40)
BILIRUB SERPL-MCNC: 0.6 MG/DL (ref 0–1)
BUN SERPL-MCNC: 20 MG/DL (ref 8–28)
CALCIUM SERPL-MCNC: 9.9 MG/DL (ref 8.5–10.5)
CHLORIDE BLD-SCNC: 101 MMOL/L (ref 98–107)
CO2 SERPL-SCNC: 32 MMOL/L (ref 22–31)
CREAT SERPL-MCNC: 0.84 MG/DL (ref 0.7–1.3)
GFR SERPL CREATININE-BSD FRML MDRD: 86 ML/MIN/1.73M2
GLUCOSE BLD-MCNC: 127 MG/DL (ref 70–125)
HBA1C MFR BLD: 6.9 % (ref 0–5.6)
POTASSIUM BLD-SCNC: 4.4 MMOL/L (ref 3.5–5)
PROT SERPL-MCNC: 6.7 G/DL (ref 6–8)
SODIUM SERPL-SCNC: 139 MMOL/L (ref 136–145)

## 2021-12-02 PROCEDURE — 99214 OFFICE O/P EST MOD 30 MIN: CPT | Performed by: INTERNAL MEDICINE

## 2021-12-02 PROCEDURE — 36415 COLL VENOUS BLD VENIPUNCTURE: CPT | Performed by: INTERNAL MEDICINE

## 2021-12-02 PROCEDURE — 83036 HEMOGLOBIN GLYCOSYLATED A1C: CPT | Performed by: INTERNAL MEDICINE

## 2021-12-02 PROCEDURE — 80053 COMPREHEN METABOLIC PANEL: CPT | Performed by: INTERNAL MEDICINE

## 2021-12-02 NOTE — PROGRESS NOTES
(E11.8) Type 2 diabetes mellitus with complication, without long-term current use of insulin (H)  (primary encounter diagnosis)  Comment: On Lantus 12 U, metformin, Januvia and Jardiance. No hypoglycemia. Weight stable now, gained 5 lbs over the last 4-6 weeks. Most probably weight loss was caused by Jardiance.  Plan: Hemoglobin A1c, Comprehensive metabolic panel,         NM Lexiscan stress test            (E78.5) Hyperlipidemia, unspecified hyperlipidemia type  Comment: on statin.      (F32.9) Depressive disorder  Comment: stable on venlafaxine, [facility].      (D69.6) Thrombocytopenia (H)  Comment: stable  Component      Latest Ref Rng & Units 10/23/2021   WBC      4.0 - 11.0 10e3/uL 5.2   RBC Count      4.40 - 5.90 10e6/uL 4.20 (L)   Hemoglobin      13.3 - 17.7 g/dL 14.5   Hematocrit      40.0 - 53.0 % 42.2   MCV      78 - 100 fL 101 (H)   MCH      26.5 - 33.0 pg 34.5 (H)   MCHC      31.5 - 36.5 g/dL 34.4   RDW      10.0 - 15.0 % 12.2   Platelet Count      150 - 450 10e3/uL 127 (L)       (R93.89) Abnormal CXR  Comment: was in the ER on 10/23/21. CXR showed tiny granuloma in left upper lobe. Still has pain in the left upper chest with deep breath. No cough, has some dyspnea with exertion. No fever, chills. He will schedule CT chest and stress test.    Plan: CT Chest w Contrast            (R07.81) Pleuritic pain    Plan: CT Chest w Contrast            (Z87.891) History of smoking  Comment: for about 10 years in the past  Plan: CT Chest w Contrast, NM Lexiscan stress test            (R06.00) Dyspnea on exertion  Comment: especially going 1-2 flight of stairs. He has DM, h/o smoking and CVA. ekg and trop I normal in the ER 10/23/21.  Plan: NM Lexiscan stress test                  This note has been dictated using voice recognition software. Any grammatical or context distortions are unintentional and inherent to the software.      Return in about 4 months (around 4/2/2022) for Follow up, multiple  issues, diabetes.    Patient Instructions   To schedule cardiac stress test.    To schedule CT chest.            Subjective:    Yong Guillermo is a 76 year old male  here for follow up.      Social History     Socioeconomic History     Marital status:      Spouse name: Not on file     Number of children: Not on file     Years of education: Not on file     Highest education level: Not on file   Occupational History     Not on file   Tobacco Use     Smoking status: Former Smoker     Types: Cigars, Pipe     Smokeless tobacco: Current User     Types: Chew     Tobacco comment: Quit 40 years ago.   Substance and Sexual Activity     Alcohol use: No     Drug use: No     Sexual activity: Yes     Partners: Female     Birth control/protection: None   Other Topics Concern     Parent/sibling w/ CABG, MI or angioplasty before 65F 55M? Not Asked   Social History Narrative     Not on file     Social Determinants of Health     Financial Resource Strain: Not on file   Food Insecurity: Not on file   Transportation Needs: Not on file   Physical Activity: Not on file   Stress: Not on file   Social Connections: Not on file   Intimate Partner Violence: Not on file   Housing Stability: Not on file       Family History   Problem Relation Age of Onset     Snoring Mother      Diabetes Mother      Cancer Mother         Brain     Heart Disease Brother      Heart Disease Brother      Diabetes Brother      Diabetes Paternal Grandmother      Review of Systems:  A 12 point comprehensive review of systems was negative except as noted in HPI.        Objective:    Physical Exam   /60   Pulse 60   Wt 68.3 kg (150 lb 9.6 oz)   SpO2 97%   BMI 21.92 kg/m    Body mass index is 21.92 kg/m .    Constitutional: oriented to person, place, and time, appears well-nourished. No distress.   HENT:   Head: Normocephalic.   Mouth/Throat: Oropharynx is clear and moist.   Eyes: Conjunctivae are normal. Pupils are equal, round, and reactive to light.    Neck: Normal range of motion. Neck supple.   Cardiovascular: Normal rate, regular rhythm and normal heart sounds.    Pulmonary/Chest: Effort normal and breath sounds normal.  Abdominal: Soft. Bowel sounds are normal.   Musculoskeletal: Normal range of motion.   Neurological: alert and oriented to person, place, and time.  Psychiatric:  normal mood and affect.      Patient Active Problem List   Diagnosis     Benign essential tremor     Complex sleep apnea syndrome     CVA (cerebral vascular accident) (H)     Type 2 diabetes mellitus with complication, without long-term current use of insulin (H)     H/O carotid endarterectomy     Hyperlipemia     Depressive disorder     Osteopenia of multiple sites     BPH (benign prostatic hyperplasia)     Macrocytosis without anemia     Major depression, recurrent (H)     Thrombocytopenia (H)     Other neutropenia (H)       Current Outpatient Medications   Medication     acetaminophen (TYLENOL) 500 MG tablet     Ascorbic Acid (VITAMIN C) 500 MG CAPS     aspirin 81 MG EC tablet     atorvastatin (LIPITOR) 80 MG tablet     Calcium Carbonate-Vitamin D (CALCIUM 600/VITAMIN D PO)     Cholecalciferol (VITAMIN D3) 25 MCG (1000 UT) CAPS     clopidogrel (PLAVIX) 75 MG tablet     insulin glargine (LANTUS SOLOSTAR) 100 UNIT/ML pen     JARDIANCE 25 MG TABS tablet     metFORMIN (GLUCOPHAGE) 500 MG tablet     multivitamin w/minerals (MULTI-VITAMIN) tablet     Omega-3 1000 MG capsule     sitagliptin (JANUVIA) 100 MG tablet     venlafaxine (EFFEXOR) 100 MG tablet     venlafaxine (EFFEXOR) 37.5 MG tablet     vitamin B complex with vitamin C (VITAMIN  B COMPLEX) tablet     No current facility-administered medications for this visit.               Anita Pineda MD  12/2/2021

## 2021-12-03 ASSESSMENT — PATIENT HEALTH QUESTIONNAIRE - PHQ9: SUM OF ALL RESPONSES TO PHQ QUESTIONS 1-9: 2

## 2021-12-04 ENCOUNTER — HOSPITAL ENCOUNTER (OUTPATIENT)
Dept: CT IMAGING | Facility: CLINIC | Age: 76
Discharge: HOME | End: 2021-12-04
Attending: INTERNAL MEDICINE | Admitting: INTERNAL MEDICINE
Payer: MEDICARE

## 2021-12-04 DIAGNOSIS — Z87.891 HISTORY OF SMOKING: ICD-10-CM

## 2021-12-04 DIAGNOSIS — R93.89 ABNORMAL CXR: ICD-10-CM

## 2021-12-04 DIAGNOSIS — R07.81 PLEURITIC PAIN: ICD-10-CM

## 2021-12-04 PROCEDURE — 71260 CT THORAX DX C+: CPT | Mod: ME

## 2021-12-04 PROCEDURE — 250N000011 HC RX IP 250 OP 636: Performed by: INTERNAL MEDICINE

## 2021-12-04 RX ORDER — IOPAMIDOL 755 MG/ML
100 INJECTION, SOLUTION INTRAVASCULAR ONCE
Status: COMPLETED | OUTPATIENT
Start: 2021-12-04 | End: 2021-12-04

## 2021-12-04 RX ADMIN — IOPAMIDOL 100 ML: 755 INJECTION, SOLUTION INTRAVENOUS at 08:44

## 2021-12-09 ENCOUNTER — HOSPITAL ENCOUNTER (OUTPATIENT)
Dept: NUCLEAR MEDICINE | Facility: CLINIC | Age: 76
End: 2021-12-09
Attending: INTERNAL MEDICINE
Payer: MEDICARE

## 2021-12-09 ENCOUNTER — HOSPITAL ENCOUNTER (OUTPATIENT)
Dept: CARDIOLOGY | Facility: CLINIC | Age: 76
End: 2021-12-09
Attending: INTERNAL MEDICINE
Payer: MEDICARE

## 2021-12-09 DIAGNOSIS — R94.39 ABNORMAL STRESS TEST: Primary | ICD-10-CM

## 2021-12-09 DIAGNOSIS — R06.09 DYSPNEA ON EXERTION: ICD-10-CM

## 2021-12-09 DIAGNOSIS — E11.8 TYPE 2 DIABETES MELLITUS WITH COMPLICATION, WITHOUT LONG-TERM CURRENT USE OF INSULIN (H): ICD-10-CM

## 2021-12-09 DIAGNOSIS — Z87.891 HISTORY OF SMOKING: ICD-10-CM

## 2021-12-09 LAB
CV BLOOD PRESSURE: 68 MMHG
CV STRESS CURRENT BP HE: NORMAL
CV STRESS CURRENT HR HE: 72
CV STRESS CURRENT HR HE: 73
CV STRESS CURRENT HR HE: 74
CV STRESS CURRENT HR HE: 80
CV STRESS CURRENT HR HE: 83
CV STRESS CURRENT HR HE: 83
CV STRESS CURRENT HR HE: 85
CV STRESS CURRENT HR HE: 86
CV STRESS CURRENT HR HE: 90
CV STRESS CURRENT HR HE: 93
CV STRESS DEVIATION TIME HE: NORMAL
CV STRESS ECHO PERCENT HR HE: NORMAL
CV STRESS EXERCISE STAGE HE: NORMAL
CV STRESS FINAL RESTING BP HE: NORMAL
CV STRESS FINAL RESTING HR HE: 83
CV STRESS MAX HR HE: 94
CV STRESS MAX TREADMILL GRADE HE: 0
CV STRESS MAX TREADMILL SPEED HE: 0
CV STRESS PEAK DIA BP HE: NORMAL
CV STRESS PEAK SYS BP HE: NORMAL
CV STRESS PHASE HE: NORMAL
CV STRESS PROTOCOL HE: NORMAL
CV STRESS RESTING PT POSITION HE: NORMAL
CV STRESS ST DEVIATION AMOUNT HE: NORMAL
CV STRESS ST DEVIATION ELEVATION HE: NORMAL
CV STRESS ST EVELATION AMOUNT HE: NORMAL
CV STRESS TEST TYPE HE: NORMAL
CV STRESS TOTAL STAGE TIME MIN 1 HE: NORMAL
RATE PRESSURE PRODUCT: NORMAL
STRESS ECHO BASELINE DIASTOLIC HE: 54
STRESS ECHO BASELINE HR: 73
STRESS ECHO BASELINE SYSTOLIC BP: 127
STRESS ECHO CALCULATED PERCENT HR: 64 %
STRESS ECHO LAST STRESS DIASTOLIC BP: 52
STRESS ECHO LAST STRESS HR: 93
STRESS ECHO LAST STRESS SYSTOLIC BP: 115
STRESS ECHO TARGET HR: 146

## 2021-12-09 PROCEDURE — 93017 CV STRESS TEST TRACING ONLY: CPT | Performed by: INTERNAL MEDICINE

## 2021-12-09 PROCEDURE — 78452 HT MUSCLE IMAGE SPECT MULT: CPT | Mod: MG

## 2021-12-09 PROCEDURE — 343N000001 HC RX 343: Performed by: INTERNAL MEDICINE

## 2021-12-09 PROCEDURE — 93018 CV STRESS TEST I&R ONLY: CPT | Mod: MG | Performed by: INTERNAL MEDICINE

## 2021-12-09 PROCEDURE — 250N000011 HC RX IP 250 OP 636: Performed by: INTERNAL MEDICINE

## 2021-12-09 PROCEDURE — G1004 CDSM NDSC: HCPCS | Performed by: INTERNAL MEDICINE

## 2021-12-09 PROCEDURE — 78452 HT MUSCLE IMAGE SPECT MULT: CPT | Mod: 26 | Performed by: INTERNAL MEDICINE

## 2021-12-09 PROCEDURE — 93016 CV STRESS TEST SUPVJ ONLY: CPT | Performed by: INTERNAL MEDICINE

## 2021-12-09 PROCEDURE — A9500 TC99M SESTAMIBI: HCPCS | Performed by: INTERNAL MEDICINE

## 2021-12-09 RX ORDER — REGADENOSON 0.08 MG/ML
0.4 INJECTION, SOLUTION INTRAVENOUS ONCE
Status: COMPLETED | OUTPATIENT
Start: 2021-12-09 | End: 2021-12-09

## 2021-12-09 RX ORDER — AMINOPHYLLINE 25 MG/ML
50 INJECTION, SOLUTION INTRAVENOUS
Status: DISCONTINUED | OUTPATIENT
Start: 2021-12-09 | End: 2021-12-09 | Stop reason: HOSPADM

## 2021-12-09 RX ADMIN — REGADENOSON 0.4 MG: 0.08 INJECTION, SOLUTION INTRAVENOUS at 07:59

## 2021-12-09 RX ADMIN — Medication 8.3 MILLICURIE: at 06:58

## 2021-12-09 RX ADMIN — Medication 31.8 MILLICURIE: at 08:30

## 2021-12-09 NOTE — PROGRESS NOTES
Pt here for cardiac evaluation for SOLIS and chest pain with deep breaths.  Recent chest CT showed mod. Calcification in the coronary arteries.  Denies other sx.  Has risk factors of DM2, hyperlipidemia, and previous CVA.  Sirisha Fajardo RN

## 2021-12-15 ENCOUNTER — HOSPITAL ENCOUNTER (OUTPATIENT)
Dept: CT IMAGING | Facility: CLINIC | Age: 76
Discharge: HOME | End: 2021-12-15
Attending: INTERNAL MEDICINE | Admitting: INTERNAL MEDICINE
Payer: MEDICARE

## 2021-12-15 VITALS
WEIGHT: 150 LBS | BODY MASS INDEX: 21.47 KG/M2 | DIASTOLIC BLOOD PRESSURE: 55 MMHG | HEIGHT: 70 IN | SYSTOLIC BLOOD PRESSURE: 111 MMHG

## 2021-12-15 DIAGNOSIS — R94.39 ABNORMAL STRESS TEST: ICD-10-CM

## 2021-12-15 PROCEDURE — 250N000011 HC RX IP 250 OP 636: Performed by: INTERNAL MEDICINE

## 2021-12-15 PROCEDURE — 250N000009 HC RX 250: Performed by: INTERNAL MEDICINE

## 2021-12-15 PROCEDURE — 250N000013 HC RX MED GY IP 250 OP 250 PS 637: Performed by: INTERNAL MEDICINE

## 2021-12-15 PROCEDURE — 75574 CT ANGIO HRT W/3D IMAGE: CPT | Mod: 26 | Performed by: INTERNAL MEDICINE

## 2021-12-15 PROCEDURE — 75574 CT ANGIO HRT W/3D IMAGE: CPT

## 2021-12-15 PROCEDURE — 0504T CT FFR: CPT | Performed by: INTERNAL MEDICINE

## 2021-12-15 RX ORDER — DILTIAZEM HYDROCHLORIDE 5 MG/ML
5-25 INJECTION INTRAVENOUS
Status: DISCONTINUED | OUTPATIENT
Start: 2021-12-15 | End: 2021-12-16 | Stop reason: HOSPADM

## 2021-12-15 RX ORDER — DILTIAZEM HYDROCHLORIDE 5 MG/ML
10 INJECTION INTRAVENOUS
Status: DISCONTINUED | OUTPATIENT
Start: 2021-12-15 | End: 2021-12-16 | Stop reason: HOSPADM

## 2021-12-15 RX ORDER — METOPROLOL TARTRATE 1 MG/ML
5-20 INJECTION, SOLUTION INTRAVENOUS
Status: DISCONTINUED | OUTPATIENT
Start: 2021-12-15 | End: 2021-12-16 | Stop reason: HOSPADM

## 2021-12-15 RX ORDER — IOPAMIDOL 755 MG/ML
100 INJECTION, SOLUTION INTRAVASCULAR ONCE
Status: COMPLETED | OUTPATIENT
Start: 2021-12-15 | End: 2021-12-15

## 2021-12-15 RX ORDER — NITROGLYCERIN 0.4 MG/1
0.4 TABLET SUBLINGUAL ONCE
Status: COMPLETED | OUTPATIENT
Start: 2021-12-15 | End: 2021-12-15

## 2021-12-15 RX ADMIN — IOPAMIDOL 100 ML: 755 INJECTION, SOLUTION INTRAVENOUS at 12:27

## 2021-12-15 RX ADMIN — NITROGLYCERIN 0.4 MG: 0.4 TABLET SUBLINGUAL at 12:14

## 2021-12-15 RX ADMIN — METOPROLOL TARTRATE 5 MG: 1 INJECTION, SOLUTION INTRAVENOUS at 12:16

## 2021-12-15 RX ADMIN — METOPROLOL TARTRATE 5 MG: 1 INJECTION, SOLUTION INTRAVENOUS at 12:11

## 2021-12-15 ASSESSMENT — MIFFLIN-ST. JEOR: SCORE: 1418.71

## 2021-12-16 LAB
BSA FOR ECHO PROCEDURE: 1.82 M2
CV CALCIUM SCORE AGATSTON LM: 25
CV CALCIUM SCORING AGATSON LAD: 216
CV CALCIUM SCORING AGATSTON CX: 17
CV CALCIUM SCORING AGATSTON RCA: 128
CV CALCIUM SCORING AGATSTON TOTAL: 386

## 2021-12-23 DIAGNOSIS — R93.1 ABNORMAL CORONARY ANGIOGRAM: ICD-10-CM

## 2021-12-23 DIAGNOSIS — R94.39 ABNORMAL STRESS TEST: Primary | ICD-10-CM

## 2021-12-23 DIAGNOSIS — R93.1 HIGH CORONARY ARTERY CALCIUM SCORE: ICD-10-CM

## 2022-01-16 RX ORDER — ATORVASTATIN CALCIUM 80 MG/1
TABLET, FILM COATED ORAL
Qty: 90 TABLET | Refills: 2 | Status: SHIPPED | OUTPATIENT
Start: 2022-01-16 | End: 2022-10-13

## 2022-01-17 DIAGNOSIS — E11.8 TYPE 2 DIABETES MELLITUS WITH COMPLICATION, WITHOUT LONG-TERM CURRENT USE OF INSULIN (H): Primary | ICD-10-CM

## 2022-01-17 NOTE — TELEPHONE ENCOUNTER
"    Outpatient Medication Detail     Disp Refills Start End JANICE   atorvastatin (LIPITOR) 80 MG tablet 90 tablet 3 11/9/2020  No   Sig: TAKE 1 TABLET DAILY   Sent to pharmacy as: atorvastatin 80 mg tablet (LIPITOR)   E-Prescribing Status: Receipt confirmed by pharmacy (11/9/2020  4:04 PM CST)       atorvastatin (LIPITOR) 80 MG tablet [[id number]]    Electronically signed by: Kathya Reyes RN on 11/09/20 1606 Status: Active   Ordering user: Kathya Reyes RN 11/09/20 1603 Ordering provider: Anita Pineda MD   Authorized by: Anita Pineda MD   Frequency:  11/09/20 - Until Discontinued Released by: Kathya Reyes RN 11/09/20 1604   Diagnoses  Hyperlipemia [E78.5]       Last office visit provider:  12/2/21     Requested Prescriptions   Pending Prescriptions Disp Refills     atorvastatin (LIPITOR) 80 MG tablet [Pharmacy Med Name: ATORVASTATIN TABS 80MG] 90 tablet 3     Sig: TAKE 1 TABLET DAILY       Statins Protocol Passed - 1/14/2022  1:16 AM        Passed - LDL on file in past 12 months     Recent Labs   Lab Test 09/02/21  0937   LDL 58             Passed - No abnormal creatine kinase in past 12 months     No lab results found.             Passed - Recent (12 mo) or future (30 days) visit within the authorizing provider's specialty     Patient has had an office visit with the authorizing provider or a provider within the authorizing providers department within the previous 12 mos or has a future within next 30 days. See \"Patient Info\" tab in inbasket, or \"Choose Columns\" in Meds & Orders section of the refill encounter.              Passed - Medication is active on med list        Passed - Patient is age 18 or older             Rosita Barragan RN 01/16/22 10:47 PM  "

## 2022-01-19 RX ORDER — EMPAGLIFLOZIN 25 MG/1
TABLET, FILM COATED ORAL
Qty: 90 TABLET | Refills: 3 | Status: SHIPPED | OUTPATIENT
Start: 2022-01-19 | End: 2023-01-12

## 2022-01-19 NOTE — TELEPHONE ENCOUNTER
Outpatient Medication Detail     Disp Refills Start End JANICE   empagliflozin (JARDIANCE) 25 mg Tab 90 tablet 3 3/2/2021  --   Sig - Route: Take 1 tablet (25 mg total) by mouth daily. - Oral   Sent to pharmacy as: Jardiance 25 mg tablet (empagliflozin)   E-Prescribing Status: Receipt confirmed by pharmacy (3/2/2021  1:02 PM CST)       empagliflozin (JARDIANCE) 25 mg Tab [[id number]]    Electronically signed by: Anita Pineda MD on 03/02/21 1302 Status: Active   Ordering user: Anita Pineda MD 03/02/21 1302 Authorized by: Anita Pineda MD   Frequency: DAILY 03/02/21 - Until Discontinued   Diagnoses  Type 2 diabetes mellitus with complication, without long-term current use of insulin (H) [E11.8]     Routing refill request to provider for review/approval because:  Early refill requested.    Last office visit provider:  12/2/21     Requested Prescriptions   Pending Prescriptions Disp Refills     JARDIANCE 25 MG TABS tablet [Pharmacy Med Name: JARDIANCE TABS 25MG] 90 tablet 3     Sig: TAKE 1 TABLET DAILY       Sodium Glucose Co-Transport Inhibitor Agents Passed - 1/17/2022  1:28 AM        Passed - Patient has documented A1c within the specified period of time.     If HgbA1C is 8 or greater, it needs to be on file within the past 3 months.  If less than 8, must be on file within the past 6 months.     Recent Labs   Lab Test 12/02/21  1130   A1C 6.9*             Passed - No creatinine >1.4 or GFR <45 within the past 12 mos     Recent Labs   Lab Test 12/02/21  1130 09/02/21  0937 06/15/21  0932   GFRESTIMATED 86   < > >60   GFRESTBLACK  --   --  >60    < > = values in this interval not displayed.       Recent Labs   Lab Test 12/02/21  1130   CR 0.84             Passed - Medication is active on med list        Passed - Patient is age 18 or older        Passed - Patient has documented normal Potassium within the last 12 mos.     Recent Labs   Lab Test 12/02/21  1130   POTASSIUM 4.4             Passed - Recent (6 mo) or  "future (30 days) visit within the authorizing provider's specialty     Patient had office visit in the last 6 months or has a visit in the next 30 days with authorizing provider or within the authorizing provider's specialty.  See \"Patient Info\" tab in inbasket, or \"Choose Columns\" in Meds & Orders section of the refill encounter.                 Jose Ku RN 01/19/22 7:16 AM  "

## 2022-02-23 DIAGNOSIS — E11.9 DIABETES MELLITUS, TYPE 2 (H): Primary | ICD-10-CM

## 2022-02-23 RX ORDER — INSULIN GLARGINE 100 [IU]/ML
INJECTION, SOLUTION SUBCUTANEOUS
Qty: 15 ML | Refills: 1 | Status: SHIPPED | OUTPATIENT
Start: 2022-02-23 | End: 2022-09-26

## 2022-02-23 NOTE — TELEPHONE ENCOUNTER
"Last Written Prescription Date:  7/1/21  Last Fill Quantity: 15ml,  # refills: 5   Last office visit provider:  12/2/21         Requested Prescriptions   Pending Prescriptions Disp Refills     LANTUS SOLOSTAR 100 UNIT/ML soln [Pharmacy Med Name: LANTUS SOLOSTAR PEN 100U/ML] 15 mL 2     Sig: INJECT 12 UNITS UNDER THE SKIN AT BEDTIME       Long Acting Insulin Protocol Passed - 2/23/2022  1:43 AM        Passed - Serum creatinine on file in past 12 months     Recent Labs   Lab Test 12/02/21  1130   CR 0.84       Ok to refill medication if creatinine is low          Passed - HgbA1C in past 3 or 6 months     If HgbA1C is 8 or greater, it needs to be on file within the past 3 months.  If less than 8, must be on file within the past 6 months.     Recent Labs   Lab Test 12/02/21  1130   A1C 6.9*             Passed - Medication is active on med list        Passed - Patient is age 18 or older        Passed - Recent (6 mo) or future (30 days) visit within the authorizing provider's specialty     Patient had office visit in the last 6 months or has a visit in the next 30 days with authorizing provider or within the authorizing provider's specialty.  See \"Patient Info\" tab in inbasket, or \"Choose Columns\" in Meds & Orders section of the refill encounter.                 Abigail Schofield RN 02/23/22 12:19 PM  "

## 2022-03-06 ENCOUNTER — HEALTH MAINTENANCE LETTER (OUTPATIENT)
Age: 77
End: 2022-03-06

## 2022-03-09 NOTE — PROGRESS NOTES
[facility] Certification Request    June 7, 2017      Patient: Yong Guillermo  MR Number: [id number]  YOB: 1945  Date of Visit: 6/7/2017      Dear Dr. Pineda:    Thank you for this referral.   We are seeing Yong Guillermo for Physical Therapy of R anterior hip pain.    Medicare and/or Medicaid requires physician review and approval of the treatment plan. Please review the plan of care and verify that you agree with the therapy plan of care by co-signing this note.      Plan of Care  Authorization / Certification Start Date: 06/07/17  Authorization / Certification End Date: 09/07/17  Authorization / Certification Number of Visits: 12  Communication with: Referral Source  Patient Related Instruction: Posture;Nature of Condition;Treatment plan and rationale;Self Care instruction;Basis of treatment;Body mechanics  Times per Week: 1  Number of Weeks: 12  Number of Visits: 12  Therapeutic Exercise: Strengthening;Stretching;ROM  Neuromuscular Reeducation: posture;balance/proprioception  Manual Therapy: other  Manual Therapy: prn    Goals:  Pt. will be independent with home exercise program in : 4 weeks  Pt will: increase LEFS score by 8 point in 8 weeks  Pt will: report decreased incdence of hip pain when rising from a chair by 30% in 8 weeks      If you have any questions or concerns, please don't hesitate to call.    Sincerely,      Heidy Rowley, PT        Physician recommendation:     ___ Follow therapist's recommendation        ___ Modify therapy      *Physician co-signature indicates they certify the need for these services furnished within this plan and while under their care.      [facility]   [facility]-Pel[facility] Initial Evaluation    Patient Name: Yong Guillermo  Date of evaluation: 6/7/2017  Referral Diagnosis: Hip pain  Referring provider: Anita Pineda MD  Visit Diagnosis:     ICD-10-CM    1. Hip flexor tightness, right M24.551    2. Right hip pain M25.551    3. Generalized  Received request for ativan refill from palliative RN. Will Barajas CNP on vacation. muscle weakness M62.81        Assessment:    Patient presents with R anterior hip pain secondary to tightness of the R quad and hip flexor. Upon evaluation, his examination was normal except for glut weakness and the above mentioned restriction in musculature. Patient to benefit from education to increase activity level and proceed with therapeutic exercise and balance work to address his impairment of pain getting out of a chair.    Pt. is appropriate for skilled PT intervention as outlined in the Plan of Care (POC).  Pt. is a good candidate for skilled PT services to improve pain levels and function.    Goals:  Pt. will be independent with home exercise program in : 4 weeks  Pt will: increase LEFS score by 8 point in 8 weeks  Pt will: report decreased incdence of hip pain when rising from a chair by 30% in 8 weeks    Patient's expectations/goals are realistic.    Barriers to Learning or Achieving Goals:  Chronicity of the problem.       Plan / Patient Instructions:        Plan of Care:   Authorization / Certification Start Date: 06/07/17  Authorization / Certification End Date: 09/07/17  Authorization / Certification Number of Visits: 12  Communication with: Referral Source  Patient Related Instruction: Posture;Nature of Condition;Treatment plan and rationale;Self Care instruction;Basis of treatment;Body mechanics  Times per Week: 1  Number of Weeks: 12  Number of Visits: 12  Therapeutic Exercise: Strengthening;Stretching;ROM  Neuromuscular Reeducation: posture;balance/proprioception  Manual Therapy: other  Manual Therapy: prn    Plan for next visit: Standing hip abduction, standing HS curl, Nustep,      Subjective:         History of Present Illness:    Yong is a 71 y.o. male who presents to therapy today with complaints of chronic pain in the R hip of insidious onset since 1/2017. Recently it has been painful while going down stairs and noting some lower back pain and stiffness. Pain is localized around R  IT  band,R upper quadriceps and lower R back. Pain occurs after prolonged sitting when he gets up from the chair. No reports of numbness or tingling. History of 2 strokes, most recently 3 years ago having little affect on his R UE. Generally activity level is low.    Pertinent medical history: CVA, lumbar and cervical surgery, RC repair  Pain Ratin   Pain rating at best: 0  Pain rating at worst: 3  Pain description: aching     Functional limitations are described as occurring with:   ascending stairs or curbs  sitting for too long, initially limp     Patient reports benefit from:  movement or exercise , anti-inflammatory          Objective:      Note: Items left blank indicates the item was not performed or not indicated at the time of the evaluation.  Patient educated on importance of increasing exercise and limiting prolonged sitting.     Patient Outcome Measures :    Lower Extremity Functional Scale (_/80): 54   Scores range from 0-80, where a score of 80 represents maximum function. The minimal clinically important difference is a positive change of 9 points.    Examination  1. Hip flexor tightness, right     2. Right hip pain     3. Generalized muscle weakness       Involved side: Right  Posture Observation:      General sitting posture is  normal.  General standing posture is normal.  Lumbopelvic complex: Moderately decreased lumbar lordosis    Lumbar ROM:    Date: 17     *Indicate scale AROM AROM AROM   Lumbar Flexion 35cm fingertip to floor     Lumbar Extension Moderate to major      Right Left Right Left Right Left   Lumbar Sidebending 59cm fingertip to floor 58 cm finger tip to floor       Lumbar Rotation           Lower Extremity Strength:     Date: 17     LE strength/5 Right Left Right Left Right Left   Hip Flexion (L1-3) 5 5       Hip Extension (L5-S1) 4- 3+       Hip Abduction (L4-5) 3+ 4       Hip Adduction (L2-3) 5 5       Hip External Rotation         Hip Internal Rotation         Knee  Extension (L3-4) 5 5       Knee Flexion 5 5       Ankle Dorsiflexion (L4-5) 5 5       Great Toe Extension (L5) 5 5       Ankle Plantar flexion (S1) 5 5       Abdominals        Sensation    Equal and normal B except for area noted below       Lumbar Dermatomes Right Left UE Reflexes Right Left   Iliac Crest and Groin (L1)   Biceps (C5-6)     Anterior Medial Thigh (L2)   Brachioradialis (C5-6)     Anterior Thigh, Medial Epicondyle Femur (L3)   Triceps (C7-8)     Lateral Thigh, Anterior Knee, Medial Leg/Malleolus (L4)   Blaine s test     Lateral Leg, Dorsal Foot (L5) Tingling, poss from previous LB surgery  LE Reflexes     Lateral Foot (S1)   Patellar (L3-4)     Posterior Leg (S2)   Achilles (S1-2)     Other:   Babinski Response       Palpation:    Lumbar Special Tests:     Lumbar Special Tests Right Left SI Tests Right  Left   Quadrant test   SI Compression     Straight leg raise -45 -45 SI Distraction     Crossover response   POSH Test     Slump - - Sacral Thrust     Sit-up test  FADIR     Trunk extensor endurance test  Resisted Abduction     Prone instability test  Other: YUMIKO - -   Pubic shotgun  Other:Scour - -     Repeated Motion Testing:  Does not peripheralize     Passive Mobility - Joint Integrity:  Not tested    LE Screen:  reducation in hamstring and hip flexion and adductors    Treatment Today     TREATMENT MINUTES COMMENTS   Evaluation 25    Self-care/ Home management     Manual therapy     Neuromuscular Re-education     Therapeutic Activity     Therapeutic Exercises 25 Patient educated on PT POC and goals for therapy, educated on increasing activity level and purpose of exercise   Gait training     Modality__________________                Total 50    Blank areas are intentional and mean the treatment did not include these items.       PT Evaluation Code: (Please list factors)  Patient History/Comorbidities: previous spine surgery (neck and LB), CVA, RC repair  Examination: lumbar   Clinical  Presentation: stable  Clinical Decision Making: low    Patient History/  Comorbidities Examination  (body structures and functions, activity limitations, and/or participation restrictions) Clinical Presentation Clinical Decision Making (Complexity)   No documented Comorbidities or personal factors 1-2 Elements Stable and/or uncomplicated Low   1-2 documented comorbidities or personal factor 3 Elements Evolving clinical presentation with changing characteristics Moderate   3-4 documented comorbidities or personal factors 4 or more Unstable and unpredictable High              Heidy Rowley  6/7/2017  8:39 AM

## 2022-04-13 ENCOUNTER — OFFICE VISIT (OUTPATIENT)
Dept: CARDIOLOGY | Facility: CLINIC | Age: 77
End: 2022-04-13
Payer: MEDICARE

## 2022-04-13 VITALS
RESPIRATION RATE: 16 BRPM | SYSTOLIC BLOOD PRESSURE: 108 MMHG | DIASTOLIC BLOOD PRESSURE: 60 MMHG | HEART RATE: 86 BPM | BODY MASS INDEX: 21.11 KG/M2 | WEIGHT: 145 LBS

## 2022-04-13 DIAGNOSIS — G47.33 OSA (OBSTRUCTIVE SLEEP APNEA): ICD-10-CM

## 2022-04-13 DIAGNOSIS — R53.83 FATIGUE, UNSPECIFIED TYPE: Primary | ICD-10-CM

## 2022-04-13 PROCEDURE — 99204 OFFICE O/P NEW MOD 45 MIN: CPT | Performed by: INTERNAL MEDICINE

## 2022-04-13 NOTE — PROGRESS NOTES
HEART CARE ENCOUNTER CONSULTATON NOTE      [facility]  [phone]      Assessment/Recommendations   Assessment/Plan:  Fatigue - the stress test and cta/ffr suggest against ischemia. His exam today does not reveal any significant murmurs and his heart rate was not slow. His EKG last fall was NSR. We will get an echo to rule out pulmonary hypertension given his DANIELLE history but I suspect that Yong is just out of shape. I encouraged him to start walking 30 minutes daily.  We will call with his echo results and recommendations.     Coronary artery disease - on aspirin, plavix and atorvastatin. LDL at goal. Mediterranean diet and regular exercise recommended.    Hyperlipidemia - LDL at goal on statin. As above.     DANIELLE - on BiPAP and compliant    F/U 12 months       History of Present Illness/Subjective    HPI: Yong Guillermo is a 76 year old male diabetic with a history of stroke x 2 (2007 and 2009) s/p right CEA, hpyerlipidemia, DANIELLE on BiPAP who comes to cardiology for evaluation of fatigue. He has noted fatigue for years but thinks is may be a little worse recently. His TSH, CBC were normal in late 2021. A lexiscan nuclear stress showed a normal EF and possible distal inferior infarction. A coronary CTA showed a calcium score of 386 and moderate proximal LAD disease which was not flow limiting by FFR. Yong does not exercise. He lives in a 4th floor condo and does not take the stairs. The farthest he walks is to take out the trash. He notes dyspnea if he does take the stairs to the fourth floor, but no chest discomfort. He notes some orthostasis, but no syncope and denies palpitations. He sleeps well with his CPAP. Yong notes that he lost 30 lbs since being on jardiance.          Physical Examination  Review of Systems   Vitals: /60 (BP Location: Left arm, Patient Position: Sitting, Cuff Size: Adult Large)   Pulse 86   Resp 16   Wt 65.8 kg (145 lb)   BMI 21.11 kg/m    BMI= Body mass  index is 21.11 kg/m .  Wt Readings from Last 3 Encounters:   04/13/22 65.8 kg (145 lb)   12/15/21 68 kg (150 lb)   12/02/21 68.3 kg (150 lb 9.6 oz)       General Appearance:   no distress, normal body habitus   ENT/Mouth: membranes moist, no oral lesions or bleeding gums.      EYES:  no scleral icterus, normal conjunctivae   Neck: no carotid bruits or thyromegaly   Chest/Lungs:   lungs are clear to auscultation, no rales or wheezing, no sternal scar, equal chest wall expansion    Cardiovascular:   Regular. Normal first and second heart sounds with no murmur. No rubs or gallops; the right carotid, radial and posterior tibial pulses are intact and the left carotid, radial and posterior tibial pulses are intact.  Jugular venous pressure is flat, no edema bilaterally    Abdomen:  no organomegaly, masses, bruits, or tenderness; bowel sounds are present   Extremities: no cyanosis or clubbing   Skin: no xanthelasma, warm.    Neurologic: normal  bilateral, no tremors     Psychiatric: alert and oriented x3, calm        Please refer above for cardiac ROS details.        Medical History  Surgical History Family History Social History   Past Medical History:   Diagnosis Date     Benign essential tremor 9/29/2016     BPH (benign prostatic hyperplasia) 5/26/2015     CVA (cerebral vascular accident) (H) 01/14/2009    Right arm clumsiness.  MRI showed 2 infarcts in the left hemisphere.     CVA (cerebral vascular accident) (H) 06/22/2006    Left arm weakness.     Depression      Diabetes mellitus, type 2 (H)      Hyperlipidemia      Obstructive sleep apnea 07/22/2013     Shingles      Past Surgical History:   Procedure Laterality Date     ARTHROSCOPY SHOULDER ROTATOR CUFF REPAIR Right      CAROTID ENDARTERECTOMY Right 09/27/2006     CERVICAL DISC SURGERY  07/29/2005    C5/6 and C6/7 lamina foraminotomy, scope with partial facetectomy and excision of hard disc herniation.     HERNIA REPAIR  5/21/104    Right inguinal hernia and  umbilical hernia right cheek cyst on the face.     IR AORTIC ARCH 4 VESSEL ANGIOGRAM  8/8/2006     IR CAROTID ANGIOGRAM  8/8/2006     IR CAROTID ANGIOGRAM  8/8/2006     HI EXCIS TENDON SHEATH LESION, HAND/FINGER      Description: Hand Excision Of A Tendon Cyst;  Recorded: 04/29/2008;     TONSILLECTOMY       VASECTOMY       ZZC EXCIS CERV DISK,ONE LEVEL      Description: Laminectomy With Disc Removal;  Recorded: 04/29/2008;  Comments: Lumbar level     ZZC EXCISION,BENIGN TUMOR,MANDIBLE      Description: Jaw Excision Benign Cyst / Tumor;  Proc Date: 05/01/2004;     Family History   Problem Relation Age of Onset     Snoring Mother      Diabetes Mother      Cancer Mother         Brain     Heart Disease Brother      Heart Disease Brother      Diabetes Brother      Diabetes Paternal Grandmother         Social History     Socioeconomic History     Marital status:      Spouse name: Not on file     Number of children: Not on file     Years of education: Not on file     Highest education level: Not on file   Occupational History     Not on file   Tobacco Use     Smoking status: Former Smoker     Types: Cigars, Pipe     Smokeless tobacco: Current User     Types: Chew     Tobacco comment: Quit 40 years ago.   Substance and Sexual Activity     Alcohol use: No     Drug use: No     Sexual activity: Yes     Partners: Female     Birth control/protection: None   Other Topics Concern     Parent/sibling w/ CABG, MI or angioplasty before 65F 55M? Not Asked   Social History Narrative     Not on file     Social Determinants of Health     Financial Resource Strain: Not on file   Food Insecurity: Not on file   Transportation Needs: Not on file   Physical Activity: Not on file   Stress: Not on file   Social Connections: Not on file   Intimate Partner Violence: Not on file   Housing Stability: Not on file           Medications  Allergies   Current Outpatient Medications   Medication Sig Dispense Refill     acetaminophen (TYLENOL) 500  MG tablet Take 650 mg by mouth        Ascorbic Acid (VITAMIN C) 500 MG CAPS Take 1,000 mg by mouth daily        aspirin 81 MG EC tablet Take 81 mg by mouth       atorvastatin (LIPITOR) 80 MG tablet TAKE 1 TABLET DAILY 90 tablet 2     Calcium Carbonate-Vitamin D (CALCIUM 600/VITAMIN D PO) Take by mouth daily       Cholecalciferol (VITAMIN D3) 25 MCG (1000 UT) CAPS Take 1,000 Units by mouth       clopidogrel (PLAVIX) 75 MG tablet TAKE 1 TABLET DAILY 90 tablet 1     JARDIANCE 25 MG TABS tablet TAKE 1 TABLET DAILY 90 tablet 3     LANTUS SOLOSTAR 100 UNIT/ML soln INJECT 12 UNITS UNDER THE SKIN AT BEDTIME 15 mL 1     metFORMIN (GLUCOPHAGE) 500 MG tablet TAKE 2 TABLETS TWICE A DAY WITH MEALS 360 tablet 1     multivitamin w/minerals (THERA-VIT-M) tablet Take 1 tablet by mouth       Omega-3 1000 MG capsule Take 2 g by mouth       sitagliptin (JANUVIA) 100 MG tablet Take 1 tablet (100 mg) by mouth daily 90 tablet 1     venlafaxine (EFFEXOR) 100 MG tablet Take 150 mg by mouth Taking 150 mg       venlafaxine (EFFEXOR) 37.5 MG tablet Take 37.5 mg by mouth       vitamin B complex with vitamin C (VITAMIN  B COMPLEX) tablet Take 1 tablet by mouth daily          Allergies   Allergen Reactions     Alteplase      Sulfa Drugs           Lab Results    Chemistry/lipid CBC Cardiac Enzymes/BNP/TSH/INR   Recent Labs   Lab Test 09/02/21  0937   CHOL 121   HDL 46   LDL 58   TRIG 87     Recent Labs   Lab Test 09/02/21  0937 06/02/21  0929 08/11/20  1044   LDL 58 72 61     Recent Labs   Lab Test 12/02/21  1130      POTASSIUM 4.4   CHLORIDE 101   CO2 32*   *   BUN 20   CR 0.84   GFRESTIMATED 86   KATHLEEN 9.9     Recent Labs   Lab Test 12/02/21  1130 10/23/21  1706 09/02/21  0937   CR 0.84 1.00 0.82     Recent Labs   Lab Test 12/02/21  1130 09/02/21  0937 06/02/21  0929   A1C 6.9* 6.6* 7.1*          Recent Labs   Lab Test 10/23/21  1706   WBC 5.2   HGB 14.5   HCT 42.2   *   *     Recent Labs   Lab Test 10/23/21  1706  09/02/21  0937 06/10/21  1959   HGB 14.5 15.0 13.7    Recent Labs   Lab Test 10/23/21  1706   TROPONINI <0.01     No results for input(s): BNP, NTBNPI, NTBNP in the last 50097 hours.  Recent Labs   Lab Test 09/02/21  0937   TSH 1.64     No results for input(s): INR in the last 59653 hours.     Today's clinic visit entailed:  Review of prior external note(s) from - [facility] information from epic reviewed  50 minutes spent on the date of the encounter doing chart review, review of outside records, review of test results, interpretation of tests, patient visit and documentation   Provider  Link to [facility] Help Grid     The level of medical decision making during this visit was of high complexity.        Olive Street MD

## 2022-04-13 NOTE — LETTER
4/13/2022    Anita Pineda MD  [address] [address]    RE: Yong Guillermo       Dear Colleague,     I had the pleasure of seeing Yong Guillermo in the [facility].    HEART CARE ENCOUNTER CONSULTATON NOTE      [facility]  [phone]      Assessment/Recommendations   Assessment/Plan:  Fatigue - the stress test and cta/ffr suggest against ischemia. His exam today does not reveal any significant murmurs and his heart rate was not slow. His EKG last fall was NSR. We will get an echo to rule out pulmonary hypertension given his DANIELLE history but I suspect that Yong is just out of shape. I encouraged him to start walking 30 minutes daily.  We will call with his echo results and recommendations.     Coronary artery disease - on aspirin, plavix and atorvastatin. LDL at goal. Mediterranean diet and regular exercise recommended.    Hyperlipidemia - LDL at goal on statin. As above.     DANIELLE - on BiPAP and compliant    F/U 12 months       History of Present Illness/Subjective    HPI: Yong Guillermo is a 76 year old male diabetic with a history of stroke x 2 (2007 and 2009) s/p right CEA, hpyerlipidemia, DANIELLE on BiPAP who comes to cardiology for evaluation of fatigue. He has noted fatigue for years but thinks is may be a little worse recently. His TSH, CBC were normal in late 2021. A lexiscan nuclear stress showed a normal EF and possible distal inferior infarction. A coronary CTA showed a calcium score of 386 and moderate proximal LAD disease which was not flow limiting by FFR. Yong does not exercise. He lives in a 4th floor condo and does not take the stairs. The farthest he walks is to take out the trash. He notes dyspnea if he does take the stairs to the fourth floor, but no chest discomfort. He notes some orthostasis, but no syncope and denies palpitations. He sleeps well with his CPAP. Yong notes that he lost 30 lbs since being on jardiance.          Physical  Examination  Review of Systems   Vitals: /60 (BP Location: Left arm, Patient Position: Sitting, Cuff Size: Adult Large)   Pulse 86   Resp 16   Wt 65.8 kg (145 lb)   BMI 21.11 kg/m    BMI= Body mass index is 21.11 kg/m .  Wt Readings from Last 3 Encounters:   04/13/22 65.8 kg (145 lb)   12/15/21 68 kg (150 lb)   12/02/21 68.3 kg (150 lb 9.6 oz)       General Appearance:   no distress, normal body habitus   ENT/Mouth: membranes moist, no oral lesions or bleeding gums.      EYES:  no scleral icterus, normal conjunctivae   Neck: no carotid bruits or thyromegaly   Chest/Lungs:   lungs are clear to auscultation, no rales or wheezing, no sternal scar, equal chest wall expansion    Cardiovascular:   Regular. Normal first and second heart sounds with no murmur. No rubs or gallops; the right carotid, radial and posterior tibial pulses are intact and the left carotid, radial and posterior tibial pulses are intact.  Jugular venous pressure is flat, no edema bilaterally    Abdomen:  no organomegaly, masses, bruits, or tenderness; bowel sounds are present   Extremities: no cyanosis or clubbing   Skin: no xanthelasma, warm.    Neurologic: normal  bilateral, no tremors     Psychiatric: alert and oriented x3, calm        Please refer above for cardiac ROS details.        Medical History  Surgical History Family History Social History   Past Medical History:   Diagnosis Date     Benign essential tremor 9/29/2016     BPH (benign prostatic hyperplasia) 5/26/2015     CVA (cerebral vascular accident) (H) 01/14/2009    Right arm clumsiness.  MRI showed 2 infarcts in the left hemisphere.     CVA (cerebral vascular accident) (H) 06/22/2006    Left arm weakness.     Depression      Diabetes mellitus, type 2 (H)      Hyperlipidemia      Obstructive sleep apnea 07/22/2013     Shingles      Past Surgical History:   Procedure Laterality Date     ARTHROSCOPY SHOULDER ROTATOR CUFF REPAIR Right      CAROTID ENDARTERECTOMY Right  09/27/2006     CERVICAL DISC SURGERY  07/29/2005    C5/6 and C6/7 lamina foraminotomy, scope with partial facetectomy and excision of hard disc herniation.     HERNIA REPAIR  5/21/104    Right inguinal hernia and umbilical hernia right cheek cyst on the face.     IR AORTIC ARCH 4 VESSEL ANGIOGRAM  8/8/2006     IR CAROTID ANGIOGRAM  8/8/2006     IR CAROTID ANGIOGRAM  8/8/2006     OK EXCIS TENDON SHEATH LESION, HAND/FINGER      Description: Hand Excision Of A Tendon Cyst;  Recorded: 04/29/2008;     TONSILLECTOMY       VASECTOMY       ZZC EXCIS CERV DISK,ONE LEVEL      Description: Laminectomy With Disc Removal;  Recorded: 04/29/2008;  Comments: Lumbar level     ZZC EXCISION,BENIGN TUMOR,MANDIBLE      Description: Jaw Excision Benign Cyst / Tumor;  Proc Date: 05/01/2004;     Family History   Problem Relation Age of Onset     Snoring Mother      Diabetes Mother      Cancer Mother         Brain     Heart Disease Brother      Heart Disease Brother      Diabetes Brother      Diabetes Paternal Grandmother         Social History     Socioeconomic History     Marital status:      Spouse name: Not on file     Number of children: Not on file     Years of education: Not on file     Highest education level: Not on file   Occupational History     Not on file   Tobacco Use     Smoking status: Former Smoker     Types: Cigars, Pipe     Smokeless tobacco: Current User     Types: Chew     Tobacco comment: Quit 40 years ago.   Substance and Sexual Activity     Alcohol use: No     Drug use: No     Sexual activity: Yes     Partners: Female     Birth control/protection: None   Other Topics Concern     Parent/sibling w/ CABG, MI or angioplasty before 65F 55M? Not Asked   Social History Narrative     Not on file     Social Determinants of Health     Financial Resource Strain: Not on file   Food Insecurity: Not on file   Transportation Needs: Not on file   Physical Activity: Not on file   Stress: Not on file   Social Connections: Not  on file   Intimate Partner Violence: Not on file   Housing Stability: Not on file           Medications  Allergies   Current Outpatient Medications   Medication Sig Dispense Refill     acetaminophen (TYLENOL) 500 MG tablet Take 650 mg by mouth        Ascorbic Acid (VITAMIN C) 500 MG CAPS Take 1,000 mg by mouth daily        aspirin 81 MG EC tablet Take 81 mg by mouth       atorvastatin (LIPITOR) 80 MG tablet TAKE 1 TABLET DAILY 90 tablet 2     Calcium Carbonate-Vitamin D (CALCIUM 600/VITAMIN D PO) Take by mouth daily       Cholecalciferol (VITAMIN D3) 25 MCG (1000 UT) CAPS Take 1,000 Units by mouth       clopidogrel (PLAVIX) 75 MG tablet TAKE 1 TABLET DAILY 90 tablet 1     JARDIANCE 25 MG TABS tablet TAKE 1 TABLET DAILY 90 tablet 3     LANTUS SOLOSTAR 100 UNIT/ML soln INJECT 12 UNITS UNDER THE SKIN AT BEDTIME 15 mL 1     metFORMIN (GLUCOPHAGE) 500 MG tablet TAKE 2 TABLETS TWICE A DAY WITH MEALS 360 tablet 1     multivitamin w/minerals (THERA-VIT-M) tablet Take 1 tablet by mouth       Omega-3 1000 MG capsule Take 2 g by mouth       sitagliptin (JANUVIA) 100 MG tablet Take 1 tablet (100 mg) by mouth daily 90 tablet 1     venlafaxine (EFFEXOR) 100 MG tablet Take 150 mg by mouth Taking 150 mg       venlafaxine (EFFEXOR) 37.5 MG tablet Take 37.5 mg by mouth       vitamin B complex with vitamin C (VITAMIN  B COMPLEX) tablet Take 1 tablet by mouth daily          Allergies   Allergen Reactions     Alteplase      Sulfa Drugs           Lab Results    Chemistry/lipid CBC Cardiac Enzymes/BNP/TSH/INR   Recent Labs   Lab Test 09/02/21  0937   CHOL 121   HDL 46   LDL 58   TRIG 87     Recent Labs   Lab Test 09/02/21  0937 06/02/21  0929 08/11/20  1044   LDL 58 72 61     Recent Labs   Lab Test 12/02/21  1130      POTASSIUM 4.4   CHLORIDE 101   CO2 32*   *   BUN 20   CR 0.84   GFRESTIMATED 86   KATHLEEN 9.9     Recent Labs   Lab Test 12/02/21  1130 10/23/21  1706 09/02/21  0937   CR 0.84 1.00 0.82     Recent Labs   Lab Test  12/02/21  1130 09/02/21  0937 06/02/21  0929   A1C 6.9* 6.6* 7.1*          Recent Labs   Lab Test 10/23/21  1706   WBC 5.2   HGB 14.5   HCT 42.2   *   *     Recent Labs   Lab Test 10/23/21  1706 09/02/21  0937 06/10/21  1959   HGB 14.5 15.0 13.7    Recent Labs   Lab Test 10/23/21  1706   TROPONINI <0.01     No results for input(s): BNP, NTBNPI, NTBNP in the last 38793 hours.  Recent Labs   Lab Test 09/02/21  0937   TSH 1.64     No results for input(s): INR in the last 60864 hours.     Today's clinic visit entailed:  Review of prior external note(s) from - [facility] information from epic reviewed  50 minutes spent on the date of the encounter doing chart review, review of outside records, review of test results, interpretation of tests, patient visit and documentation   Provider  Link to [facility] Help Grid     The level of medical decision making during this visit was of high complexity.                Thank you for allowing me to participate in the care of your patient.    Sincerely,     Olive Street MD     [facility]

## 2022-04-13 NOTE — PATIENT INSTRUCTIONS
It was a pleasure seeing you at [facility] today.        Here are my suggestions for your care:    1.  Schedule an echocardiogram to look for pulmonary hypertension    2.  Stick to a healthy mediterranean diet with limited sugar and processed foods.    3.  Get at least 30 minutes of cardiovascular exercise daily      Let's meet again in 12 months.    You can always call my nurse Madeline Das RN who is a nurse helping me in the care of my patients. She can be reached at [phone] if you have any questions.    For scheduling, please call my  Yarely Ross at [phone].    Thank you again for trusting me with your care. Please feel free to call my office at any time if you have any question or if I can assist you in any way.    Olive Street MD  [facility]

## 2022-04-25 ENCOUNTER — HOSPITAL ENCOUNTER (OUTPATIENT)
Dept: CARDIOLOGY | Facility: CLINIC | Age: 77
Discharge: HOME | End: 2022-04-25
Attending: INTERNAL MEDICINE | Admitting: INTERNAL MEDICINE
Payer: MEDICARE

## 2022-04-25 DIAGNOSIS — R53.83 FATIGUE, UNSPECIFIED TYPE: ICD-10-CM

## 2022-04-25 DIAGNOSIS — G47.33 OSA (OBSTRUCTIVE SLEEP APNEA): ICD-10-CM

## 2022-04-25 LAB — LVEF ECHO: NORMAL

## 2022-04-25 PROCEDURE — 93306 TTE W/DOPPLER COMPLETE: CPT | Mod: 26 | Performed by: INTERNAL MEDICINE

## 2022-04-25 PROCEDURE — 93306 TTE W/DOPPLER COMPLETE: CPT

## 2022-04-26 ENCOUNTER — OFFICE VISIT (OUTPATIENT)
Dept: INTERNAL MEDICINE | Facility: CLINIC | Age: 77
End: 2022-04-26
Payer: MEDICARE

## 2022-04-26 VITALS
HEART RATE: 78 BPM | BODY MASS INDEX: 22.97 KG/M2 | SYSTOLIC BLOOD PRESSURE: 126 MMHG | DIASTOLIC BLOOD PRESSURE: 66 MMHG | OXYGEN SATURATION: 98 % | WEIGHT: 157.8 LBS

## 2022-04-26 DIAGNOSIS — R53.83 FATIGUE, UNSPECIFIED TYPE: ICD-10-CM

## 2022-04-26 DIAGNOSIS — F33.9 RECURRENT MAJOR DEPRESSIVE DISORDER, REMISSION STATUS UNSPECIFIED (H): ICD-10-CM

## 2022-04-26 DIAGNOSIS — G47.39 COMPLEX SLEEP APNEA SYNDROME: ICD-10-CM

## 2022-04-26 DIAGNOSIS — E11.8 TYPE 2 DIABETES MELLITUS WITH COMPLICATION, WITHOUT LONG-TERM CURRENT USE OF INSULIN (H): Primary | ICD-10-CM

## 2022-04-26 DIAGNOSIS — I25.10 CORONARY ARTERY DISEASE INVOLVING NATIVE CORONARY ARTERY OF NATIVE HEART WITHOUT ANGINA PECTORIS: ICD-10-CM

## 2022-04-26 DIAGNOSIS — R53.83 TIREDNESS: ICD-10-CM

## 2022-04-26 DIAGNOSIS — F32.A DEPRESSIVE DISORDER: ICD-10-CM

## 2022-04-26 DIAGNOSIS — I35.1 AORTIC REGURGITATION: Primary | ICD-10-CM

## 2022-04-26 DIAGNOSIS — D69.6 THROMBOCYTOPENIA (H): ICD-10-CM

## 2022-04-26 PROBLEM — D70.8 OTHER NEUTROPENIA (H): Status: RESOLVED | Noted: 2019-06-10 | Resolved: 2022-04-26

## 2022-04-26 LAB
ALBUMIN SERPL-MCNC: 3.8 G/DL (ref 3.5–5)
ALP SERPL-CCNC: 80 U/L (ref 45–120)
ALT SERPL W P-5'-P-CCNC: 51 U/L (ref 0–45)
ANION GAP SERPL CALCULATED.3IONS-SCNC: 13 MMOL/L (ref 5–18)
AST SERPL W P-5'-P-CCNC: 30 U/L (ref 0–40)
BILIRUB SERPL-MCNC: 0.5 MG/DL (ref 0–1)
BUN SERPL-MCNC: 17 MG/DL (ref 8–28)
CALCIUM SERPL-MCNC: 9.7 MG/DL (ref 8.5–10.5)
CHLORIDE BLD-SCNC: 104 MMOL/L (ref 98–107)
CO2 SERPL-SCNC: 27 MMOL/L (ref 22–31)
CREAT SERPL-MCNC: 0.84 MG/DL (ref 0.7–1.3)
ERYTHROCYTE [DISTWIDTH] IN BLOOD BY AUTOMATED COUNT: 12.3 % (ref 10–15)
GFR SERPL CREATININE-BSD FRML MDRD: >90 ML/MIN/1.73M2
GLUCOSE BLD-MCNC: 201 MG/DL (ref 70–125)
HBA1C MFR BLD: 6.9 % (ref 0–5.6)
HCT VFR BLD AUTO: 43.1 % (ref 40–53)
HGB BLD-MCNC: 14.9 G/DL (ref 13.3–17.7)
MAGNESIUM SERPL-MCNC: 1.9 MG/DL (ref 1.8–2.6)
MCH RBC QN AUTO: 34.8 PG (ref 26.5–33)
MCHC RBC AUTO-ENTMCNC: 34.6 G/DL (ref 31.5–36.5)
MCV RBC AUTO: 101 FL (ref 78–100)
PLATELET # BLD AUTO: 122 10E3/UL (ref 150–450)
POTASSIUM BLD-SCNC: 4.7 MMOL/L (ref 3.5–5)
PROT SERPL-MCNC: 6.8 G/DL (ref 6–8)
RBC # BLD AUTO: 4.28 10E6/UL (ref 4.4–5.9)
SODIUM SERPL-SCNC: 144 MMOL/L (ref 136–145)
TSH SERPL DL<=0.005 MIU/L-ACNC: 1.4 UIU/ML (ref 0.3–5)
VIT B12 SERPL-MCNC: >2000 PG/ML (ref 213–816)
WBC # BLD AUTO: 4.5 10E3/UL (ref 4–11)

## 2022-04-26 PROCEDURE — 36415 COLL VENOUS BLD VENIPUNCTURE: CPT | Performed by: INTERNAL MEDICINE

## 2022-04-26 PROCEDURE — 82607 VITAMIN B-12: CPT | Performed by: INTERNAL MEDICINE

## 2022-04-26 PROCEDURE — 99000 SPECIMEN HANDLING OFFICE-LAB: CPT | Performed by: INTERNAL MEDICINE

## 2022-04-26 PROCEDURE — 80053 COMPREHEN METABOLIC PANEL: CPT | Performed by: INTERNAL MEDICINE

## 2022-04-26 PROCEDURE — 83036 HEMOGLOBIN GLYCOSYLATED A1C: CPT | Performed by: INTERNAL MEDICINE

## 2022-04-26 PROCEDURE — 84425 ASSAY OF VITAMIN B-1: CPT | Mod: 90 | Performed by: INTERNAL MEDICINE

## 2022-04-26 PROCEDURE — 83735 ASSAY OF MAGNESIUM: CPT | Performed by: INTERNAL MEDICINE

## 2022-04-26 PROCEDURE — 84443 ASSAY THYROID STIM HORMONE: CPT | Performed by: INTERNAL MEDICINE

## 2022-04-26 PROCEDURE — 99214 OFFICE O/P EST MOD 30 MIN: CPT | Performed by: INTERNAL MEDICINE

## 2022-04-26 PROCEDURE — 85027 COMPLETE CBC AUTOMATED: CPT | Performed by: INTERNAL MEDICINE

## 2022-04-26 RX ORDER — AMOXICILLIN 250 MG
CAPSULE ORAL
COMMUNITY
End: 2022-09-28

## 2022-04-26 NOTE — PROGRESS NOTES
(E11.8) Type 2 diabetes mellitus with complication, without long-term current use of insulin (H)  (primary encounter diagnosis)  Comment: On Lantus 12 U, metformin, Januvia and Jardiance. No hypoglycemia. Weight stable now  Plan: Hemoglobin A1c, Comprehensive metabolic panel            (F33.9) Recurrent major depressive disorder, remission status unspecified (H)  Comment: On venlafaxine, [facility]. He is taking venlafaxine every morning. Describes sleepiness during the day. We will try to move venlafaxine to the bedtime. [facility] will follow[facility] with [facility].      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    (R53.83) Tiredness  Comment: chronic with daytime sleepiness and anhedonia. Depression discussed.  Plan: CBC with platelets, TSH, Vitamin B12, Vitamin         B1 whole blood, Magnesium            (I25.10) Coronary artery disease involving native coronary artery of native heart without angina pectoris  Comment: saw Cardiology on 4/13/22.  the stress test and cta/ffr suggest against ischemia. His exam today does not reveal any significant murmurs and his heart rate was not slow. His EKG last fall was NSR. We will get an echo to rule out pulmonary hypertension given his DANIELLE history but I suspect that Yong is just out of shape. I encouraged him to start walking 30 minutes daily.  We will call with his echo results and recommendations.       Coronary artery disease - on aspirin, plavix and atorvastatin. LDL at goal. Mediterranean diet and regular exercise recommended.      Echo showed moderate aortic regurgitation and will be rechecked in 1 year.    This note has been dictated using voice recognition software. Any grammatical or context distortions are unintentional and inherent to the software.      Return in about 5 months (around 9/26/2022) for AWV,  multiple issues.    Patient Instructions   Please schedule follow-up visit with Psychiatrist and Sleep specialist.          Subjective:    Yong Guillermo is a 76 year old male  here for follow up.      Social History     Socioeconomic History     Marital status:      Spouse name: Not on file     Number of children: Not on file     Years of education: Not on file     Highest education level: Not on file   Occupational History     Not on file   Tobacco Use     Smoking status: Former Smoker     Types: Cigars, Pipe     Quit date: 1980     Years since quittin.0     Smokeless tobacco: Former User     Types: Chew     Tobacco comment: Quit 40 years ago.   Substance and Sexual Activity     Alcohol use: No     Drug use: No     Sexual activity: Yes     Partners: Female     Birth control/protection: None   Other Topics Concern     Parent/sibling w/ CABG, MI or angioplasty before 65F 55M? Not Asked   Social History Narrative     Not on file     Social Determinants of Health     Financial Resource Strain: Not on file   Food Insecurity: Not on file   Transportation Needs: Not on file   Physical Activity: Not on file   Stress: Not on file   Social Connections: Not on file   Intimate Partner Violence: Not on file   Housing Stability: Not on file       Family History   Problem Relation Age of Onset     Heart Disease Mother      Snoring Mother      Diabetes Mother      Cancer Mother         Brain     Diabetes Paternal Grandmother      Heart Disease Brother      Heart Disease Brother      Diabetes Brother      Review of Systems:  A 12 point comprehensive review of systems was negative except as noted in HPI.        Objective:    Physical Exam   /66   Pulse 78   Wt 71.6 kg (157 lb 12.8 oz)   SpO2 98%   BMI 22.97 kg/m    Body mass index is 22.97 kg/m .    Constitutional: oriented to person, place, and time, appears well-nourished. No distress.   HENT:   Head: Normocephalic.   Eyes: Conjunctivae are normal.  Neck:  Normal range of motion. Neck supple.   Cardiovascular: Normal rate, regular rhythm and normal heart sounds.    Pulmonary/Chest: Effort normal and breath sounds normal.  Abdominal: Soft. Bowel sounds are normal.   Musculoskeletal: Normal range of motion.   Neurological: alert and oriented to person, place, and time.  Psychiatric:  normal mood and affect.      Patient Active Problem List   Diagnosis     Benign essential tremor     Complex sleep apnea syndrome     CVA (cerebral vascular accident) (H)     Type 2 diabetes mellitus with complication, without long-term current use of insulin (H)     H/O carotid endarterectomy     Hyperlipemia     Depressive disorder     Osteopenia of multiple sites     BPH (benign prostatic hyperplasia)     Macrocytosis without anemia     Major depression, recurrent (H)     Thrombocytopenia (H)     Coronary artery disease involving native coronary artery of native heart without angina pectoris       Current Outpatient Medications   Medication     acetaminophen (TYLENOL) 500 MG tablet     Ascorbic Acid (VITAMIN C) 500 MG CAPS     aspirin 81 MG EC tablet     atorvastatin (LIPITOR) 80 MG tablet     Cholecalciferol (VITAMIN D3) 25 MCG (1000 UT) CAPS     clopidogrel (PLAVIX) 75 MG tablet     Cobalamin Combinations (B-12) 100-5000 MCG SUBL     JARDIANCE 25 MG TABS tablet     LANTUS SOLOSTAR 100 UNIT/ML soln     metFORMIN (GLUCOPHAGE) 500 MG tablet     multivitamin w/minerals (THERA-VIT-M) tablet     Omega-3 1000 MG capsule     sitagliptin (JANUVIA) 100 MG tablet     venlafaxine (EFFEXOR) 100 MG tablet     venlafaxine (EFFEXOR) 37.5 MG tablet     vitamin B complex with vitamin C (VITAMIN  B COMPLEX) tablet     No current facility-administered medications for this visit.               Anita Pineda MD  4/26/2022

## 2022-04-30 LAB — VIT B1 PYROPHOSHATE BLD-SCNC: 254 NMOL/L

## 2022-05-12 DIAGNOSIS — E11.9 TYPE 2 DIABETES MELLITUS (H): ICD-10-CM

## 2022-05-14 NOTE — TELEPHONE ENCOUNTER
"Last Written Prescription Date:  10/14/21  Last Fill Quantity: 360,  # refills: 1   Last office visit provider:  4/26/22     Requested Prescriptions   Pending Prescriptions Disp Refills     metFORMIN (GLUCOPHAGE) 500 MG tablet [Pharmacy Med Name: METFORMIN HCL TABS 500MG] 360 tablet 3     Sig: TAKE 2 TABLETS TWICE A DAY WITH MEALS       Biguanide Agents Passed - 5/12/2022  2:05 AM        Passed - Patient is age 10 or older        Passed - Patient has documented A1c within the specified period of time.     If HgbA1C is 8 or greater, it needs to be on file within the past 3 months.  If less than 8, must be on file within the past 6 months.     Recent Labs   Lab Test 04/26/22  1414   A1C 6.9*             Passed - Patient's CR is NOT>1.4 OR Patient's EGFR is NOT<45 within past 12 mos.     Recent Labs   Lab Test 04/26/22  1414 09/02/21  0937 06/15/21  0932   GFRESTIMATED >90   < > >60   GFRESTBLACK  --   --  >60    < > = values in this interval not displayed.       Recent Labs   Lab Test 04/26/22  1414   CR 0.84             Passed - Patient does NOT have a diagnosis of CHF.        Passed - Medication is active on med list        Passed - Recent (6 mo) or future (30 days) visit within the authorizing provider's specialty     Patient had office visit in the last 6 months or has a visit in the next 30 days with authorizing provider or within the authorizing provider's specialty.  See \"Patient Info\" tab in inbasket, or \"Choose Columns\" in Meds & Orders section of the refill encounter.                 Calista Kwon 05/14/22 8:13 AM  "

## 2022-05-27 ENCOUNTER — TRANSFERRED RECORDS (OUTPATIENT)
Dept: HEALTH INFORMATION MANAGEMENT | Facility: CLINIC | Age: 77
End: 2022-05-27
Payer: MEDICARE

## 2022-05-27 LAB — RETINOPATHY: NEGATIVE

## 2022-06-20 DIAGNOSIS — E11.9 TYPE 2 DIABETES MELLITUS (H): ICD-10-CM

## 2022-06-20 NOTE — TELEPHONE ENCOUNTER
"Last Written Prescription Date:  10/26/21  Last Fill Quantity: 90,  # refills: 1   Last office visit provider:  4/26/22     Requested Prescriptions   Pending Prescriptions Disp Refills     JANUVIA 100 MG tablet [Pharmacy Med Name: NILESHUVIA TABS 100MG] 90 tablet 3     Sig: TAKE 1 TABLET DAILY       DPP4 Inhibitors Protocol Passed - 6/20/2022 10:17 AM        Passed - HgbA1C in past 3 or 6 months     If HgbA1C is 8 or greater, it needs to be on file within the past 3 months.  If less than 8, must be on file within the past 6 months.     Recent Labs   Lab Test 04/26/22  1414   A1C 6.9*             Passed - Medication is active on med list        Passed - Patient is age 18 or older        Passed - Normal serum creatinine in past 12 months     Recent Labs   Lab Test 04/26/22  1414   CR 0.84       Ok to refill medication if creatinine is low          Passed - Recent (6 mo) or future (30 days) visit within the authorizing provider's specialty     Patient had office visit in the last 6 months or has a visit in the next 30 days with authorizing provider.  See \"Patient Info\" tab in inbasket, or \"Choose Columns\" in Meds & Orders section of the refill encounter.                 Jose Ku RN 06/20/22 10:17 AM  "

## 2022-08-16 DIAGNOSIS — E11.8 TYPE 2 DIABETES MELLITUS WITH COMPLICATION, WITHOUT LONG-TERM CURRENT USE OF INSULIN (H): Primary | ICD-10-CM

## 2022-08-16 RX ORDER — PEN NEEDLE, DIABETIC 32GX 5/32"
NEEDLE, DISPOSABLE MISCELLANEOUS
Qty: 100 EACH | Refills: 3 | Status: SHIPPED | OUTPATIENT
Start: 2022-08-16 | End: 2023-08-28

## 2022-08-16 NOTE — TELEPHONE ENCOUNTER
"Routing refill request to provider for review/approval because:  Drug not active on patient's medication list    Last Written Prescription Date:    Last Fill Quantity: ,  # refills:    Last office visit provider:  4/26/22     Requested Prescriptions   Pending Prescriptions Disp Refills     BD PEN NEEDLE JERRY 2ND GEN 32G X 4 MM miscellaneous [Pharmacy Med Name: BD PEN JAYDE JERRY 2GEN 4MM 100S 32G5/32]  3     Sig: INJECT 1 NEEDLE UNDER THE SKIN DAILY       Diabetic Supplies Protocol Failed - 8/16/2022  7:53 AM        Failed - Medication is active on med list        Passed - Patient is 18 years of age or older        Passed - Recent (6 mo) or future (30 days) visit within the authorizing provider's specialty     Patient had office visit in the last 6 months or has a visit in the next 30 days with authorizing provider.  See \"Patient Info\" tab in inbasket, or \"Choose Columns\" in Meds & Orders section of the refill encounter.                 Kathya Reyes RN 08/16/22 5:36 PM  "

## 2022-09-22 ASSESSMENT — ENCOUNTER SYMPTOMS
EYE PAIN: 0
CONSTIPATION: 0
HEARTBURN: 0
PARESTHESIAS: 0
DIZZINESS: 0
DIARRHEA: 0
COUGH: 0
ABDOMINAL PAIN: 0
JOINT SWELLING: 0
DYSURIA: 0
HEMATOCHEZIA: 0
NAUSEA: 0
MYALGIAS: 1
ARTHRALGIAS: 1
NERVOUS/ANXIOUS: 0
FEVER: 0
SHORTNESS OF BREATH: 0
PALPITATIONS: 0
FREQUENCY: 1
WEAKNESS: 1
SORE THROAT: 0
HEMATURIA: 0
CHILLS: 0
HEADACHES: 0

## 2022-09-22 ASSESSMENT — ACTIVITIES OF DAILY LIVING (ADL): CURRENT_FUNCTION: NO ASSISTANCE NEEDED

## 2022-09-26 DIAGNOSIS — E11.9 DIABETES MELLITUS, TYPE 2 (H): ICD-10-CM

## 2022-09-26 RX ORDER — INSULIN GLARGINE 100 [IU]/ML
INJECTION, SOLUTION SUBCUTANEOUS
Qty: 15 ML | Refills: 0 | Status: SHIPPED | OUTPATIENT
Start: 2022-09-26 | End: 2022-12-26

## 2022-09-26 NOTE — TELEPHONE ENCOUNTER
"Last Written Prescription Date:  2/23/22  Last Fill Quantity: 15 ml,  # refills: 1   Last office visit provider:  4/26/22     Requested Prescriptions   Pending Prescriptions Disp Refills     LANTUS SOLOSTAR 100 UNIT/ML soln [Pharmacy Med Name: LANTUS SOLOSTAR PEN 3ML 5'S 100U/ML] 15 mL 2     Sig: INJECT 12 UNITS UNDER THE SKIN AT BEDTIME       Long Acting Insulin Protocol Passed - 9/26/2022  1:27 PM        Passed - Serum creatinine on file in past 12 months     Recent Labs   Lab Test 04/26/22  1414   CR 0.84       Ok to refill medication if creatinine is low          Passed - HgbA1C in past 3 or 6 months     If HgbA1C is 8 or greater, it needs to be on file within the past 3 months.  If less than 8, must be on file within the past 6 months.     Recent Labs   Lab Test 04/26/22  1414   A1C 6.9*             Passed - Medication is active on med list        Passed - Patient is age 18 or older        Passed - Recent (6 mo) or future (30 days) visit within the authorizing provider's specialty     Patient had office visit in the last 6 months or has a visit in the next 30 days with authorizing provider or within the authorizing provider's specialty.  See \"Patient Info\" tab in inbasket, or \"Choose Columns\" in Meds & Orders section of the refill encounter.                 Jose Ku RN 09/26/22 1:27 PM  "

## 2022-09-28 ENCOUNTER — OFFICE VISIT (OUTPATIENT)
Dept: INTERNAL MEDICINE | Facility: CLINIC | Age: 77
End: 2022-09-28
Payer: MEDICARE

## 2022-09-28 VITALS
HEART RATE: 79 BPM | HEIGHT: 70 IN | BODY MASS INDEX: 22.41 KG/M2 | DIASTOLIC BLOOD PRESSURE: 68 MMHG | WEIGHT: 156.5 LBS | OXYGEN SATURATION: 98 % | SYSTOLIC BLOOD PRESSURE: 122 MMHG

## 2022-09-28 DIAGNOSIS — Z12.5 SCREENING PSA (PROSTATE SPECIFIC ANTIGEN): ICD-10-CM

## 2022-09-28 DIAGNOSIS — M79.5 FOREIGN BODY (FB) IN SOFT TISSUE: ICD-10-CM

## 2022-09-28 DIAGNOSIS — F33.9 RECURRENT MAJOR DEPRESSIVE DISORDER, REMISSION STATUS UNSPECIFIED (H): ICD-10-CM

## 2022-09-28 DIAGNOSIS — M85.89 OSTEOPENIA OF MULTIPLE SITES: ICD-10-CM

## 2022-09-28 DIAGNOSIS — N39.43 DRIBBLING OF URINE: ICD-10-CM

## 2022-09-28 DIAGNOSIS — Z00.00 ENCOUNTER FOR MEDICARE ANNUAL WELLNESS EXAM: Primary | ICD-10-CM

## 2022-09-28 DIAGNOSIS — I63.9 CEREBROVASCULAR ACCIDENT (CVA), UNSPECIFIED MECHANISM (H): ICD-10-CM

## 2022-09-28 DIAGNOSIS — I25.10 CORONARY ARTERY DISEASE INVOLVING NATIVE CORONARY ARTERY OF NATIVE HEART WITHOUT ANGINA PECTORIS: ICD-10-CM

## 2022-09-28 DIAGNOSIS — G47.39 COMPLEX SLEEP APNEA SYNDROME: ICD-10-CM

## 2022-09-28 DIAGNOSIS — E11.8 TYPE 2 DIABETES MELLITUS WITH COMPLICATION, WITHOUT LONG-TERM CURRENT USE OF INSULIN (H): ICD-10-CM

## 2022-09-28 DIAGNOSIS — Z98.890 H/O CAROTID ENDARTERECTOMY: ICD-10-CM

## 2022-09-28 DIAGNOSIS — E78.5 HYPERLIPIDEMIA, UNSPECIFIED HYPERLIPIDEMIA TYPE: ICD-10-CM

## 2022-09-28 DIAGNOSIS — Z13.89 SCREENING FOR BLOOD OR PROTEIN IN URINE: ICD-10-CM

## 2022-09-28 DIAGNOSIS — D69.6 THROMBOCYTOPENIA (H): ICD-10-CM

## 2022-09-28 DIAGNOSIS — N40.0 BENIGN PROSTATIC HYPERPLASIA, UNSPECIFIED WHETHER LOWER URINARY TRACT SYMPTOMS PRESENT: ICD-10-CM

## 2022-09-28 PROBLEM — F32.A DEPRESSIVE DISORDER: Status: RESOLVED | Noted: 2020-08-13 | Resolved: 2022-09-28

## 2022-09-28 LAB
ALBUMIN SERPL BCG-MCNC: 4.2 G/DL (ref 3.5–5.2)
ALBUMIN UR-MCNC: NEGATIVE MG/DL
ALP SERPL-CCNC: 73 U/L (ref 40–129)
ALT SERPL W P-5'-P-CCNC: 34 U/L (ref 10–50)
ANION GAP SERPL CALCULATED.3IONS-SCNC: 13 MMOL/L (ref 7–15)
APPEARANCE UR: CLEAR
AST SERPL W P-5'-P-CCNC: 28 U/L (ref 10–50)
BILIRUB SERPL-MCNC: 0.6 MG/DL
BILIRUB UR QL STRIP: NEGATIVE
BUN SERPL-MCNC: 24.6 MG/DL (ref 8–23)
CALCIUM SERPL-MCNC: 9.4 MG/DL (ref 8.8–10.2)
CHLORIDE SERPL-SCNC: 101 MMOL/L (ref 98–107)
CHOLEST SERPL-MCNC: 106 MG/DL
COLOR UR AUTO: YELLOW
CREAT SERPL-MCNC: 0.72 MG/DL (ref 0.67–1.17)
CREAT UR-MCNC: 65.7 MG/DL
DEPRECATED CALCIDIOL+CALCIFEROL SERPL-MC: 49 UG/L (ref 20–75)
DEPRECATED HCO3 PLAS-SCNC: 25 MMOL/L (ref 22–29)
ERYTHROCYTE [DISTWIDTH] IN BLOOD BY AUTOMATED COUNT: 11.8 % (ref 10–15)
GFR SERPL CREATININE-BSD FRML MDRD: >90 ML/MIN/1.73M2
GLUCOSE SERPL-MCNC: 125 MG/DL (ref 70–99)
GLUCOSE UR STRIP-MCNC: >=1000 MG/DL
HBA1C MFR BLD: 7.2 % (ref 0–5.6)
HCT VFR BLD AUTO: 41.5 % (ref 40–53)
HDLC SERPL-MCNC: 40 MG/DL
HGB BLD-MCNC: 14.3 G/DL (ref 13.3–17.7)
HGB UR QL STRIP: NEGATIVE
KETONES UR STRIP-MCNC: NEGATIVE MG/DL
LDLC SERPL CALC-MCNC: 46 MG/DL
LEUKOCYTE ESTERASE UR QL STRIP: NEGATIVE
MCH RBC QN AUTO: 34.9 PG (ref 26.5–33)
MCHC RBC AUTO-ENTMCNC: 34.5 G/DL (ref 31.5–36.5)
MCV RBC AUTO: 101 FL (ref 78–100)
MICROALBUMIN UR-MCNC: <12 MG/L
MICROALBUMIN/CREAT UR: NORMAL MG/G{CREAT}
NITRATE UR QL: NEGATIVE
NONHDLC SERPL-MCNC: 66 MG/DL
PH UR STRIP: 5 [PH] (ref 5–8)
PLATELET # BLD AUTO: 107 10E3/UL (ref 150–450)
POTASSIUM SERPL-SCNC: 4.4 MMOL/L (ref 3.4–5.3)
PROT SERPL-MCNC: 6.6 G/DL (ref 6.4–8.3)
PSA SERPL-MCNC: 0.41 NG/ML (ref 0–6.5)
RBC # BLD AUTO: 4.1 10E6/UL (ref 4.4–5.9)
SODIUM SERPL-SCNC: 139 MMOL/L (ref 136–145)
SP GR UR STRIP: 1.02 (ref 1–1.03)
TRIGL SERPL-MCNC: 100 MG/DL
UROBILINOGEN UR STRIP-ACNC: 0.2 E.U./DL
WBC # BLD AUTO: 4.7 10E3/UL (ref 4–11)

## 2022-09-28 PROCEDURE — 80061 LIPID PANEL: CPT | Performed by: INTERNAL MEDICINE

## 2022-09-28 PROCEDURE — 99214 OFFICE O/P EST MOD 30 MIN: CPT | Mod: 25 | Performed by: INTERNAL MEDICINE

## 2022-09-28 PROCEDURE — 81003 URINALYSIS AUTO W/O SCOPE: CPT | Performed by: INTERNAL MEDICINE

## 2022-09-28 PROCEDURE — 82306 VITAMIN D 25 HYDROXY: CPT | Performed by: INTERNAL MEDICINE

## 2022-09-28 PROCEDURE — 36415 COLL VENOUS BLD VENIPUNCTURE: CPT | Performed by: INTERNAL MEDICINE

## 2022-09-28 PROCEDURE — G0103 PSA SCREENING: HCPCS | Performed by: INTERNAL MEDICINE

## 2022-09-28 PROCEDURE — 82043 UR ALBUMIN QUANTITATIVE: CPT | Performed by: INTERNAL MEDICINE

## 2022-09-28 PROCEDURE — 85027 COMPLETE CBC AUTOMATED: CPT | Performed by: INTERNAL MEDICINE

## 2022-09-28 PROCEDURE — 80053 COMPREHEN METABOLIC PANEL: CPT | Performed by: INTERNAL MEDICINE

## 2022-09-28 PROCEDURE — G0439 PPPS, SUBSEQ VISIT: HCPCS | Performed by: INTERNAL MEDICINE

## 2022-09-28 PROCEDURE — 83036 HEMOGLOBIN GLYCOSYLATED A1C: CPT | Performed by: INTERNAL MEDICINE

## 2022-09-28 RX ORDER — LOSARTAN POTASSIUM 25 MG/1
12.5 TABLET ORAL DAILY
Qty: 90 TABLET | Refills: 3 | Status: SHIPPED | OUTPATIENT
Start: 2022-09-28 | End: 2023-10-20

## 2022-09-28 RX ORDER — LOSARTAN POTASSIUM 25 MG/1
25 TABLET ORAL DAILY
Qty: 90 TABLET | Refills: 3 | Status: SHIPPED | OUTPATIENT
Start: 2022-09-28 | End: 2022-09-28

## 2022-09-28 ASSESSMENT — ENCOUNTER SYMPTOMS
NERVOUS/ANXIOUS: 0
EYE PAIN: 0
HEADACHES: 0
WEAKNESS: 1
CONSTIPATION: 0
SORE THROAT: 0
COUGH: 0
SHORTNESS OF BREATH: 0
DYSURIA: 0
JOINT SWELLING: 0
NAUSEA: 0
PARESTHESIAS: 0
HEARTBURN: 0
HEMATOCHEZIA: 0
PALPITATIONS: 0
ABDOMINAL PAIN: 0
DIZZINESS: 0
FEVER: 0
MYALGIAS: 1
HEMATURIA: 0
ARTHRALGIAS: 1
CHILLS: 0
DIARRHEA: 0
FREQUENCY: 1

## 2022-09-28 ASSESSMENT — PATIENT HEALTH QUESTIONNAIRE - PHQ9
SUM OF ALL RESPONSES TO PHQ QUESTIONS 1-9: 2
SUM OF ALL RESPONSES TO PHQ QUESTIONS 1-9: 2
10. IF YOU CHECKED OFF ANY PROBLEMS, HOW DIFFICULT HAVE THESE PROBLEMS MADE IT FOR YOU TO DO YOUR WORK, TAKE CARE OF THINGS AT HOME, OR GET ALONG WITH OTHER PEOPLE: SOMEWHAT DIFFICULT

## 2022-09-28 ASSESSMENT — ACTIVITIES OF DAILY LIVING (ADL): CURRENT_FUNCTION: NO ASSISTANCE NEEDED

## 2022-09-28 NOTE — PROGRESS NOTES
The patient was provided with suggestions to help him develop a healthy physical lifestyle.  He is at risk for lack of exercise and has been provided with information to increase physical activity for the benefit of his well-being.  The patient was counseled and encouraged to consider modifying their diet and eating habits. He was provided with information on recommended healthy diet options.  Information on urinary incontinence and treatment options given to patient.  The patient was provided with suggestions to help him develop a healthy emotional lifestyle.

## 2022-09-28 NOTE — PATIENT INSTRUCTIONS
We will do colonoscopy one more time next summer. If all good, no need for future colonoscopies.    Due for DXA scan in 9/2023.    To schedule carotid [phone].    To schedule visit with Gen surgery for the foreign body in the arm.    You can use Debrox for the deep wax in the right ear.    Starting losartan, only 1/2 pill,  for kidney protection with diabetes. It can lower your blood pressure. Let me know if it makes you dizzy, weak and the blood pressure goes down.    Patient Education   Personalized Prevention Plan  You are due for the preventive services outlined below.  Your care team is available to assist you in scheduling these services.  If you have already completed any of these items, please share that information with your care team to update in your medical record.  Health Maintenance Due   Topic Date Due    Diabetic Foot Exam  Never done    ANNUAL REVIEW OF  ORDERS  Never done    Depression Action Plan  Never done    Zoster (Shingles) Vaccine (2 of 2) 12/04/2019    Flu Vaccine (1) 09/01/2022    Cholesterol Lab  09/02/2022    Kidney Microalbumin Urine Test  09/02/2022     Your Health Risk Assessment indicates you feel you are not in good health    A healthy lifestyle helps keep the body fit and the mind alert. It helps protect you from disease, helps you fight disease, and helps prevent chronic disease (disease that doesn't go away) from getting worse. This is important as you get older and begin to notice twinges in muscles and joints and a decline in the strength and stamina you once took for granted. A healthy lifestyle includes good healthcare, good nutrition, weight control, recreation, and regular exercise. Avoid harmful substances and do what you can to keep safe. Another part of a healthy lifestyle is stay mentally active and socially involved.    Good healthcare   Have a wellness visit every year.   If you have new symptoms, let us know right away. Don't wait until the next checkup.    Take medicines exactly as prescribed and keep your medicines in a safe place. Tell us if your medicine causes problems.   Healthy diet and weight control   Eat 3 or 4 small, nutritious, low-fat, high-fiber meals a day. Include a variety of fruits, vegetables, and whole-grain foods.   Make sure you get enough calcium in your diet. Calcium, vitamin D, and exercise help prevent osteoporosis (bone thinning).   If you live alone, try eating with others when you can. That way you get a good meal and have company while you eat it.   Try to keep a healthy weight. If you eat more calories than your body uses for energy, it will be stored as fat and you will gain weight.     Recreation   Recreation is not limited to sports and team events. It includes any activity that provides relaxation, interest, enjoyment, and exercise. Recreation provides an outlet for physical, mental, and social energy. It can give a sense of worth and achievement. It can help you stay healthy.    Mental Exercise and Social Involvement  Mental and emotional health is as important as physical health. Keep in touch with friends and family. Stay as active as possible. Continue to learn and challenge yourself.   Things you can do to stay mentally active are:  Learn something new, like a foreign language or musical instrument.   Play SCRABBLE or do crossword puzzles. If you cannot find people to play these games with you at home, you can play them with others on your computer through the Internet.   Join a games club--anything from card games to chess or checkers or lawn bowling.   Start a new hobby.   Go back to school.   Volunteer.   Read.   Keep up with world events.    Exercise for a Healthier Heart  You may wonder how you can improve the health of your heart. If you re thinking about exercise, you re on the right track. You don t need to become an athlete. But you do need a certain amount of brisk exercise to help strengthen your heart. If you have  been diagnosed with a heart condition, your healthcare provider may advise exercise to help stabilize your condition. To help make exercise a habit, choose safe, fun activities.      Exercise with a friend. When activity is fun, you're more likely to stick with it.   Before you start  Check with your healthcare provider before starting an exercise program. This is especially important if you have not been active for a while. It's also important if you have a long-term (chronic) health problem such as heart disease, diabetes, or obesity. Or if you are at high risk for having these problems.   Why exercise?  Exercising regularly offers many healthy rewards. It can help you do all of the following:   Improve your blood cholesterol level to help prevent further heart trouble  Lower your blood pressure to help prevent a stroke or heart attack  Control diabetes, or reduce your risk of getting this disease  Improve your heart and lung function  Reach and stay at a healthy weight  Make your muscles stronger so you can stay active  Prevent falls and fractures by slowing the loss of bone mass (osteoporosis)  Manage stress better  Reduce your blood pressure  Improve your sense of self and your body image  Exercise tips    Ease into your routine. Set small goals. Then build on them. If you are not sure what your activity level should be, talk with your healthcare provider first before starting an exercise routine.  Exercise on most days. Aim for a total of 150 minutes (2 hours and 30 minutes) or more of moderate-intensity aerobic activity each week. Or 75 minutes (1 hour and 15 minutes) or more of vigorous-intensity aerobic activity each week. Or try for a combination of both. Moderate activity means that you breathe heavier and your heart rate increases but you can still talk. Think about doing 40 minutes of moderate exercise, 3 to 4 times a week. For best results, activity should last for about 40 minutes to lower blood  pressure and cholesterol. It's OK to work up to the 40-minute period over time. Examples of moderate-intensity activity are walking 1 mile in 15 minutes. Or doing 30 to 45 minutes of yard work.  Step up your daily activity level.  Along with your exercise program, try being more active the whole day. Walk instead of drive. Or park further away so that you take more steps each day. Do more household tasks or yard work. You may not be able to meet the advised mount of physical activity. But doing some moderate- or vigorous-intensity aerobic activity can help reduce your risk for heart disease. Your healthcare provider can help you figure out what is best for you.  Choose 1 or more activities you enjoy.  Walking is one of the easiest things you can do. You can also try swimming, riding a bike, dancing, or taking an exercise class.    When to call your healthcare provider  Call your healthcare provider if you have any of these:   Chest pain or feel dizzy or lightheaded  Burning, tightness, pressure, or heaviness in your chest, neck, shoulders, back, or arms  Abnormal shortness of breath  More joint or muscle pain  A very fast or irregular heartbeat (palpitations)  [employer] last reviewed this educational content on 7/1/2019 2000-2021 [employer]. All rights reserved. This information is not intended as a substitute for professional medical care. Always follow your healthcare professional's instructions.          Understanding USDA MyPlate  The USDA has guidelines to help you make healthy food choices. These are called MyPlate. MyPlate shows the food groups that make up healthy meals using the image of a place setting. Before you eat, think about the healthiest choices for what to put on your plate or in your cup or bowl. To learn more about building a healthy plate, visit www.choosemyplate.gov.    The food groups  Fruits. Any fruit or 100% fruit juice counts as part of the Fruit Group. Fruits may be  fresh, canned, frozen, or dried, and may be whole, cut-up, or pureed. Make 1/2 of your plate fruits and vegetables.  Vegetables. Any vegetable or 100% vegetable juice counts as a member of the Vegetable Group. Vegetables may be fresh, frozen, canned, or dried. They can be served raw or cooked and may be whole, cut-up, or mashed. Make 1/2 of your plate fruits and vegetables.  Grains. All foods made from grains are part of the Grains Group. These include wheat, rice, oats, cornmeal, and barley. Grains are often used to make foods such as bread, pasta, oatmeal, cereal, tortillas, and grits. Grains should be no more than 1/4 of your plate. At least half of your grains should be whole grains.  Protein. This group includes meat, poultry, seafood, beans and peas, eggs, processed soy products (such as tofu), nuts (including nut butters), and seeds. Make protein choices no more than 1/4 of your plate. Meat and poultry choices should be lean or low fat.  Dairy. The Dairy Group includes all fluid milk products and foods made from milk that contain calcium, such as yogurt and cheese. (Foods that have little calcium, such as cream, butter, and cream cheese, are not part of this group.) Most dairy choices should be low-fat or fat-free.  Oils. Oils aren't a food group, but they do contain essential nutrients. However it's important to watch your intake of oils. These are fats that are liquid at room temperature. They include canola, corn, olive, soybean, vegetable, and sunflower oil. Foods that are mainly oil include mayonnaise, certain salad dressings, and soft margarines. You likely already get your daily oil allowance from the foods you eat.  Things to limit  Eating healthy also means limiting these things in your diet:     Salt (sodium). Many processed foods have a lot of sodium. To keep sodium intake down, eat fresh vegetables, meats, poultry, and seafood when possible. Purchase low-sodium, reduced-sodium, or no-salt-added  food products at the store. And don't add salt to your meals at home. Instead, season them with herbs and spices such as dill, oregano, cumin, and paprika. Or try adding flavor with lemon or lime zest and juice.  Saturated fat. Saturated fats are most often found in animal products such as beef, pork, and chicken. They are often solid at room temperature, such as butter. To reduce your saturated fat intake, choose leaner cuts of meat and poultry. And try healthier cooking methods such as grilling, broiling, roasting, or baking. For a simple lower-fat swap, use plain nonfat yogurt instead of mayonnaise when making potato salad or macaroni salad.  Added sugars. These are sugars added to foods. They are in foods such as ice cream, candy, soda, fruit drinks, sports drinks, energy drinks, cookies, pastries, jams, and syrups. Cut down on added sugars by sharing sweet treats with a family member or friend. You can also choose fruit for dessert, and drink water or other unsweetened beverages.     [employer] last reviewed this educational content on 6/1/2020 2000-2021 [employer]. All rights reserved. This information is not intended as a substitute for professional medical care. Always follow your healthcare professional's instructions.          Urinary Incontinence (Male)    Urinary incontinence means not being able to control the release of urine from the bladder.   Causes  Common causes of urinary incontinence in men include:  Infection  Certain medicines  Aging  Poor pelvic muscle tone  Bladder spasms  Obesity  Trouble urinating and fully emptying the bladder (urinary retention)  Other things that can cause incontinence are:   Nervous system diseases  Diabetes  Sleep apnea  Urinary tract infections  Prostate surgery  Pelvic injury  Constipation and smoking have also been identified as risk factors.   Symptoms  Urge incontinence (overactive bladder). This is a sudden urge to urinate. It occurs even though  there may not be much urine in the bladder. The need to urinate often during the night is common. It's due to bladder spasms.  Stress incontinence. This is urine leakage that you can't control. It can occur with sneezing, coughing, and other actions that put stress on the bladder.    Treatment  Treatment depends on what is causing the condition. Bladder infections are treated with antibiotics. Urinary retention is treated with a bladder catheter.   Home care  Follow these guidelines when caring for yourself at home:  Don't have any foods and drinks that may irritate the bladder. This includes:  Chocolate  Alcohol  Caffeine  Carbonated drinks  Acidic fruits and juices  Limit fluids to 6 to 8 cups a day.  Lose weight if you are overweight. This will reduce your symptoms.  If advised, do regular pelvic muscle-strengthening exercises such as Kegel exercises.  If needed, wear absorbent pads to catch urine. Change the pads often. This is for good hygiene and to prevent skin and bladder infections.  Bathe daily for good hygiene.  If an antibiotic was prescribed to treat a bladder infection, take it until it's finished. Keep taking it even if you are feeling better. This is to make sure your infection has cleared.  If a catheter was left in place, keep bacteria from getting into the collection bag. Don't disconnect the catheter from the collection bag.  Use a leg band to secure the catheter drainage tube, so it does not pull on the catheter. Drain the collection bag when it becomes full. To do this, use the drain spout at the bottom of the bag. Don't disconnect the bag from the catheter.  Don't pull on or try to remove a catheter. The catheter must be removed by a healthcare provider.  If you smoke, stop. Ask your provider for help if you can't do this on your own.  Follow-up care  Follow up with your healthcare provider, or as advised.  When to get medical advice  Call your healthcare provider right away if any of these  occur:  Fever over 100.4 F (38 C), or as directed by your provider  Bladder pain or fullness  Belly swelling, nausea, or vomiting  Back pain  Weakness, dizziness, or fainting  If a catheter was left in place, return if:  The catheter falls out  The catheter stops draining for 6 hours  Your urine gets cloudy or smells bad  [employer] last reviewed this educational content on 1/1/2020 2000-2021 [employer]. All rights reserved. This information is not intended as a substitute for professional medical care. Always follow your healthcare professional's instructions.        Your Health Risk Assessment indicates you feel you are not in good emotional health.    Recreation   Recreation is not limited to sports and team events. It includes any activity that provides relaxation, interest, enjoyment, and exercise. Recreation provides an outlet for physical, mental, and social energy. It can give a sense of worth and achievement. It can help you stay healthy.    Mental Exercise and Social Involvement  Mental and emotional health is as important as physical health. Keep in touch with friends and family. Stay as active as possible. Continue to learn and challenge yourself.   Things you can do to stay mentally active are:  Learn something new, like a foreign language or musical instrument.   Play SCRABBLE or do crossword puzzles. If you cannot find people to play these games with you at home, you can play them with others on your computer through the Internet.   Join a games club--anything from card games to chess or checkers or lawn bowling.   Start a new hobby.   Go back to school.   Volunteer.   Read.   Keep up with world events.

## 2022-09-28 NOTE — PROGRESS NOTES
"SUBJECTIVE:   Yong is a 77 year old who presents for Preventive Visit.      Patient has been advised of split billing requirements and indicates understanding: Yes  Are you in the first 12 months of your Medicare coverage?  No    Healthy Habits:     In general, how would you rate your overall health?  Fair    Frequency of exercise:  None    Do you usually eat at least 4 servings of fruit and vegetables a day, include whole grains    & fiber and avoid regularly eating high fat or \"junk\" foods?  No    Taking medications regularly:  Yes    Medication side effects:  Not applicable and None    Ability to successfully perform activities of daily living:  No assistance needed    Home Safety:  No safety concerns identified    Hearing Impairment:  No hearing concerns    In the past 6 months, have you been bothered by leaking of urine? Yes    In general, how would you rate your overall mental or emotional health?  Fair      PHQ-2 Total Score: 0    Do you feel safe in your environment? Yes    Have you ever done Advance Care Planning? (For example, a Health Directive, POLST, or a discussion with a medical provider or your loved ones about your wishes): Yes, advance care planning is on file.       Fall risk  Fallen 2 or more times in the past year?: No  Any fall with injury in the past year?: No    Cognitive Screening   1) Repeat 3 items (Leader, Season, Table)    2) Clock draw: NORMAL  3) 3 item recall: Recalls 3 objects  Results: NORMAL clock, 1-2 items recalled: COGNITIVE IMPAIRMENT LESS LIKELY    Mini-CogTM Copyright PORTILLO Suggs. Licensed by the author for use in [facility]; reprinted with permission (joaquina@.[email or web address]). All rights reserved.      Do you have sleep apnea, excessive snoring or daytime drowsiness?: yes    Reviewed and updated as needed this visit by clinical staff   Tobacco  Allergies  Meds                Reviewed and updated as needed this visit by Provider                   Social History     Tobacco " Use     Smoking status: Former Smoker     Types: Cigars, Pipe     Quit date: 1980     Years since quittin.5     Smokeless tobacco: Former User     Types: Chew     Tobacco comment: Quit 40 years ago.   Substance Use Topics     Alcohol use: No     If you drink alcohol do you typically have >3 drinks per day or >7 drinks per week? No    Alcohol Use 2022   Prescreen: >3 drinks/day or >7 drinks/week? No   Prescreen: >3 drinks/day or >7 drinks/week? -               Current providers sharing in care for this patient include:   Patient Care Team:  Anita Pineda MD as PCP - General (Internal Medicine)  Pat Solano MD as MD (Hematology & Oncology)  Andre Kemp DO as Assigned Sleep Provider  Anita Pineda MD as Assigned PCP  Olive Street MD as Assigned Heart and Vascular Provider    The following health maintenance items are reviewed in Epic and correct as of today:  Health Maintenance   Topic Date Due     DIABETIC FOOT EXAM  Never done     ANNUAL REVIEW OF  ORDERS  Never done     DEPRESSION ACTION PLAN  Never done     ZOSTER IMMUNIZATION (2 of 2) 2019     INFLUENZA VACCINE (1) 2022     LIPID  2022     MICROALBUMIN  2022     MEDICARE ANNUAL WELLNESS VISIT  2022     A1C  10/26/2022     PHQ-9  2023     BMP  2023     EYE EXAM  2023     COLORECTAL CANCER SCREENING  2023     FALL RISK ASSESSMENT  2023     ADVANCE CARE PLANNING  2026     DTAP/TDAP/TD IMMUNIZATION (3 - Td or Tdap) 2028     HEPATITIS C SCREENING  Completed     Pneumococcal Vaccine: 65+ Years  Completed     AORTIC ANEURYSM SCREENING (SYSTEM ASSIGNED)  Completed     COVID-19 Vaccine  Completed     IPV IMMUNIZATION  Aged Out     MENINGITIS IMMUNIZATION  Aged Out               Review of Systems   Constitutional: Negative for chills and fever.   HENT: Positive for hearing loss. Negative for congestion, ear pain and sore throat.    Eyes: Negative for pain and visual  "disturbance.   Respiratory: Negative for cough and shortness of breath.    Cardiovascular: Negative for chest pain, palpitations and peripheral edema.   Gastrointestinal: Negative for abdominal pain, constipation, diarrhea, heartburn, hematochezia and nausea.   Genitourinary: Positive for frequency, impotence and urgency. Negative for dysuria, genital sores, hematuria and penile discharge.   Musculoskeletal: Positive for arthralgias and myalgias. Negative for joint swelling.   Skin: Negative for rash.   Neurological: Positive for weakness. Negative for dizziness, headaches and paresthesias.   Psychiatric/Behavioral: Negative for mood changes. The patient is not nervous/anxious.          OBJECTIVE:   /68   Pulse 79   Ht 1.765 m (5' 9.5\")   Wt 71 kg (156 lb 8 oz)   SpO2 98%   BMI 22.78 kg/m   Estimated body mass index is 22.78 kg/m  as calculated from the following:    Height as of this encounter: 1.765 m (5' 9.5\").    Weight as of this encounter: 71 kg (156 lb 8 oz).  Physical Exam  General Appearance: Alert, cooperative, no distress, appears stated age.  Head: Normocephalic, without obvious abnormality, atraumatic  Eyes: PERRL, conjunctiva/corneas clear, EOM's intact  Ears: Normal TM's and external ear canals, both ears  Nose: Nares normal, septum midline,mucosa normal, no drainage  Throat: Lips, mucosa, and tongue normal; teeth and gums normal  Neck: Supple, symmetrical, trachea midline, no adenopathy;  thyroid: not enlarged, symmetric, no tenderness/mass/nodules; no carotid bruit or JVD  Back: Symmetric, no curvature, ROM normal, no CVA tenderness.  Lungs: Clear to auscultation bilaterally, respirations unlabored.  Breasts: No breast masses, tenderness, asymmetry, or nipple discharge.  Heart: Regular rate and rhythm, S1 and S2 normal, no murmur, rub, or gallop.  Abdomen: Soft, non-tender, bowel sounds active all four quadrants,  no masses, no organomegaly.  Extremities: Extremities normal, atraumatic, " no cyanosis or edema.  Skin: Skin color, texture, turgor normal, no rashes or lesions.  Lymph nodes: Cervical, supraclavicular, and axillary nodes normal.  Neurologic: No focal neurological findings.          ASSESSMENT / PLAN:   (Z00.00) Encounter for Medicare annual wellness exam  (primary encounter diagnosis)      (E11.8) Type 2 diabetes mellitus with complication, without long-term current use of insulin (H)  Comment:  On Lantus 12 U, metformin, Januvia and Jardiance. No hypoglycemia. Weight stable now. -150.  We will start small dose of   Plan: Albumin Random Urine Quantitative with Creat         Ratio, Comprehensive metabolic panel,         Hemoglobin A1c, losartan (COZAAR) 25 MG tablet,        DISCONTINUED: losartan (COZAAR) 25 MG tablet            (D69.6) Thrombocytopenia (H)  Comment: stable  Plan: CBC with platelets            (M85.89) Osteopenia of multiple sites  Comment: due for DXA next year  Plan: Vitamin D Deficiency            (F33.9) Recurrent major depressive disorder, remission status unspecified (H)  Comment: on venlafaxine, [facility]      (E78.5) Hyperlipidemia, unspecified hyperlipidemia type  Comment: on statin  Plan: Lipid panel reflex to direct LDL Non-fasting,         Comprehensive metabolic panel            (I25.10) Coronary artery disease involving native coronary artery of native heart without angina pectoris  Comment: saw Cardiology on 4/13/22.  the stress test and cta/ffr suggest against ischemia. His exam today does not reveal any significant murmurs and his heart rate was not slow. His EKG last fall was NSR. We will get an echo to rule out pulmonary hypertension given his DANIELLE history but I suspect that Yong is just out of shape. I encouraged him to start walking 30 minutes daily.  We will call with his echo results and recommendations.         Coronary artery disease - on aspirin, plavix and atorvastatin. LDL at goal. Mediterranean diet and regular exercise  "recommended.        Echo showed moderate aortic regurgitation and will be rechecked in 1 year.         (I63.9) Cerebrovascular accident (CVA), unspecified mechanism (H)  Comment: in 2002, H/O carotid endarterectomy, On Plavix, asa, statin  (Z98.890) H/O carotid endarterectomy  Last US in 2019 was normal.  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 These symptoms resolved now. He thinks it is a piece of metal. Xray today, he will see Gen surgery  Plan: Adult General Surg Referral, XR Forearm Right 2        Views            (Z13.89) Screening for blood or protein in urine  Comment:   Plan: UA reflex to Microscopic and Culture            (Z12.5) Screening PSA (prostate specific antigen)  Comment:   Plan: PSA, screen            (N39.43) Dribbling of urine  Comment: he had BPH, nocturia 1-2x/ night, sometimes urgency incontinence. We discussed possible referral to Urology.  Plan: UA reflex to Microscopic and Culture, PSA,         screen            (N40.0) Benign prostatic hyperplasia, unspecified whether lower urinary tract symptoms present            Patient has been advised of split billing requirements and indicates understanding: Yes    COUNSELING:  Reviewed preventive health counseling, as reflected in patient instructions       Regular exercise       Healthy diet/nutrition    Estimated body mass index is 22.78 kg/m  as calculated from the following:    Height as of this encounter: 1.765 m (5' 9.5\").    Weight as of this encounter: 71 kg (156 lb 8 oz).        He reports that he quit smoking about 42 years ago. His smoking use included cigars and pipe. He has quit using smokeless tobacco.  His smokeless tobacco use included chew.      Appropriate preventive services were discussed with this patient, " including applicable screening as appropriate for cardiovascular disease, diabetes, osteopenia/osteoporosis, and glaucoma.  As appropriate for age/gender, discussed screening for colorectal cancer, prostate cancer, breast cancer, and cervical cancer. Checklist reviewing preventive services available has been given to the patient.    Reviewed patients plan of care and provided an AVS. The Basic Care Plan (routine screening as documented in Health Maintenance) for Yong meets the Care Plan requirement. This Care Plan has been established and reviewed with the Patient.    Counseling Resources:  ATP IV Guidelines  Pooled Cohorts Equation Calculator  Breast Cancer Risk Calculator  Breast Cancer: Medication to Reduce Risk  FRAX Risk Assessment  ICSI Preventive Guidelines  Dietary Guidelines for Americans, 2010  [employer]'s MyPlate  ASA Prophylaxis  Lung CA Screening    Anita Pineda MD  [facility]    Identified Health Risks:  Answers for HPI/ROS submitted by the patient on 9/28/2022  If you checked off any problems, how difficult have these problems made it for you to do your work, take care of things at home, or get along with other people?: Somewhat difficult  PHQ9 TOTAL SCORE: 2

## 2022-10-03 DIAGNOSIS — E11.8 TYPE 2 DIABETES MELLITUS WITH COMPLICATION, WITHOUT LONG-TERM CURRENT USE OF INSULIN (H): Primary | ICD-10-CM

## 2022-10-03 NOTE — TELEPHONE ENCOUNTER
"Routing refill request to provider for review/approval because:  Drug not active on patient's medication list  Patient needs to be seen because:  q6m    Last Written Prescription Date:    Last Fill Quantity: ,  # refills:    Last office visit provider:  9/28/22     Requested Prescriptions   Pending Prescriptions Disp Refills     blood glucose monitoring (FREESTYLE) lancets [Pharmacy Med Name: LANCETS FREESTYLE 100'S 28G]  3     Sig: USE 1 LANCET DAILY AS NEEDED       Diabetic Supplies Protocol Failed - 10/3/2022  9:24 AM        Failed - Medication is active on med list        Passed - Patient is 18 years of age or older        Passed - Recent (6 mo) or future (30 days) visit within the authorizing provider's specialty     Patient had office visit in the last 6 months or has a visit in the next 30 days with authorizing provider.  See \"Patient Info\" tab in inbasket, or \"Choose Columns\" in Meds & Orders section of the refill encounter.               FREESTYLE LITE test strip [Pharmacy Med Name: FREESTYLE LITE STRIPS 50'S] 200 strip 6     Sig: USE 1 STRIP FOUR TIMES A DAY AS DIRECTED       Diabetic Supplies Protocol Failed - 10/3/2022  9:24 AM        Failed - Medication is active on med list        Passed - Patient is 18 years of age or older        Passed - Recent (6 mo) or future (30 days) visit within the authorizing provider's specialty     Patient had office visit in the last 6 months or has a visit in the next 30 days with authorizing provider.  See \"Patient Info\" tab in inbasket, or \"Choose Columns\" in Meds & Orders section of the refill encounter.                 Kathya Reyes RN 10/03/22 1:22 PM  "

## 2022-10-04 RX ORDER — LANCETS 28 GAUGE
EACH MISCELLANEOUS
Qty: 100 EACH | Refills: 3 | Status: SHIPPED | OUTPATIENT
Start: 2022-10-04 | End: 2023-10-10

## 2022-10-04 RX ORDER — BLOOD-GLUCOSE METER
KIT MISCELLANEOUS
Qty: 200 STRIP | Refills: 6 | Status: SHIPPED | OUTPATIENT
Start: 2022-10-04 | End: 2024-07-05

## 2022-10-05 ENCOUNTER — OFFICE VISIT (OUTPATIENT)
Dept: SURGERY | Facility: CLINIC | Age: 77
End: 2022-10-05
Attending: INTERNAL MEDICINE
Payer: MEDICARE

## 2022-10-05 VITALS — WEIGHT: 155 LBS | DIASTOLIC BLOOD PRESSURE: 66 MMHG | BODY MASS INDEX: 22.56 KG/M2 | SYSTOLIC BLOOD PRESSURE: 122 MMHG

## 2022-10-05 DIAGNOSIS — M79.5 FOREIGN BODY (FB) IN SOFT TISSUE: ICD-10-CM

## 2022-10-05 PROCEDURE — 99203 OFFICE O/P NEW LOW 30 MIN: CPT | Mod: 25 | Performed by: SURGERY

## 2022-10-05 PROCEDURE — 10120 INC&RMVL FB SUBQ TISS SMPL: CPT | Performed by: SURGERY

## 2022-10-05 NOTE — LETTER
10/5/2022         RE: Yong Guillermo  [address]  [facility] [address]        Dear Colleague,    Thank you for referring your patient, Yong Guillermo, to the [facility] [place]. Please see a copy of my visit note below.    I, Yong Guillermo, give verbal consent for a resident to be present in today's visit.    GENERAL SURGICAL CONSULTATION    I was requested by Anita Pineda to consult on this pt to evaluate them for a foreign body in his right forearm    HPI:  This is a 77 year old male here today with a foreign body lodged in his right forearm this went in decades ago but more recently its become swollen and has been draining fluid near the entry site of this lesion.  The lump is palpable beneath the skin.    Allergies:Alteplase and Sulfa drugs    Past Medical History:   Diagnosis Date     Benign essential tremor 09/29/2016     BPH (benign prostatic hyperplasia) 05/26/2015     Coronary artery disease      CVA (cerebral vascular accident) (H) 01/14/2009    Right arm clumsiness.  MRI showed 2 infarcts in the left hemisphere.     CVA (cerebral vascular accident) (H) 06/22/2006    Left arm weakness.     Depression      Diabetes mellitus, type 2 (H)      Hyperlipidemia      Obstructive sleep apnea 07/22/2013     Shingles        Past Surgical History:   Procedure Laterality Date     ARTHROSCOPY SHOULDER ROTATOR CUFF REPAIR Right      CAROTID ENDARTERECTOMY Right 09/27/2006     CERVICAL DISC SURGERY  07/29/2005    C5/6 and C6/7 lamina foraminotomy, scope with partial facetectomy and excision of hard disc herniation.     HERNIA REPAIR  5/21/104    Right inguinal hernia and umbilical hernia right cheek cyst on the face.     IR AORTIC ARCH 4 VESSEL ANGIOGRAM  8/8/2006     IR CAROTID ANGIOGRAM  8/8/2006     IR CAROTID ANGIOGRAM  8/8/2006     MS EXCIS TENDON SHEATH LESION, HAND/FINGER      Description: Hand Excision Of A Tendon Cyst;  Recorded: 04/29/2008;      TONSILLECTOMY       VASECTOMY       ZZC EXCIS CERV DISK,ONE LEVEL      Description: Laminectomy With Disc Removal;  Recorded: 04/29/2008;  Comments: Lumbar level     ZZC EXCISION,BENIGN TUMOR,MANDIBLE      Description: Jaw Excision Benign Cyst / Tumor;  Proc Date: 05/01/2004;       CURRENT MEDS:  Current Outpatient Medications   Medication Sig Dispense Refill     acetaminophen (TYLENOL) 500 MG tablet Take 650 mg by mouth        Ascorbic Acid (VITAMIN C) 500 MG CAPS Take 1,000 mg by mouth daily        aspirin 81 MG EC tablet Take 81 mg by mouth       atorvastatin (LIPITOR) 80 MG tablet TAKE 1 TABLET DAILY 90 tablet 2     BD PEN NEEDLE JERRY 2ND GEN 32G X 4 MM miscellaneous INJECT 1 NEEDLE UNDER THE SKIN DAILY 100 each 3     blood glucose monitoring (FREESTYLE) lancets USE 1 LANCET DAILY AS NEEDED 100 each 3     Cholecalciferol (VITAMIN D3) 25 MCG (1000 UT) CAPS Take 1,000 Units by mouth       clopidogrel (PLAVIX) 75 MG tablet TAKE 1 TABLET DAILY 90 tablet 1     FREESTYLE LITE test strip USE 1 STRIP FOUR TIMES A DAY AS DIRECTED 200 strip 6     JARDIANCE 25 MG TABS tablet TAKE 1 TABLET DAILY 90 tablet 3     LANTUS SOLOSTAR 100 UNIT/ML soln INJECT 12 UNITS UNDER THE SKIN AT BEDTIME 15 mL 0     losartan (COZAAR) 25 MG tablet Take 0.5 tablets (12.5 mg) by mouth daily 90 tablet 3     metFORMIN (GLUCOPHAGE) 500 MG tablet TAKE 2 TABLETS TWICE A DAY WITH MEALS 360 tablet 3     multivitamin w/minerals (THERA-VIT-M) tablet Take 1 tablet by mouth       Omega-3 1000 MG capsule Take 2 g by mouth       sitagliptin (JANUVIA) 100 MG tablet Take 1 tablet (100 mg) by mouth daily 90 tablet 1     venlafaxine (EFFEXOR) 100 MG tablet Take 150 mg by mouth Taking 150 mg       venlafaxine (EFFEXOR) 37.5 MG tablet Take 37.5 mg by mouth         Family History   Problem Relation Age of Onset     Heart Disease Mother      Snoring Mother      Diabetes Mother      Cancer Mother         Brain     Diabetes Paternal Grandmother      Heart Disease  Brother      Heart Disease Brother      Diabetes Brother      Family history is not pertinent to this patients Chief Complaint.     reports that he quit smoking about 42 years ago. His smoking use included cigars and pipe. He has quit using smokeless tobacco.  His smokeless tobacco use included chew. He reports that he does not drink alcohol and does not use drugs.    Review of Systems -   10 point Review of systems is negative except for; as mentioned above in HPI and PMHx    /66 (BP Location: Right arm, Patient Position: Sitting, Cuff Size: Adult Small)   Wt 70.3 kg (155 lb)   BMI 22.56 kg/m    Body mass index is 22.56 kg/m .    EXAM:  GENERAL: Well developed male  EXT: There is a palpable subcutaneous nodule midway between the elbow and the wrist on the right forearm.  HEENT: EOMI, Anicteric Sclera, Moist Mucous Membranes,  In Mouth the pt does not have redness or bleeding gums  CARDIOVASCULAR: RRR w/out murmur   CHEST/LUNG: Clear to Auscultation  ABDOMEN:  Non tender to palpation, +BS  MUSCULOSKELETAL:  No deformities with good range of motion in all extremities  NEURO: He is ambulatory with good strength in both legs.  HEME/LYMPH: No Cervical Adenopathy or tenderness.     IMAGES:  EXAM: XR FOREARM RIGHT 2 VIEWS  LOCATION: [facility]  DATE/TIME: 9/28/2022 9:57 AM     INDICATION:  Foreign body (FB) in soft tissue  COMPARISON: None.                                                                      IMPRESSION: There is a 4 mm irregular metallic foreign body in the mid forearm soft tissues. This is along the volar aspect overlying the  radius. No fracture.     There is a small olecranon spur. Calcification of the TFCC.    Assessment/Plan:  Foreign body right forearm I will excise this in clinic today.      Procedure note:    Patient was placed in supine position his right arm was draped across his body.  The wound was prepped with Betadine and injected with 1% lidocaine with  epinephrine as a field block.    A 1 cm incision was made over the lesion of concern and subcutaneous tissues were dissected into sharply and then blunt dissection was done to identify and surround the foreign body.  Once identify the foreign body I dissected around this lesion sharply with Metzenbaum scissors and with a 15 blade scalpel.  The lesion was excised inside of a capsule.  The capsule was cut into and the metal foreign body was removed.    Operative field was clean and dry a small amount of bleeding which was drawn together with an interrupted 4-0 subcuticular Monocryl suture.  The wound was dressed with gauze and a Tegaderm.        Jefferson Carrera MD  [facility]  [phone]      Again, thank you for allowing me to participate in the care of your patient.        Sincerely,        Jefferson Carrera MD

## 2022-10-05 NOTE — PROGRESS NOTES
GENERAL SURGICAL CONSULTATION    I was requested by Anita Pineda to consult on this pt to evaluate them for a foreign body in his right forearm    HPI:  This is a 77 year old male here today with a foreign body lodged in his right forearm this went in decades ago but more recently its become swollen and has been draining fluid near the entry site of this lesion.  The lump is palpable beneath the skin.    Allergies:Alteplase and Sulfa drugs    Past Medical History:   Diagnosis Date     Benign essential tremor 09/29/2016     BPH (benign prostatic hyperplasia) 05/26/2015     Coronary artery disease      CVA (cerebral vascular accident) (H) 01/14/2009    Right arm clumsiness.  MRI showed 2 infarcts in the left hemisphere.     CVA (cerebral vascular accident) (H) 06/22/2006    Left arm weakness.     Depression      Diabetes mellitus, type 2 (H)      Hyperlipidemia      Obstructive sleep apnea 07/22/2013     Shingles        Past Surgical History:   Procedure Laterality Date     ARTHROSCOPY SHOULDER ROTATOR CUFF REPAIR Right      CAROTID ENDARTERECTOMY Right 09/27/2006     CERVICAL DISC SURGERY  07/29/2005    C5/6 and C6/7 lamina foraminotomy, scope with partial facetectomy and excision of hard disc herniation.     HERNIA REPAIR  5/21/104    Right inguinal hernia and umbilical hernia right cheek cyst on the face.     IR AORTIC ARCH 4 VESSEL ANGIOGRAM  8/8/2006     IR CAROTID ANGIOGRAM  8/8/2006     IR CAROTID ANGIOGRAM  8/8/2006     WI EXCIS TENDON SHEATH LESION, HAND/FINGER      Description: Hand Excision Of A Tendon Cyst;  Recorded: 04/29/2008;     TONSILLECTOMY       VASECTOMY       ZZC EXCIS CERV DISK,ONE LEVEL      Description: Laminectomy With Disc Removal;  Recorded: 04/29/2008;  Comments: Lumbar level     ZZC EXCISION,BENIGN TUMOR,MANDIBLE      Description: Jaw Excision Benign Cyst / Tumor;  Proc Date: 05/01/2004;       CURRENT MEDS:  Current Outpatient Medications   Medication Sig Dispense Refill      acetaminophen (TYLENOL) 500 MG tablet Take 650 mg by mouth        Ascorbic Acid (VITAMIN C) 500 MG CAPS Take 1,000 mg by mouth daily        aspirin 81 MG EC tablet Take 81 mg by mouth       atorvastatin (LIPITOR) 80 MG tablet TAKE 1 TABLET DAILY 90 tablet 2     BD PEN NEEDLE JERRY 2ND GEN 32G X 4 MM miscellaneous INJECT 1 NEEDLE UNDER THE SKIN DAILY 100 each 3     blood glucose monitoring (FREESTYLE) lancets USE 1 LANCET DAILY AS NEEDED 100 each 3     Cholecalciferol (VITAMIN D3) 25 MCG (1000 UT) CAPS Take 1,000 Units by mouth       clopidogrel (PLAVIX) 75 MG tablet TAKE 1 TABLET DAILY 90 tablet 1     FREESTYLE LITE test strip USE 1 STRIP FOUR TIMES A DAY AS DIRECTED 200 strip 6     JARDIANCE 25 MG TABS tablet TAKE 1 TABLET DAILY 90 tablet 3     LANTUS SOLOSTAR 100 UNIT/ML soln INJECT 12 UNITS UNDER THE SKIN AT BEDTIME 15 mL 0     losartan (COZAAR) 25 MG tablet Take 0.5 tablets (12.5 mg) by mouth daily 90 tablet 3     metFORMIN (GLUCOPHAGE) 500 MG tablet TAKE 2 TABLETS TWICE A DAY WITH MEALS 360 tablet 3     multivitamin w/minerals (THERA-VIT-M) tablet Take 1 tablet by mouth       Omega-3 1000 MG capsule Take 2 g by mouth       sitagliptin (JANUVIA) 100 MG tablet Take 1 tablet (100 mg) by mouth daily 90 tablet 1     venlafaxine (EFFEXOR) 100 MG tablet Take 150 mg by mouth Taking 150 mg       venlafaxine (EFFEXOR) 37.5 MG tablet Take 37.5 mg by mouth         Family History   Problem Relation Age of Onset     Heart Disease Mother      Snoring Mother      Diabetes Mother      Cancer Mother         Brain     Diabetes Paternal Grandmother      Heart Disease Brother      Heart Disease Brother      Diabetes Brother      Family history is not pertinent to this patients Chief Complaint.     reports that he quit smoking about 42 years ago. His smoking use included cigars and pipe. He has quit using smokeless tobacco.  His smokeless tobacco use included chew. He reports that he does not drink alcohol and does not use  drugs.    Review of Systems -   10 point Review of systems is negative except for; as mentioned above in HPI and PMHx    /66 (BP Location: Right arm, Patient Position: Sitting, Cuff Size: Adult Small)   Wt 70.3 kg (155 lb)   BMI 22.56 kg/m    Body mass index is 22.56 kg/m .    EXAM:  GENERAL: Well developed male  EXT: There is a palpable subcutaneous nodule midway between the elbow and the wrist on the right forearm.  HEENT: EOMI, Anicteric Sclera, Moist Mucous Membranes,  In Mouth the pt does not have redness or bleeding gums  CARDIOVASCULAR: RRR w/out murmur   CHEST/LUNG: Clear to Auscultation  ABDOMEN:  Non tender to palpation, +BS  MUSCULOSKELETAL:  No deformities with good range of motion in all extremities  NEURO: He is ambulatory with good strength in both legs.  HEME/LYMPH: No Cervical Adenopathy or tenderness.     IMAGES:  EXAM: XR FOREARM RIGHT 2 VIEWS  LOCATION: [facility]  DATE/TIME: 9/28/2022 9:57 AM     INDICATION:  Foreign body (FB) in soft tissue  COMPARISON: None.                                                                      IMPRESSION: There is a 4 mm irregular metallic foreign body in the mid forearm soft tissues. This is along the volar aspect overlying the  radius. No fracture.     There is a small olecranon spur. Calcification of the TFCC.    Assessment/Plan:  Foreign body right forearm I will excise this in clinic today.      Procedure note:    Patient was placed in supine position his right arm was draped across his body.  The wound was prepped with Betadine and injected with 1% lidocaine with epinephrine as a field block.    A 1 cm incision was made over the lesion of concern and subcutaneous tissues were dissected into sharply and then blunt dissection was done to identify and surround the foreign body.  Once identify the foreign body I dissected around this lesion sharply with Metzenbaum scissors and with a 15 blade scalpel.  The lesion was excised  inside of a capsule.  The capsule was cut into and the metal foreign body was removed.    Operative field was clean and dry a small amount of bleeding which was drawn together with an interrupted 4-0 subcuticular Monocryl suture.  The wound was dressed with gauze and a Tegaderm.        Jefferson Carrera MD  [facility]  [phone]

## 2022-10-06 ENCOUNTER — HOSPITAL ENCOUNTER (OUTPATIENT)
Dept: ULTRASOUND IMAGING | Facility: CLINIC | Age: 77
Discharge: HOME | End: 2022-10-06
Attending: INTERNAL MEDICINE | Admitting: INTERNAL MEDICINE
Payer: MEDICARE

## 2022-10-06 DIAGNOSIS — I63.9 CEREBROVASCULAR ACCIDENT (CVA), UNSPECIFIED MECHANISM (H): ICD-10-CM

## 2022-10-06 DIAGNOSIS — Z98.890 H/O CAROTID ENDARTERECTOMY: ICD-10-CM

## 2022-10-06 PROCEDURE — 93880 EXTRACRANIAL BILAT STUDY: CPT

## 2022-10-10 DIAGNOSIS — I63.9 CVA (CEREBRAL VASCULAR ACCIDENT) (H): ICD-10-CM

## 2022-10-10 RX ORDER — CLOPIDOGREL BISULFATE 75 MG/1
75 TABLET ORAL DAILY
Qty: 90 TABLET | Refills: 3 | Status: SHIPPED | OUTPATIENT
Start: 2022-10-10 | End: 2023-10-09

## 2022-10-10 NOTE — TELEPHONE ENCOUNTER
"Routing refill request to provider for review/approval because:  Labs out of range:  Platelets     Last Written Prescription Date:  2/15/22  Last Fill Quantity: 90,  # refills: 1   Last office visit provider:  9/28/22     Requested Prescriptions   Pending Prescriptions Disp Refills     clopidogrel (PLAVIX) 75 MG tablet [Pharmacy Med Name: CLOPIDOGREL BISULFATE TABS 75MG] 90 tablet 3     Sig: TAKE 1 TABLET DAILY       Plavix Failed - 10/10/2022 12:49 PM        Failed - Normal Platelets on file in past 12 months     Recent Labs   Lab Test 09/28/22  0957   *               Passed - No active PPI on record unless is Protonix        Passed - Normal HGB on file in past 12 months     Recent Labs   Lab Test 09/28/22  0957   HGB 14.3               Passed - Recent (12 mo) or future (30 days) visit within the authorizing provider's specialty     Patient has had an office visit with the authorizing provider or a provider within the authorizing providers department within the previous 12 mos or has a future within next 30 days. See \"Patient Info\" tab in inbasket, or \"Choose Columns\" in Meds & Orders section of the refill encounter.              Passed - Medication is active on med list        Passed - Patient is age 18 or older             Jose Ku RN 10/10/22 12:49 PM  "

## 2022-10-13 DIAGNOSIS — E78.5 HYPERLIPIDEMIA, UNSPECIFIED HYPERLIPIDEMIA TYPE: Primary | ICD-10-CM

## 2022-10-13 RX ORDER — ATORVASTATIN CALCIUM 80 MG/1
80 TABLET, FILM COATED ORAL DAILY
Qty: 90 TABLET | Refills: 3 | Status: SHIPPED | OUTPATIENT
Start: 2022-10-13 | End: 2023-10-10

## 2022-10-13 NOTE — TELEPHONE ENCOUNTER
"Last Written Prescription Date:  1/16/22  Last Fill Quantity: 90,  # refills: 2   Last office visit provider:  9/28/22     Requested Prescriptions   Pending Prescriptions Disp Refills     atorvastatin (LIPITOR) 80 MG tablet [Pharmacy Med Name: ATORVASTATIN TABS 80MG] 90 tablet 3     Sig: TAKE 1 TABLET DAILY       Statins Protocol Passed - 10/13/2022 11:52 AM        Passed - LDL on file in past 12 months     Recent Labs   Lab Test 09/28/22  0957   LDL 46             Passed - No abnormal creatine kinase in past 12 months     No lab results found.             Passed - Recent (12 mo) or future (30 days) visit within the authorizing provider's specialty     Patient has had an office visit with the authorizing provider or a provider within the authorizing providers department within the previous 12 mos or has a future within next 30 days. See \"Patient Info\" tab in inbasket, or \"Choose Columns\" in Meds & Orders section of the refill encounter.              Passed - Medication is active on med list        Passed - Patient is age 18 or older             Jose Ku RN 10/13/22 11:53 AM  "

## 2022-12-05 ENCOUNTER — OFFICE VISIT (OUTPATIENT)
Dept: URGENT CARE | Facility: URGENT CARE | Age: 77
End: 2022-12-05
Payer: MEDICARE

## 2022-12-05 VITALS
OXYGEN SATURATION: 96 % | WEIGHT: 150 LBS | TEMPERATURE: 98.2 F | BODY MASS INDEX: 21.47 KG/M2 | SYSTOLIC BLOOD PRESSURE: 117 MMHG | HEIGHT: 70 IN | HEART RATE: 87 BPM | DIASTOLIC BLOOD PRESSURE: 60 MMHG

## 2022-12-05 DIAGNOSIS — S61.217A LACERATION OF LEFT LITTLE FINGER, FOREIGN BODY PRESENCE UNSPECIFIED, NAIL DAMAGE STATUS UNSPECIFIED, INITIAL ENCOUNTER: Primary | ICD-10-CM

## 2022-12-05 PROCEDURE — 99213 OFFICE O/P EST LOW 20 MIN: CPT | Performed by: PHYSICIAN ASSISTANT

## 2022-12-05 ASSESSMENT — ENCOUNTER SYMPTOMS: WOUND: 1

## 2022-12-05 NOTE — PROGRESS NOTES
SUBJECTIVE:   Yong Guillermo is a 77 year old male presenting with a chief complaint of   Chief Complaint   Patient presents with     Urgent Care     Laceration     Pt in clinic to have eval for left hand pinky finger laceration.  4/27/2018 last tetanus.       He is an established patient of [facility].  Patient presents with left hand 5th digit laceration sustained 2 days ago by a pocket knife.  RHD.  UTD on tetnus.  Per patient, did not come in as his wife felt that the skin flap was not viable.  Patient is on plavix and aspirin daily due to CVA.  Hx of DM.    Initially treated with irrigation and pressure.  Per patient, continues to bleed, but improved.  Denies any problems with ROM.          Review of Systems   Skin: Positive for wound.   All other systems reviewed and are negative.      Past Medical History:   Diagnosis Date     Benign essential tremor 09/29/2016     BPH (benign prostatic hyperplasia) 05/26/2015     Coronary artery disease      CVA (cerebral vascular accident) (H) 01/14/2009    Right arm clumsiness.  MRI showed 2 infarcts in the left hemisphere.     CVA (cerebral vascular accident) (H) 06/22/2006    Left arm weakness.     Depression      Diabetes mellitus, type 2 (H)      Hyperlipidemia      Obstructive sleep apnea 07/22/2013     Shingles      Family History   Problem Relation Age of Onset     Heart Disease Mother      Snoring Mother      Diabetes Mother      Cancer Mother         Brain     Diabetes Paternal Grandmother      Heart Disease Brother      Heart Disease Brother      Diabetes Brother      Current Outpatient Medications   Medication Sig Dispense Refill     acetaminophen (TYLENOL) 500 MG tablet Take 650 mg by mouth        Ascorbic Acid (VITAMIN C) 500 MG CAPS Take 1,000 mg by mouth daily        aspirin 81 MG EC tablet Take 81 mg by mouth       atorvastatin (LIPITOR) 80 MG tablet Take 1 tablet (80 mg) by mouth daily 90 tablet 3     BD PEN NEEDLE JERRY 2ND GEN 32G X 4 MM miscellaneous INJECT  "1 NEEDLE UNDER THE SKIN DAILY 100 each 3     blood glucose monitoring (FREESTYLE) lancets USE 1 LANCET DAILY AS NEEDED 100 each 3     Cholecalciferol (VITAMIN D3) 25 MCG (1000 UT) CAPS Take 1,000 Units by mouth       clopidogrel (PLAVIX) 75 MG tablet Take 1 tablet (75 mg) by mouth daily 90 tablet 3     FREESTYLE LITE test strip USE 1 STRIP FOUR TIMES A DAY AS DIRECTED 200 strip 6     JARDIANCE 25 MG TABS tablet TAKE 1 TABLET DAILY 90 tablet 3     LANTUS SOLOSTAR 100 UNIT/ML soln INJECT 12 UNITS UNDER THE SKIN AT BEDTIME 15 mL 0     losartan (COZAAR) 25 MG tablet Take 0.5 tablets (12.5 mg) by mouth daily 90 tablet 3     metFORMIN (GLUCOPHAGE) 500 MG tablet TAKE 2 TABLETS TWICE A DAY WITH MEALS 360 tablet 3     multivitamin w/minerals (THERA-VIT-M) tablet Take 1 tablet by mouth       Omega-3 1000 MG capsule Take 2 g by mouth       sitagliptin (JANUVIA) 100 MG tablet Take 1 tablet (100 mg) by mouth daily 90 tablet 1     venlafaxine (EFFEXOR) 100 MG tablet Take 150 mg by mouth Taking 150 mg       venlafaxine (EFFEXOR) 37.5 MG tablet Take 37.5 mg by mouth       Social History     Tobacco Use     Smoking status: Former     Types: Cigars, Pipe     Quit date: 1980     Years since quittin.7     Smokeless tobacco: Former     Types: Chew     Tobacco comments:     Quit 40 years ago.   Substance Use Topics     Alcohol use: No       OBJECTIVE  /60   Pulse 87   Temp 98.2  F (36.8  C) (Temporal)   Ht 1.772 m (5' 9.75\")   Wt 68 kg (150 lb)   SpO2 96%   BMI 21.68 kg/m      Physical Exam  Vitals and nursing note reviewed.   Constitutional:       Appearance: Normal appearance. He is normal weight.   Eyes:      Extraocular Movements: Extraocular movements intact.      Conjunctiva/sclera: Conjunctivae normal.   Cardiovascular:      Rate and Rhythm: Normal rate.   Musculoskeletal:         General: Normal range of motion.   Skin:     Comments: Left hand:  5th digit, over the PIP extending distally to DIP, a \"long\" U " shaped skin flap.  No signs of tendon disruption.  Flap not dusky.  Some bleeding, minor.   Neurological:      General: No focal deficit present.      Mental Status: He is alert.   Psychiatric:         Mood and Affect: Mood normal.         Labs:  No results found for this or any previous visit (from the past 24 hour(s)).    X-Ray was not done.    ASSESSMENT:    No diagnosis found.     Medical Decision Making:    Differential Diagnosis:  laceration    Serious Comorbid Conditions:  Adult:  reviewed    PLAN:    Patient irrigated here and bandaged.  Discussion regarding probable viability of skin flap and possibility of loosing the skin.  At the moment, appears to have good blood flow.  Continue with dressing changes prn.  Discussed signs and symptoms of infection.  Patient encouraged to remove splint and keep ROM.  Discussed reasons to seek immediate medical attention.        Followup:    If not improving or if condition worsens, follow up with your Primary Care Provider, If not improving or if conditions worsens over the next 12-24 hours, go to the Emergency Department    There are no Patient Instructions on file for this visit.

## 2022-12-19 DIAGNOSIS — E11.9 TYPE 2 DIABETES MELLITUS (H): ICD-10-CM

## 2022-12-19 NOTE — TELEPHONE ENCOUNTER
"Last Written Prescription Date:  6/20/22  Last Fill Quantity: 90,  # refills: 1   Last office visit provider:  9/28/22     Requested Prescriptions   Pending Prescriptions Disp Refills     JANUVIA 100 MG tablet [Pharmacy Med Name: NILESHUVIA TABS 100MG] 90 tablet 3     Sig: TAKE 1 TABLET DAILY       DPP4 Inhibitors Protocol Passed - 12/19/2022  1:52 PM        Passed - HgbA1C in past 3 or 6 months     If HgbA1C is 8 or greater, it needs to be on file within the past 3 months.  If less than 8, must be on file within the past 6 months.     Recent Labs   Lab Test 09/28/22  0957   A1C 7.2*             Passed - Medication is active on med list        Passed - Patient is age 18 or older        Passed - Normal serum creatinine in past 12 months     Recent Labs   Lab Test 09/28/22  0957   CR 0.72       Ok to refill medication if creatinine is low          Passed - Recent (6 mo) or future (30 days) visit within the authorizing provider's specialty     Patient had office visit in the last 6 months or has a visit in the next 30 days with authorizing provider.  See \"Patient Info\" tab in inbasket, or \"Choose Columns\" in Meds & Orders section of the refill encounter.                 Jose Ku RN 12/19/22 1:52 PM  "

## 2022-12-26 DIAGNOSIS — E11.9 DIABETES MELLITUS, TYPE 2 (H): ICD-10-CM

## 2022-12-26 RX ORDER — INSULIN GLARGINE 100 [IU]/ML
INJECTION, SOLUTION SUBCUTANEOUS
Qty: 15 ML | Refills: 0 | Status: SHIPPED | OUTPATIENT
Start: 2022-12-26 | End: 2023-03-27

## 2022-12-26 NOTE — TELEPHONE ENCOUNTER
"  Last Written Prescription Date:  9/26/2022  Last Fill Quantity: 15 mL,  # refills: 0   Last office visit provider:  9/28/2022     Requested Prescriptions   Pending Prescriptions Disp Refills     LANTUS SOLOSTAR 100 UNIT/ML soln [Pharmacy Med Name: LANTUS SOLOSTAR PEN 3ML 5'S 100U/ML] 15 mL 2     Sig: INJECT 12 UNITS UNDER THE SKIN AT BEDTIME       Long Acting Insulin Protocol Passed - 12/26/2022  2:13 PM        Passed - Serum creatinine on file in past 12 months     Recent Labs   Lab Test 09/28/22  0957   CR 0.72       Ok to refill medication if creatinine is low          Passed - HgbA1C in past 3 or 6 months     If HgbA1C is 8 or greater, it needs to be on file within the past 3 months.  If less than 8, must be on file within the past 6 months.     Recent Labs   Lab Test 09/28/22  0957   A1C 7.2*             Passed - Medication is active on med list        Passed - Patient is age 18 or older        Passed - Recent (6 mo) or future (30 days) visit within the authorizing provider's specialty     Patient had office visit in the last 6 months or has a visit in the next 30 days with authorizing provider or within the authorizing provider's specialty.  See \"Patient Info\" tab in inbasket, or \"Choose Columns\" in Meds & Orders section of the refill encounter.                 Christy Geiger RN 12/26/22 2:14 PM  "

## 2022-12-28 ENCOUNTER — OFFICE VISIT (OUTPATIENT)
Dept: INTERNAL MEDICINE | Facility: CLINIC | Age: 77
End: 2022-12-28
Payer: MEDICARE

## 2022-12-28 VITALS
SYSTOLIC BLOOD PRESSURE: 118 MMHG | OXYGEN SATURATION: 97 % | HEART RATE: 81 BPM | HEIGHT: 69 IN | DIASTOLIC BLOOD PRESSURE: 62 MMHG | TEMPERATURE: 97.8 F | BODY MASS INDEX: 22.99 KG/M2 | WEIGHT: 155.2 LBS

## 2022-12-28 DIAGNOSIS — N40.0 BENIGN PROSTATIC HYPERPLASIA, UNSPECIFIED WHETHER LOWER URINARY TRACT SYMPTOMS PRESENT: ICD-10-CM

## 2022-12-28 DIAGNOSIS — E78.5 HYPERLIPIDEMIA, UNSPECIFIED HYPERLIPIDEMIA TYPE: ICD-10-CM

## 2022-12-28 DIAGNOSIS — E11.65 UNCONTROLLED TYPE 2 DIABETES MELLITUS WITH HYPERGLYCEMIA (H): Primary | ICD-10-CM

## 2022-12-28 DIAGNOSIS — E11.8 TYPE 2 DIABETES MELLITUS WITH COMPLICATION, WITHOUT LONG-TERM CURRENT USE OF INSULIN (H): Primary | ICD-10-CM

## 2022-12-28 DIAGNOSIS — N39.43 URINARY DRIBBLING: ICD-10-CM

## 2022-12-28 LAB
ALBUMIN SERPL BCG-MCNC: 4.4 G/DL (ref 3.5–5.2)
ALP SERPL-CCNC: 66 U/L (ref 40–129)
ALT SERPL W P-5'-P-CCNC: 45 U/L (ref 10–50)
ANION GAP SERPL CALCULATED.3IONS-SCNC: 12 MMOL/L (ref 7–15)
AST SERPL W P-5'-P-CCNC: 31 U/L (ref 10–50)
BILIRUB SERPL-MCNC: 0.4 MG/DL
BUN SERPL-MCNC: 21.1 MG/DL (ref 8–23)
CALCIUM SERPL-MCNC: 9.9 MG/DL (ref 8.8–10.2)
CHLORIDE SERPL-SCNC: 99 MMOL/L (ref 98–107)
CREAT SERPL-MCNC: 0.77 MG/DL (ref 0.67–1.17)
DEPRECATED HCO3 PLAS-SCNC: 27 MMOL/L (ref 22–29)
GFR SERPL CREATININE-BSD FRML MDRD: >90 ML/MIN/1.73M2
GLUCOSE SERPL-MCNC: 158 MG/DL (ref 70–99)
HBA1C MFR BLD: 8 % (ref 0–5.6)
POTASSIUM SERPL-SCNC: 4.4 MMOL/L (ref 3.4–5.3)
PROT SERPL-MCNC: 6.9 G/DL (ref 6.4–8.3)
SODIUM SERPL-SCNC: 138 MMOL/L (ref 136–145)

## 2022-12-28 PROCEDURE — 99214 OFFICE O/P EST MOD 30 MIN: CPT | Performed by: INTERNAL MEDICINE

## 2022-12-28 PROCEDURE — 80053 COMPREHEN METABOLIC PANEL: CPT | Performed by: INTERNAL MEDICINE

## 2022-12-28 PROCEDURE — 83036 HEMOGLOBIN GLYCOSYLATED A1C: CPT | Performed by: INTERNAL MEDICINE

## 2022-12-28 PROCEDURE — 36415 COLL VENOUS BLD VENIPUNCTURE: CPT | Performed by: INTERNAL MEDICINE

## 2022-12-28 NOTE — PROGRESS NOTES
Assessment & Plan     Type 2 diabetes mellitus with complication, without long-term current use of insulin (H)   On Lantus 12 U, metformin, Januvia and Jardiance. No hypoglycemia. Weight stable now. -130.  We started small dose of losartan in September for renal protection.  - Hemoglobin A1c; Future  - Comprehensive metabolic panel; Future    Benign prostatic hyperplasia, unspecified whether lower urinary tract symptoms present  he had BPH, nocturia 1-2x/ night, sometimes urgency incontinence.   - Adult Urology  Referral; Future    Hyperlipidemia, unspecified hyperlipidemia type  On statin    Urinary dribbling    - Adult Urology  Referral; Future                 Return in about 4 months (around 4/28/2023) for Follow up, multiple issues.    Anita Pineda MD  [facility]    Dominique Beach is a 77 year old, presenting for the following health issues:  Diabetes (Diabetes F/U)      History of Present Illness       Diabetes:   He presents for follow up of diabetes.  He is checking home blood glucose a few times a month. He checks blood glucose after meals.  Blood glucose is never over 200 and never under 70. When his blood glucose is low, the patient is asymptomatic for confusion, blurred vision, lethargy and reports not feeling dizzy, shaky, or weak.  He has no concerns regarding his diabetes at this time.  He is not experiencing numbness or burning in feet, excessive thirst, blurry vision, weight changes or redness, sores or blisters on feet.         He eats 2-3 servings of fruits and vegetables daily.He consumes 0 sweetened beverage(s) daily.He exercises with enough effort to increase his heart rate 10 to 19 minutes per day.  He exercises with enough effort to increase his heart rate 3 or less days per week.   He is taking medications regularly.             Review of Systems         Objective    /62   Pulse 81   Temp 97.8  F (36.6  C) (Oral)   Ht  "1.753 m (5' 9\")   Wt 70.4 kg (155 lb 3.2 oz)   SpO2 97%   BMI 22.92 kg/m    Body mass index is 22.92 kg/m .  Physical Exam   Constitutional:  oriented to person, place, and time, appears well-nourished. No distress.   HENT:   Head: Normocephalic.   Mouth/Throat: Oropharynx is clear and moist.   Eyes: Conjunctivae are normal. Pupils are equal, round, and reactive to light.   Neck: Normal range of motion. Neck supple.   Cardiovascular: Normal rate, regular rhythm and normal heart sounds.    Pulmonary/Chest: Effort normal and breath sounds normal.   Abdominal: Soft. Bowel sounds are normal.   Musculoskeletal: Normal range of motion.   Neurological: alert and oriented to person, place, and time. Skin: Skin is warm.   Psychiatric: normal mood and affect.                    "

## 2022-12-28 NOTE — TELEPHONE ENCOUNTER
MEDICAL RECORDS REQUEST   [facility] Clinic  [address] [place] [address]  PHONE: [phone]  Fax: [phone]        FUTURE VISIT INFORMATION                                                   Yong ENCINAS Eagle, : 1945 scheduled for future visit at [facility]    APPOINTMENT INFORMATION:    Date: 2023    Provider:  Kishore Glover MD    Reason for Visit/Diagnosis: Incontinence    REFERRAL INFORMATION:    Referring provider:  Anita Pineda MD in [facility]    RECORDS REQUESTED FOR VISIT                                                     NOTES  STATUS/DETAILS   OFFICE NOTE from referring provider  yes, 2022 -- Anita Pineda MD in [facility]   MEDICATION LIST  yes   LABS     URINALYSIS (UA)  yes     PRE-VISIT CHECKLIST      Record collection complete Yes   Appointment appropriately scheduled           (right time/right provider) Yes   Joint diagnostic appointment coordinated correctly          (ensure right order & amount of time) Yes   MyChart activation Yes   Questionnaire complete If no, please explain PENDING

## 2023-01-04 ENCOUNTER — VIRTUAL VISIT (OUTPATIENT)
Dept: INTERNAL MEDICINE | Facility: CLINIC | Age: 78
End: 2023-01-04
Payer: MEDICARE

## 2023-01-04 DIAGNOSIS — G47.39 COMPLEX SLEEP APNEA SYNDROME: Primary | ICD-10-CM

## 2023-01-04 DIAGNOSIS — J06.9 VIRAL UPPER RESPIRATORY TRACT INFECTION: ICD-10-CM

## 2023-01-04 DIAGNOSIS — U07.1 INFECTION DUE TO 2019 NOVEL CORONAVIRUS: ICD-10-CM

## 2023-01-04 PROBLEM — E11.8 TYPE 2 DIABETES MELLITUS WITH COMPLICATION, WITHOUT LONG-TERM CURRENT USE OF INSULIN (H): Status: RESOLVED | Noted: 2020-08-13 | Resolved: 2023-01-04

## 2023-01-04 PROBLEM — E11.9 TYPE 2 DIABETES, HBA1C GOAL < 8% (H): Status: ACTIVE | Noted: 2020-08-13

## 2023-01-04 PROCEDURE — 99214 OFFICE O/P EST MOD 30 MIN: CPT | Mod: 95 | Performed by: INTERNAL MEDICINE

## 2023-01-04 NOTE — PROGRESS NOTES
Yong is a 77 year old who is being evaluated via a billable video visit.      How would you like to obtain your AVS? MyChart  If the video visit is dropped, the invitation should be resent by: Text to cell phone: [phone]    Assessment & Plan     Infection due to 2019 novel coronavirus  Symptoms are very mild.  He has risk factor of DM type 2, CAD, DANIELLE, and age.  Meds would have to be held, atorvastatin and clopidogrel. He will come in tomorrow to lab and have repeat testing from the lab to confirm.     Return for next scheduled appointment.    Bi Sarmiento MD  [facility]    Subjective   Yong is a 77 year old accompanied by his spouse, presenting for the following health issues:  positive covid and Recheck Medication (Pt reports that he tested positive this morning for covid. Pt also reports that he feels like he has a cold.)    He has mild sore throat, nasal congestion, very mild cough.  No high fever, or headache. Wife is not ill.  A home test was used.      Review of Systems   See HPI      Objective    Vitals - Patient Reported  SpO2 (Patient Reported): 97  Temperature (Patient Reported): 97.6  F (36.4  C)  Pulse (Patient Reported): 85    He is alert and oriented in no  Visible distress.     Video-Visit Details    Type of service:  Video Visit   Video Start Time: 5:26 PM  Video End Time:5:38 PM    Originating Location (pt. Location): Home  Platform used for Video Visit: "[employer]

## 2023-01-05 ENCOUNTER — LAB (OUTPATIENT)
Dept: URGENT CARE | Facility: URGENT CARE | Age: 78
End: 2023-01-05
Payer: MEDICARE

## 2023-01-05 DIAGNOSIS — J06.9 VIRAL UPPER RESPIRATORY TRACT INFECTION: ICD-10-CM

## 2023-01-05 LAB — SARS-COV-2 RNA RESP QL NAA+PROBE: POSITIVE

## 2023-01-05 PROCEDURE — 99207 PR NO CHARGE LOS: CPT

## 2023-01-05 PROCEDURE — U0003 INFECTIOUS AGENT DETECTION BY NUCLEIC ACID (DNA OR RNA); SEVERE ACUTE RESPIRATORY SYNDROME CORONAVIRUS 2 (SARS-COV-2) (CORONAVIRUS DISEASE [COVID-19]), AMPLIFIED PROBE TECHNIQUE, MAKING USE OF HIGH THROUGHPUT TECHNOLOGIES AS DESCRIBED BY CMS-2020-01-R: HCPCS

## 2023-01-05 PROCEDURE — U0005 INFEC AGEN DETEC AMPLI PROBE: HCPCS

## 2023-01-06 ENCOUNTER — MYC MEDICAL ADVICE (OUTPATIENT)
Dept: INTERNAL MEDICINE | Facility: CLINIC | Age: 78
End: 2023-01-06

## 2023-01-06 ENCOUNTER — TELEPHONE (OUTPATIENT)
Dept: NURSING | Facility: CLINIC | Age: 78
End: 2023-01-06

## 2023-01-06 ENCOUNTER — TELEPHONE (OUTPATIENT)
Dept: INTERNAL MEDICINE | Facility: CLINIC | Age: 78
End: 2023-01-06

## 2023-01-06 DIAGNOSIS — U07.1 INFECTION DUE TO 2019 NOVEL CORONAVIRUS: Primary | ICD-10-CM

## 2023-01-06 NOTE — TELEPHONE ENCOUNTER
Spoke with patient and relayed information below from Dr Hall. Patient verbalized understanding and agrees with the plan.

## 2023-01-06 NOTE — TELEPHONE ENCOUNTER
COVID Positive/Requesting COVID treatment    Patient is positive for COVID and requesting treatment options.    Date of positive COVID test (PCR or at home)? 1/5/2023 PCR  Current COVID symptoms: cough and fatigue  Date COVID symptoms began: Couple days ago    Message should be routed to clinic RN pool. Best phone number to use for call back: [phone]      Patient saw [facility] 1/4/2023 for covid treatment.

## 2023-01-06 NOTE — TELEPHONE ENCOUNTER
Coronavirus (COVID-19) Notification    Caller Name (Patient, parent, daughter/son, grandparent, etc)  patient    Reason for call  Notify of Positive Coronavirus (COVID-19) lab results, assess symptoms,  review [facility] recommendations    Lab Result    Lab test:  2019-nCoV rRt-PCR or SARS-CoV-2 PCR    Oropharyngeal AND/OR nasopharyngeal swabs is POSITIVE for 2019-nCoV RNA/SARS-COV-2 PCR (COVID-19 virus)      Gather patient reported symptoms   Assessment   Current Symptoms at time of phone call, reported by patient: (if no symptoms, document: No symptoms] Stuffy nose, headache, cough, sneezing   Date of symptom(s) onset (if applicable) 1/3/23     If at time of call, Patients symptoms have worsened, the Patient should contact 911 or have someone drive them to Emergency Dept promptly:      If Patient calling 911, inform 911 personal that you have tested positive for the Coronavirus (COVID-19).  Place mask on and await 911 to arrive.    If Emergency Dept, If possible, please have another adult drive you to the Emergency Dept but you need to wear mask when in contact with other people.      Treatment Options:   Patient classified as COVID treatment eligible by Epic high risk algorithm: Yes  Is the patient symptomatic at the time of result notification? Yes. Was the onset of symptoms within the last 5 days? Yes.   There are now oral medications available for the treatment of COVID-19.  Taking one of these medications within the first five days of symptoms (when people may not yet feel severely ill) has been shown to make people feel better, prevent them from getting sicker, and preventing hospitalization and death.   Does the patient agree to have a visit with a provider to discuss treatment options? No.  Reason patient declined:  Already discussed with provider and determined don't need      Review information with Patient    Your result was positive. This means you have COVID-19 (coronavirus).    How can I  protect others?    These guidelines are for isolating before returning to work, school or .    If you DO have symptoms    Stay home and away from others     For at least 5 days after your symptoms started, AND    You are fever free for 24 hours (with no medicine that reduces fever), AND    Your other symptoms are better    Wear a mask for 10 full days anytime you are around others    If you DON'T have symptoms    Stay home and away from others for at least 5 days after your positive test    Wear a mask for 10 full days anytime you are around others    There may be different guidelines for healthcare facilities.  Please check with the specific sites before arriving.    If you have been told by a doctor that you were severely ill with COVID-19 or are immunocompromised, you should isolate for at least 10 days.    You should not go back to work until you meet the guidelines above for ending your home isolation. You don't need to be retested for COVID-19 before going back to work--studies show that you won't spread the virus if it's been at least 10 days since your symptoms started (or 20 days, if you have a weak immune system).    Employers, schools, and daycares: This is an official notice for this person's medical guidelines for returning in-person.  They must meet the above guidelines before going back to work, school or  in person.    You will receive a positive COVID-19 letter via [employer] or the mail soon with additional self-care information.    Would you like me to review some of that information with you now?  No    If you were tested for an upcoming procedure, please contact your provider for next steps.    Zoe Beebe

## 2023-01-06 NOTE — TELEPHONE ENCOUNTER
Spoke with patient and clarified symptom start date of 1/3. Patient has already had a visit with Dr Sarmiento on 1/4 and discussed medications that need to be held while on Paxlovid.

## 2023-01-06 NOTE — TELEPHONE ENCOUNTER
Please let patient know, I sent Paxlovid prescription in.  Infusion started as soon as possible since the medication is most effective in the first 5 days after symptoms started.    He should hold his Lipitor medication while on it due to medication interaction.    In some people Paxlovid can cause diarrhea, if diarrhea is moderate to severe, let us know and stop medication.

## 2023-01-11 DIAGNOSIS — E11.8 TYPE 2 DIABETES MELLITUS WITH COMPLICATION, WITHOUT LONG-TERM CURRENT USE OF INSULIN (H): ICD-10-CM

## 2023-01-12 NOTE — TELEPHONE ENCOUNTER
"Last Written Prescription Date:  1/19/22  Last Fill Quantity: 90,  # refills: 3   Last office visit provider:  1/4/23     Requested Prescriptions   Pending Prescriptions Disp Refills     JARDIANCE 25 MG TABS tablet [Pharmacy Med Name: JARDIANCE TABS 25MG] 90 tablet 3     Sig: TAKE 1 TABLET DAILY       Sodium Glucose Co-Transport Inhibitor Agents Passed - 1/12/2023 11:13 AM        Passed - Patient has documented A1c within the specified period of time.     If HgbA1C is 8 or greater, it needs to be on file within the past 3 months.  If less than 8, must be on file within the past 6 months.     Recent Labs   Lab Test 12/28/22  1256   A1C 8.0*             Passed - No creatinine >1.4 or GFR <45 within the past 12 mos     Recent Labs   Lab Test 12/28/22  1256 09/02/21  0937 06/15/21  0932   GFRESTIMATED >90   < > >60   GFRESTBLACK  --   --  >60    < > = values in this interval not displayed.       Recent Labs   Lab Test 12/28/22  1256   CR 0.77             Passed - Medication is active on med list        Passed - Patient is age 18 or older        Passed - Patient has documented normal Potassium within the last 12 mos.     Recent Labs   Lab Test 12/28/22  1256   POTASSIUM 4.4             Passed - Recent (6 mo) or future (30 days) visit within the authorizing provider's specialty     Patient had office visit in the last 6 months or has a visit in the next 30 days with authorizing provider or within the authorizing provider's specialty.  See \"Patient Info\" tab in inbasket, or \"Choose Columns\" in Meds & Orders section of the refill encounter.                 Jose Ku RN 01/12/23 11:13 AM  "

## 2023-01-17 DIAGNOSIS — G47.33 OBSTRUCTIVE SLEEP APNEA (ADULT) (PEDIATRIC): Primary | ICD-10-CM

## 2023-02-10 NOTE — PROGRESS NOTES
HPI:  Yong Guillermo is a 77 year old male being seen for incontinence. He has a history of DM type 2, CAD, DANIELLE    He was seen by his PCP for BPH with LUTS.     Had 2 strokes in the past around 8-10 years ago. Some difficulties with left hand movement but otherwise not too affected.      He gets urges to urinate, and sometimes urge related incontinence   When he empties his bladder, he able to urinate more afterwards  Says he drinks a lot of fluids   He drinks 2-3 cups of coffee in the morning then decaf in the afternoon all afternoon   When he is eating, he has a tall glass of [employer] mix  When he gets an urge, he will leak on his way to the bathroom   Does not wear pads or depends   Does not drink alcohol   Nocturia 1-2x per night      Currently not on any bladder meds      Hemoglobin A1C was 8.0 on 12/28/22  PSA checked was 0.41  Cr 0.72        Uroflow today:    VV 341cc  Qmax 25cc/s  Time 27s  PVR 0cc    Exam:  GENERAL: Healthy, alert and no distress  EYES: Eyes grossly normal to inspection.  No discharge or erythema  RESP: No audible wheeze, cough, or visible cyanosis.  NEURO: Alert and oriented x3  PSYCH: Mentation appears normal, judgement and insight intact, normal speech       Review of Imaging:  The following imaging exams were independently viewed and interpreted by me and discussed with patient:  CT from 2021 shows a medium sized prostate (not overly large)     Review of Labs:  The following labs were reviewed by me and discussed with the patient:  As above      Assessment & Plan   1. BPH with LUTS - PSA was <1 on recent check. Not currently on any medications. Voided well today on uroflow so we did not discuss starting alpha blocker  2. Urge and urge incontinence - likely related to his T2DM. Stroke does not sound like it contributed.     Discussed that he sounds like he UUI and urge. The first step would be conservative measures including avoiding caffeinated beverages like coffee, and to cut down  on his decaf coffee intake. Also may need to cut down on overall fluid intake as it sounds like he drinks a lot of fluid.     Also discussed he could trial an anticholinergic, however he was not interested in trying a medication at this time.     Follow in 3 months to see of cutting out coffee has helped. Can see how bothered he still is and whether he would like to try an anticholinergic    Kishore Glover MD  [facility] [place]      ==========================      Additional Coding Information:    Problems:  4 -- two or more stable chronic illnesses    Data Reviewed  Review of external notes as documented above   Review of the result(s) of each unique test - uroflow/pvr    Level of risk:  3 -- low risk (e.g., OTC medication or observation, minor surgery without risks)    Time spent:  30 minutes spent on the date of the encounter doing chart review, history and exam, documentation and further activities per the note

## 2023-02-13 ENCOUNTER — PRE VISIT (OUTPATIENT)
Dept: UROLOGY | Facility: CLINIC | Age: 78
End: 2023-02-13

## 2023-02-13 ENCOUNTER — OFFICE VISIT (OUTPATIENT)
Dept: UROLOGY | Facility: CLINIC | Age: 78
End: 2023-02-13
Payer: MEDICARE

## 2023-02-13 DIAGNOSIS — N40.0 BENIGN PROSTATIC HYPERPLASIA, UNSPECIFIED WHETHER LOWER URINARY TRACT SYMPTOMS PRESENT: ICD-10-CM

## 2023-02-13 DIAGNOSIS — N39.43 URINARY DRIBBLING: ICD-10-CM

## 2023-02-13 PROCEDURE — 99203 OFFICE O/P NEW LOW 30 MIN: CPT | Mod: 25 | Performed by: STUDENT IN AN ORGANIZED HEALTH CARE EDUCATION/TRAINING PROGRAM

## 2023-02-13 PROCEDURE — 51798 US URINE CAPACITY MEASURE: CPT | Performed by: STUDENT IN AN ORGANIZED HEALTH CARE EDUCATION/TRAINING PROGRAM

## 2023-02-13 PROCEDURE — 51741 ELECTRO-UROFLOWMETRY FIRST: CPT | Performed by: STUDENT IN AN ORGANIZED HEALTH CARE EDUCATION/TRAINING PROGRAM

## 2023-02-13 NOTE — PROGRESS NOTES
Had 2 strokes in the past around 8-10 years ago. Some difficulties with left hand movement but otherwise not too affected.     He gets urges to urinate  When he empties his bladder, he able to urinate more afterwards  Says he drinks a lot of fluids   He drinks 2-3 cups of coffee in the morning then decaf in the afternoon   When he is eating, he has a tall glass of [employer] mix  When he gets an urge, he will leak on his way to the bathroom   Does not wear pads or depends   Does not drink alcohol   Nocturia 1-2x per night     Currently not on any bladder meds     Hemoglobin A1C was 8.0 on 12/28/22  PSA checked was 0.41  Cr 0.72      Avoid coffee

## 2023-02-13 NOTE — LETTER
2/13/2023       RE: Yong Guillermo  [address]  [place] [facility] [facility] [address]     Dear Colleague,    Thank you for referring your patient, Yong Guillermo, to the [facility] [place] at [facility]. Please see a copy of my visit note below.    HPI:  Yong Guillermo is a 77 year old male being seen for incontinence. He has a history of DM type 2, CAD, DANIELLE    He was seen by his PCP for BPH with LUTS.     Had 2 strokes in the past around 8-10 years ago. Some difficulties with left hand movement but otherwise not too affected.      He gets urges to urinate, and sometimes urge related incontinence   When he empties his bladder, he able to urinate more afterwards  Says he drinks a lot of fluids   He drinks 2-3 cups of coffee in the morning then decaf in the afternoon all afternoon   When he is eating, he has a tall glass of [employer] mix  When he gets an urge, he will leak on his way to the bathroom   Does not wear pads or depends   Does not drink alcohol   Nocturia 1-2x per night      Currently not on any bladder meds      Hemoglobin A1C was 8.0 on 12/28/22  PSA checked was 0.41  Cr 0.72        Uroflow today:    VV 341cc  Qmax 25cc/s  Time 27s  PVR 0cc    Exam:  GENERAL: Healthy, alert and no distress  EYES: Eyes grossly normal to inspection.  No discharge or erythema  RESP: No audible wheeze, cough, or visible cyanosis.  NEURO: Alert and oriented x3  PSYCH: Mentation appears normal, judgement and insight intact, normal speech       Review of Imaging:  The following imaging exams were independently viewed and interpreted by me and discussed with patient:  CT from 2021 shows a medium sized prostate (not overly large)     Review of Labs:  The following labs were reviewed by me and discussed with the patient:  As above      Assessment & Plan   1. BPH with LUTS - PSA was <1 on recent check. Not currently on any medications. Voided well today on  uroflow so we did not discuss starting alpha blocker  2. Urge and urge incontinence - likely related to his T2DM. Stroke does not sound like it contributed.     Discussed that he sounds like he UUI and urge. The first step would be conservative measures including avoiding caffeinated beverages like coffee, and to cut down on his decaf coffee intake. Also may need to cut down on overall fluid intake as it sounds like he drinks a lot of fluid.     Also discussed he could trial an anticholinergic, however he was not interested in trying a medication at this time.     Follow in 3 months to see of cutting out coffee has helped. Can see how bothered he still is and whether he would like to try an anticholinergic    Kishore Glover MD  [facility] [place]      ==========================      Additional Coding Information:    Problems:  4 -- two or more stable chronic illnesses    Data Reviewed  Review of external notes as documented above   Review of the result(s) of each unique test - uroflow/pvr    Level of risk:  3 -- low risk (e.g., OTC medication or observation, minor surgery without risks)    Time spent:  30 minutes spent on the date of the encounter doing chart review, history and exam, documentation and further activities per the note

## 2023-02-13 NOTE — PATIENT INSTRUCTIONS
Please schedule a 3-month follow-up with any of our APPs.    It was a pleasure meeting with you today.  Thank you for allowing me and my team the privilege of caring for you today.  YOU are the reason we are here, and I truly hope we provided you with the excellent service you deserve.  Please let us know if there is anything else we can do for you so that we can be sure you are leaving completely satisfied with your care experience.

## 2023-03-03 NOTE — TELEPHONE ENCOUNTER
RECORDS RECEIVED FROM: internal    DATE RECEIVED: 5.23.23    NOTES (FOR ALL VISITS) STATUS DETAILS   OFFICE NOTES from referring provider internal   Dr. Miriam Howard     MEDICATION LIST internal     IMAGING      DEXASCAN internal  7/1/21     XR (Chest) internal  10.23.22   CT (HEAD/NECK/CHEST/ABDOMEN) internal  12.4.21, 6.17.21, 6.10.21     LABS     DIABETES: HBGA1C, CREATININE, FASTING LIPIDS, MICROALBUMIN URINE, POTASSIUM, TSH, T4    THYROID: TSH, T4, CBC, THYRODLONULIN, TOTAL T3, FREE T4, CALCITONIN, CEA internal

## 2023-03-27 DIAGNOSIS — E11.9 DIABETES MELLITUS, TYPE 2 (H): ICD-10-CM

## 2023-03-27 RX ORDER — INSULIN GLARGINE 100 [IU]/ML
INJECTION, SOLUTION SUBCUTANEOUS
Qty: 15 ML | Refills: 0 | Status: SHIPPED | OUTPATIENT
Start: 2023-03-27 | End: 2023-06-26

## 2023-03-27 NOTE — TELEPHONE ENCOUNTER
"Last Written Prescription Date:  12/26/2022  Last Fill Quantity: 15ml ,  # refills: 0   Last office visit provider:  1/4/2023     Requested Prescriptions   Pending Prescriptions Disp Refills     LANTUS SOLOSTAR 100 UNIT/ML soln [Pharmacy Med Name: LANTUS SOLOSTAR PEN 3ML 5'S 100U/ML] 15 mL 2     Sig: INJECT 12 UNITS UNDER THE SKIN AT BEDTIME       Long Acting Insulin Protocol Passed - 3/27/2023  3:52 PM        Passed - Serum creatinine on file in past 12 months     Recent Labs   Lab Test 12/28/22  1256   CR 0.77       Ok to refill medication if creatinine is low          Passed - HgbA1C in past 3 or 6 months     If HgbA1C is 8 or greater, it needs to be on file within the past 3 months.  If less than 8, must be on file within the past 6 months.     Recent Labs   Lab Test 12/28/22  1256   A1C 8.0*             Passed - Medication is active on med list        Passed - Patient is age 18 or older        Passed - Recent (6 mo) or future (30 days) visit within the authorizing provider's specialty     Patient had office visit in the last 6 months or has a visit in the next 30 days with authorizing provider or within the authorizing provider's specialty.  See \"Patient Info\" tab in inbasket, or \"Choose Columns\" in Meds & Orders section of the refill encounter.                 Deepa Cash RN 03/27/23 3:52 PM  "

## 2023-04-11 DIAGNOSIS — E11.8 TYPE 2 DIABETES MELLITUS WITH COMPLICATION, WITHOUT LONG-TERM CURRENT USE OF INSULIN (H): ICD-10-CM

## 2023-04-12 RX ORDER — EMPAGLIFLOZIN 25 MG/1
TABLET, FILM COATED ORAL
Qty: 90 TABLET | Refills: 1 | Status: SHIPPED | OUTPATIENT
Start: 2023-04-12 | End: 2023-10-10

## 2023-04-12 NOTE — TELEPHONE ENCOUNTER
"Last Written Prescription Date:  1/12/23  Last Fill Quantity: 90,  # refills: 0   Last office visit provider:  12/28/22, PCP     Requested Prescriptions   Pending Prescriptions Disp Refills     JARDIANCE 25 MG TABS tablet [Pharmacy Med Name: JARDIANCE TABS 25MG] 90 tablet 3     Sig: TAKE 1 TABLET DAILY       Sodium Glucose Co-Transport Inhibitor Agents Passed - 4/11/2023  2:29 AM        Passed - Patient has documented A1c within the specified period of time.     If HgbA1C is 8 or greater, it needs to be on file within the past 3 months.  If less than 8, must be on file within the past 6 months.     Recent Labs   Lab Test 12/28/22  1256   A1C 8.0*             Passed - No creatinine >1.4 or GFR <45 within the past 12 mos     Recent Labs   Lab Test 12/28/22  1256 09/02/21  0937 06/15/21  0932   GFRESTIMATED >90   < > >60   GFRESTBLACK  --   --  >60    < > = values in this interval not displayed.       Recent Labs   Lab Test 12/28/22  1256   CR 0.77             Passed - Medication is active on med list        Passed - Patient is age 18 or older        Passed - Patient has documented normal Potassium within the last 12 mos.     Recent Labs   Lab Test 12/28/22  1256   POTASSIUM 4.4             Passed - Recent (6 mo) or future (30 days) visit within the authorizing provider's specialty     Patient had office visit in the last 6 months or has a visit in the next 30 days with authorizing provider or within the authorizing provider's specialty.  See \"Patient Info\" tab in inbasket, or \"Choose Columns\" in Meds & Orders section of the refill encounter.                 Keily Castano RN 04/12/23 3:02 AM  "

## 2023-04-21 ENCOUNTER — PRE VISIT (OUTPATIENT)
Dept: UROLOGY | Facility: CLINIC | Age: 78
End: 2023-04-21
Payer: MEDICARE

## 2023-05-05 ENCOUNTER — VIRTUAL VISIT (OUTPATIENT)
Dept: SLEEP MEDICINE | Facility: CLINIC | Age: 78
End: 2023-05-05
Payer: MEDICARE

## 2023-05-05 VITALS — HEIGHT: 70 IN | BODY MASS INDEX: 21.47 KG/M2 | WEIGHT: 150 LBS

## 2023-05-05 DIAGNOSIS — G47.39 COMPLEX SLEEP APNEA SYNDROME: Primary | ICD-10-CM

## 2023-05-05 PROCEDURE — 99214 OFFICE O/P EST MOD 30 MIN: CPT | Mod: VID | Performed by: INTERNAL MEDICINE

## 2023-05-05 ASSESSMENT — SLEEP AND FATIGUE QUESTIONNAIRES
HOW LIKELY ARE YOU TO NOD OFF OR FALL ASLEEP WHEN YOU ARE A PASSENGER IN A CAR FOR AN HOUR WITHOUT A BREAK: WOULD NEVER DOZE
HOW LIKELY ARE YOU TO NOD OFF OR FALL ASLEEP WHILE SITTING INACTIVE IN A PUBLIC PLACE: WOULD NEVER DOZE
HOW LIKELY ARE YOU TO NOD OFF OR FALL ASLEEP WHILE SITTING QUIETLY AFTER LUNCH WITHOUT ALCOHOL: WOULD NEVER DOZE
HOW LIKELY ARE YOU TO NOD OFF OR FALL ASLEEP IN A CAR, WHILE STOPPED FOR A FEW MINUTES IN TRAFFIC: WOULD NEVER DOZE
HOW LIKELY ARE YOU TO NOD OFF OR FALL ASLEEP WHILE SITTING AND TALKING TO SOMEONE: WOULD NEVER DOZE
HOW LIKELY ARE YOU TO NOD OFF OR FALL ASLEEP WHILE SITTING AND READING: WOULD NEVER DOZE
HOW LIKELY ARE YOU TO NOD OFF OR FALL ASLEEP WHILE WATCHING TV: WOULD NEVER DOZE
HOW LIKELY ARE YOU TO NOD OFF OR FALL ASLEEP WHILE LYING DOWN TO REST IN THE AFTERNOON WHEN CIRCUMSTANCES PERMIT: MODERATE CHANCE OF DOZING

## 2023-05-05 NOTE — PROGRESS NOTES
Virtual Visit Details    Type of service:  Video Visit   Video Start Time: 9:25 AM  Video End Time:9:35 AM  Originating Location (pt. Location): Home  Distant Location (provider location):  Off-site  Platform used for Video Visit: AmWell     Additional 10 minutes on the date of service was spent performing the following:    -Preparing to see the patient  -Ordering medications, tests, or procedures   -Documenting clinical information in the electronic or other health record     Thank you for the opportunity to participate in the care of Yong Guillermo.     He is a 77 year old y/o male patient who comes to the sleep medicine clinic for follow up.  The patient was diagnosed with Complex sleep apnea at our facility in the past (AHI=23.6).  The patient states that he continues to benefit from pressure therapy.  He would like to get a prescription to get supplies.  He otherwise has no complaints.     Assessment and Plan:  In summary Yong Guillermo is a 77 year old year old male who is here for follow up.    1. Complex sleep apnea syndrome  I congratulated the patient on his excellent pressure therapy usage.  I will keep him on the same pressure settings for now.  Return to clinic annually.  - COMPREHENSIVE DME     Compliance Download data for 30 Days:  Pressure setting:ASV EPAP 6 cwp Min PS 3 cwp, Max PS 10 cwp  Residual AHI:0.2 events per hour  Leak:Minimal  Compliance:100%  Mask Tolerance:Good  Skin irritation:None  DME:[facility]    Lab reviewed: Discussed with patient.    Sleep-Wake Cycle:    The patient likes to initiate sleep at around 9 PM with a sleep latency of less than 20 minutes. The patient has 2-3 nocturnal awakenings. Final wake up time is around 7:30 AM.    ADRIANO:  ADRIANO Total Score: 1  Total score - [place]: 2 (5/5/2023  9:11 AM)        Patient Active Problem List   Diagnosis     Benign essential tremor     Complex sleep apnea syndrome     CVA (cerebral vascular accident) (H)     Type 2 diabetes, HbA1C  goal < 8% (H)     H/O carotid endarterectomy     Hyperlipemia     Osteopenia of multiple sites     BPH (benign prostatic hyperplasia)     Macrocytosis without anemia     Major depression, recurrent (H)     Thrombocytopenia (H)     Coronary artery disease involving native coronary artery of native heart without angina pectoris     Infection due to 2019 novel coronavirus       Past Medical History:   Diagnosis Date     Benign essential tremor 09/29/2016     BPH (benign prostatic hyperplasia) 05/26/2015     Coronary artery disease      CVA (cerebral vascular accident) (H) 01/14/2009    Right arm clumsiness.  MRI showed 2 infarcts in the left hemisphere.     CVA (cerebral vascular accident) (H) 06/22/2006    Left arm weakness.     Depression      Diabetes mellitus, type 2 (H)      Hyperlipidemia      Infection due to 2019 novel coronavirus 1/4/2023     Obstructive sleep apnea 07/22/2013     Shingles        Past Surgical History:   Procedure Laterality Date     ARTHROSCOPY SHOULDER ROTATOR CUFF REPAIR Right      CAROTID ENDARTERECTOMY Right 09/27/2006     CERVICAL DISC SURGERY  07/29/2005    C5/6 and C6/7 lamina foraminotomy, scope with partial facetectomy and excision of hard disc herniation.     HERNIA REPAIR  5/21/104    Right inguinal hernia and umbilical hernia right cheek cyst on the face.     IR AORTIC ARCH 4 VESSEL ANGIOGRAM  8/8/2006     IR CAROTID ANGIOGRAM  8/8/2006     IR CAROTID ANGIOGRAM  8/8/2006     MA EXCIS TENDON SHEATH LESION, HAND/FINGER      Description: Hand Excision Of A Tendon Cyst;  Recorded: 04/29/2008;     TONSILLECTOMY       VASECTOMY       ZZC EXCIS CERV DISK,ONE LEVEL      Description: Laminectomy With Disc Removal;  Recorded: 04/29/2008;  Comments: Lumbar level     ZZC EXCISION,BENIGN TUMOR,MANDIBLE      Description: Jaw Excision Benign Cyst / Tumor;  Proc Date: 05/01/2004;       Current Outpatient Medications   Medication Sig Dispense Refill     acetaminophen (TYLENOL) 500 MG tablet Take  650 mg by mouth        Ascorbic Acid (VITAMIN C) 500 MG CAPS Take 1,000 mg by mouth daily        aspirin 81 MG EC tablet Take 81 mg by mouth       atorvastatin (LIPITOR) 80 MG tablet Take 1 tablet (80 mg) by mouth daily 90 tablet 3     BD PEN NEEDLE JERRY 2ND GEN 32G X 4 MM miscellaneous INJECT 1 NEEDLE UNDER THE SKIN DAILY 100 each 3     blood glucose monitoring (FREESTYLE) lancets USE 1 LANCET DAILY AS NEEDED 100 each 3     Cholecalciferol (VITAMIN D3) 25 MCG (1000 UT) CAPS Take 1,000 Units by mouth       clopidogrel (PLAVIX) 75 MG tablet Take 1 tablet (75 mg) by mouth daily 90 tablet 3     FREESTYLE LITE test strip USE 1 STRIP FOUR TIMES A DAY AS DIRECTED 200 strip 6     JARDIANCE 25 MG TABS tablet TAKE 1 TABLET DAILY 90 tablet 1     LANTUS SOLOSTAR 100 UNIT/ML soln INJECT 12 UNITS UNDER THE SKIN AT BEDTIME 15 mL 0     losartan (COZAAR) 25 MG tablet Take 0.5 tablets (12.5 mg) by mouth daily 90 tablet 3     metFORMIN (GLUCOPHAGE) 500 MG tablet TAKE 2 TABLETS TWICE A DAY WITH MEALS 360 tablet 3     multivitamin w/minerals (THERA-VIT-M) tablet Take 1 tablet by mouth       Omega-3 1000 MG capsule Take 2 g by mouth       sitagliptin (JANUVIA) 100 MG tablet Take 1 tablet (100 mg) by mouth daily 90 tablet 1     venlafaxine (EFFEXOR) 100 MG tablet Take 150 mg by mouth Taking 150 mg       venlafaxine (EFFEXOR) 37.5 MG tablet Take 37.5 mg by mouth         Allergies   Allergen Reactions     Alteplase      Sulfa Antibiotics        Visual  Exam:  GEN: NAD,  Psych: normal mood, normal affect    Labs/Studies:      No results found for: PH, PHARTERIAL, PO2, [id number], SAT, PCO2, HCO3, BASEEXCESS, LENA, BEB  Lab Results   Component Value Date    TSH 1.40 04/26/2022    TSH 1.64 09/02/2021     Lab Results   Component Value Date     (H) 12/28/2022     (H) 09/28/2022     Lab Results   Component Value Date    HGB 14.3 09/28/2022    HGB 14.9 04/26/2022     Lab Results   Component Value Date    BUN 21.1 12/28/2022    BUN  24.6 (H) 09/28/2022    CR 0.77 12/28/2022    CR 0.72 09/28/2022     Lab Results   Component Value Date    AST 31 12/28/2022    AST 28 09/28/2022    ALT 45 12/28/2022    ALT 34 09/28/2022    ALKPHOS 66 12/28/2022    ALKPHOS 73 09/28/2022    BILITOTAL 0.4 12/28/2022    BILITOTAL 0.6 09/28/2022     No results found for: UAMP, UBARB, BENZODIAZEUR, UCANN, UCOC, OPIT, UPCP    Recent Labs   Lab Test 12/28/22  1256 09/28/22  0957    139   POTASSIUM 4.4 4.4   CHLORIDE 99 101   CO2 27 25   ANIONGAP 12 13   * 125*   BUN 21.1 24.6*   CR 0.77 0.72   KATHLEEN 9.9 9.4       No results found for: MEMO    I reviewed the efficacy and compliance report from his device. Data summarized on the HPI and the PAP compliance flow sheet.     Patient verbalized understanding of these issues, agrees with the plan and all questions were answered today. Patient was given an opportuntity to voice any other symptoms or concerns not listed above. Patient did not have any other symptoms or concerns.      Andre Kemp DO  Board Certified in Internal Medicine and Sleep Medicine    (Note created with Dragon voice recognition and unintended spelling errors and word substitutions may occur)     Audio and visual devices were used for this virtual clinic visit with permission from patient.

## 2023-05-05 NOTE — NURSING NOTE
Is the patient currently in the state of [place]? YES    Visit mode:VIDEO    If the visit is dropped, the patient can be reconnected by: VIDEO VISIT: Send to e-mail at: [email or web address]    Will anyone else be joining the visit? NO      How would you like to obtain your AVS? MyChart    Are changes needed to the allergy or medication list? NO    Reason for visit: CPAP Follow Up    Has patient had flu shot for current/most recent flu season? If so, when? Yes: 09/27/2022

## 2023-05-08 DIAGNOSIS — E11.9 TYPE 2 DIABETES MELLITUS (H): ICD-10-CM

## 2023-05-09 NOTE — TELEPHONE ENCOUNTER
"Last Written Prescription Date:  5-14-22  Last Fill Quantity: 360,  # refills: 3   Last office visit provider:  1-4-2023     Requested Prescriptions   Pending Prescriptions Disp Refills     metFORMIN (GLUCOPHAGE) 500 MG tablet [Pharmacy Med Name: METFORMIN HCL TABS 500MG] 360 tablet 3     Sig: TAKE 2 TABLETS TWICE A DAY WITH MEALS       Biguanide Agents Passed - 5/8/2023  2:47 AM        Passed - Patient is age 10 or older        Passed - Patient has documented A1c within the specified period of time.     If HgbA1C is 8 or greater, it needs to be on file within the past 3 months.  If less than 8, must be on file within the past 6 months.     Recent Labs   Lab Test 12/28/22  1256   A1C 8.0*             Passed - Patient's CR is NOT>1.4 OR Patient's EGFR is NOT<45 within past 12 mos.     Recent Labs   Lab Test 12/28/22  1256 09/02/21  0937 06/15/21  0932   GFRESTIMATED >90   < > >60   GFRESTBLACK  --   --  >60    < > = values in this interval not displayed.       Recent Labs   Lab Test 12/28/22  1256   CR 0.77             Passed - Patient does NOT have a diagnosis of CHF.        Passed - Medication is active on med list        Passed - Recent (6 mo) or future (30 days) visit within the authorizing provider's specialty     Patient had office visit in the last 6 months or has a visit in the next 30 days with authorizing provider or within the authorizing provider's specialty.  See \"Patient Info\" tab in inbasket, or \"Choose Columns\" in Meds & Orders section of the refill encounter.                 Morena Cage RN 05/08/23 10:53 PM  "

## 2023-05-11 ENCOUNTER — OFFICE VISIT (OUTPATIENT)
Dept: DERMATOLOGY | Facility: CLINIC | Age: 78
End: 2023-05-11
Payer: MEDICARE

## 2023-05-11 DIAGNOSIS — H61.002 CHONDRODERMATITIS NODULARIS CHRONICA HELICIS, LEFT: ICD-10-CM

## 2023-05-11 DIAGNOSIS — L57.0 AK (ACTINIC KERATOSIS): Primary | ICD-10-CM

## 2023-05-11 DIAGNOSIS — L73.8 SEBACEOUS HYPERPLASIA: ICD-10-CM

## 2023-05-11 PROCEDURE — 17003 DESTRUCT PREMALG LES 2-14: CPT | Performed by: DERMATOLOGY

## 2023-05-11 PROCEDURE — 99203 OFFICE O/P NEW LOW 30 MIN: CPT | Mod: 25 | Performed by: DERMATOLOGY

## 2023-05-11 PROCEDURE — 17000 DESTRUCT PREMALG LESION: CPT | Performed by: DERMATOLOGY

## 2023-05-11 ASSESSMENT — PAIN SCALES - GENERAL: PAINLEVEL: MILD PAIN (3)

## 2023-05-11 NOTE — PROGRESS NOTES
Dermatology New Patient Visit    Assessment and Plan:  1.  Actinic keratoses x3, on the bilateral temples.  After informed verbal consent, 3 lesions were treated with liquid nitrogen times 5 seconds x 2 cycles.  The patient tolerated this without complication.  2.  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  He was reassured as to the benign appearance of this lesion today.  We discussed home management options including pressure offloading with a small pressure doughnut or corn pad.  3.  Benign skin findings including solar lentigines and sebaceous hyperplasia.  Reassurance was provided regarding the benign appearance of these lesions.    The patient will follow-up in our clinic in 1 years time.      Oj Borges MD  Dermatology Attending    ______________________________________________________    Chief complaint: Skin check/ skin cancer screening exam    History of Present Illness:  Yong is a very pleasant 77 year old male presenting as a new patient to our dermatology clinic for a skin check.  He has no personal history of skin cancer but has had some precancerous spots on the face.  He has previously had 2 bluelight PDT treatments at an outside dermatologist, with overall improvement.  Today he denies any nonhealing sores or spots that bleed easily.  He does not believe he has any new or changing moles.    Physical Exam:  Gen  Well appearing, no apparent distress  Skin Exam of head, neck, bilateral upper extremities and trunk  Bilateral temples with three rough scaly flat topped 3-4mm pink papules  Left ear helix with 2mm flat topped pink papule  Scattered light tan uniformly pigmented macules on forearms  Occasional 2-3mm flesh toned papules with yellowish hue on dermoscopy on face

## 2023-05-11 NOTE — PROGRESS NOTES
Urology Virtual Visit - Follow Up    Reason for visit: symptom check    HPI: Yong Guillermo is a 77 year old male who is seen today via virtual visit for follow up on urinary symptoms. He had a visit with Dr. Glover 2/13/2013. Reviewed history and flow data as follows:     Yong Guillermo is a 77 year old male being seen for incontinence. He has a history of DM type 2, CAD, DANIELLE     He was seen by his PCP for BPH with LUTS.      Had 2 strokes in the past around 8-10 years ago. Some difficulties with left hand movement but otherwise not too affected.      He gets urges to urinate, and sometimes urge related incontinence   When he empties his bladder, he able to urinate more afterwards  Says he drinks a lot of fluids   He drinks 2-3 cups of coffee in the morning then decaf in the afternoon all afternoon   When he is eating, he has a tall glass of [employer] mix  When he gets an urge, he will leak on his way to the bathroom   Does not wear pads or depends   Does not drink alcohol   Nocturia 1-2x per night      Currently not on any bladder meds      Hemoglobin A1C was 8.0 on 12/28/22  PSA checked was 0.41  Cr 0.72    Uroflow today:  VV 341cc  Qmax 25cc/s  Time 27s  PVR 0cc      On that date, he was counseled on conservative measures including avoiding bladder irritants and reducing overall fluid intake. Trial of an anticholinergic medication was discussed but he declined at that time.    TODAY   5/16/2023:  Yong has seen some improvements with limiting caffeine, alcohol, and practicing timed voiding.   He is happy to continue with lifestyle modifications and is not interested in medications at this time.    PEx  GENERAL: Healthy, alert and no distress  EYES: Eyes grossly normal to inspection.  No discharge or erythema, or obvious scleral/conjunctival abnormalities.  RESP: No audible wheeze, cough, or visible cyanosis.  No visible retractions or increased work of breathing.    SKIN: Visible skin clear. No significant  rash, abnormal pigmentation or lesions.  NEURO: Cranial nerves grossly intact.  Mentation and speech appropriate for age.  PSYCH: Mentation appears normal, affect normal/bright, judgement and insight intact, normal speech and appearance well-groomed.    LAB:  Prostate Specific Antigen Screen   Date Value Ref Range Status   09/28/2022 0.41 0.00 - 6.50 ng/mL Final   06/02/2021 0.4 0.0 - 6.5 ng/mL Final     PSA Tumor Marker   Date Value Ref Range Status   09/02/2021 0.38 0.00 - 6.50 ug/L Final       Creatinine   Date Value Ref Range Status   12/28/2022 0.77 0.67 - 1.17 mg/dL Final   06/10/2021 0.87 0.66 - 1.25 mg/dL Final       Lab Results   Component Value Date    A1C 8.0 12/28/2022    A1C 7.2 09/28/2022    A1C 6.9 04/26/2022    A1C 6.9 12/02/2021    A1C 6.6 09/02/2021       IMAGING:   EXAM: CT ABDOMEN PELVIS WO ORAL WO IV CONTRAST  LOCATION: [facility]  DATE/TIME: 6/17/2021 10:41 AM    FINDINGS:   LOWER CHEST: Visualized lungs are clear. No pleural effusion. Heart size normal with no pericardial effusion.      HEPATOBILIARY: Liver is normal.  No calcified gallstones or bile duct dilatation.      PANCREAS: Normal.     SPLEEN: Spleen size normal.     ADRENAL GLANDS: Normal.     KIDNEYS, URETERS AND BLADDER: The 3 mm stone in the distal left ureter at 06/10/2021 CT has passed into the bladder. Mild persistent left pyelocaliectasis and ureterectasis and renal sinus fat edema. Kidneys, ureters and bladder otherwise normal.     BOWEL: Normal appendix. Relatively large amount of stool throughout the colon. Bowel is otherwise normal with no obstruction or inflammatory change.     LYMPH NODES: No lymphadenopathy.     VASCULATURE: Normal caliber abdominal aorta with mild calcified atheromatous plaque.       PELVIC ORGANS: No pelvic mass or fluid.     MUSCULOSKELETAL: L1 vertebral density measurement of less than 110HU. This finding indicates a high likelihood of osteoporosis and, if clinically  indicated, DEXA might be useful to confirm this diagnosis and/or to monitor for treatment related changes.     IMPRESSION:  1.  Interval passage of the stone in the distal left ureter with mild persistent pyelocaliectasis, ureterectasis and renal sinus fat edema.  2.  Kidneys, ureters and bladder otherwise normal.  3.  High likelihood of osteoporosis.      ASSESSMENT/PLAN:  77 year old male with PMH of DM2, DANIELLE, CVA, BPH, mainly bothered by irritative voiding symptoms of urgency, occasional urge incontinence. Recent PVR 0 mL. He has had some improvement with lifestyle modifications. Briefly discussed pharmacotherapy for OAB, but he declines for now.   -Continue to limit bladder irritants, fluid moderation.  -Continue timed voiding.  -Follow up with urology as needed.     Carmenza Hickman PA-C  Department of Urology    Virtual Visit Details    Type of service:  Video Visit     Video Start Time: 1:31 PM    Video End Time: 1:38 PM    Originating Location (pt. Location): Home    Distant Location (provider location):  Off-site  Platform used for Video Visit: [employer]Now      10 minutes spent on the date of the encounter doing chart review, review of test results, patient visit and documentation

## 2023-05-11 NOTE — NURSING NOTE
Dermatology Rooming Note    Yong Guillermo's goals for this visit include:   Chief Complaint   Patient presents with     Derm Problem     Spots of concern on forehead and ears       Jennifer Oden

## 2023-05-11 NOTE — LETTER
5/11/2023       RE: Yong Guillermo  [address]  [place] [address]     Dear Colleague,    Thank you for referring your patient, Yong Guillermo, to the [facility]ERMAT[facility] [place] at [facility]. Please see a copy of my visit note below.    Dermatology New Patient Visit    Assessment and Plan:  1.  Actinic keratoses x3, on the bilateral temples.  After informed verbal consent, 3 lesions were treated with liquid nitrogen times 5 seconds x 2 cycles.  The patient tolerated this without complication.  2.  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  He was reassured as to the benign appearance of this lesion today.  We discussed home management options including pressure offloading with a small pressure doughnut or corn pad.  3.  Benign skin findings including solar lentigines and sebaceous hyperplasia.  Reassurance was provided regarding the benign appearance of these lesions.    The patient will follow-up in our clinic in 1 years time.      Oj Borges MD  Dermatology Attending    ______________________________________________________    Chief complaint: Skin check/ skin cancer screening exam    History of Present Illness:  Yong is a very pleasant 77 year old male presenting as a new patient to our dermatology clinic for a skin check.  He has no personal history of skin cancer but has had some precancerous spots on the face.  He has previously had 2 bluelight PDT treatments at an outside dermatologist, with overall improvement.  Today he denies any nonhealing sores or spots that bleed easily.  He does not believe he has any new or changing moles.    Physical Exam:  Gen  Well appearing, no apparent distress  Skin Exam of head, neck, bilateral upper extremities and trunk  Bilateral temples with three rough scaly flat topped 3-4mm pink papules  Left ear helix with 2mm flat topped  pink papule  Scattered light tan uniformly pigmented macules on forearms  Occasional 2-3mm flesh toned papules with yellowish hue on dermoscopy on face

## 2023-05-16 ENCOUNTER — VIRTUAL VISIT (OUTPATIENT)
Dept: UROLOGY | Facility: CLINIC | Age: 78
End: 2023-05-16
Payer: MEDICARE

## 2023-05-16 DIAGNOSIS — R39.15 URINARY URGENCY: Primary | ICD-10-CM

## 2023-05-16 PROCEDURE — 99212 OFFICE O/P EST SF 10 MIN: CPT | Mod: VID | Performed by: PHYSICIAN ASSISTANT

## 2023-05-16 NOTE — NURSING NOTE
Patient denies any changes since echeck-in regarding medication and allergies and states all information entered during echeck-in remains accurate.    Is the patient currently in the state of [place]? YES    Visit mode:VIDEO    If the visit is dropped, the patient can be reconnected by: VIDEO VISIT: Text to cell phone: [phone]    Will anyone else be joining the visit? NO      How would you like to obtain your AVS? MyChart    Are changes needed to the allergy or medication list? NO    Reason for visit: Video Visit (follow up w/ MARIBEL per kevin)

## 2023-05-16 NOTE — LETTER
5/16/2023       RE: Yong Guillermo  [address]  [facility] [address]     Dear Colleague,    Thank you for referring your patient, Yong Guillermo, to the [facility]OLOG[facility] [place] at [facility]. Please see a copy of my visit note below.    Urology Virtual Visit - Follow Up    Reason for visit: symptom check    HPI: Yong Guillermo is a 77 year old male who is seen today via virtual visit for follow up on urinary symptoms. He had a visit with Dr. Glover 2/13/2013. Reviewed history and flow data as follows:     Yong Guillermo is a 77 year old male being seen for incontinence. He has a history of DM type 2, CAD, DANIELLE     He was seen by his PCP for BPH with LUTS.      Had 2 strokes in the past around 8-10 years ago. Some difficulties with left hand movement but otherwise not too affected.      He gets urges to urinate, and sometimes urge related incontinence   When he empties his bladder, he able to urinate more afterwards  Says he drinks a lot of fluids   He drinks 2-3 cups of coffee in the morning then decaf in the afternoon all afternoon   When he is eating, he has a tall glass of [employer] mix  When he gets an urge, he will leak on his way to the bathroom   Does not wear pads or depends   Does not drink alcohol   Nocturia 1-2x per night      Currently not on any bladder meds      Hemoglobin A1C was 8.0 on 12/28/22  PSA checked was 0.41  Cr 0.72    Uroflow today:  VV 341cc  Qmax 25cc/s  Time 27s  PVR 0cc      On that date, he was counseled on conservative measures including avoiding bladder irritants and reducing overall fluid intake. Trial of an anticholinergic medication was discussed but he declined at that time.    TODAY   5/16/2023:  Yong has seen some improvements with limiting caffeine, alcohol, and practicing timed voiding.   He is happy to continue with lifestyle modifications and is not interested in medications at this  time.    PEx  GENERAL: Healthy, alert and no distress  EYES: Eyes grossly normal to inspection.  No discharge or erythema, or obvious scleral/conjunctival abnormalities.  RESP: No audible wheeze, cough, or visible cyanosis.  No visible retractions or increased work of breathing.    SKIN: Visible skin clear. No significant rash, abnormal pigmentation or lesions.  NEURO: Cranial nerves grossly intact.  Mentation and speech appropriate for age.  PSYCH: Mentation appears normal, affect normal/bright, judgement and insight intact, normal speech and appearance well-groomed.    LAB:  Prostate Specific Antigen Screen   Date Value Ref Range Status   09/28/2022 0.41 0.00 - 6.50 ng/mL Final   06/02/2021 0.4 0.0 - 6.5 ng/mL Final     PSA Tumor Marker   Date Value Ref Range Status   09/02/2021 0.38 0.00 - 6.50 ug/L Final       Creatinine   Date Value Ref Range Status   12/28/2022 0.77 0.67 - 1.17 mg/dL Final   06/10/2021 0.87 0.66 - 1.25 mg/dL Final       Lab Results   Component Value Date    A1C 8.0 12/28/2022    A1C 7.2 09/28/2022    A1C 6.9 04/26/2022    A1C 6.9 12/02/2021    A1C 6.6 09/02/2021       IMAGING:   EXAM: CT ABDOMEN PELVIS WO ORAL WO IV CONTRAST  LOCATION: [facility]  DATE/TIME: 6/17/2021 10:41 AM    FINDINGS:   LOWER CHEST: Visualized lungs are clear. No pleural effusion. Heart size normal with no pericardial effusion.      HEPATOBILIARY: Liver is normal.  No calcified gallstones or bile duct dilatation.      PANCREAS: Normal.     SPLEEN: Spleen size normal.     ADRENAL GLANDS: Normal.     KIDNEYS, URETERS AND BLADDER: The 3 mm stone in the distal left ureter at 06/10/2021 CT has passed into the bladder. Mild persistent left pyelocaliectasis and ureterectasis and renal sinus fat edema. Kidneys, ureters and bladder otherwise normal.     BOWEL: Normal appendix. Relatively large amount of stool throughout the colon. Bowel is otherwise normal with no obstruction or inflammatory change.      LYMPH NODES: No lymphadenopathy.     VASCULATURE: Normal caliber abdominal aorta with mild calcified atheromatous plaque.       PELVIC ORGANS: No pelvic mass or fluid.     MUSCULOSKELETAL: L1 vertebral density measurement of less than 110HU. This finding indicates a high likelihood of osteoporosis and, if clinically indicated, DEXA might be useful to confirm this diagnosis and/or to monitor for treatment related changes.     IMPRESSION:  1.  Interval passage of the stone in the distal left ureter with mild persistent pyelocaliectasis, ureterectasis and renal sinus fat edema.  2.  Kidneys, ureters and bladder otherwise normal.  3.  High likelihood of osteoporosis.      ASSESSMENT/PLAN:  77 year old male with PMH of DM2, DANIELLE, CVA, BPH, mainly bothered by irritative voiding symptoms of urgency, occasional urge incontinence. Recent PVR 0 mL. He has had some improvement with lifestyle modifications. Briefly discussed pharmacotherapy for OAB, but he declines for now.   -Continue to limit bladder irritants, fluid moderation.  -Continue timed voiding.  -Follow up with urology as needed.     Carmenza Hickman PA-C  Department of Urology    Virtual Visit Details    Type of service:  Video Visit     Video Start Time: 1:31 PM    Video End Time: 1:38 PM    Originating Location (pt. Location): Home    Distant Location (provider location):  Off-site  Platform used for Video Visit: [employer]      10 minutes spent on the date of the encounter doing chart review, review of test results, patient visit and documentation

## 2023-05-16 NOTE — PATIENT INSTRUCTIONS
UROLOGY CLINIC VISIT PATIENT INSTRUCTIONS    Continue to limit bladder irritants, mainly coffee, caffeine, alcohol, etc.    Limit fluids before going to bed.    Follow up with urology if your symptoms worsen, otherwise as needed.     If you have any issues, questions or concerns in the meantime, do not hesitate to contact us at [phone] or via [employer].     It was a pleasure meeting with you today.  Thank you for allowing me and my team the privilege of caring for you today.  YOU are the reason we are here, and I truly hope we provided you with the excellent service you deserve.  Please let us know if there is anything else we can do for you so that we can be sure you are leaving completely satisfied with your care experience.

## 2023-05-17 ASSESSMENT — ENCOUNTER SYMPTOMS
STIFFNESS: 0
FLANK PAIN: 1
HEMATURIA: 0
SKIN CHANGES: 0
BACK PAIN: 1
DIFFICULTY URINATING: 0
MUSCLE WEAKNESS: 1
POOR WOUND HEALING: 0
DYSURIA: 0
NECK PAIN: 0
JOINT SWELLING: 0
MUSCLE CRAMPS: 0
MYALGIAS: 1
NAIL CHANGES: 0
ARTHRALGIAS: 1

## 2023-05-23 ENCOUNTER — OFFICE VISIT (OUTPATIENT)
Dept: ENDOCRINOLOGY | Facility: CLINIC | Age: 78
End: 2023-05-23
Attending: INTERNAL MEDICINE
Payer: MEDICARE

## 2023-05-23 ENCOUNTER — OFFICE VISIT (OUTPATIENT)
Dept: INTERNAL MEDICINE | Facility: CLINIC | Age: 78
End: 2023-05-23
Payer: MEDICARE

## 2023-05-23 ENCOUNTER — PRE VISIT (OUTPATIENT)
Dept: ENDOCRINOLOGY | Facility: CLINIC | Age: 78
End: 2023-05-23

## 2023-05-23 VITALS
HEIGHT: 70 IN | HEART RATE: 82 BPM | BODY MASS INDEX: 23.02 KG/M2 | RESPIRATION RATE: 16 BRPM | SYSTOLIC BLOOD PRESSURE: 119 MMHG | DIASTOLIC BLOOD PRESSURE: 58 MMHG | OXYGEN SATURATION: 98 % | WEIGHT: 160.8 LBS | TEMPERATURE: 97.3 F

## 2023-05-23 VITALS
BODY MASS INDEX: 23.39 KG/M2 | OXYGEN SATURATION: 92 % | SYSTOLIC BLOOD PRESSURE: 129 MMHG | WEIGHT: 163 LBS | DIASTOLIC BLOOD PRESSURE: 67 MMHG | HEART RATE: 70 BPM

## 2023-05-23 DIAGNOSIS — I35.1 NONRHEUMATIC AORTIC VALVE INSUFFICIENCY: ICD-10-CM

## 2023-05-23 DIAGNOSIS — E78.5 HYPERLIPIDEMIA, UNSPECIFIED HYPERLIPIDEMIA TYPE: ICD-10-CM

## 2023-05-23 DIAGNOSIS — E11.65 UNCONTROLLED TYPE 2 DIABETES MELLITUS WITH HYPERGLYCEMIA (H): ICD-10-CM

## 2023-05-23 DIAGNOSIS — D69.6 THROMBOCYTOPENIA (H): ICD-10-CM

## 2023-05-23 DIAGNOSIS — F33.9 RECURRENT MAJOR DEPRESSIVE DISORDER, REMISSION STATUS UNSPECIFIED (H): ICD-10-CM

## 2023-05-23 DIAGNOSIS — E11.9 TYPE 2 DIABETES, HBA1C GOAL < 8% (H): Primary | ICD-10-CM

## 2023-05-23 LAB
ALBUMIN SERPL BCG-MCNC: 4.3 G/DL (ref 3.5–5.2)
ALP SERPL-CCNC: 68 U/L (ref 40–129)
ALT SERPL W P-5'-P-CCNC: 37 U/L (ref 10–50)
ANION GAP SERPL CALCULATED.3IONS-SCNC: 15 MMOL/L (ref 7–15)
AST SERPL W P-5'-P-CCNC: 24 U/L (ref 10–50)
BILIRUB SERPL-MCNC: 0.3 MG/DL
BUN SERPL-MCNC: 15 MG/DL (ref 8–23)
CALCIUM SERPL-MCNC: 9.3 MG/DL (ref 8.8–10.2)
CHLORIDE SERPL-SCNC: 101 MMOL/L (ref 98–107)
CREAT SERPL-MCNC: 0.71 MG/DL (ref 0.67–1.17)
DEPRECATED HCO3 PLAS-SCNC: 25 MMOL/L (ref 22–29)
ERYTHROCYTE [DISTWIDTH] IN BLOOD BY AUTOMATED COUNT: 12 % (ref 10–15)
GFR SERPL CREATININE-BSD FRML MDRD: >90 ML/MIN/1.73M2
GLUCOSE SERPL-MCNC: 183 MG/DL (ref 70–99)
HBA1C MFR BLD: 8 % (ref 0–5.6)
HCT VFR BLD AUTO: 42.4 % (ref 40–53)
HGB BLD-MCNC: 14.7 G/DL (ref 13.3–17.7)
MCH RBC QN AUTO: 34.3 PG (ref 26.5–33)
MCHC RBC AUTO-ENTMCNC: 34.7 G/DL (ref 31.5–36.5)
MCV RBC AUTO: 99 FL (ref 78–100)
PLATELET # BLD AUTO: 135 10E3/UL (ref 150–450)
POTASSIUM SERPL-SCNC: 4.3 MMOL/L (ref 3.4–5.3)
PROT SERPL-MCNC: 6.9 G/DL (ref 6.4–8.3)
RBC # BLD AUTO: 4.29 10E6/UL (ref 4.4–5.9)
SODIUM SERPL-SCNC: 141 MMOL/L (ref 136–145)
WBC # BLD AUTO: 5.3 10E3/UL (ref 4–11)

## 2023-05-23 PROCEDURE — 99214 OFFICE O/P EST MOD 30 MIN: CPT | Performed by: INTERNAL MEDICINE

## 2023-05-23 PROCEDURE — 83036 HEMOGLOBIN GLYCOSYLATED A1C: CPT | Performed by: INTERNAL MEDICINE

## 2023-05-23 PROCEDURE — 99215 OFFICE O/P EST HI 40 MIN: CPT | Performed by: PHYSICIAN ASSISTANT

## 2023-05-23 PROCEDURE — 36415 COLL VENOUS BLD VENIPUNCTURE: CPT | Performed by: INTERNAL MEDICINE

## 2023-05-23 PROCEDURE — 80053 COMPREHEN METABOLIC PANEL: CPT | Performed by: INTERNAL MEDICINE

## 2023-05-23 PROCEDURE — 85027 COMPLETE CBC AUTOMATED: CPT | Performed by: INTERNAL MEDICINE

## 2023-05-23 RX ORDER — DULAGLUTIDE 0.75 MG/.5ML
0.75 INJECTION, SOLUTION SUBCUTANEOUS
Qty: 15 ML | Refills: 0 | COMMUNITY
Start: 2023-05-23 | End: 2023-05-24

## 2023-05-23 ASSESSMENT — PATIENT HEALTH QUESTIONNAIRE - PHQ9
10. IF YOU CHECKED OFF ANY PROBLEMS, HOW DIFFICULT HAVE THESE PROBLEMS MADE IT FOR YOU TO DO YOUR WORK, TAKE CARE OF THINGS AT HOME, OR GET ALONG WITH OTHER PEOPLE: NOT DIFFICULT AT ALL
SUM OF ALL RESPONSES TO PHQ QUESTIONS 1-9: 4
SUM OF ALL RESPONSES TO PHQ QUESTIONS 1-9: 4

## 2023-05-23 NOTE — PROGRESS NOTES
HPI  Yong Guillermo is a 77 year old male with type 2 diabetes mellitus being seen today as a new patient for diabetes evaluation/management.  Pt gives hx of type 2 diabetes mellitus dx around 1994.  He denies hx of diabetic retinopathy or nephropathy. Mild burning in feet with abnormal monofilamentous exam today.  Pt has hx of CAD, s/p right carotid endarterectomy, CVA x 2, moderate aortic regurgitation, hyperlipidemia, sleep apnea, major depression, benign essential tremor, osteopenia, thrombocytopenia, Herpes Zoster and COVID infection in Jan 2023.  Yong states his diabetes was initially tx with diet and exercise.  He was later started on Metformin, Januvia and Jardiance and reports he has been on insulin for 1 year.  Pt's A1C is 8.0 % today.  Previous A1C was 8.0 % on 12/28/2023 and  A1C 7.2 % in 9/2022.  I have no glucose meter data today.  He states if he does check his FBS in the am the FBS value is usually < 120.  No sx of hypoglycemia.  On ROS today, weight is 160 lbs.  He states he is not very active.  Some blurred vision.  Pt denies frequent headaches, n/v, hx of thyroid disease, SOB at rest, cough or fever.  No chest pain, abd pain, diarrhea, blood in the stool or melena.  Pt denies dysuria or hematuria.  Feet are are burning. No foot ulcers.    Diabetes Care  Retinopathy: none per patient. Pt has appointment with Oph later this month.  Nephropathy:none; urine microalbuminuria negative in 9/2022. Pt taking Losartan.  Neuropathy: yes.  Foot Exam:no ulcers; abnormal monofilamentous exam today.  Taking aspirin: yes.  Lipids: LDL 46 in 9/2022. Pt taking Lipitor.  Insulin: Basal insulin.    DM meds: Jardiance, Metformin and Januvia.  Testing: not testing on a regular basis- pt has a glucose meter and supplies.    ROS  See under HPI.    Allergies  Allergies   Allergen Reactions     Alteplase      Sulfa Antibiotics        Medications  Current Outpatient Medications   Medication Sig Dispense Refill      acetaminophen (TYLENOL) 500 MG tablet Take 650 mg by mouth        Ascorbic Acid (VITAMIN C) 500 MG CAPS Take 1,000 mg by mouth daily        aspirin 81 MG EC tablet Take 81 mg by mouth       atorvastatin (LIPITOR) 80 MG tablet Take 1 tablet (80 mg) by mouth daily 90 tablet 3     BD PEN NEEDLE JERRY 2ND GEN 32G X 4 MM miscellaneous INJECT 1 NEEDLE UNDER THE SKIN DAILY 100 each 3     blood glucose monitoring (FREESTYLE) lancets USE 1 LANCET DAILY AS NEEDED 100 each 3     Cholecalciferol (VITAMIN D3) 25 MCG (1000 UT) CAPS Take 1,000 Units by mouth       clopidogrel (PLAVIX) 75 MG tablet Take 1 tablet (75 mg) by mouth daily 90 tablet 3     dulaglutide (TRULICITY) 0.75 MG/0.5ML pen Inject 0.75 mg Subcutaneous every 7 days 15 mL 0     FREESTYLE LITE test strip USE 1 STRIP FOUR TIMES A DAY AS DIRECTED 200 strip 6     JARDIANCE 25 MG TABS tablet TAKE 1 TABLET DAILY 90 tablet 1     LANTUS SOLOSTAR 100 UNIT/ML soln INJECT 12 UNITS UNDER THE SKIN AT BEDTIME 15 mL 0     losartan (COZAAR) 25 MG tablet Take 0.5 tablets (12.5 mg) by mouth daily 90 tablet 3     metFORMIN (GLUCOPHAGE) 500 MG tablet TAKE 2 TABLETS TWICE A DAY WITH MEALS 360 tablet 2     multivitamin w/minerals (THERA-VIT-M) tablet Take 1 tablet by mouth       Omega-3 1000 MG capsule Take 2 g by mouth       venlafaxine (EFFEXOR) 100 MG tablet Take 150 mg by mouth Taking 150 mg       venlafaxine (EFFEXOR) 37.5 MG tablet Take 37.5 mg by mouth         Family History  family history includes Cancer in his mother; Diabetes in his brother, mother, and paternal grandmother; Heart Disease in his brother, brother, and mother; Snoring in his mother.     Mother, brother and paternal grandmother with hx of type 2 DM.    Social History   reports that he quit smoking about 43 years ago. His smoking use included cigars and pipe. He has quit using smokeless tobacco.  His smokeless tobacco use included chew. He reports that he does not drink alcohol and does not use drugs.     Smoke:  none at this time.  ETOH: none.  Marital status: .  Children: 2 grown children.  Occupation: retired from the [employer] and [employer].    Past Medical History  Past Medical History:   Diagnosis Date     Benign essential tremor 09/29/2016     BPH (benign prostatic hyperplasia) 05/26/2015     Coronary artery disease      CVA (cerebral vascular accident) (H) 01/14/2009    Right arm clumsiness.  MRI showed 2 infarcts in the left hemisphere.     CVA (cerebral vascular accident) (H) 06/22/2006    Left arm weakness.     Depression      Diabetes mellitus, type 2 (H)      Hyperlipidemia      Infection due to 2019 novel coronavirus 1/4/2023     Obstructive sleep apnea 07/22/2013     Shingles        Past Surgical History:   Procedure Laterality Date     ARTHROSCOPY SHOULDER ROTATOR CUFF REPAIR Right      CAROTID ENDARTERECTOMY Right 09/27/2006     CERVICAL DISC SURGERY  07/29/2005    C5/6 and C6/7 lamina foraminotomy, scope with partial facetectomy and excision of hard disc herniation.     HERNIA REPAIR  5/21/104    Right inguinal hernia and umbilical hernia right cheek cyst on the face.     IR AORTIC ARCH 4 VESSEL ANGIOGRAM  8/8/2006     IR CAROTID ANGIOGRAM  8/8/2006     IR CAROTID ANGIOGRAM  8/8/2006     OH EXCIS TENDON SHEATH LESION, HAND/FINGER      Description: Hand Excision Of A Tendon Cyst;  Recorded: 04/29/2008;     TONSILLECTOMY       VASECTOMY       ZZC EXCIS CERV DISK,ONE LEVEL      Description: Laminectomy With Disc Removal;  Recorded: 04/29/2008;  Comments: Lumbar level     ZZC EXCISION,BENIGN TUMOR,MANDIBLE      Description: Jaw Excision Benign Cyst / Tumor;  Proc Date: 05/01/2004;       Physical Exam  /67 (BP Location: Right arm, Patient Position: Sitting, Cuff Size: Adult Regular)   Pulse 70   Wt 73.9 kg (163 lb)   SpO2 92%   BMI 23.39 kg/m    Body mass index is 23.39 kg/m .    FEET: No ulcers. Abnormal monofilamentous exam.      RESULTS  Creatinine   Date Value Ref Range Status   12/28/2022 0.77 0.67 -  1.17 mg/dL Final   06/10/2021 0.87 0.66 - 1.25 mg/dL Final     GFR Estimate   Date Value Ref Range Status   12/28/2022 >90 >60 mL/min/1.73m2 Final     Comment:     Effective December 21, 2021 eGFRcr in adults is calculated using the 2021 CKD-EPI creatinine equation which includes age and gender (Gladys et al., NE, DOI: 10.1056/OVQQwb6985656)   06/15/2021 >60 >60 mL/min/1.73m2 Final   06/10/2021 85 >60 mL/min/[1.73_m2] Final     Comment:     Non  GFR Calc  Starting 12/18/2018, serum creatinine based estimated GFR (eGFR) will be   calculated using the Chronic Kidney Disease Epidemiology Collaboration   (CKD-EPI) equation.       Hemoglobin A1C   Date Value Ref Range Status   05/23/2023 8.0 (H) 0.0 - 5.6 % Final     Comment:     Normal <5.7%   Prediabetes 5.7-6.4%    Diabetes 6.5% or higher     Note: Adopted from ADA consensus guidelines.     Potassium   Date Value Ref Range Status   12/28/2022 4.4 3.4 - 5.3 mmol/L Final   04/26/2022 4.7 3.5 - 5.0 mmol/L Final   06/10/2021 4.5 3.4 - 5.3 mmol/L Final     ALT   Date Value Ref Range Status   12/28/2022 45 10 - 50 U/L Final   06/10/2021 135 (H) 0 - 70 U/L Final     AST   Date Value Ref Range Status   12/28/2022 31 10 - 50 U/L Final   06/10/2021 62 (H) 0 - 45 U/L Final     TSH   Date Value Ref Range Status   04/26/2022 1.40 0.30 - 5.00 uIU/mL Final       Cholesterol   Date Value Ref Range Status   09/28/2022 106 <200 mg/dL Final   09/02/2021 121 <=199 mg/dL Final     Direct Measure HDL   Date Value Ref Range Status   09/28/2022 40 >=40 mg/dL Final   09/02/2021 46 >=40 mg/dL Final     Comment:     HDL Cholesterol Reference Range:     0-2 years:   No reference ranges established for patients under 2 years old  at [place] for lipid analytes.    2-8 years:  Greater than 45 mg/dL     18 years and older:   Female: Greater than or equal to 50 mg/dL   Male:   Greater than or equal to 40 mg/dL     LDL Cholesterol Calculated   Date Value Ref Range  Status   09/28/2022 46 <=100 mg/dL Final   09/02/2021 58 <=129 mg/dL Final     LDL Cholesterol Direct   Date Value Ref Range Status   06/02/2021 72 <=129 mg/dl Final   06/15/2010 30.0 <130.1 mg/dL Final     Triglycerides   Date Value Ref Range Status   09/28/2022 100 <150 mg/dL Final   09/02/2021 87 <=149 mg/dL Final         ASSESSMENT/PLAN:    1.  TYPE 2 DIABETES MELLITUS: Type 2 diabetes mellitus with A1C of 8.0 %.  Hx of CAD and CVA.  Hx mild neuropathy in feet. No hx of diabetic retinopathy or nephropathy.     Discussed adding Trulicity today.  I reviewed how Trulicity works and possible side effects including n/v, GI distress, hypoglycemia and rare risk of pancreatitis.  Also discussed cardiovascular benefits of Trulicity.  Will start Trulicity 0.75 mg subcutaneous once a week x 1 month, then increase Trulicity 1.5 mg subcutaneous once a week.  If pt is tolerating the 0.75 mg dose well, he is to notify me and I will then order the Trulicity 1.5 mg pen.  Discontinue Januvia.  Continue Jardiance 25 mg subcutaneous each am, Metformin 500 mg 2 tabs BID and Lantus 16 units subcutaneous at hs.  Pt is aware that he may need to decrease his Lantus dose once he starts taking Trulicity and to let me know if he has blood sugars in the 70 range or < 70.  Yong was instructed to check his FBS each am and predinner DAILY.  Encouraged pt to make healthy food choices and remain active.  He declined referral to diabetes education today.  Pt has an Oph appt later this month.  Most recent urine microalbuminuria was negative in 9/2022.  Creat 0.77 with GFR > 90 mL/min on 12/28/2023.  /67 today.  TSH normal in 4/2022.    2.  HX OF CAD: No chest pain.    3.  NEUROPATHY: Mild neuropathy in feet. No foot ulcers.    4.  SLEEP APNEA: Not addressed today.    5.  HX OF CVA: BP good today - 129/67.    6.  LIPIDS: LDL 46 in 9/2022. Pt taking Lipitor.    7.  DEPRESSION: Stable at this time per patient. [facility].    8. FOLLOW UP: With me in 2 months.  Trulicity ordered today.    Time spent reviewing chart and labs today = 15 minutes.  Time for clinic visit today = 40 minutes.  Time for documentation today= 15 minutes.    Total time for documentation today=  70 minutes.    Kathya Courtney PA-C

## 2023-05-23 NOTE — PATIENT INSTRUCTIONS
Start Trulicity 0.75 mg inject ONCE A WEEK x 1 month, then increase dose 1.5 mg ONCE A WEEK.  When you start your 3rd dose of Trulicity, please call me at [phone] and I will order the Trulicity 1.5 mg pen.  Discontinue Januvia.  Continue Jardiance, Metformin and Lantus dose.  If you have blood sugar values in the 70 range or lower, please decrease your Lantus dose 14 units daily and notify me.  Check your fasting blood sugar each am and predinner blood sugar daily.  Follow up visit with me in 2 months.  Kathya Courtney

## 2023-05-23 NOTE — LETTER
5/23/2023       RE: Yong Guillermo  [address]  [facility] [address]     Dear Colleague,    Thank you for referring your patient, Yong Guillermo, to the [facility] [place] at [facility]. Please see a copy of my visit note below.    HPI  Yong Guillermo is a 77 year old male with type 2 diabetes mellitus being seen today as a new patient for diabetes evaluation/management.  Pt gives hx of type 2 diabetes mellitus dx around 1994.  He denies hx of diabetic retinopathy or nephropathy. Mild burning in feet with abnormal monofilamentous exam today.  Pt has hx of CAD, s/p right carotid endarterectomy, CVA x 2, moderate aortic regurgitation, hyperlipidemia, sleep apnea, major depression, benign essential tremor, osteopenia, thrombocytopenia, Herpes Zoster and COVID infection in Jan 2023.  Yong states his diabetes was initially tx with diet and exercise.  He was later started on Metformin, Januvia and Jardiance and reports he has been on insulin for 1 year.  Pt's A1C is 8.0 % today.  Previous A1C was 8.0 % on 12/28/2023 and  A1C 7.2 % in 9/2022.  I have no glucose meter data today.  He states if he does check his FBS in the am the FBS value is usually < 120.  No sx of hypoglycemia.  On ROS today, weight is 160 lbs.  He states he is not very active.  Some blurred vision.  Pt denies frequent headaches, n/v, hx of thyroid disease, SOB at rest, cough or fever.  No chest pain, abd pain, diarrhea, blood in the stool or melena.  Pt denies dysuria or hematuria.  Feet are are burning. No foot ulcers.    Diabetes Care  Retinopathy: none per patient. Pt has appointment with Oph later this month.  Nephropathy:none; urine microalbuminuria negative in 9/2022. Pt taking Losartan.  Neuropathy: yes.  Foot Exam:no ulcers; abnormal monofilamentous exam today.  Taking aspirin: yes.  Lipids: LDL 46 in 9/2022. Pt taking Lipitor.  Insulin: Basal insulin.     DM meds: Jardiance, Metformin and Januvia.  Testing: not testing on a regular basis- pt has a glucose meter and supplies.    ROS  See under HPI.    Allergies  Allergies   Allergen Reactions    Alteplase     Sulfa Antibiotics        Medications  Current Outpatient Medications   Medication Sig Dispense Refill    acetaminophen (TYLENOL) 500 MG tablet Take 650 mg by mouth       Ascorbic Acid (VITAMIN C) 500 MG CAPS Take 1,000 mg by mouth daily       aspirin 81 MG EC tablet Take 81 mg by mouth      atorvastatin (LIPITOR) 80 MG tablet Take 1 tablet (80 mg) by mouth daily 90 tablet 3    BD PEN NEEDLE JERRY 2ND GEN 32G X 4 MM miscellaneous INJECT 1 NEEDLE UNDER THE SKIN DAILY 100 each 3    blood glucose monitoring (FREESTYLE) lancets USE 1 LANCET DAILY AS NEEDED 100 each 3    Cholecalciferol (VITAMIN D3) 25 MCG (1000 UT) CAPS Take 1,000 Units by mouth      clopidogrel (PLAVIX) 75 MG tablet Take 1 tablet (75 mg) by mouth daily 90 tablet 3    dulaglutide (TRULICITY) 0.75 MG/0.5ML pen Inject 0.75 mg Subcutaneous every 7 days 15 mL 0    FREESTYLE LITE test strip USE 1 STRIP FOUR TIMES A DAY AS DIRECTED 200 strip 6    JARDIANCE 25 MG TABS tablet TAKE 1 TABLET DAILY 90 tablet 1    LANTUS SOLOSTAR 100 UNIT/ML soln INJECT 12 UNITS UNDER THE SKIN AT BEDTIME 15 mL 0    losartan (COZAAR) 25 MG tablet Take 0.5 tablets (12.5 mg) by mouth daily 90 tablet 3    metFORMIN (GLUCOPHAGE) 500 MG tablet TAKE 2 TABLETS TWICE A DAY WITH MEALS 360 tablet 2    multivitamin w/minerals (THERA-VIT-M) tablet Take 1 tablet by mouth      Omega-3 1000 MG capsule Take 2 g by mouth      venlafaxine (EFFEXOR) 100 MG tablet Take 150 mg by mouth Taking 150 mg      venlafaxine (EFFEXOR) 37.5 MG tablet Take 37.5 mg by mouth         Family History  family history includes Cancer in his mother; Diabetes in his brother, mother, and paternal grandmother; Heart Disease in his brother, brother, and mother; Snoring in his mother.     Mother, brother and paternal  grandmother with hx of type 2 DM.    Social History   reports that he quit smoking about 43 years ago. His smoking use included cigars and pipe. He has quit using smokeless tobacco.  His smokeless tobacco use included chew. He reports that he does not drink alcohol and does not use drugs.     Smoke: none at this time.  ETOH: none.  Marital status: .  Children: 2 grown children.  Occupation: retired from the [employer] and [employer].    Past Medical History  Past Medical History:   Diagnosis Date    Benign essential tremor 09/29/2016    BPH (benign prostatic hyperplasia) 05/26/2015    Coronary artery disease     CVA (cerebral vascular accident) (H) 01/14/2009    Right arm clumsiness.  MRI showed 2 infarcts in the left hemisphere.    CVA (cerebral vascular accident) (H) 06/22/2006    Left arm weakness.    Depression     Diabetes mellitus, type 2 (H)     Hyperlipidemia     Infection due to 2019 novel coronavirus 1/4/2023    Obstructive sleep apnea 07/22/2013    Shingles        Past Surgical History:   Procedure Laterality Date    ARTHROSCOPY SHOULDER ROTATOR CUFF REPAIR Right     CAROTID ENDARTERECTOMY Right 09/27/2006    CERVICAL DISC SURGERY  07/29/2005    C5/6 and C6/7 lamina foraminotomy, scope with partial facetectomy and excision of hard disc herniation.    HERNIA REPAIR  5/21/104    Right inguinal hernia and umbilical hernia right cheek cyst on the face.    IR AORTIC ARCH 4 VESSEL ANGIOGRAM  8/8/2006    IR CAROTID ANGIOGRAM  8/8/2006    IR CAROTID ANGIOGRAM  8/8/2006    FL EXCIS TENDON SHEATH LESION, HAND/FINGER      Description: Hand Excision Of A Tendon Cyst;  Recorded: 04/29/2008;    TONSILLECTOMY      VASECTOMY      ZZC EXCIS CERV DISK,ONE LEVEL      Description: Laminectomy With Disc Removal;  Recorded: 04/29/2008;  Comments: Lumbar level    ZZC EXCISION,BENIGN TUMOR,MANDIBLE      Description: Jaw Excision Benign Cyst / Tumor;  Proc Date: 05/01/2004;       Physical Exam  /67 (BP Location: Right arm,  Patient Position: Sitting, Cuff Size: Adult Regular)   Pulse 70   Wt 73.9 kg (163 lb)   SpO2 92%   BMI 23.39 kg/m    Body mass index is 23.39 kg/m .    FEET: No ulcers. Abnormal monofilamentous exam.      RESULTS  Creatinine   Date Value Ref Range Status   12/28/2022 0.77 0.67 - 1.17 mg/dL Final   06/10/2021 0.87 0.66 - 1.25 mg/dL Final     GFR Estimate   Date Value Ref Range Status   12/28/2022 >90 >60 mL/min/1.73m2 Final     Comment:     Effective December 21, 2021 eGFRcr in adults is calculated using the 2021 CKD-EPI creatinine equation which includes age and gender (Gladys et al., NEJ, DOI: 10.1056/HZJWws3699293)   06/15/2021 >60 >60 mL/min/1.73m2 Final   06/10/2021 85 >60 mL/min/[1.73_m2] Final     Comment:     Non  GFR Calc  Starting 12/18/2018, serum creatinine based estimated GFR (eGFR) will be   calculated using the Chronic Kidney Disease Epidemiology Collaboration   (CKD-EPI) equation.       Hemoglobin A1C   Date Value Ref Range Status   05/23/2023 8.0 (H) 0.0 - 5.6 % Final     Comment:     Normal <5.7%   Prediabetes 5.7-6.4%    Diabetes 6.5% or higher     Note: Adopted from ADA consensus guidelines.     Potassium   Date Value Ref Range Status   12/28/2022 4.4 3.4 - 5.3 mmol/L Final   04/26/2022 4.7 3.5 - 5.0 mmol/L Final   06/10/2021 4.5 3.4 - 5.3 mmol/L Final     ALT   Date Value Ref Range Status   12/28/2022 45 10 - 50 U/L Final   06/10/2021 135 (H) 0 - 70 U/L Final     AST   Date Value Ref Range Status   12/28/2022 31 10 - 50 U/L Final   06/10/2021 62 (H) 0 - 45 U/L Final     TSH   Date Value Ref Range Status   04/26/2022 1.40 0.30 - 5.00 uIU/mL Final       Cholesterol   Date Value Ref Range Status   09/28/2022 106 <200 mg/dL Final   09/02/2021 121 <=199 mg/dL Final     Direct Measure HDL   Date Value Ref Range Status   09/28/2022 40 >=40 mg/dL Final   09/02/2021 46 >=40 mg/dL Final     Comment:     HDL Cholesterol Reference Range:     0-2 years:   No reference ranges established  for patients under 2 years old  at [facility] [employer] for lipid analytes.    2-8 years:  Greater than 45 mg/dL     18 years and older:   Female: Greater than or equal to 50 mg/dL   Male:   Greater than or equal to 40 mg/dL     LDL Cholesterol Calculated   Date Value Ref Range Status   09/28/2022 46 <=100 mg/dL Final   09/02/2021 58 <=129 mg/dL Final     LDL Cholesterol Direct   Date Value Ref Range Status   06/02/2021 72 <=129 mg/dl Final   06/15/2010 30.0 <130.1 mg/dL Final     Triglycerides   Date Value Ref Range Status   09/28/2022 100 <150 mg/dL Final   09/02/2021 87 <=149 mg/dL Final         ASSESSMENT/PLAN:    1.  TYPE 2 DIABETES MELLITUS: Type 2 diabetes mellitus with A1C of 8.0 %.  Hx of CAD and CVA.  Hx mild neuropathy in feet. No hx of diabetic retinopathy or nephropathy.     Discussed adding Trulicity today.  I reviewed how Trulicity works and possible side effects including n/v, GI distress, hypoglycemia and rare risk of pancreatitis.  Also discussed cardiovascular benefits of Trulicity.  Will start Trulicity 0.75 mg subcutaneous once a week x 1 month, then increase Trulicity 1.5 mg subcutaneous once a week.  If pt is tolerating the 0.75 mg dose well, he is to notify me and I will then order the Trulicity 1.5 mg pen.  Discontinue Januvia.  Continue Jardiance 25 mg subcutaneous each am, Metformin 500 mg 2 tabs BID and Lantus 16 units subcutaneous at hs.  Pt is aware that he may need to decrease his Lantus dose once he starts taking Trulicity and to let me know if he has blood sugars in the 70 range or < 70.  Yong was instructed to check his FBS each am and predinner DAILY.  Encouraged pt to make healthy food choices and remain active.  He declined referral to diabetes education today.  Pt has an Oph appt later this month.  Most recent urine microalbuminuria was negative in 9/2022.  Creat 0.77 with GFR > 90 mL/min on 12/28/2023.  /67 today.  TSH normal in 4/2022.    2.  HX OF CAD: No chest  pain.    3.  NEUROPATHY: Mild neuropathy in feet. No foot ulcers.    4.  SLEEP APNEA: Not addressed today.    5.  HX OF CVA: BP good today - 129/67.    6.  LIPIDS: LDL 46 in 9/2022. Pt taking Lipitor.    7.  DEPRESSION: Stable at this time per patient. [facility].    8. FOLLOW UP: With me in 2 months.  Trulicity ordered today.    Time spent reviewing chart and labs today = 15 minutes.  Time for clinic visit today = 40 minutes.  Time for documentation today= 15 minutes.    Total time for documentation today=  70 minutes.    Kathya Courtney PA-C

## 2023-05-23 NOTE — NURSING NOTE
Yong Guillermo's goals for this visit include:   Chief Complaint   Patient presents with     Consult     Diabetes     He requests these members of his care team be copied on today's visit information: yes    PCP: Anita Pineda    Referring Provider:  Anita Pineda MD  [address] [place] [address]    /67 (BP Location: Right arm, Patient Position: Sitting, Cuff Size: Adult Regular)   Pulse 70   Wt 73.9 kg (163 lb)   SpO2 92%   BMI 23.39 kg/m      Do you need any medication refills at today's visit? jose Cuellar CMA  Adult Endocrinology  [facility]

## 2023-05-23 NOTE — PROGRESS NOTES
Assessment & Plan     Type 2 diabetes, HbA1C goal < 8% (H)   On Lantus 16 U, metformin, Januvia and Jardiance. No hypoglycemia. Weight stable now. -130.  We started small dose of losartan in September for renal protection.    HgbA1c 8.0.   He will see Diabetes educator today.  Low carb diet discussed.  - Comprehensive metabolic panel; Future  - Hemoglobin A1c; Future  - Comprehensive metabolic panel  - Hemoglobin A1c    Recurrent major depressive disorder, remission status unspecified (H)  [facility], stable on venlafaxine    Thrombocytopenia (H)  Stable. No alcohol drinking.  - CBC with platelets; Future  - CBC with platelets    Hyperlipidemia, unspecified hyperlipidemia type  On statin        Nonrheumatic aortic valve insufficiency  ECHO last year showed moderate aortic regurgitation. We will repeat ECHO.  - Echocardiogram Complete; Future             Return in about 6 months (around 11/23/2023) for AWV.    Anita Pineda MD  [facility]    Dominique Beach is a 77 year old, presenting for the following health issues:  Follow Up (F/U)        12/28/2022    12:30 PM   Additional Questions   Roomed by Diane     History of Present Illness       Diabetes:   He presents for follow up of diabetes.  He is checking home blood glucose one time daily. He checks blood glucose before meals.  Blood glucose is never over 200 and never under 70. When his blood glucose is low, the patient is asymptomatic for confusion, blurred vision, lethargy and reports not feeling dizzy, shaky, or weak.  He is concerned about other.  He is having burning in feet and blurry vision. The patient has had a diabetic eye exam in the last 12 months.         He eats 2-3 servings of fruits and vegetables daily.He consumes 0 sweetened beverage(s) daily.He exercises with enough effort to increase his heart rate 9 or less minutes per day.  He exercises with enough effort to increase his heart rate 3  "or less days per week.   He is taking medications regularly.    Today's PHQ-9         PHQ-9 Total Score: 4    PHQ-9 Q9 Thoughts of better off dead/self-harm past 2 weeks :   Not at all    How difficult have these problems made it for you to do your work, take care of things at home, or get along with other people: Not difficult at all               Review of Systems         Objective    /58   Pulse 82   Temp 97.3  F (36.3  C)   Resp 16   Ht 1.778 m (5' 10\")   Wt 72.9 kg (160 lb 12.8 oz)   SpO2 98%   BMI 23.07 kg/m    Body mass index is 23.07 kg/m .  Physical Exam   Constitutional:  oriented to person, place, and time, appears well-nourished. No distress.   HENT:   Head: Normocephalic.   Mouth/Throat: Oropharynx is clear and moist.   Eyes: Conjunctivae are normal. Pupils are equal, round, and reactive to light.   Neck: Normal range of motion. Neck supple.   Cardiovascular: Normal rate, regular rhythm and normal heart sounds.    Pulmonary/Chest: Effort normal and breath sounds normal.   Abdominal: Soft. Bowel sounds are normal.   Musculoskeletal: Normal range of motion.   Neurological: alert and oriented to person, place, and time. Skin: Skin is warm.   Psychiatric: normal mood and affect.                    "

## 2023-05-24 DIAGNOSIS — E11.65 UNCONTROLLED TYPE 2 DIABETES MELLITUS WITH HYPERGLYCEMIA (H): Primary | ICD-10-CM

## 2023-05-24 RX ORDER — DULAGLUTIDE 0.75 MG/.5ML
0.75 INJECTION, SOLUTION SUBCUTANEOUS
Qty: 15 ML | Refills: 0 | Status: SHIPPED | OUTPATIENT
Start: 2023-05-24 | End: 2023-06-13 | Stop reason: DRUGHIGH

## 2023-05-24 NOTE — TELEPHONE ENCOUNTER
[facility] Call Center    Phone Message    May a detailed message be left on voicemail: yes     Reason for Call: Medication Question or concern regarding medication   Prescription Clarification  Name of Medication: dulaglutide (TRULICITY) 0.75 MG/0.5ML pen  Prescribing Provider: Kathya Courtney   Pharmacy: N/A   What on the order needs clarification?     Per pt the RX has not been sent out yet? Please send RX to pharmacy [address] [address].  Please call pt once RX get sent out. Thank you!          Action Taken: Message routed to:  [facility] ENDO    Travel Screening: Not Applicable

## 2023-05-24 NOTE — TELEPHONE ENCOUNTER
dulaglutide (TRULICITY) 0.75 MG/0.5ML pen  Last Written Prescription Date:  Noted historical  Last Fill Quantity: na,   # refills: na  Last Office Visit : 5/23/23  Future Office visit:  9/5/23    Routing refill request to provider for review/approval because:  Medication is reported/historical  5/23/23 AVS>  Start Trulicity 0.75 mg inject ONCE A WEEK x 1 month, then increase dose 1.5 mg ONCE A WEEK.  When you start your 3rd dose of Trulicity, please call me at [phone] and I will order the Trulicity 1.5 mg pen.        Per pt the RX has not been sent out yet? Please send RX to pharmacy [facility] [address] [place] [address].  Please call pt once RX get sent out. Thank you!

## 2023-05-30 ENCOUNTER — MYC MEDICAL ADVICE (OUTPATIENT)
Dept: INTERNAL MEDICINE | Facility: CLINIC | Age: 78
End: 2023-05-30
Payer: MEDICARE

## 2023-06-02 ENCOUNTER — TRANSFERRED RECORDS (OUTPATIENT)
Dept: HEALTH INFORMATION MANAGEMENT | Facility: CLINIC | Age: 78
End: 2023-06-02
Payer: MEDICARE

## 2023-06-05 ENCOUNTER — ANCILLARY PROCEDURE (OUTPATIENT)
Dept: CARDIOLOGY | Facility: CLINIC | Age: 78
End: 2023-06-05
Attending: INTERNAL MEDICINE
Payer: MEDICARE

## 2023-06-05 DIAGNOSIS — I35.1 NONRHEUMATIC AORTIC VALVE INSUFFICIENCY: ICD-10-CM

## 2023-06-05 LAB — LVEF ECHO: NORMAL

## 2023-06-05 PROCEDURE — 93306 TTE W/DOPPLER COMPLETE: CPT | Performed by: INTERNAL MEDICINE

## 2023-06-06 DIAGNOSIS — I35.1 NONRHEUMATIC AORTIC VALVE INSUFFICIENCY: ICD-10-CM

## 2023-06-06 DIAGNOSIS — I35.1 AORTIC REGURGITATION: ICD-10-CM

## 2023-06-06 DIAGNOSIS — I77.819 AORTIC DILATATION (H): Primary | ICD-10-CM

## 2023-06-10 PROBLEM — L57.0 AK (ACTINIC KERATOSIS): Status: ACTIVE | Noted: 2023-06-10

## 2023-06-10 PROBLEM — L73.8 SEBACEOUS HYPERPLASIA: Status: ACTIVE | Noted: 2023-06-10

## 2023-06-10 PROBLEM — H61.002: Status: ACTIVE | Noted: 2023-06-10

## 2023-06-13 DIAGNOSIS — E11.65 UNCONTROLLED TYPE 2 DIABETES MELLITUS WITH HYPERGLYCEMIA (H): Primary | ICD-10-CM

## 2023-06-14 ENCOUNTER — HOSPITAL ENCOUNTER (OUTPATIENT)
Dept: CT IMAGING | Facility: HOSPITAL | Age: 78
Discharge: HOME | End: 2023-06-14
Attending: INTERNAL MEDICINE | Admitting: INTERNAL MEDICINE
Payer: MEDICARE

## 2023-06-14 DIAGNOSIS — I35.1 NONRHEUMATIC AORTIC VALVE INSUFFICIENCY: ICD-10-CM

## 2023-06-14 DIAGNOSIS — E11.65 UNCONTROLLED TYPE 2 DIABETES MELLITUS WITH HYPERGLYCEMIA (H): ICD-10-CM

## 2023-06-14 DIAGNOSIS — I77.819 AORTIC DILATATION (H): ICD-10-CM

## 2023-06-14 PROCEDURE — G1010 CDSM STANSON: HCPCS

## 2023-06-14 PROCEDURE — 250N000011 HC RX IP 250 OP 636: Performed by: INTERNAL MEDICINE

## 2023-06-14 RX ORDER — IOPAMIDOL 755 MG/ML
90 INJECTION, SOLUTION INTRAVASCULAR ONCE
Status: COMPLETED | OUTPATIENT
Start: 2023-06-14 | End: 2023-06-14

## 2023-06-14 RX ADMIN — IOPAMIDOL 90 ML: 755 INJECTION, SOLUTION INTRAVENOUS at 14:54

## 2023-06-26 DIAGNOSIS — E11.9 DIABETES MELLITUS, TYPE 2 (H): ICD-10-CM

## 2023-06-26 RX ORDER — INSULIN GLARGINE 100 [IU]/ML
INJECTION, SOLUTION SUBCUTANEOUS
Qty: 15 ML | Refills: 0 | Status: SHIPPED | OUTPATIENT
Start: 2023-06-26 | End: 2023-09-14

## 2023-06-26 NOTE — TELEPHONE ENCOUNTER
"Last Written Prescription Date:  3/27/23  Last Fill Quantity: 15 ml,  # refills: 0   Last office visit provider:  5/23/23     Requested Prescriptions   Pending Prescriptions Disp Refills     LANTUS SOLOSTAR 100 UNIT/ML soln [Pharmacy Med Name: LANTUS SOLOSTAR PEN 3ML 5'S 100U/ML] 15 mL 2     Sig: INJECT 12 UNITS UNDER THE SKIN AT BEDTIME       Long Acting Insulin Protocol Passed - 6/26/2023  3:24 PM        Passed - Serum creatinine on file in past 12 months     Recent Labs   Lab Test 05/23/23  1341   CR 0.71       Ok to refill medication if creatinine is low          Passed - HgbA1C in past 3 or 6 months     If HgbA1C is 8 or greater, it needs to be on file within the past 3 months.  If less than 8, must be on file within the past 6 months.     Recent Labs   Lab Test 05/23/23  1341   A1C 8.0*             Passed - Medication is active on med list        Passed - Patient is age 18 or older        Passed - Recent (6 mo) or future (30 days) visit within the authorizing provider's specialty     Patient had office visit in the last 6 months or has a visit in the next 30 days with authorizing provider or within the authorizing provider's specialty.  See \"Patient Info\" tab in inbasket, or \"Choose Columns\" in Meds & Orders section of the refill encounter.                 Jose Ku RN 06/26/23 3:24 PM  "

## 2023-08-17 ENCOUNTER — TRANSFERRED RECORDS (OUTPATIENT)
Dept: HEALTH INFORMATION MANAGEMENT | Facility: CLINIC | Age: 78
End: 2023-08-17
Payer: MEDICARE

## 2023-08-21 ENCOUNTER — OFFICE VISIT (OUTPATIENT)
Dept: CARDIOLOGY | Facility: CLINIC | Age: 78
End: 2023-08-21
Attending: INTERNAL MEDICINE
Payer: MEDICARE

## 2023-08-21 VITALS
SYSTOLIC BLOOD PRESSURE: 110 MMHG | RESPIRATION RATE: 16 BRPM | WEIGHT: 161.2 LBS | DIASTOLIC BLOOD PRESSURE: 60 MMHG | BODY MASS INDEX: 23.08 KG/M2 | HEIGHT: 70 IN | HEART RATE: 101 BPM

## 2023-08-21 DIAGNOSIS — I77.819 AORTIC VALVE REGURGITATION DUE TO AORTIC DILATION (H): ICD-10-CM

## 2023-08-21 DIAGNOSIS — I25.84 CORONARY ARTERY DISEASE DUE TO CALCIFIED CORONARY LESION: Primary | ICD-10-CM

## 2023-08-21 DIAGNOSIS — I35.1 AORTIC VALVE REGURGITATION DUE TO AORTIC DILATION (H): ICD-10-CM

## 2023-08-21 DIAGNOSIS — I35.1 NONRHEUMATIC AORTIC VALVE INSUFFICIENCY: ICD-10-CM

## 2023-08-21 DIAGNOSIS — I25.10 CORONARY ARTERY DISEASE DUE TO CALCIFIED CORONARY LESION: Primary | ICD-10-CM

## 2023-08-21 LAB
CHOLEST SERPL-MCNC: 102 MG/DL
HDLC SERPL-MCNC: 37 MG/DL
LDLC SERPL CALC-MCNC: 35 MG/DL
NONHDLC SERPL-MCNC: 65 MG/DL
TRIGL SERPL-MCNC: 150 MG/DL

## 2023-08-21 PROCEDURE — 36415 COLL VENOUS BLD VENIPUNCTURE: CPT | Performed by: INTERNAL MEDICINE

## 2023-08-21 PROCEDURE — 99215 OFFICE O/P EST HI 40 MIN: CPT | Performed by: INTERNAL MEDICINE

## 2023-08-21 PROCEDURE — 80061 LIPID PANEL: CPT | Performed by: INTERNAL MEDICINE

## 2023-08-21 NOTE — LETTER
8/21/2023    Anita Pineda MD  [address] [facility] [address]    RE: Yong Guillermo       Dear Colleague,     I had the pleasure of seeing Yong Guillermo in the [facility].    HEART CARE ENCOUNTER CONSULTATON NOTE      [facility]  [phone]      Assessment/Recommendations   Assessment/Plan:  Coronary calcification - on aspirin and high intensity statin. Diet and exercise discussed.    Bicuspid aortic valve with mild regurgitation and mild enlargement of the aorta at the sinuses - BP controlled. Repeat echo in 2 years if asymptomatic.     Hyperlipidemia - on high intensity statin. LDL check today.    History of strokes - quiescent on aspirin and clopidogrel s/p right CEA    F/U 1 year with one of my colleagues       History of Present Illness/Subjective    HPI: Yong Guillermo is a 77 year old male diabetic with a history of stroke x 2 (2007 and 2009) s/p right CEA, hyperlipidemia, DANIELLE on BiPAP, bicuspid aortic valve with mild AI, and coronary calcification on CT scan.  Yong presented to clinic with long standing fatigue. Labs were normal in late 2021. A lexiscan nuclear stress showed a normal EF and possible distal inferior infarction. A coronary CTA showed a calcium score of 386 and moderate proximal LAD disease which was not flow limiting by FFR. CTA chest 6/2023 for follow up of reports of an enlarged aorta on echo reported 4.0 cm at the sinuses of valsalva with 3.0 at the ST junction.     Yong returns for annual follow up today. 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 Yong has atypical chest pains, like if he takes a deep breath or if he uses his right arm/shoulder. There is no exertional angina. He will get short of breath if he goes up multiple flights of stairs.     Echo 6/2023:  Global and regional left ventricular function is normal with an EF of 60-65%.  Right ventricular function, chamber size, wall motion,  "and thickness are normal.  The aortic valve is bicuspid.  Mild aortic insufficiency is present.  Sinuses of Valsalva 4.7 cm.  Moderate dilatation of the aorta is present.       Physical Examination  Review of Systems   Vitals: /60 (BP Location: Right arm, Patient Position: Sitting, Cuff Size: Adult Regular)   Pulse 101   Resp 16   Ht 1.765 m (5' 9.5\")   Wt 73.1 kg (161 lb 3.2 oz)   BMI 23.46 kg/m    BMI= Body mass index is 23.46 kg/m .  Wt Readings from Last 3 Encounters:   08/21/23 73.1 kg (161 lb 3.2 oz)   05/23/23 73.9 kg (163 lb)   05/23/23 72.9 kg (160 lb 12.8 oz)       General Appearance:   no distress, normal body habitus   ENT/Mouth: membranes moist, no oral lesions or bleeding gums.      EYES:  no scleral icterus, normal conjunctivae   Neck: no carotid bruits or thyromegaly   Chest/Lungs:   lungs are clear to auscultation, no rales or wheezing, no sternal scar, equal chest wall expansion    Cardiovascular:   Regular. Normal first and second heart sounds with diastolic murmur. No rubs or gallops; the right carotid, radial and posterior tibial pulses are intact and the left carotid, radial and posterior tibial pulses are intact.  Jugular venous pressure is flat, no edema bilaterally    Abdomen:  no organomegaly, masses, bruits, or tenderness; bowel sounds are present   Extremities: no cyanosis or clubbing   Skin: no xanthelasma, warm.    Neurologic: normal  bilateral, no tremors     Psychiatric: alert and oriented x3, calm        Please refer above for cardiac ROS details.        Medical History  Surgical History Family History Social History   Past Medical History:   Diagnosis Date    Benign essential tremor 09/29/2016    BPH (benign prostatic hyperplasia) 05/26/2015    Coronary artery disease     CVA (cerebral vascular accident) (H) 01/14/2009    Right arm clumsiness.  MRI showed 2 infarcts in the left hemisphere.    CVA (cerebral vascular accident) (H) 06/22/2006    Left arm weakness.    " Depression     Diabetes mellitus, type 2 (H)     Hyperlipidemia     Infection due to 2019 novel coronavirus 2023    Obstructive sleep apnea 2013    Shingles      Past Surgical History:   Procedure Laterality Date    ARTHROSCOPY SHOULDER ROTATOR CUFF REPAIR Right     CAROTID ENDARTERECTOMY Right 2006    CERVICAL DISC SURGERY  2005    C5/6 and C6/7 lamina foraminotomy, scope with partial facetectomy and excision of hard disc herniation.    HERNIA REPAIR      Right inguinal hernia and umbilical hernia right cheek cyst on the face.    IR AORTIC ARCH 4 VESSEL ANGIOGRAM  2006    IR CAROTID ANGIOGRAM  2006    IR CAROTID ANGIOGRAM  2006    [place] EXCIS TENDON SHEATH LESION, HAND/FINGER      Description: Hand Excision Of A Tendon Cyst;  Recorded: 2008;    TONSILLECTOMY      VASECTOMY      ZZC EXCIS CERV DISK,ONE LEVEL      Description: Laminectomy With Disc Removal;  Recorded: 2008;  Comments: Lumbar level    ZZC EXCISION,BENIGN TUMOR,MANDIBLE      Description: Jaw Excision Benign Cyst / Tumor;  Proc Date: 2004;     Family History   Problem Relation Age of Onset    Heart Disease Mother     Snoring Mother     Diabetes Mother     Cancer Mother         Brain    Diabetes Paternal Grandmother     Heart Disease Brother     Heart Disease Brother     Diabetes Brother         Social History     Socioeconomic History    Marital status:      Spouse name: Not on file    Number of children: Not on file    Years of education: Not on file    Highest education level: Not on file   Occupational History    Not on file   Tobacco Use    Smoking status: Former     Types: Cigars, Pipe     Quit date: 1980     Years since quittin.4    Smokeless tobacco: Former     Types: Chew    Tobacco comments:     Quit 40 years ago.   Substance and Sexual Activity    Alcohol use: No    Drug use: No    Sexual activity: Yes     Partners: Female     Birth control/protection: None   Other Topics  Concern    Parent/sibling w/ CABG, MI or angioplasty before 65F 55M? Not Asked   Social History Narrative    Not on file     Social Determinants of Health     Financial Resource Strain: Not on file   Food Insecurity: Not on file   Transportation Needs: Not on file   Physical Activity: Not on file   Stress: Not on file   Social Connections: Not on file   Intimate Partner Violence: Not on file   Housing Stability: Not on file           Medications  Allergies   Current Outpatient Medications   Medication Sig Dispense Refill    acetaminophen (TYLENOL) 500 MG tablet Take 650 mg by mouth       Ascorbic Acid (VITAMIN C) 500 MG CAPS Take 1,000 mg by mouth daily       aspirin 81 MG EC tablet Take 81 mg by mouth      atorvastatin (LIPITOR) 80 MG tablet Take 1 tablet (80 mg) by mouth daily 90 tablet 3    BD PEN NEEDLE JERRY 2ND GEN 32G X 4 MM miscellaneous INJECT 1 NEEDLE UNDER THE SKIN DAILY 100 each 3    blood glucose monitoring (FREESTYLE) lancets USE 1 LANCET DAILY AS NEEDED 100 each 3    Cholecalciferol (VITAMIN D3) 25 MCG (1000 UT) CAPS Take 1,000 Units by mouth      clopidogrel (PLAVIX) 75 MG tablet Take 1 tablet (75 mg) by mouth daily 90 tablet 3    dulaglutide (TRULICITY) 1.5 MG/0.5ML pen Inject 1.5 mg Subcutaneous every 7 days 6 mL 1    FREESTYLE LITE test strip USE 1 STRIP FOUR TIMES A DAY AS DIRECTED 200 strip 6    JARDIANCE 25 MG TABS tablet TAKE 1 TABLET DAILY 90 tablet 1    LANTUS SOLOSTAR 100 UNIT/ML soln INJECT 12 UNITS UNDER THE SKIN AT BEDTIME 15 mL 0    losartan (COZAAR) 25 MG tablet Take 0.5 tablets (12.5 mg) by mouth daily 90 tablet 3    metFORMIN (GLUCOPHAGE) 500 MG tablet TAKE 2 TABLETS TWICE A DAY WITH MEALS 360 tablet 2    multivitamin w/minerals (THERA-VIT-M) tablet Take 1 tablet by mouth      Omega-3 1000 MG capsule Take 2 g by mouth      venlafaxine (EFFEXOR) 100 MG tablet Take 150 mg by mouth Taking 150 mg      venlafaxine (EFFEXOR) 37.5 MG tablet Take 37.5 mg by mouth         Allergies   Allergen  Reactions    Alteplase     Sulfa Antibiotics           Lab Results    Chemistry/lipid CBC Cardiac Enzymes/BNP/TSH/INR   Recent Labs   Lab Test 09/28/22  0957   CHOL 106   HDL 40   LDL 46   TRIG 100     Recent Labs   Lab Test 09/28/22  0957 09/02/21  0937 06/02/21  0929   LDL 46 58 72     Recent Labs   Lab Test 05/23/23  1341      POTASSIUM 4.3   CHLORIDE 101   CO2 25   *   BUN 15.0   CR 0.71   GFRESTIMATED >90   KATHLEEN 9.3     Recent Labs   Lab Test 05/23/23  1341 12/28/22  1256 09/28/22  0957   CR 0.71 0.77 0.72     Recent Labs   Lab Test 05/23/23  1341 12/28/22  1256 09/28/22  0957   A1C 8.0* 8.0* 7.2*          Recent Labs   Lab Test 05/23/23  1341   WBC 5.3   HGB 14.7   HCT 42.4   MCV 99   *     Recent Labs   Lab Test 05/23/23  1341 09/28/22  0957 04/26/22  1414   HGB 14.7 14.3 14.9    Recent Labs   Lab Test 10/23/21  1706   TROPONINI <0.01     No results for input(s): BNP, NTBNPI, NTBNP in the last 76571 hours.  Recent Labs   Lab Test 04/26/22  1414   TSH 1.40     No results for input(s): INR in the last 11291 hours.     Today's clinic visit entailed:  Review of prior external note(s) from - [facility] information from epic reviewed  40 minutes spent by me on the date of the encounter doing chart review, history and exam, documentation and further activities per the note  Provider  Link to [facility] Help Grid     The level of medical decision making during this visit was of moderate complexity.        Olive Street MD              Thank you for allowing me to participate in the care of your patient.      Sincerely,     Olive Street MD     [facility]  cc:   Olive Street MD  [address] [facility] [address]  [place] [address]

## 2023-08-21 NOTE — PATIENT INSTRUCTIONS
It was a pleasure seeing you at [facility] today.        Here are my suggestions for your care:    1.  Exercise 30 minutes daily    2.  Call with any concerns        You can always call my nurse Madeline Das RN who is a nurse helping me in the care of my patients. She can be reached at [phone] if you have any questions.    For scheduling, please call my  Yarely Ross at [phone].    Thank you again for trusting me with your care. Please feel free to call my office at any time if you have any question or if I can assist you in any way.    Olive Street MD  [facility]

## 2023-08-21 NOTE — PROGRESS NOTES
HEART CARE ENCOUNTER CONSULTATON NOTE      [facility]  [phone]      Assessment/Recommendations   Assessment/Plan:  Coronary calcification - on aspirin and high intensity statin. Diet and exercise discussed.    Bicuspid aortic valve with mild regurgitation and mild enlargement of the aorta at the sinuses - BP controlled. Repeat echo in 2 years if asymptomatic.     Hyperlipidemia - on high intensity statin. LDL check today.    History of strokes - quiescent on aspirin and clopidogrel s/p right CEA    F/U 1 year with one of my colleagues       History of Present Illness/Subjective    HPI: Yong Guillermo is a 77 year old male diabetic with a history of stroke x 2 (2007 and 2009) s/p right CEA, hyperlipidemia, DANIELLE on BiPAP, bicuspid aortic valve with mild AI, and coronary calcification on CT scan.  Yong presented to clinic with long standing fatigue. Labs were normal in late 2021. A lexiscan nuclear stress showed a normal EF and possible distal inferior infarction. A coronary CTA showed a calcium score of 386 and moderate proximal LAD disease which was not flow limiting by FFR. CTA chest 6/2023 for follow up of reports of an enlarged aorta on echo reported 4.0 cm at the sinuses of valsalva with 3.0 at the ST junction.     Yong returns for annual follow up today. 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 Yong has atypical chest pains, like if he takes a deep breath or if he uses his right arm/shoulder. There is no exertional angina. He will get short of breath if he goes up multiple flights of stairs.     Echo 6/2023:  Global and regional left ventricular function is normal with an EF of 60-65%.  Right ventricular function, chamber size, wall motion, and thickness are normal.  The aortic valve is bicuspid.  Mild aortic insufficiency is present.  Sinuses of Valsalva 4.7 cm.  Moderate dilatation of the aorta is present.       Physical Examination   "Review of Systems   Vitals: /60 (BP Location: Right arm, Patient Position: Sitting, Cuff Size: Adult Regular)   Pulse 101   Resp 16   Ht 1.765 m (5' 9.5\")   Wt 73.1 kg (161 lb 3.2 oz)   BMI 23.46 kg/m    BMI= Body mass index is 23.46 kg/m .  Wt Readings from Last 3 Encounters:   08/21/23 73.1 kg (161 lb 3.2 oz)   05/23/23 73.9 kg (163 lb)   05/23/23 72.9 kg (160 lb 12.8 oz)       General Appearance:   no distress, normal body habitus   ENT/Mouth: membranes moist, no oral lesions or bleeding gums.      EYES:  no scleral icterus, normal conjunctivae   Neck: no carotid bruits or thyromegaly   Chest/Lungs:   lungs are clear to auscultation, no rales or wheezing, no sternal scar, equal chest wall expansion    Cardiovascular:   Regular. Normal first and second heart sounds with diastolic murmur. No rubs or gallops; the right carotid, radial and posterior tibial pulses are intact and the left carotid, radial and posterior tibial pulses are intact.  Jugular venous pressure is flat, no edema bilaterally    Abdomen:  no organomegaly, masses, bruits, or tenderness; bowel sounds are present   Extremities: no cyanosis or clubbing   Skin: no xanthelasma, warm.    Neurologic: normal  bilateral, no tremors     Psychiatric: alert and oriented x3, calm        Please refer above for cardiac ROS details.        Medical History  Surgical History Family History Social History   Past Medical History:   Diagnosis Date    Benign essential tremor 09/29/2016    BPH (benign prostatic hyperplasia) 05/26/2015    Coronary artery disease     CVA (cerebral vascular accident) (H) 01/14/2009    Right arm clumsiness.  MRI showed 2 infarcts in the left hemisphere.    CVA (cerebral vascular accident) (H) 06/22/2006    Left arm weakness.    Depression     Diabetes mellitus, type 2 (H)     Hyperlipidemia     Infection due to 2019 novel coronavirus 1/4/2023    Obstructive sleep apnea 07/22/2013    Shingles      Past Surgical History: "   Procedure Laterality Date    ARTHROSCOPY SHOULDER ROTATOR CUFF REPAIR Right     CAROTID ENDARTERECTOMY Right 2006    CERVICAL DISC SURGERY  2005    C5/6 and C6/7 lamina foraminotomy, scope with partial facetectomy and excision of hard disc herniation.    HERNIA REPAIR      Right inguinal hernia and umbilical hernia right cheek cyst on the face.    IR AORTIC ARCH 4 VESSEL ANGIOGRAM  2006    IR CAROTID ANGIOGRAM  2006    IR CAROTID ANGIOGRAM  2006    [place] EXCIS TENDON SHEATH LESION, HAND/FINGER      Description: Hand Excision Of A Tendon Cyst;  Recorded: 2008;    TONSILLECTOMY      VASECTOMY      ZZC EXCIS CERV DISK,ONE LEVEL      Description: Laminectomy With Disc Removal;  Recorded: 2008;  Comments: Lumbar level    ZZC EXCISION,BENIGN TUMOR,MANDIBLE      Description: Jaw Excision Benign Cyst / Tumor;  Proc Date: 2004;     Family History   Problem Relation Age of Onset    Heart Disease Mother     Snoring Mother     Diabetes Mother     Cancer Mother         Brain    Diabetes Paternal Grandmother     Heart Disease Brother     Heart Disease Brother     Diabetes Brother         Social History     Socioeconomic History    Marital status:      Spouse name: Not on file    Number of children: Not on file    Years of education: Not on file    Highest education level: Not on file   Occupational History    Not on file   Tobacco Use    Smoking status: Former     Types: Cigars, Pipe     Quit date: 1980     Years since quittin.4    Smokeless tobacco: Former     Types: Chew    Tobacco comments:     Quit 40 years ago.   Substance and Sexual Activity    Alcohol use: No    Drug use: No    Sexual activity: Yes     Partners: Female     Birth control/protection: None   Other Topics Concern    Parent/sibling w/ CABG, MI or angioplasty before 65F 55M? Not Asked   Social History Narrative    Not on file     Social Determinants of Health     Financial Resource Strain: Not on  file   Food Insecurity: Not on file   Transportation Needs: Not on file   Physical Activity: Not on file   Stress: Not on file   Social Connections: Not on file   Intimate Partner Violence: Not on file   Housing Stability: Not on file           Medications  Allergies   Current Outpatient Medications   Medication Sig Dispense Refill    acetaminophen (TYLENOL) 500 MG tablet Take 650 mg by mouth       Ascorbic Acid (VITAMIN C) 500 MG CAPS Take 1,000 mg by mouth daily       aspirin 81 MG EC tablet Take 81 mg by mouth      atorvastatin (LIPITOR) 80 MG tablet Take 1 tablet (80 mg) by mouth daily 90 tablet 3    BD PEN NEEDLE JERRY 2ND GEN 32G X 4 MM miscellaneous INJECT 1 NEEDLE UNDER THE SKIN DAILY 100 each 3    blood glucose monitoring (FREESTYLE) lancets USE 1 LANCET DAILY AS NEEDED 100 each 3    Cholecalciferol (VITAMIN D3) 25 MCG (1000 UT) CAPS Take 1,000 Units by mouth      clopidogrel (PLAVIX) 75 MG tablet Take 1 tablet (75 mg) by mouth daily 90 tablet 3    dulaglutide (TRULICITY) 1.5 MG/0.5ML pen Inject 1.5 mg Subcutaneous every 7 days 6 mL 1    FREESTYLE LITE test strip USE 1 STRIP FOUR TIMES A DAY AS DIRECTED 200 strip 6    JARDIANCE 25 MG TABS tablet TAKE 1 TABLET DAILY 90 tablet 1    LANTUS SOLOSTAR 100 UNIT/ML soln INJECT 12 UNITS UNDER THE SKIN AT BEDTIME 15 mL 0    losartan (COZAAR) 25 MG tablet Take 0.5 tablets (12.5 mg) by mouth daily 90 tablet 3    metFORMIN (GLUCOPHAGE) 500 MG tablet TAKE 2 TABLETS TWICE A DAY WITH MEALS 360 tablet 2    multivitamin w/minerals (THERA-VIT-M) tablet Take 1 tablet by mouth      Omega-3 1000 MG capsule Take 2 g by mouth      venlafaxine (EFFEXOR) 100 MG tablet Take 150 mg by mouth Taking 150 mg      venlafaxine (EFFEXOR) 37.5 MG tablet Take 37.5 mg by mouth         Allergies   Allergen Reactions    Alteplase     Sulfa Antibiotics           Lab Results    Chemistry/lipid CBC Cardiac Enzymes/BNP/TSH/INR   Recent Labs   Lab Test 09/28/22  0957   CHOL 106   HDL 40   LDL 46   TRIG  100     Recent Labs   Lab Test 09/28/22  0957 09/02/21  0937 06/02/21  0929   LDL 46 58 72     Recent Labs   Lab Test 05/23/23  1341      POTASSIUM 4.3   CHLORIDE 101   CO2 25   *   BUN 15.0   CR 0.71   GFRESTIMATED >90   KATHLEEN 9.3     Recent Labs   Lab Test 05/23/23  1341 12/28/22  1256 09/28/22  0957   CR 0.71 0.77 0.72     Recent Labs   Lab Test 05/23/23  1341 12/28/22  1256 09/28/22  0957   A1C 8.0* 8.0* 7.2*          Recent Labs   Lab Test 05/23/23  1341   WBC 5.3   HGB 14.7   HCT 42.4   MCV 99   *     Recent Labs   Lab Test 05/23/23  1341 09/28/22  0957 04/26/22  1414   HGB 14.7 14.3 14.9    Recent Labs   Lab Test 10/23/21  1706   TROPONINI <0.01     No results for input(s): BNP, NTBNPI, NTBNP in the last 01263 hours.  Recent Labs   Lab Test 04/26/22  1414   TSH 1.40     No results for input(s): INR in the last 80064 hours.     Today's clinic visit entailed:  Review of prior external note(s) from - [facility] information from epic reviewed  40 minutes spent by me on the date of the encounter doing chart review, history and exam, documentation and further activities per the note  Provider  Link to [facility] Help Grid     The level of medical decision making during this visit was of moderate complexity.        Olive Street MD

## 2023-08-28 DIAGNOSIS — E11.8 TYPE 2 DIABETES MELLITUS WITH COMPLICATION, WITHOUT LONG-TERM CURRENT USE OF INSULIN (H): ICD-10-CM

## 2023-08-28 RX ORDER — PEN NEEDLE, DIABETIC 32GX 5/32"
NEEDLE, DISPOSABLE MISCELLANEOUS
Qty: 90 EACH | Refills: 0 | Status: SHIPPED | OUTPATIENT
Start: 2023-08-28 | End: 2023-11-27

## 2023-08-28 NOTE — TELEPHONE ENCOUNTER
"Last Written Prescription Date:  8/16/22  Last Fill Quantity: 100,  # refills: 3   Last office visit provider:  5/23/23     Requested Prescriptions   Pending Prescriptions Disp Refills    BD PEN NEEDLE JERRY 2ND GEN 32G X 4 MM miscellaneous [Pharmacy Med Name: BD PEN JAYDE JERRY 2GEN 4MM 100S 32G5/32]  3     Sig: INJECT 1 NEEDLE UNDER THE SKIN DAILY       Diabetic Supplies Protocol Passed - 8/28/2023  4:28 AM        Passed - Medication is active on med list        Passed - Patient is 18 years of age or older        Passed - Recent (6 mo) or future (30 days) visit within the authorizing provider's specialty     Patient had office visit in the last 6 months or has a visit in the next 30 days with authorizing provider.  See \"Patient Info\" tab in inbasket, or \"Choose Columns\" in Meds & Orders section of the refill encounter.                 Harper Phillip RN 08/28/23 4:28 AM  "

## 2023-09-13 DIAGNOSIS — E11.9 DIABETES MELLITUS, TYPE 2 (H): ICD-10-CM

## 2023-09-14 RX ORDER — INSULIN GLARGINE 100 [IU]/ML
INJECTION, SOLUTION SUBCUTANEOUS
Qty: 15 ML | Refills: 2 | Status: SHIPPED | OUTPATIENT
Start: 2023-09-14 | End: 2024-07-30

## 2023-10-07 DIAGNOSIS — E11.8 TYPE 2 DIABETES MELLITUS WITH COMPLICATION, WITHOUT LONG-TERM CURRENT USE OF INSULIN (H): ICD-10-CM

## 2023-10-09 DIAGNOSIS — E11.8 TYPE 2 DIABETES MELLITUS WITH COMPLICATION, WITHOUT LONG-TERM CURRENT USE OF INSULIN (H): ICD-10-CM

## 2023-10-09 DIAGNOSIS — I63.9 CVA (CEREBRAL VASCULAR ACCIDENT) (H): ICD-10-CM

## 2023-10-09 DIAGNOSIS — E78.5 HYPERLIPIDEMIA, UNSPECIFIED HYPERLIPIDEMIA TYPE: ICD-10-CM

## 2023-10-09 RX ORDER — CLOPIDOGREL BISULFATE 75 MG/1
75 TABLET ORAL DAILY
Qty: 90 TABLET | Refills: 3 | Status: SHIPPED | OUTPATIENT
Start: 2023-10-09 | End: 2024-10-03

## 2023-10-10 RX ORDER — ATORVASTATIN CALCIUM 80 MG/1
80 TABLET, FILM COATED ORAL DAILY
Qty: 90 TABLET | Refills: 3 | Status: SHIPPED | OUTPATIENT
Start: 2023-10-10 | End: 2024-10-03

## 2023-10-10 RX ORDER — LANCETS 28 GAUGE
EACH MISCELLANEOUS
Qty: 100 EACH | Refills: 3 | Status: SHIPPED | OUTPATIENT
Start: 2023-10-10 | End: 2024-08-05

## 2023-10-10 RX ORDER — EMPAGLIFLOZIN 25 MG/1
TABLET, FILM COATED ORAL
Qty: 90 TABLET | Refills: 3 | OUTPATIENT
Start: 2023-10-10

## 2023-10-20 DIAGNOSIS — E11.8 TYPE 2 DIABETES MELLITUS WITH COMPLICATION, WITHOUT LONG-TERM CURRENT USE OF INSULIN (H): ICD-10-CM

## 2023-10-20 RX ORDER — LOSARTAN POTASSIUM 25 MG/1
12.5 TABLET ORAL DAILY
Qty: 90 TABLET | Refills: 3 | Status: SHIPPED | OUTPATIENT
Start: 2023-10-20

## 2023-11-10 DIAGNOSIS — E11.65 UNCONTROLLED TYPE 2 DIABETES MELLITUS WITH HYPERGLYCEMIA (H): ICD-10-CM

## 2023-11-14 NOTE — TELEPHONE ENCOUNTER
LVD and A1C 5/23/2023  [facility] [place]     Kathya Courtney PA-C  Endocrinology, Diabetes, and Metabolism   NVD 1/2/24 [place]

## 2023-11-15 DIAGNOSIS — E11.65 UNCONTROLLED TYPE 2 DIABETES MELLITUS WITH HYPERGLYCEMIA (H): ICD-10-CM

## 2023-11-25 ENCOUNTER — HEALTH MAINTENANCE LETTER (OUTPATIENT)
Age: 78
End: 2023-11-25

## 2023-11-27 DIAGNOSIS — E11.8 TYPE 2 DIABETES MELLITUS WITH COMPLICATION, WITHOUT LONG-TERM CURRENT USE OF INSULIN (H): ICD-10-CM

## 2023-11-27 RX ORDER — PEN NEEDLE, DIABETIC 32GX 5/32"
NEEDLE, DISPOSABLE MISCELLANEOUS
Qty: 100 EACH | Refills: 3 | Status: SHIPPED | OUTPATIENT
Start: 2023-11-27

## 2023-11-30 ASSESSMENT — ENCOUNTER SYMPTOMS
HEADACHES: 0
HEARTBURN: 0
ABDOMINAL PAIN: 0
COUGH: 0
WEAKNESS: 1
DIZZINESS: 0
MYALGIAS: 1
HEMATOCHEZIA: 0
SHORTNESS OF BREATH: 0
FREQUENCY: 1
PALPITATIONS: 0
DIARRHEA: 0
ARTHRALGIAS: 1
FEVER: 0
CONSTIPATION: 0
SORE THROAT: 0
DYSURIA: 0
PARESTHESIAS: 0
NAUSEA: 0
NERVOUS/ANXIOUS: 0
CHILLS: 0
EYE PAIN: 0
HEMATURIA: 0
JOINT SWELLING: 0

## 2023-11-30 ASSESSMENT — ACTIVITIES OF DAILY LIVING (ADL): CURRENT_FUNCTION: NO ASSISTANCE NEEDED

## 2023-12-06 ENCOUNTER — ANCILLARY PROCEDURE (OUTPATIENT)
Dept: GENERAL RADIOLOGY | Facility: CLINIC | Age: 78
End: 2023-12-06
Attending: INTERNAL MEDICINE
Payer: MEDICARE

## 2023-12-06 ENCOUNTER — OFFICE VISIT (OUTPATIENT)
Dept: INTERNAL MEDICINE | Facility: CLINIC | Age: 78
End: 2023-12-06
Payer: MEDICARE

## 2023-12-06 VITALS
HEART RATE: 73 BPM | OXYGEN SATURATION: 95 % | TEMPERATURE: 97.2 F | SYSTOLIC BLOOD PRESSURE: 116 MMHG | WEIGHT: 161 LBS | DIASTOLIC BLOOD PRESSURE: 59 MMHG | HEIGHT: 70 IN | BODY MASS INDEX: 23.05 KG/M2 | RESPIRATION RATE: 16 BRPM

## 2023-12-06 DIAGNOSIS — Z98.890 H/O CAROTID ENDARTERECTOMY: ICD-10-CM

## 2023-12-06 DIAGNOSIS — M85.89 OSTEOPENIA OF MULTIPLE SITES: ICD-10-CM

## 2023-12-06 DIAGNOSIS — D69.6 THROMBOCYTOPENIA (H): ICD-10-CM

## 2023-12-06 DIAGNOSIS — I25.84 CORONARY ARTERY DISEASE DUE TO CALCIFIED CORONARY LESION: ICD-10-CM

## 2023-12-06 DIAGNOSIS — I63.9 CEREBROVASCULAR ACCIDENT (CVA), UNSPECIFIED MECHANISM (H): ICD-10-CM

## 2023-12-06 DIAGNOSIS — I35.1 AORTIC VALVE REGURGITATION DUE TO AORTIC DILATION (H): ICD-10-CM

## 2023-12-06 DIAGNOSIS — F33.9 RECURRENT MAJOR DEPRESSIVE DISORDER, REMISSION STATUS UNSPECIFIED (H): ICD-10-CM

## 2023-12-06 DIAGNOSIS — E78.5 HYPERLIPIDEMIA, UNSPECIFIED HYPERLIPIDEMIA TYPE: ICD-10-CM

## 2023-12-06 DIAGNOSIS — I77.819 AORTIC VALVE REGURGITATION DUE TO AORTIC DILATION (H): ICD-10-CM

## 2023-12-06 DIAGNOSIS — E11.9 TYPE 2 DIABETES, HBA1C GOAL < 8% (H): ICD-10-CM

## 2023-12-06 DIAGNOSIS — Z00.00 ENCOUNTER FOR MEDICARE ANNUAL WELLNESS EXAM: Primary | ICD-10-CM

## 2023-12-06 DIAGNOSIS — G47.39 COMPLEX SLEEP APNEA SYNDROME: ICD-10-CM

## 2023-12-06 DIAGNOSIS — G25.0 BENIGN ESSENTIAL TREMOR: ICD-10-CM

## 2023-12-06 DIAGNOSIS — M25.511 CHRONIC RIGHT SHOULDER PAIN: ICD-10-CM

## 2023-12-06 DIAGNOSIS — G89.29 CHRONIC RIGHT SHOULDER PAIN: ICD-10-CM

## 2023-12-06 DIAGNOSIS — N40.0 BENIGN PROSTATIC HYPERPLASIA, UNSPECIFIED WHETHER LOWER URINARY TRACT SYMPTOMS PRESENT: ICD-10-CM

## 2023-12-06 DIAGNOSIS — Z12.5 SCREENING FOR PROSTATE CANCER: ICD-10-CM

## 2023-12-06 DIAGNOSIS — I25.10 CORONARY ARTERY DISEASE DUE TO CALCIFIED CORONARY LESION: ICD-10-CM

## 2023-12-06 PROBLEM — L73.8 SEBACEOUS HYPERPLASIA: Status: RESOLVED | Noted: 2023-06-10 | Resolved: 2023-12-06

## 2023-12-06 PROBLEM — H61.002: Status: RESOLVED | Noted: 2023-06-10 | Resolved: 2023-12-06

## 2023-12-06 PROBLEM — U07.1 INFECTION DUE TO 2019 NOVEL CORONAVIRUS: Status: RESOLVED | Noted: 2023-01-04 | Resolved: 2023-12-06

## 2023-12-06 PROBLEM — L57.0 AK (ACTINIC KERATOSIS): Status: RESOLVED | Noted: 2023-06-10 | Resolved: 2023-12-06

## 2023-12-06 LAB
ALBUMIN SERPL BCG-MCNC: 4.4 G/DL (ref 3.5–5.2)
ALBUMIN UR-MCNC: NEGATIVE MG/DL
ALP SERPL-CCNC: 76 U/L (ref 40–150)
ALT SERPL W P-5'-P-CCNC: 48 U/L (ref 0–70)
ANION GAP SERPL CALCULATED.3IONS-SCNC: 10 MMOL/L (ref 7–15)
APPEARANCE UR: CLEAR
AST SERPL W P-5'-P-CCNC: 27 U/L (ref 0–45)
BILIRUB SERPL-MCNC: 0.5 MG/DL
BILIRUB UR QL STRIP: NEGATIVE
BUN SERPL-MCNC: 17.6 MG/DL (ref 8–23)
CALCIUM SERPL-MCNC: 9.9 MG/DL (ref 8.8–10.2)
CHLORIDE SERPL-SCNC: 101 MMOL/L (ref 98–107)
CHOLEST SERPL-MCNC: 132 MG/DL
COLOR UR AUTO: YELLOW
CREAT SERPL-MCNC: 0.68 MG/DL (ref 0.67–1.17)
CREAT UR-MCNC: 50.7 MG/DL
DEPRECATED HCO3 PLAS-SCNC: 30 MMOL/L (ref 22–29)
EGFRCR SERPLBLD CKD-EPI 2021: >90 ML/MIN/1.73M2
ERYTHROCYTE [DISTWIDTH] IN BLOOD BY AUTOMATED COUNT: 11.8 % (ref 10–15)
FASTING STATUS PATIENT QL REPORTED: YES
GLUCOSE SERPL-MCNC: 167 MG/DL (ref 70–99)
GLUCOSE UR STRIP-MCNC: 500 MG/DL
HBA1C MFR BLD: 7.7 % (ref 0–5.6)
HCT VFR BLD AUTO: 43 % (ref 40–53)
HDLC SERPL-MCNC: 41 MG/DL
HGB BLD-MCNC: 14.9 G/DL (ref 13.3–17.7)
HGB UR QL STRIP: NEGATIVE
KETONES UR STRIP-MCNC: NEGATIVE MG/DL
LDLC SERPL CALC-MCNC: 74 MG/DL
LEUKOCYTE ESTERASE UR QL STRIP: NEGATIVE
MCH RBC QN AUTO: 34.7 PG (ref 26.5–33)
MCHC RBC AUTO-ENTMCNC: 34.7 G/DL (ref 31.5–36.5)
MCV RBC AUTO: 100 FL (ref 78–100)
MICROALBUMIN UR-MCNC: <12 MG/L
MICROALBUMIN/CREAT UR: NORMAL MG/G{CREAT}
NITRATE UR QL: NEGATIVE
NONHDLC SERPL-MCNC: 91 MG/DL
PH UR STRIP: 5.5 [PH] (ref 5–8)
PLATELET # BLD AUTO: 141 10E3/UL (ref 150–450)
POTASSIUM SERPL-SCNC: 5 MMOL/L (ref 3.4–5.3)
PROT SERPL-MCNC: 7.1 G/DL (ref 6.4–8.3)
PSA SERPL DL<=0.01 NG/ML-MCNC: 0.41 NG/ML (ref 0–6.5)
RBC # BLD AUTO: 4.29 10E6/UL (ref 4.4–5.9)
SODIUM SERPL-SCNC: 141 MMOL/L (ref 135–145)
SP GR UR STRIP: 1.02 (ref 1–1.03)
TRIGL SERPL-MCNC: 83 MG/DL
TSH SERPL DL<=0.005 MIU/L-ACNC: 2.48 UIU/ML (ref 0.3–4.2)
UROBILINOGEN UR STRIP-ACNC: 0.2 E.U./DL
VIT D+METAB SERPL-MCNC: 37 NG/ML (ref 20–50)
WBC # BLD AUTO: 3.9 10E3/UL (ref 4–11)

## 2023-12-06 PROCEDURE — 84443 ASSAY THYROID STIM HORMONE: CPT | Performed by: INTERNAL MEDICINE

## 2023-12-06 PROCEDURE — 81003 URINALYSIS AUTO W/O SCOPE: CPT | Performed by: INTERNAL MEDICINE

## 2023-12-06 PROCEDURE — 36415 COLL VENOUS BLD VENIPUNCTURE: CPT | Performed by: INTERNAL MEDICINE

## 2023-12-06 PROCEDURE — 82570 ASSAY OF URINE CREATININE: CPT | Performed by: INTERNAL MEDICINE

## 2023-12-06 PROCEDURE — G0439 PPPS, SUBSEQ VISIT: HCPCS | Performed by: INTERNAL MEDICINE

## 2023-12-06 PROCEDURE — 73030 X-RAY EXAM OF SHOULDER: CPT | Mod: TC | Performed by: RADIOLOGY

## 2023-12-06 PROCEDURE — 85027 COMPLETE CBC AUTOMATED: CPT | Performed by: INTERNAL MEDICINE

## 2023-12-06 PROCEDURE — 80053 COMPREHEN METABOLIC PANEL: CPT | Performed by: INTERNAL MEDICINE

## 2023-12-06 PROCEDURE — 82306 VITAMIN D 25 HYDROXY: CPT | Performed by: INTERNAL MEDICINE

## 2023-12-06 PROCEDURE — 83036 HEMOGLOBIN GLYCOSYLATED A1C: CPT | Performed by: INTERNAL MEDICINE

## 2023-12-06 PROCEDURE — 82043 UR ALBUMIN QUANTITATIVE: CPT | Performed by: INTERNAL MEDICINE

## 2023-12-06 PROCEDURE — G0103 PSA SCREENING: HCPCS | Performed by: INTERNAL MEDICINE

## 2023-12-06 PROCEDURE — 80061 LIPID PANEL: CPT | Performed by: INTERNAL MEDICINE

## 2023-12-06 PROCEDURE — 99213 OFFICE O/P EST LOW 20 MIN: CPT | Mod: 25 | Performed by: INTERNAL MEDICINE

## 2023-12-06 ASSESSMENT — ENCOUNTER SYMPTOMS
NERVOUS/ANXIOUS: 0
MYALGIAS: 1
JOINT SWELLING: 0
HEARTBURN: 0
HEMATOCHEZIA: 0
CHILLS: 0
DYSURIA: 0
NAUSEA: 0
FREQUENCY: 1
FEVER: 0
ABDOMINAL PAIN: 0
WEAKNESS: 1
COUGH: 0
SORE THROAT: 0
SHORTNESS OF BREATH: 0
PARESTHESIAS: 0
DIZZINESS: 0
DIARRHEA: 0
CONSTIPATION: 0
PALPITATIONS: 0
HEADACHES: 0
HEMATURIA: 0
EYE PAIN: 0
ARTHRALGIAS: 1

## 2023-12-06 ASSESSMENT — ACTIVITIES OF DAILY LIVING (ADL): CURRENT_FUNCTION: NO ASSISTANCE NEEDED

## 2023-12-06 NOTE — PATIENT INSTRUCTIONS
Labs and xray today.  Start PT.    To schedule DXA scan [phone].    For pain management - Tylenol and OC topical patches, gels, creams.      Patient Education   Personalized Prevention Plan  You are due for the preventive services outlined below.  Your care team is available to assist you in scheduling these services.  If you have already completed any of these items, please share that information with your care team to update in your medical record.  Health Maintenance Due   Topic Date Due    Diabetic Foot Exam  Never done    ANNUAL REVIEW OF HM ORDERS  Never done    Depression Action Plan  Never done    RSV VACCINE (Pregnancy & 60+) (1 - 1-dose 60+ series) Never done    Zoster (Shingles) Vaccine (2 of 2) 12/04/2019    Kidney Microalbumin Urine Test  09/28/2023    A1C Lab  11/23/2023     Your Health Risk Assessment indicates you feel you are not in good health    A healthy lifestyle helps keep the body fit and the mind alert. It helps protect you from disease, helps you fight disease, and helps prevent chronic disease (disease that doesn't go away) from getting worse. This is important as you get older and begin to notice twinges in muscles and joints and a decline in the strength and stamina you once took for granted. A healthy lifestyle includes good healthcare, good nutrition, weight control, recreation, and regular exercise. Avoid harmful substances and do what you can to keep safe. Another part of a healthy lifestyle is stay mentally active and socially involved.    Good healthcare   Have a wellness visit every year.   If you have new symptoms, let us know right away. Don't wait until the next checkup.   Take medicines exactly as prescribed and keep your medicines in a safe place. Tell us if your medicine causes problems.   Healthy diet and weight control   Eat 3 or 4 small, nutritious, low-fat, high-fiber meals a day. Include a variety of fruits, vegetables, and whole-grain foods.   Make sure you get  "enough calcium in your diet. Calcium, vitamin D, and exercise help prevent osteoporosis (bone thinning).   If you live alone, try eating with others when you can. That way you get a good meal and have company while you eat it.   Try to keep a healthy weight. If you eat more calories than your body uses for energy, it will be stored as fat and you will gain weight.     Recreation   Recreation is not limited to sports and team events. It includes any activity that provides relaxation, interest, enjoyment, and exercise. Recreation provides an outlet for physical, mental, and social energy. It can give a sense of worth and achievement. It can help you stay healthy.    Mental Exercise and Social Involvement  Mental and emotional health is as important as physical health. Keep in touch with friends and family. Stay as active as possible. Continue to learn and challenge yourself.   Things you can do to stay mentally active are:  Learn something new, like a foreign language or musical instrument.   Play SCRABBLE or do crossword puzzles. If you cannot find people to play these games with you at home, you can play them with others on your computer through the Internet.   Join a games club--anything from card games to chess or checkers or lawn bowling.   Start a new hobby.   Go back to school.   Volunteer.   Read.   Keep up with world events.  Learning About Being Physically Active  What is physical activity?     Being physically active means doing any kind of activity that gets your body moving.  The types of physical activity that can help you get fit and stay healthy include:  Aerobic or \"cardio\" activities. These make your heart beat faster and make you breathe harder, such as brisk walking, riding a bike, or running. They strengthen your heart and lungs and build up your endurance.  Strength training activities. These make your muscles work against, or \"resist,\" something. Examples include lifting weights or doing " push-ups. These activities help tone and strengthen your muscles and bones.  Stretches. These let you move your joints and muscles through their full range of motion. Stretching helps you be more flexible.  Reaching a balance between these three types of physical activity is important because each one contributes to your overall fitness.  What are the benefits of being active?  Being active is one of the best things you can do for your health. It helps you to:  Feel stronger and have more energy to do all the things you like to do.  Focus better at school or work.  Feel, think, and sleep better.  Reach and stay at a healthy weight.  Lose fat and build lean muscle.  Lower your risk for serious health problems, including diabetes, heart attack, high blood pressure, and some cancers.  Keep your heart, lungs, bones, muscles, and joints strong and healthy.  How can you make being active part of your life?  Start slowly. Make it your long-term goal to get at least 30 minutes of exercise on most days of the week. Walking is a good choice. You also may want to do other activities, such as running, swimming, cycling, or playing tennis or team sports.  Pick activities that you like--ones that make your heart beat faster, your muscles stronger, and your muscles and joints more flexible. If you find more than one thing you like doing, do them all. You don't have to do the same thing every day.  Get your heart pumping every day. Any activity that makes your heart beat faster and keeps it at that rate for a while counts.  Here are some great ways to get your heart beating faster:  Go for a brisk walk, run, or hike.  Go for a swim or bike ride.  Take an online exercise class or dance.  Play a game of touch football, basketball, or soccer.  Play tennis, pickleball, or racquetball.  Climb stairs.  Even some household chores can be aerobic. Just do them at a faster pace. Raking or mowing the lawn, sweeping the garage, and vacuuming  "and cleaning your home all can help get your heart rate up.  Strengthen your muscles during the week. You don't have to lift heavy weights or grow big, bulky muscles to get stronger. Doing a few simple activities that make your muscles work against, or \"resist,\" something can help you get stronger. Aim for at least twice a week.  For example, you can:  Do push-ups or sit-ups, which use your own body weight as resistance.  Lift weights or dumbbells or use stretch bands at home or in a gym or community center.  Stretch your muscles often. Stretching will help you as you become more active. It can help you stay flexible and loosen tight muscles. It can also help improve your balance and posture and can be a great way to relax.  Be sure to stretch the muscles you'll be using when you work out. It's best to warm your muscles slightly before you stretch them. Walk or do some other light aerobic activity for a few minutes. Then start stretching.  When you stretch your muscles:  Do it slowly. Stretching is not about going fast or making sudden movements.  Don't push or bounce during a stretch.  Hold each stretch for at least 15 to 30 seconds, if you can. You should feel a stretch in the muscle, but not pain.  Breathe out as you do the stretch. Then breathe in as you hold the stretch. Don't hold your breath.  If you're worried about how more activity might affect your health, have a checkup before you start. Follow any special advice your doctor gives you for getting a smart start.  Where can you learn more?  Go to https://www.Face-Me.net/patiented  Enter W332 in the search box to learn more about \"Learning About Being Physically Active.\"  Current as of: June 6, 2023               Content Version: 13.8    2907-2649 [employer].   Care instructions adapted under license by your healthcare professional. If you have questions about a medical condition or this instruction, always ask your healthcare professional. " [employer], [facility] disclaims any warranty or liability for your use of this information.      Learning About Dietary Guidelines  What are the Dietary Guidelines for Americans?     Dietary Guidelines for Americans provide tips for eating well and staying healthy. This helps reduce the risk for long-term (chronic) diseases.  These guidelines recommend that you:  Eat and drink the right amount for you. The U.[place] government's food guide is called MyPlate. It can help you make your own well-balanced eating plan.  Try to balance your eating with your activity. This helps you stay at a healthy weight.  Drink alcohol in moderation, if at all.  Limit foods high in salt, saturated fat, trans fat, and added sugar.  These guidelines are from the U.S. Department of Agriculture and the U.S. Department of Health and Human Services. They are updated every 5 years.  What is MyPlate?  MyPlate is the U.[place] government's food guide. It can help you make your own well-balanced eating plan. A balanced eating plan means that you eat enough, but not too much, and that your food gives you the nutrients you need to stay healthy.  MyPlate focuses on eating plenty of whole grains, fruits, and vegetables, and on limiting fat and sugar. It is available online at www.ChooseMyPlate.gov.  How can you get started?  If you're trying to eat healthier, you can slowly change your eating habits over time. You don't have to make big changes all at once. Start by adding one or two healthy foods to your meals each day.  Grains  Choose whole-grain breads and cereals and whole-wheat pasta and whole-grain crackers.  Vegetables  Eat a variety of vegetables every day. They have lots of nutrients and are part of a heart-healthy diet.  Fruits  Eat a variety of fruits every day. Fruits contain lots of nutrients. Choose fresh fruit instead of fruit juice.  Protein foods  Choose fish and lean poultry more often. Eat red meat and fried meats less often. Dried  "beans, tofu, and nuts are also good sources of protein.  Dairy  Choose low-fat or fat-free products from this food group. If you have problems digesting milk, try eating cheese or yogurt instead.  Fats and oils  Limit fats and oils if you're trying to cut calories. Choose healthy fats when you cook. These include canola oil and olive oil.  Where can you learn more?  Go to https://www.[employer].net/patiented  Enter D676 in the search box to learn more about \"Learning About Dietary Guidelines.\"  Current as of: February 28, 2023               Content Version: 13.8    8224-7975 [employer].   Care instructions adapted under license by your healthcare professional. If you have questions about a medical condition or this instruction, always ask your healthcare professional. [employer] disclaims any warranty or liability for your use of this information.      Your Health Risk Assessment indicates you feel you are not in good emotional health.    Recreation   Recreation is not limited to sports and team events. It includes any activity that provides relaxation, interest, enjoyment, and exercise. Recreation provides an outlet for physical, mental, and social energy. It can give a sense of worth and achievement. It can help you stay healthy.    Mental Exercise and Social Involvement  Mental and emotional health is as important as physical health. Keep in touch with friends and family. Stay as active as possible. Continue to learn and challenge yourself.   Things you can do to stay mentally active are:  Learn something new, like a foreign language or musical instrument.   Play SCRABBLE or do crossword puzzles. If you cannot find people to play these games with you at home, you can play them with others on your computer through the Internet.   Join a games club--anything from card games to chess or checkers or lawn bowling.   Start a new hobby.   Go back to school.   Volunteer.   Read.   Keep up with " world events.

## 2023-12-06 NOTE — PROGRESS NOTES
The patient was provided with suggestions to help him develop a healthy physical lifestyle.  He is at risk for lack of exercise and has been provided with information to increase physical activity for the benefit of his well-being.  The patient was counseled and encouraged to consider modifying their diet and eating habits. He was provided with information on recommended healthy diet options.  The patient was provided with suggestions to help him develop a healthy emotional lifestyle.

## 2023-12-06 NOTE — PROGRESS NOTES
"SUBJECTIVE:   Yong is a 78 year old, presenting for the following:  Wellness Visit ([place] 8/17/23)        12/6/2023     7:55 AM   Additional Questions   Roomed by Diane       Are you in the first 12 months of your Medicare coverage?  No    Healthy Habits:     In general, how would you rate your overall health?  Fair    Frequency of exercise:  None    Do you usually eat at least 4 servings of fruit and vegetables a day, include whole grains    & fiber and avoid regularly eating high fat or \"junk\" foods?  No    Taking medications regularly:  Yes    Medication side effects:  Not applicable    Ability to successfully perform activities of daily living:  No assistance needed    Home Safety:  No safety concerns identified    Hearing Impairment:  No hearing concerns    In the past 6 months, have you been bothered by leaking of urine?  No    In general, how would you rate your overall mental or emotional health?  Fair    Additional concerns today:  Yes      Today's PHQ-9 Score:       12/6/2023     7:23 AM   PHQ-9 SCORE   PHQ-9 Total Score MyChart 2 (Minimal depression)   PHQ-9 Total Score 2           Have you ever done Advance Care Planning? (For example, a Health Directive, POLST, or a discussion with a medical provider or your loved ones about your wishes): Yes, advance care planning is on file.       Fall risk  Fallen 2 or more times in the past year?: No  Any fall with injury in the past year?: No    Cognitive Screening   1) Repeat 3 items (Leader, Season, Table)    2) Clock draw: NORMAL  3) 3 item recall: Recalls 3 objects  Results: NORMAL clock, 1-2 items recalled: COGNITIVE IMPAIRMENT LESS LIKELY    Mini-CogTM Copyright PORTILLO Suggs. Licensed by the author for use in [facility]; reprinted with permission ([email or web address]). All rights reserved.      Do you have sleep apnea, excessive snoring or daytime drowsiness? : yes    Reviewed and updated as needed this visit by clinical staff   Tobacco  Allergies  Meds   "            Reviewed and updated as needed this visit by Provider                 Social History     Tobacco Use    Smoking status: Former     Types: Cigars, Pipe     Quit date: 1980     Years since quittin.7    Smokeless tobacco: Former     Types: Chew    Tobacco comments:     Quit 40 years ago.   Substance Use Topics    Alcohol use: No             2023     9:25 AM   Alcohol Use   Prescreen: >3 drinks/day or >7 drinks/week? No     Do you have a current opioid prescription? No  Do you use any other controlled substances or medications that are not prescribed by a provider? None              Current providers sharing in care for this patient include:   Patient Care Team:  Anita Pineda MD as PCP - General (Internal Medicine)  Pat Solano MD as MD (Hematology & Oncology)  Andre Kemp DO as Assigned Sleep Provider  Anita Pineda MD as Assigned PCP  Olive Street MD as Assigned Heart and Vascular Provider  Kishore Glover MD as Physician (Urology)  Kathya Courtney PA-C as Physician Assistant (Endocrinology, Diabetes, and Metabolism)  Carmenza Hickman PA-C as Assigned Surgical Provider  Kathya Courtney PA-C as Assigned Endocrinology Provider    The following health maintenance items are reviewed in Epic and correct as of today:  Health Maintenance   Topic Date Due    DIABETIC FOOT EXAM  Never done    ANNUAL REVIEW OF  ORDERS  Never done    DEPRESSION ACTION PLAN  Never done    RSV VACCINE (Pregnancy & 60+) (1 - 1-dose 60+ series) Never done    ZOSTER IMMUNIZATION (2 of 2) 2019    MEDICARE ANNUAL WELLNESS VISIT  2023    MICROALBUMIN  2023    A1C  2023    COVID-19 Vaccine ( season) 12/15/2023    BMP  2024    EYE EXAM  2024    PHQ-9  2024    LIPID  2024    FALL RISK ASSESSMENT  2024    ADVANCE CARE PLANNING  2027    DTAP/TDAP/TD IMMUNIZATION (3 - Td or Tdap) 2028    HEPATITIS C SCREENING   "Completed    INFLUENZA VACCINE  Completed    Pneumococcal Vaccine: 65+ Years  Completed    IPV IMMUNIZATION  Aged Out    HPV IMMUNIZATION  Aged Out    MENINGITIS IMMUNIZATION  Aged Out    RSV MONOCLONAL ANTIBODY  Aged Out    COLORECTAL CANCER SCREENING  Discontinued    LUNG CANCER SCREENING  Discontinued               Review of Systems   Constitutional:  Negative for chills and fever.   HENT:  Negative for congestion, ear pain, hearing loss and sore throat.    Eyes:  Negative for pain and visual disturbance.   Respiratory:  Negative for cough and shortness of breath.    Cardiovascular:  Negative for chest pain, palpitations and peripheral edema.   Gastrointestinal:  Negative for abdominal pain, constipation, diarrhea, heartburn, hematochezia and nausea.   Genitourinary:  Positive for frequency and impotence. Negative for dysuria, genital sores, hematuria, penile discharge and urgency.   Musculoskeletal:  Positive for arthralgias and myalgias. Negative for joint swelling.   Skin:  Negative for rash.   Neurological:  Positive for weakness. Negative for dizziness, headaches and paresthesias.   Psychiatric/Behavioral:  The patient is not nervous/anxious.          OBJECTIVE:   /59   Pulse 73   Temp 97.2  F (36.2  C)   Resp 16   Ht 1.784 m (5' 10.25\")   Wt 73 kg (161 lb)   SpO2 95%   BMI 22.94 kg/m   Estimated body mass index is 22.94 kg/m  as calculated from the following:    Height as of this encounter: 1.784 m (5' 10.25\").    Weight as of this encounter: 73 kg (161 lb).  Physical Exam  General Appearance: Alert, cooperative, no distress, appears stated age.  Head: Normocephalic, without obvious abnormality, atraumatic  Eyes: PERRL, conjunctiva/corneas clear, EOM's intact  Ears: Normal TM's and external ear canals, both ears  Nose: Nares normal, septum midline,mucosa normal, no drainage  Throat: Lips, mucosa, and tongue normal; teeth and gums normal  Neck: Supple, symmetrical, trachea midline, no " adenopathy;  thyroid: not enlarged, symmetric, no tenderness/mass/nodules; no carotid bruit or JVD  Back: Symmetric, no curvature, ROM normal, no CVA tenderness.  Lungs: Clear to auscultation bilaterally, respirations unlabored.  Breasts: No breast masses, tenderness, asymmetry, or nipple discharge.  Heart: Regular rate and rhythm, S1 and S2 normal, no murmur, rub, or gallop.  Abdomen: Soft, non-tender, bowel sounds active all four quadrants,  no masses, no organomegaly.  Extremities: Extremities normal, atraumatic, no cyanosis or edema. Right shoulder - no swelling or crepitations, has restriction of movement in internal rotation, but normal strength, negative drop arm and Neer's.  Skin: Skin color, texture, turgor normal, no rashes or lesions.  Lymph nodes: Cervical, supraclavicular, and axillary nodes normal.  Neurologic: No focal neurological findings.          ASSESSMENT / PLAN:   (Z00.00) Encounter for Medicare annual wellness exam  (primary encounter diagnosis)      (E11.9) Type 2 diabetes, HbA1C goal < 8% (H)  Comment: On Lantus 16 U, Trulicity, metformin and Jardiance. He is seeing Endo.  Plan: Albumin Random Urine Quantitative with Creat         Ratio, Hemoglobin A1c, Comprehensive metabolic         panel, UA Macroscopic with reflex to         Microscopic and Culture, TSH with free T4         reflex            (D69.6) Thrombocytopenia (H24)  Comment: stable, no alcohol use  Plan: CBC with platelets            (M85.89) Osteopenia of multiple sites  Comment: no fractures, due for DXA scan  Plan: Vitamin D Deficiency, TSH with free T4 reflex,         DX Hip/Pelvis/Spine            (F33.9) Recurrent major depressive disorder, remission status unspecified (H24)  Comment: stable on Venlafaxine, [facility]  Plan: TSH with free T4 reflex          (E78.5) Hyperlipidemia, unspecified hyperlipidemia type  Comment: on statin  Plan: Lipid panel reflex to direct LDL Fasting            (I63.9) Cerebrovascular accident  (CVA), unspecified mechanism (H)  Comment: on ASA, statin   in 2002, H/O carotid endarterectomy, On Plavix, asa, statin     (I25.10,  I25.84) Coronary artery disease due to calcified coronary lesion  Comment: no anginal pain, saw Cardiologist in [place]  Plan: Lipid panel reflex to direct LDL Fasting            (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     (M25.511,  G89.29) Chronic right shoulder pain  Comment: worsening of chronic right shoulder pain in the last 6 months, especially with internal and external rotation. Taking Tylenol. Had rotator cuff surgery > 10 years ago.  Plan: XR Shoulder Right 2 Views, Physical Therapy         Referral, CANCELED: Orthopedic          Referral            Patient has been advised of split billing requirements and indicates understanding: Yes      COUNSELING:  Reviewed preventive health counseling, as reflected in patient instructions       Regular exercise       Healthy diet/nutrition        He reports that he quit smoking about 43 years ago. His smoking use included cigars and pipe. He has quit using smokeless tobacco.  His smokeless tobacco use included chew.      Appropriate preventive services were discussed with this patient, including applicable screening as appropriate for fall prevention, nutrition, physical activity, Tobacco-use cessation, weight loss and cognition.  Checklist reviewing preventive services available has been  given to the patient.    Reviewed patients plan of care and provided an AVS. The Basic Care Plan (routine screening as documented in Health Maintenance) for Yong meets the Care Plan requirement. This Care Plan has been established and reviewed with the Patient.          Anita Pineda MD  [facility]    Identified Health Risks:  I have reviewed Opioid Use Disorder and Substance Use Disorder risk factors and made any needed referrals.   Answers submitted by the patient for this visit:  Patient Health Questionnaire (Submitted on 12/6/2023)  If you checked off any problems, how difficult have these problems made it for you to do your work, take care of things at home, or get along with other people?: Somewhat difficult  PHQ9 TOTAL SCORE: 2

## 2023-12-07 ENCOUNTER — THERAPY VISIT (OUTPATIENT)
Dept: PHYSICAL THERAPY | Facility: CLINIC | Age: 78
End: 2023-12-07
Attending: INTERNAL MEDICINE
Payer: MEDICARE

## 2023-12-07 DIAGNOSIS — G89.29 CHRONIC RIGHT SHOULDER PAIN: ICD-10-CM

## 2023-12-07 DIAGNOSIS — M25.511 CHRONIC RIGHT SHOULDER PAIN: ICD-10-CM

## 2023-12-07 PROCEDURE — 97161 PT EVAL LOW COMPLEX 20 MIN: CPT | Mod: GP

## 2023-12-07 PROCEDURE — 97110 THERAPEUTIC EXERCISES: CPT | Mod: GP

## 2023-12-07 NOTE — PROGRESS NOTES
PHYSICAL THERAPY EVALUATION  Type of Visit: Evaluation    See electronic medical record for Abuse and Falls Screening details.    Subjective       Presenting condition or subjective complaint:   R shoulder pain been going on for 10 months. Pt reports pain down his L arm with lifting and moving into internal rotation.  Date of onset: 02/07/23    Relevant medical history:     Dates & types of surgery:  rotator cuff 20 years ago, discectomy 2005 - lasting numbness in fingers 1 and 2    Prior diagnostic imaging/testing results:       Prior therapy history for the same diagnosis, illness or injury:          Employment:     retired  Hobbies/Interests:  wood turning and wood working, reading    Patient goals for therapy:   be able to use arm    Pain assessment:  none at start of visit     Objective   SHOULDER EVALUATION  PAIN: Pain Level at Rest: 0/10  Pain Level with Use: 8/10  Pain Location: shoulder and elbow  Pain Quality: Aching  Pain Frequency: intermittent  Pain is Worst: daytime  Pain is Exacerbated By: lifting, internal rotation  Pain is Relieved By: self massage  INTEGUMENTARY (edema, incisions): WNL  POSTURE: Sitting Posture: Rounded shoulders, Forward head  ROM:  shoulder shrug on R with flexion pt reports is from rotator cuff repair, abduction on R limited due to pain, IR limited on R, flexion, ER WFL  STRENGTH:   Pain: - none + mild ++ moderate +++ severe  Strength Scale: 0-5/5 Left Right   Shoulder Flexion 5 4+, + (mild)   Shoulder Abduction 5 4, ++ (mod)   Shoulder Internal Rotation 5 3+, ++ (mod)   Shoulder External Rotation 5 4, ++ (mod)   Elbow Flexion 5 5   Elbow Extension 5 5     SPECIAL TESTS:    Left Right   Rotator Cuff Tear     Drop Arm Negative  Positive - with external resistance, not positive without    Belly Press Negative  Positive   Lift off  Negative  Unable to assess due to lack of ROM     PALPATION:  pain to post shoulder   JOINT MOBILITY:  slight decrease in posterior ROM  CERVICAL  SCREEN:   (Degrees) Left AROM Right AROM   Cervical Flexion Min tomlin   Cervical Extension Mod tomlin   Side bend Sig limited Sig limited   Rotation Mod tomlin Mod tomlin      Left Right   Spurling s Negative  Negative      Median nerve tension - positive on R    Assessment & Plan   CLINICAL IMPRESSIONS  Medical Diagnosis: R shoulder pain    Treatment Diagnosis: R shoulder pain   Impression/Assessment: Patient is a 78 year old male with R shoulder pain complaints.  The following significant findings have been identified: Pain, Decreased ROM/flexibility, Decreased joint mobility, Decreased strength, Impaired muscle performance, Decreased activity tolerance, and Impaired posture. These impairments interfere with their ability to perform self care tasks and recreational activities as compared to previous level of function.     Clinical Decision Making (Complexity):  Clinical Presentation: Stable/Uncomplicated  Clinical Presentation Rationale: based on medical and personal factors listed in PT evaluation  Clinical Decision Making (Complexity): Low complexity    PLAN OF CARE  Treatment Interventions:  Modalities: Dry Needling, E-stim, Hot Pack  Interventions: Manual Therapy, Neuromuscular Re-education, Therapeutic Activity, Therapeutic Exercise, Self-Care/Home Management    Long Term Goals     PT Goal 1  Goal Identifier: reaching  Goal Description: pt will demonstrate improvement in ROM in order to reach behind his back to tuck in his shirt  Rationale: to maximize safety and independence with performance of ADLs and functional tasks  Target Date: 02/29/24  PT Goal 2  Goal Identifier: lifting  Goal Description: patient will demonstrate proper form with repeated lifting maneuver of light object (<5 pounds) for 10 reps from floor to counter height.  In 12 weeks patient will demonstrate use of this lifting maneuver throughout day unrestricted with PL <2/10.  Rationale: to maximize safety and independence with performance of ADLs and  functional tasks  Target Date: 02/29/24      Frequency of Treatment: 1x/week for 6 weeks then 1x every 2 weeks for 6 weeks  Duration of Treatment: 12 weeks    Recommended Referrals to Other Professionals: Physical Therapy  Education Assessment:   Learner/Method: Patient    Risks and benefits of evaluation/treatment have been explained.   Patient/Family/caregiver agrees with Plan of Care.     Evaluation Time:     PT Eval, Low Complexity Minutes (47339): 25       Signing Clinician: Latanya Nicholas PT      [facility]                                                                                   OUTPATIENT PHYSICAL THERAPY      PLAN OF TREATMENT FOR OUTPATIENT REHABILITATION   Patient's Last Name, First Name, Yong Connell YOB: 1945   Provider's Name   [facility]   Medical Record No.  [id number]     Onset Date: 02/07/23  Start of Care Date: 12/07/23     Medical Diagnosis:  R shoulder pain      PT Treatment Diagnosis:  R shoulder pain Plan of Treatment  Frequency/Duration: 1x/week for 6 weeks then 1x every 2 weeks for 6 weeks/ 12 weeks    Certification date from 12/07/23 to 02/29/24         See note for plan of treatment details and functional goals     Latanya Nicholas, PT                         I CERTIFY THE NEED FOR THESE SERVICES FURNISHED UNDER        THIS PLAN OF TREATMENT AND WHILE UNDER MY CARE     (Physician attestation of this document indicates review and certification of the therapy plan).              Referring Provider:  Anita Pineda    Initial Assessment  See Epic Evaluation- Start of Care Date: 12/07/23

## 2023-12-20 ENCOUNTER — THERAPY VISIT (OUTPATIENT)
Dept: PHYSICAL THERAPY | Facility: CLINIC | Age: 78
End: 2023-12-20
Payer: MEDICARE

## 2023-12-20 DIAGNOSIS — M25.511 CHRONIC RIGHT SHOULDER PAIN: Primary | ICD-10-CM

## 2023-12-20 DIAGNOSIS — G89.29 CHRONIC RIGHT SHOULDER PAIN: Primary | ICD-10-CM

## 2023-12-20 PROCEDURE — 97110 THERAPEUTIC EXERCISES: CPT | Mod: GP | Performed by: PHYSICAL THERAPIST

## 2023-12-26 ENCOUNTER — THERAPY VISIT (OUTPATIENT)
Dept: PHYSICAL THERAPY | Facility: CLINIC | Age: 78
End: 2023-12-26
Payer: MEDICARE

## 2023-12-26 DIAGNOSIS — G89.29 CHRONIC RIGHT SHOULDER PAIN: Primary | ICD-10-CM

## 2023-12-26 DIAGNOSIS — M25.511 CHRONIC RIGHT SHOULDER PAIN: Primary | ICD-10-CM

## 2023-12-26 PROCEDURE — 97110 THERAPEUTIC EXERCISES: CPT | Mod: GP | Performed by: PHYSICAL THERAPIST

## 2024-01-02 ENCOUNTER — TELEPHONE (OUTPATIENT)
Dept: ENDOCRINOLOGY | Facility: CLINIC | Age: 79
End: 2024-01-02

## 2024-01-02 ENCOUNTER — VIRTUAL VISIT (OUTPATIENT)
Dept: ENDOCRINOLOGY | Facility: CLINIC | Age: 79
End: 2024-01-02
Payer: MEDICARE

## 2024-01-02 DIAGNOSIS — E11.9 WELL CONTROLLED TYPE 2 DIABETES MELLITUS (H): Primary | ICD-10-CM

## 2024-01-02 PROCEDURE — 99213 OFFICE O/P EST LOW 20 MIN: CPT | Mod: 95 | Performed by: PHYSICIAN ASSISTANT

## 2024-01-02 ASSESSMENT — PAIN SCALES - GENERAL: PAINLEVEL: MODERATE PAIN (4)

## 2024-01-02 NOTE — LETTER
"1/2/2024       RE: Yong Guillermo  [address]  [facility] [address]     Dear Colleague,    Thank you for referring your patient, Yong Guillermo, to the [facility] [place] at [facility]. Please see a copy of my visit note below.    Outcome for 01/02/24 10:20 AM: Data obtained via phone and located below  Yulisa Hawk LPN       Virtual Visit Details    Type of service:  Video Visit   Video Start Time:  1033  Video End Time:10:53 AM    Originating Location (pt. Location): Home    Distant Location (provider location):  Off-site  Platform used for Video Visit: Well      HPI:   Yong is a pleasant 79 yo man here for follow up of type 2 diabetes, diagnosed around 1994. He also has a history of CAD, s/p right carotid endarterectomy, CVA x 2, moderate aortic regurgitation, hyperlipidemia, sleep apnea, major depression, benign essential tremor, osteopenia, thrombocytopenia, Herpes Zoster and COVID infection in Jan 2023. Yong states his diabetes was initially tx with diet and exercise and lost 40 pounds and cut out almost all carbs. He was later started on Metformin, Januvia and Jardiance and reports he has been on insulin for a couple of years.    At his visit with Rachel Courtney, he switched from Januvia to Trulicity.  He is tolerating this well. Appetite is the same. No changes in weight.  150#.  5'10\"    Current treatment:   Lantus- 16 units  Jardiance 25  Trulicity 1.5 mg  Metformin- 1000 mg bid.     Previous treatments:  Januvia- switched to Trulicity 2023.     Feeling tired. Has some pain in shoulder from tendonitis.  Eating more cookies over the holidays.      1/2  AM- 130    1/1  AM- 150  PM - 201    12/31  AM- 127  PM- 130    12/30  AM- 136    12/29  AM- 113    12/28  AM- 105    12/27  AM- 146  PM- 160    12/26  AM- 150    12/25  AM- 159  PM- 153    12/24  AM- 126    12/23  AM- 122  PM- 125    12/22  AM- 128    Diabetes " Control:   Lab Results   Component Value Date    A1C 7.7 12/06/2023    A1C 8.0 05/23/2023    A1C 8.0 12/28/2022    A1C 7.2 09/28/2022    A1C 6.9 04/26/2022         Past Medical History:   Diagnosis Date    Benign essential tremor 09/29/2016    BPH (benign prostatic hyperplasia) 05/26/2015    Coronary artery disease     CVA (cerebral vascular accident) (H) 01/14/2009    Right arm clumsiness.  MRI showed 2 infarcts in the left hemisphere.    CVA (cerebral vascular accident) (H) 06/22/2006    Left arm weakness.    Depression     Diabetes mellitus, type 2 (H)     Hyperlipidemia     Infection due to 2019 novel coronavirus 1/4/2023    Obstructive sleep apnea 07/22/2013    Shingles        Past Surgical History:   Procedure Laterality Date    ARTHROSCOPY SHOULDER ROTATOR CUFF REPAIR Right     CAROTID ENDARTERECTOMY Right 09/27/2006    CERVICAL DISC SURGERY  07/29/2005    C5/6 and C6/7 lamina foraminotomy, scope with partial facetectomy and excision of hard disc herniation.    HERNIA REPAIR  5/21/104    Right inguinal hernia and umbilical hernia right cheek cyst on the face.    IR AORTIC ARCH 4 VESSEL ANGIOGRAM  8/8/2006    IR CAROTID ANGIOGRAM  8/8/2006    IR CAROTID ANGIOGRAM  8/8/2006    RI EXCIS TENDON SHEATH LESION, HAND/FINGER      Description: Hand Excision Of A Tendon Cyst;  Recorded: 04/29/2008;    TONSILLECTOMY      VASECTOMY      ZZC EXCIS CERV DISK,ONE LEVEL      Description: Laminectomy With Disc Removal;  Recorded: 04/29/2008;  Comments: Lumbar level    ZZC EXCISION,BENIGN TUMOR,MANDIBLE      Description: Jaw Excision Benign Cyst / Tumor;  Proc Date: 05/01/2004;       Family History   Problem Relation Age of Onset    Heart Disease Mother     Snoring Mother     Diabetes Mother     Cancer Mother         Brain    Diabetes Paternal Grandmother     Heart Disease Brother     Heart Disease Brother     Diabetes Brother        Social History     Socioeconomic History    Marital status:      Spouse name: None     Number of children: None    Years of education: None    Highest education level: None   Tobacco Use    Smoking status: Former     Types: Cigars, Pipe     Quit date: 1980     Years since quittin.7    Smokeless tobacco: Former     Types: Chew    Tobacco comments:     Quit 40 years ago.   Vaping Use    Vaping Use: Never used   Substance and Sexual Activity    Alcohol use: No    Drug use: No    Sexual activity: Yes     Partners: Female     Birth control/protection: None     Social Determinants of Health     Financial Resource Strain: Low Risk  (2023)    Financial Resource Strain     Within the past 12 months, have you or your family members you live with been unable to get utilities (heat, electricity) when it was really needed?: No   Food Insecurity: Low Risk  (2023)    Food Insecurity     Within the past 12 months, did you worry that your food would run out before you got money to buy more?: No     Within the past 12 months, did the food you bought just not last and you didn t have money to get more?: No   Transportation Needs: Low Risk  (2023)    Transportation Needs     Within the past 12 months, has lack of transportation kept you from medical appointments, getting your medicines, non-medical meetings or appointments, work, or from getting things that you need?: No   Interpersonal Safety: Low Risk  (2023)    Interpersonal Safety     Do you feel physically and emotionally safe where you currently live?: Yes     Within the past 12 months, have you been hit, slapped, kicked or otherwise physically hurt by someone?: No     Within the past 12 months, have you been humiliated or emotionally abused in other ways by your partner or ex-partner?: No   Housing Stability: Low Risk  (2023)    Housing Stability     Do you have housing? : Yes     Are you worried about losing your housing?: No       Current Outpatient Medications   Medication    acetaminophen (TYLENOL) 500 MG tablet     Ascorbic Acid (VITAMIN C) 500 MG CAPS    aspirin 81 MG EC tablet    atorvastatin (LIPITOR) 80 MG tablet    blood glucose monitoring (FREESTYLE) lancets    Cholecalciferol (VITAMIN D3) 25 MCG (1000 UT) CAPS    clopidogrel (PLAVIX) 75 MG tablet    dulaglutide (TRULICITY) 1.5 MG/0.5ML pen    empagliflozin (JARDIANCE) 25 MG TABS tablet    FREESTYLE LITE test strip    insulin pen needle (BD PEN NEEDLE JERRY 2ND GEN) 32G X 4 MM miscellaneous    LANTUS SOLOSTAR 100 UNIT/ML soln    losartan (COZAAR) 25 MG tablet    metFORMIN (GLUCOPHAGE) 500 MG tablet    multivitamin w/minerals (THERA-VIT-M) tablet    Omega-3 1000 MG capsule    venlafaxine (EFFEXOR) 100 MG tablet    venlafaxine (EFFEXOR) 37.5 MG tablet     No current facility-administered medications for this visit.          Allergies   Allergen Reactions    Alteplase      Muscle Tightness    Sulfa Antibiotics        Physical Exam  There were no vitals taken for this visit.  GENERAL: healthy, alert and no distress  RESP: no audible wheeze, cough, or visible cyanosis.  No visible retractions or increased work of breathing.  Able to speak fully in complete sentences.  PSYCH: mentation appears normal, affect normal/bright, judgement and insight intact, normal speech and appearance well-groomed  RESULTS  Lab Results   Component Value Date    A1C 7.7 (H) 12/06/2023    A1C 8.0 (H) 05/23/2023    A1C 8.0 (H) 12/28/2022    A1C 7.2 (H) 09/28/2022    A1C 6.9 (H) 04/26/2022       TSH   Date Value Ref Range Status   12/06/2023 2.48 0.30 - 4.20 uIU/mL Final   04/26/2022 1.40 0.30 - 5.00 uIU/mL Final   09/02/2021 1.64 0.30 - 5.00 uIU/mL Final   06/02/2021 1.89 0.30 - 5.00 uIU/mL Final   08/11/2020 1.39 0.30 - 5.00 uIU/mL Final   05/09/2019 2.22 0.30 - 5.00 uIU/mL Final       ALT   Date Value Ref Range Status   12/06/2023 48 0 - 70 U/L Final     Comment:     Reference intervals for this test were updated on 6/12/2023 to more accurately reflect our healthy population. There may be  differences in the flagging of prior results with similar values performed with this method. Interpretation of those prior results can be made in the context of the updated reference intervals.     05/23/2023 37 10 - 50 U/L Final   06/10/2021 135 (H) 0 - 70 U/L Final   ]    Recent Labs   Lab Test 12/06/23  0847 08/21/23  1439   CHOL 132 102   HDL 41 37*   LDL 74 35   TRIG 83 150*       Lab Results   Component Value Date     12/06/2023     06/10/2021      Lab Results   Component Value Date    POTASSIUM 5.0 12/06/2023    POTASSIUM 4.7 04/26/2022    POTASSIUM 4.5 06/10/2021     Lab Results   Component Value Date    CHLORIDE 101 12/06/2023    CHLORIDE 104 04/26/2022    CHLORIDE 103 06/10/2021     Lab Results   Component Value Date    KATHLEEN 9.9 12/06/2023    KATHLEEN 9.7 06/10/2021     Lab Results   Component Value Date    CO2 30 12/06/2023    CO2 27 04/26/2022    CO2 21 06/10/2021     Lab Results   Component Value Date    BUN 17.6 12/06/2023    BUN 17 04/26/2022    BUN 23 06/10/2021     Lab Results   Component Value Date    CR 0.68 12/06/2023    CR 0.87 06/10/2021       GFR Estimate   Date Value Ref Range Status   12/06/2023 >90 >60 mL/min/1.73m2 Final   05/23/2023 >90 >60 mL/min/1.73m2 Final     Comment:     eGFR calculated using 2021 CKD-EPI equation.   12/28/2022 >90 >60 mL/min/1.73m2 Final     Comment:     Effective December 21, 2021 eGFRcr in adults is calculated using the 2021 CKD-EPI creatinine equation which includes age and gender (Gladys james al., NEJM, DOI: 10.1056/RUEKiz7332197)   06/15/2021 >60 >60 mL/min/1.73m2 Final   06/10/2021 85 >60 mL/min/[1.73_m2] Final     Comment:     Non  GFR Calc  Starting 12/18/2018, serum creatinine based estimated GFR (eGFR) will be   calculated using the Chronic Kidney Disease Epidemiology Collaboration   (CKD-EPI) equation.     06/02/2021 >60 >60 mL/min/1.73m2 Final   01/28/2021 >60 >60 mL/min/1.73m2 Final     GFR Estimate If Black   Date Value Ref Range  "Status   06/15/2021 >60 >60 mL/min/1.73m2 Final   06/10/2021 >90 >60 mL/min/[1.73_m2] Final     Comment:      GFR Calc  Starting 12/18/2018, serum creatinine based estimated GFR (eGFR) will be   calculated using the Chronic Kidney Disease Epidemiology Collaboration   (CKD-EPI) equation.     06/02/2021 >60 >60 mL/min/1.73m2 Final   01/28/2021 >60 >60 mL/min/1.73m2 Final       Lab Results   Component Value Date    MICROL <12.0 12/06/2023     No results found for: \"MICROALBUMIN\"  No results found for: \"CPEPT\", \"GADAB\", \"ISCAB\"    Vitamin B12   Date Value Ref Range Status   04/26/2022 >2,000 (H) 213 - 816 pg/mL Final   08/11/2020 331 213 - 816 pg/mL Final   ]    Most recent eye exam date: : Not Found     Assessment/Plan:     1.  Type 2 diabetes- A1c is in target at 7.7% (goal <8%) and glucose is well controlled on current regimen. Discussed rationale for A1c target based on ACCORD and other trials involving people with high risk CVD. He does have some high readings after eating cookies, etc, but otherwise under good control.  We made the following plan today (instructions given to patient):      Focus on walking or going up stairs after meals. This will help lower your glucose.     Let me know if your glucose if often over 180 mg/dL.  If so, we may want to increase trulicity to 3 mg weekly.      Please let me know if you are having low blood sugars less than 70 or over 250 mg/dL.      If you have concerns, please send me a [employer] message M-Th, call the clinic at [phone], or call [phone] after hours/weekends and ask to speak with the endocrinologist on call.      2.  Risk factors-     Retinopathy:  No.  Had eye exam within last year.   Nephropathy:  BP historically well controlled. No microalbuminuria.  Creatinine stable.   Neuropathy: No.    Feet: OK, no ulcers.   Lipids:  LDL at target.  Patient taking statin    3.  F/U in 3 mos with Rachel Courtney or myself.    27 minutes spent on the date of " the encounter doing chart review, review of test results, review and interpretation of glucose data, patient visit and documentation, counseling/coordination of care, and discussion of follow up plan for worsening hyper and hypoglycemia.  The patient understood and is satisfied with today's visit.          Kristen Doss PA-C, MPAS   [facility]  Department of Medicine  Division of Endocrinology and Diabetes

## 2024-01-02 NOTE — NURSING NOTE
Is the patient currently in the state of [place]? YES    Visit mode:VIDEO    If the visit is dropped, the patient can be reconnected by: VIDEO VISIT: Send to e-mail at: [email or web address]    Will anyone else be joining the visit? NO  (If patient encounters technical issues they should call [phone])    How would you like to obtain your AVS? MyChart    Are changes needed to the allergy or medication list? Pt stated no changes to allergies and Pt stated no med changes    Reason for visit: Follow Up    Yulisa Hawk LPN LPN

## 2024-01-02 NOTE — TELEPHONE ENCOUNTER
Patient call:     Appointment type: Return diabetes   Provider: Sherwin   Return date: 1/2   Speciality phone number: [phone]   Additional appointment(s) needed: N/A   Additional notes: Spoke to pt to re-farhat appt today andre Courtney. Pt took virtual appt with Romario today 1/2 @ 10:30 am     Elizabeth Treviño on 1/2/2024 at 8:55 AM

## 2024-01-02 NOTE — PROGRESS NOTES
"Outcome for 01/02/24 10:20 AM: Data obtained via phone and located below  Yulisa Hawk LPN       Virtual Visit Details    Type of service:  Video Visit   Video Start Time:  1033  Video End Time:10:53 AM    Originating Location (pt. Location): Home    Distant Location (provider location):  Off-site  Platform used for Video Visit: Well      HPI:   Yong is a pleasant 79 yo man here for follow up of type 2 diabetes, diagnosed around 1994. He also has a history of CAD, s/p right carotid endarterectomy, CVA x 2, moderate aortic regurgitation, hyperlipidemia, sleep apnea, major depression, benign essential tremor, osteopenia, thrombocytopenia, Herpes Zoster and COVID infection in Jan 2023. Yong states his diabetes was initially tx with diet and exercise and lost 40 pounds and cut out almost all carbs. He was later started on Metformin, Januvia and Jardiance and reports he has been on insulin for a couple of years.    At his visit with Rachel Courtney, he switched from Januvia to Trulicity.  He is tolerating this well. Appetite is the same. No changes in weight.  150#.  5'10\"    Current treatment:   Lantus- 16 units  Jardiance 25  Trulicity 1.5 mg  Metformin- 1000 mg bid.     Previous treatments:  Januvia- switched to Trulicity 2023.     Feeling tired. Has some pain in shoulder from tendonitis.  Eating more cookies over the holidays.      1/2  AM- 130    1/1  AM- 150  PM - 201    12/31  AM- 127  PM- 130    12/30  AM- 136    12/29  AM- 113    12/28  AM- 105    12/27  AM- 146  PM- 160    12/26  AM- 150    12/25  AM- 159  PM- 153    12/24  AM- 126    12/23  AM- 122  PM- 125    12/22  AM- 128    Diabetes Control:   Lab Results   Component Value Date    A1C 7.7 12/06/2023    A1C 8.0 05/23/2023    A1C 8.0 12/28/2022    A1C 7.2 09/28/2022    A1C 6.9 04/26/2022         Past Medical History:   Diagnosis Date    Benign essential tremor 09/29/2016    BPH (benign prostatic hyperplasia) 05/26/2015    Coronary artery disease     CVA " (cerebral vascular accident) (H) 2009    Right arm clumsiness.  MRI showed 2 infarcts in the left hemisphere.    CVA (cerebral vascular accident) (H) 2006    Left arm weakness.    Depression     Diabetes mellitus, type 2 (H)     Hyperlipidemia     Infection due to 2019 novel coronavirus 2023    Obstructive sleep apnea 2013    Shingles        Past Surgical History:   Procedure Laterality Date    ARTHROSCOPY SHOULDER ROTATOR CUFF REPAIR Right     CAROTID ENDARTERECTOMY Right 2006    CERVICAL DISC SURGERY  2005    C5/6 and C6/7 lamina foraminotomy, scope with partial facetectomy and excision of hard disc herniation.    HERNIA REPAIR      Right inguinal hernia and umbilical hernia right cheek cyst on the face.    IR AORTIC ARCH 4 VESSEL ANGIOGRAM  2006    IR CAROTID ANGIOGRAM  2006    IR CAROTID ANGIOGRAM  2006    MI EXCIS TENDON SHEATH LESION, HAND/FINGER      Description: Hand Excision Of A Tendon Cyst;  Recorded: 2008;    TONSILLECTOMY      VASECTOMY      ZZC EXCIS CERV DISK,ONE LEVEL      Description: Laminectomy With Disc Removal;  Recorded: 2008;  Comments: Lumbar level    ZZC EXCISION,BENIGN TUMOR,MANDIBLE      Description: Jaw Excision Benign Cyst / Tumor;  Proc Date: 2004;       Family History   Problem Relation Age of Onset    Heart Disease Mother     Snoring Mother     Diabetes Mother     Cancer Mother         Brain    Diabetes Paternal Grandmother     Heart Disease Brother     Heart Disease Brother     Diabetes Brother        Social History     Socioeconomic History    Marital status:      Spouse name: None    Number of children: None    Years of education: None    Highest education level: None   Tobacco Use    Smoking status: Former     Types: Cigars, Pipe     Quit date: 1980     Years since quittin.7    Smokeless tobacco: Former     Types: Chew    Tobacco comments:     Quit 40 years ago.   Vaping Use    Vaping Use:  Never used   Substance and Sexual Activity    Alcohol use: No    Drug use: No    Sexual activity: Yes     Partners: Female     Birth control/protection: None     Social Determinants of Health     Financial Resource Strain: Low Risk  (11/30/2023)    Financial Resource Strain     Within the past 12 months, have you or your family members you live with been unable to get utilities (heat, electricity) when it was really needed?: No   Food Insecurity: Low Risk  (11/30/2023)    Food Insecurity     Within the past 12 months, did you worry that your food would run out before you got money to buy more?: No     Within the past 12 months, did the food you bought just not last and you didn t have money to get more?: No   Transportation Needs: Low Risk  (11/30/2023)    Transportation Needs     Within the past 12 months, has lack of transportation kept you from medical appointments, getting your medicines, non-medical meetings or appointments, work, or from getting things that you need?: No   Interpersonal Safety: Low Risk  (12/6/2023)    Interpersonal Safety     Do you feel physically and emotionally safe where you currently live?: Yes     Within the past 12 months, have you been hit, slapped, kicked or otherwise physically hurt by someone?: No     Within the past 12 months, have you been humiliated or emotionally abused in other ways by your partner or ex-partner?: No   Housing Stability: Low Risk  (11/30/2023)    Housing Stability     Do you have housing? : Yes     Are you worried about losing your housing?: No       Current Outpatient Medications   Medication    acetaminophen (TYLENOL) 500 MG tablet    Ascorbic Acid (VITAMIN C) 500 MG CAPS    aspirin 81 MG EC tablet    atorvastatin (LIPITOR) 80 MG tablet    blood glucose monitoring (FREESTYLE) lancets    Cholecalciferol (VITAMIN D3) 25 MCG (1000 UT) CAPS    clopidogrel (PLAVIX) 75 MG tablet    dulaglutide (TRULICITY) 1.5 MG/0.5ML pen    empagliflozin (JARDIANCE) 25 MG TABS  tablet    FREESTYLE LITE test strip    insulin pen needle (BD PEN NEEDLE JERRY 2ND GEN) 32G X 4 MM miscellaneous    LANTUS SOLOSTAR 100 UNIT/ML soln    losartan (COZAAR) 25 MG tablet    metFORMIN (GLUCOPHAGE) 500 MG tablet    multivitamin w/minerals (THERA-VIT-M) tablet    Omega-3 1000 MG capsule    venlafaxine (EFFEXOR) 100 MG tablet    venlafaxine (EFFEXOR) 37.5 MG tablet     No current facility-administered medications for this visit.          Allergies   Allergen Reactions    Alteplase      Muscle Tightness    Sulfa Antibiotics        Physical Exam  There were no vitals taken for this visit.  GENERAL: healthy, alert and no distress  RESP: no audible wheeze, cough, or visible cyanosis.  No visible retractions or increased work of breathing.  Able to speak fully in complete sentences.  PSYCH: mentation appears normal, affect normal/bright, judgement and insight intact, normal speech and appearance well-groomed  RESULTS  Lab Results   Component Value Date    A1C 7.7 (H) 12/06/2023    A1C 8.0 (H) 05/23/2023    A1C 8.0 (H) 12/28/2022    A1C 7.2 (H) 09/28/2022    A1C 6.9 (H) 04/26/2022       TSH   Date Value Ref Range Status   12/06/2023 2.48 0.30 - 4.20 uIU/mL Final   04/26/2022 1.40 0.30 - 5.00 uIU/mL Final   09/02/2021 1.64 0.30 - 5.00 uIU/mL Final   06/02/2021 1.89 0.30 - 5.00 uIU/mL Final   08/11/2020 1.39 0.30 - 5.00 uIU/mL Final   05/09/2019 2.22 0.30 - 5.00 uIU/mL Final       ALT   Date Value Ref Range Status   12/06/2023 48 0 - 70 U/L Final     Comment:     Reference intervals for this test were updated on 6/12/2023 to more accurately reflect our healthy population. There may be differences in the flagging of prior results with similar values performed with this method. Interpretation of those prior results can be made in the context of the updated reference intervals.     05/23/2023 37 10 - 50 U/L Final   06/10/2021 135 (H) 0 - 70 U/L Final   ]    Recent Labs   Lab Test 12/06/23  0847 08/21/23  4269   CHOL  132 102   HDL 41 37*   LDL 74 35   TRIG 83 150*       Lab Results   Component Value Date     12/06/2023     06/10/2021      Lab Results   Component Value Date    POTASSIUM 5.0 12/06/2023    POTASSIUM 4.7 04/26/2022    POTASSIUM 4.5 06/10/2021     Lab Results   Component Value Date    CHLORIDE 101 12/06/2023    CHLORIDE 104 04/26/2022    CHLORIDE 103 06/10/2021     Lab Results   Component Value Date    KATHLEEN 9.9 12/06/2023    KATHLEEN 9.7 06/10/2021     Lab Results   Component Value Date    CO2 30 12/06/2023    CO2 27 04/26/2022    CO2 21 06/10/2021     Lab Results   Component Value Date    BUN 17.6 12/06/2023    BUN 17 04/26/2022    BUN 23 06/10/2021     Lab Results   Component Value Date    CR 0.68 12/06/2023    CR 0.87 06/10/2021       GFR Estimate   Date Value Ref Range Status   12/06/2023 >90 >60 mL/min/1.73m2 Final   05/23/2023 >90 >60 mL/min/1.73m2 Final     Comment:     eGFR calculated using 2021 CKD-EPI equation.   12/28/2022 >90 >60 mL/min/1.73m2 Final     Comment:     Effective December 21, 2021 eGFRcr in adults is calculated using the 2021 CKD-EPI creatinine equation which includes age and gender (Gladys james al., NEJ, DOI: 10.1056/PSXQug7060228)   06/15/2021 >60 >60 mL/min/1.73m2 Final   06/10/2021 85 >60 mL/min/[1.73_m2] Final     Comment:     Non  GFR Calc  Starting 12/18/2018, serum creatinine based estimated GFR (eGFR) will be   calculated using the Chronic Kidney Disease Epidemiology Collaboration   (CKD-EPI) equation.     06/02/2021 >60 >60 mL/min/1.73m2 Final   01/28/2021 >60 >60 mL/min/1.73m2 Final     GFR Estimate If Black   Date Value Ref Range Status   06/15/2021 >60 >60 mL/min/1.73m2 Final   06/10/2021 >90 >60 mL/min/[1.73_m2] Final     Comment:      GFR Calc  Starting 12/18/2018, serum creatinine based estimated GFR (eGFR) will be   calculated using the Chronic Kidney Disease Epidemiology Collaboration   (CKD-EPI) equation.     06/02/2021 >60 >60  "mL/min/1.73m2 Final   01/28/2021 >60 >60 mL/min/1.73m2 Final       Lab Results   Component Value Date    MICROL <12.0 12/06/2023     No results found for: \"MICROALBUMIN\"  No results found for: \"CPEPT\", \"GADAB\", \"ISCAB\"    Vitamin B12   Date Value Ref Range Status   04/26/2022 >2,000 (H) 213 - 816 pg/mL Final   08/11/2020 331 213 - 816 pg/mL Final   ]    Most recent eye exam date: : Not Found     Assessment/Plan:     1.  Type 2 diabetes- A1c is in target at 7.7% (goal <8%) and glucose is well controlled on current regimen. Discussed rationale for A1c target based on ACCORD and other trials involving people with high risk CVD. He does have some high readings after eating cookies, etc, but otherwise under good control.  We made the following plan today (instructions given to patient):      Focus on walking or going up stairs after meals. This will help lower your glucose.     Let me know if your glucose if often over 180 mg/dL.  If so, we may want to increase trulicity to 3 mg weekly.      Please let me know if you are having low blood sugars less than 70 or over 250 mg/dL.      If you have concerns, please send me a [employer] message M-Th, call the clinic at [phone], or call [phone] after hours/weekends and ask to speak with the endocrinologist on call.      2.  Risk factors-     Retinopathy:  No.  Had eye exam within last year.   Nephropathy:  BP historically well controlled. No microalbuminuria.  Creatinine stable.   Neuropathy: No.    Feet: OK, no ulcers.   Lipids:  LDL at target.  Patient taking statin    3.  F/U in 3 mos with Rachel Courtney or myself.    27 minutes spent on the date of the encounter doing chart review, review of test results, review and interpretation of glucose data, patient visit and documentation, counseling/coordination of care, and discussion of follow up plan for worsening hyper and hypoglycemia.  The patient understood and is satisfied with today's visit.          Kristen Doss PA-C, " MPAS   [facility]  Department of Medicine  Division of Endocrinology and Diabetes

## 2024-01-02 NOTE — PATIENT INSTRUCTIONS
Focus on walking or going up stairs after meals. This will help lower your glucose.     Let me know if your glucose if often over 180 mg/dL.  If so, we may want to increase trulicity to 3 mg weekly.      Please let me know if you are having low blood sugars less than 70 or over 250 mg/dL.      If you have concerns, please send me a [employer] message M-Th, call the clinic at [phone], or call [phone] after hours/weekends and ask to speak with the endocrinologist on call.

## 2024-01-03 ENCOUNTER — THERAPY VISIT (OUTPATIENT)
Dept: PHYSICAL THERAPY | Facility: CLINIC | Age: 79
End: 2024-01-03
Payer: MEDICARE

## 2024-01-03 DIAGNOSIS — M25.511 CHRONIC RIGHT SHOULDER PAIN: Primary | ICD-10-CM

## 2024-01-03 DIAGNOSIS — G89.29 CHRONIC RIGHT SHOULDER PAIN: Primary | ICD-10-CM

## 2024-01-03 PROCEDURE — 97110 THERAPEUTIC EXERCISES: CPT | Mod: GP | Performed by: PHYSICAL THERAPIST

## 2024-01-03 PROCEDURE — 97140 MANUAL THERAPY 1/> REGIONS: CPT | Mod: GP | Performed by: PHYSICAL THERAPIST

## 2024-01-09 ENCOUNTER — THERAPY VISIT (OUTPATIENT)
Dept: PHYSICAL THERAPY | Facility: CLINIC | Age: 79
End: 2024-01-09
Payer: MEDICARE

## 2024-01-09 DIAGNOSIS — G89.29 CHRONIC RIGHT SHOULDER PAIN: Primary | ICD-10-CM

## 2024-01-09 DIAGNOSIS — M25.511 CHRONIC RIGHT SHOULDER PAIN: Primary | ICD-10-CM

## 2024-01-09 PROCEDURE — 97110 THERAPEUTIC EXERCISES: CPT | Mod: GP | Performed by: PHYSICAL THERAPIST

## 2024-01-09 PROCEDURE — 97140 MANUAL THERAPY 1/> REGIONS: CPT | Mod: GP | Performed by: PHYSICAL THERAPIST

## 2024-01-10 ENCOUNTER — ANCILLARY PROCEDURE (OUTPATIENT)
Dept: BONE DENSITY | Facility: CLINIC | Age: 79
End: 2024-01-10
Attending: INTERNAL MEDICINE
Payer: MEDICARE

## 2024-01-10 DIAGNOSIS — M85.89 OSTEOPENIA OF MULTIPLE SITES: ICD-10-CM

## 2024-01-10 PROCEDURE — 77080 DXA BONE DENSITY AXIAL: CPT | Mod: TC | Performed by: PHYSICIAN ASSISTANT

## 2024-01-10 PROCEDURE — 77081 DXA BONE DENSITY APPENDICULR: CPT | Mod: TC | Performed by: PHYSICIAN ASSISTANT

## 2024-01-23 ENCOUNTER — THERAPY VISIT (OUTPATIENT)
Dept: PHYSICAL THERAPY | Facility: CLINIC | Age: 79
End: 2024-01-23
Payer: MEDICARE

## 2024-01-23 DIAGNOSIS — G89.29 CHRONIC RIGHT SHOULDER PAIN: Primary | ICD-10-CM

## 2024-01-23 DIAGNOSIS — M25.511 CHRONIC RIGHT SHOULDER PAIN: Primary | ICD-10-CM

## 2024-01-23 PROCEDURE — 97110 THERAPEUTIC EXERCISES: CPT | Mod: GP | Performed by: PHYSICAL THERAPIST

## 2024-02-01 DIAGNOSIS — E11.9 TYPE 2 DIABETES MELLITUS (H): ICD-10-CM

## 2024-02-06 ENCOUNTER — THERAPY VISIT (OUTPATIENT)
Dept: PHYSICAL THERAPY | Facility: CLINIC | Age: 79
End: 2024-02-06
Payer: MEDICARE

## 2024-02-06 DIAGNOSIS — M25.511 CHRONIC RIGHT SHOULDER PAIN: Primary | ICD-10-CM

## 2024-02-06 DIAGNOSIS — G89.29 CHRONIC RIGHT SHOULDER PAIN: Primary | ICD-10-CM

## 2024-02-06 PROCEDURE — 97110 THERAPEUTIC EXERCISES: CPT | Mod: GP | Performed by: PHYSICAL THERAPIST

## 2024-02-06 NOTE — PROGRESS NOTES
02/06/24 0500   Appointment Info   Signing clinician's name / credentials Hotrencia Ramsay, PT, DPT   Total/Authorized Visits E+T 9   Visits Used 7   Medical Diagnosis R shoulder pain   PT Tx Diagnosis R shoulder pain   Quick Adds Certification   Progress Note/Certification   Start of Care Date 12/07/23   Onset of illness/injury or Date of Surgery 02/07/23   Therapy Frequency 1x/week for 6 weeks then 1x every 2 weeks for 6 weeks   Predicted Duration 12 weeks   Certification date from 12/07/23   Certification date to 02/29/24   Progress Note Due Date 02/29/24   Progress Note Completed Date 12/07/23   GOALS   PT Goals 2   PT Goal 1   Goal Identifier reaching   Goal Description pt will demonstrate improvement in ROM in order to reach behind his back to tuck in his shirt   Rationale to maximize safety and independence with performance of ADLs and functional tasks   Target Date 02/29/24   PT Goal 2   Goal Identifier lifting   Goal Description patient will demonstrate proper form with repeated lifting maneuver of light object (<5 pounds) for 10 reps from floor to counter height.  In 12 weeks patient will demonstrate use of this lifting maneuver throughout day unrestricted with PL <2/10.   Rationale to maximize safety and independence with performance of ADLs and functional tasks   Target Date 02/29/24   Subjective Report   Subjective Report Pt notes that he yanked his shoulder when doing laundry and thought it was going to hurt but since then his pain has reduced   Objective Measures   Objective Measures Objective Measure 1   Objective Measure 1   Objective Measure AROM R Shoulder   Details Flexion 147, Abduction 139 with forward drift, ER 40, IR to L5   Treatment Interventions (PT)   Interventions Therapeutic Procedure/Exercise;Manual Therapy   Therapeutic Procedure/Exercise   Therapeutic Procedures: strength, endurance, ROM, flexibillity minutes (16920) 35   Therapeutic Procedures Ther Proc 2;Ther Proc 3;Ther Proc  4;Ther Proc 5   Ther Proc 1 UBE   Ther Proc 1 - Details level 6; 3.5 min forward   Ther Proc 2 PROM   Ther Proc 2 - Details Flexion/Abduction/IR/ER   Ther Proc 3 SA tricep skull    Ther Proc 3 - Details 3#   PTRx Ther Proc 1 Wand Shoulder Abduction Standing   PTRx Ther Proc 1 - Details VR to continue for ROM   PTRx Ther Proc 2 Wall Slides Abduction   PTRx Ther Proc 2 - Details VR to continue for ROM   PTRx Ther Proc 3 Side-lying Posterior Capsule Stretch   PTRx Ther Proc 3 - Details VR to continue for ROM   PTRx Ther Proc 4 Shoulder Towel Stretch Internal Rotation   PTRx Ther Proc 4 - Details VR to continue for ROM   PTRx Ther Proc 5 Shoulder Theraband Rows   PTRx Ther Proc 5 - Details VR   PTRx Ther Proc 6 Shoulder External Rotation Sidelying   PTRx Ther Proc 6 - Details Reviewed x reps to fatigue 1# DB; pt completing 3# at home but seems to be too challenging    PTRx Ther Proc 7 Supine Ceiling Punch   PTRx Ther Proc 7 - Details Reviewed and progressed to 3# DB in hand x 10 punches then iso hold with 1x through ABCs   PTRx Ther Proc 8 Supscapularis Wing   PTRx Ther Proc 8 - Details Reviewed 5 sec x 10 reps belly press and bear hug   Skilled Intervention cueing for dosing and technique   Patient Response/Progress pt demonstrated understanding   Ther Proc 4 SA chest press   Ther Proc 4 - Details 3#   PTRx Ther Proc 9 Shoulder Scaption Full Can   PTRx Ther Proc 9 - Details introduced x 10 reps BW; declines trialing weight today   Education   Learner/Method Patient   Plan   Home program ptrx printout   Plan for next session Pt is DC'd today   Total Session Time   Timed Code Treatment Minutes 35   Total Treatment Time (sum of timed and untimed services) 35           DISCHARGE  Reason for Discharge: Patient has met most goals.  Patient chooses to discontinue therapy. He is feeling better and plans to continue with HEP independently     Discharge Plan: Patient to continue home program.    Referring Provider:  Anita  Miriam

## 2024-02-23 DIAGNOSIS — E11.65 UNCONTROLLED TYPE 2 DIABETES MELLITUS WITH HYPERGLYCEMIA (H): ICD-10-CM

## 2024-02-27 ENCOUNTER — TELEPHONE (OUTPATIENT)
Dept: ENDOCRINOLOGY | Facility: CLINIC | Age: 79
End: 2024-02-27
Payer: MEDICARE

## 2024-02-27 DIAGNOSIS — E11.9 WELL CONTROLLED TYPE 2 DIABETES MELLITUS (H): Primary | ICD-10-CM

## 2024-02-27 NOTE — TELEPHONE ENCOUNTER
Prior Authorization Approval    Medication: OZEMPIC (1 MG/DOSE) 4 MG/3ML SC SOPN  Authorization Effective Date: 2/27/2024  Authorization Expiration Date: 1/31/2031  Approved Dose/Quantity: 3  Reference #: [id number]   Insurance Company: [employer] [phone] Fax [phone]  Expected CoPay: $    CoPay Card Available:      Financial Assistance Needed:    Which Pharmacy is filling the prescription:    Pharmacy Notified: yes  Patient Notified: yes

## 2024-02-27 NOTE — TELEPHONE ENCOUNTER
PA Initiation    Medication: OZEMPIC (1 MG/DOSE) 4 MG/3ML SC SOPN  Insurance Company: [employer] - Phone [phone] Fax [phone]  Pharmacy Filling the Rx:    Filling Pharmacy Phone:    Filling Pharmacy Fax:    Start Date: 2/27/2024

## 2024-03-29 ENCOUNTER — MYC MEDICAL ADVICE (OUTPATIENT)
Dept: ENDOCRINOLOGY | Facility: CLINIC | Age: 79
End: 2024-03-29
Payer: MEDICARE

## 2024-03-29 DIAGNOSIS — E11.9 WELL CONTROLLED TYPE 2 DIABETES MELLITUS (H): ICD-10-CM

## 2024-03-29 NOTE — PROGRESS NOTES
"03/29/24 10:31 AM  PATIENT LAB/IMAGING STATUS : No pending lab orders   Patient is showing 5/5 MNCM met.   Outcome for 04/12/24 11:29 AM: [employer] message sent  Katie Vo CMA        HPI:   Yong is a pleasant 79 yo man here for follow up of type 2 diabetes, diagnosed around 1994. He also has a history of CAD, s/p right carotid endarterectomy, CVA x 2, moderate aortic regurgitation, hyperlipidemia, sleep apnea, major depression, benign essential tremor, osteopenia, thrombocytopenia, Herpes Zoster and COVID infection in Jan 2023. Yong states his diabetes was initially treated with diet and exercise and lost 40 pounds and cut out almost all carbs. He was later started on Metformin, Januvia and Jardiance and reports he has been on insulin for a couple of years.    At his visit with Rachel Courtney, he switched from trulicity to ozempic.  He is tolerating this well. Appetite is the same.  No nausea/vomiting.  150#.  5'10\"    Current treatment:   Lantus- 16 units  Jardiance 25 mg  Ozempic 1 mg  Metformin- 1000 mg bid.     Previous treatments:  Januvia- switched to Trulicity 2023.   Trulicity 1.5 mg    Feeling tired. Has some pain in shoulder from tendonitis.      Typical day:   Cereal in AM with milk, melvina jon, coffee with creamer (no sugar), fruit  Cookie cart on Thursday- buys 10 and has one a day. Keeps in the freezer.   Lunch- sandwich- meat or PB/banana, PB/jam, potato chips, melvina jon.   Supper- has dinners from the place he lives.  Independent living.  Wife has parkinson's, but really well controlled.   Sets up and works in the [employer] where he lives.   Not much physical activity.   Likes doing construction projects around.     Patient uses [employer] 3 CGM and checks his glucose 6 times daily.    Recent glucose:   7 day average- 117, range     Diabetes Control:   Lab Results   Component Value Date    A1C 7.7 12/06/2023    A1C 8.0 05/23/2023    A1C 8.0 12/28/2022    A1C 7.2 09/28/2022    A1C 6.9 04/26/2022 "         Past Medical History:   Diagnosis Date    Benign essential tremor 09/29/2016    BPH (benign prostatic hyperplasia) 05/26/2015    Coronary artery disease     CVA (cerebral vascular accident) (H) 01/14/2009    Right arm clumsiness.  MRI showed 2 infarcts in the left hemisphere.    CVA (cerebral vascular accident) (H) 06/22/2006    Left arm weakness.    Depression     Diabetes mellitus, type 2 (H)     Hyperlipidemia     Infection due to 2019 novel coronavirus 1/4/2023    Obstructive sleep apnea 07/22/2013    Shingles        Past Surgical History:   Procedure Laterality Date    ARTHROSCOPY SHOULDER ROTATOR CUFF REPAIR Right     CAROTID ENDARTERECTOMY Right 09/27/2006    CERVICAL DISC SURGERY  07/29/2005    C5/6 and C6/7 lamina foraminotomy, scope with partial facetectomy and excision of hard disc herniation.    HERNIA REPAIR  5/21/104    Right inguinal hernia and umbilical hernia right cheek cyst on the face.    IR AORTIC ARCH 4 VESSEL ANGIOGRAM  8/8/2006    IR CAROTID ANGIOGRAM  8/8/2006    IR CAROTID ANGIOGRAM  8/8/2006    KS EXCIS TENDON SHEATH LESION, HAND/FINGER      Description: Hand Excision Of A Tendon Cyst;  Recorded: 04/29/2008;    TONSILLECTOMY      VASECTOMY      ZZC EXCIS CERV DISK,ONE LEVEL      Description: Laminectomy With Disc Removal;  Recorded: 04/29/2008;  Comments: Lumbar level    ZZC EXCISION,BENIGN TUMOR,MANDIBLE      Description: Jaw Excision Benign Cyst / Tumor;  Proc Date: 05/01/2004;       Family History   Problem Relation Age of Onset    Heart Disease Mother     Snoring Mother     Diabetes Mother     Cancer Mother         Brain    Diabetes Paternal Grandmother     Heart Disease Brother     Heart Disease Brother     Diabetes Brother        Social History     Socioeconomic History    Marital status:      Spouse name: None    Number of children: None    Years of education: None    Highest education level: None   Tobacco Use    Smoking status: Former     Types: Cigars, Pipe      Quit date: 1980     Years since quittin.7    Smokeless tobacco: Former     Types: Chew    Tobacco comments:     Quit 40 years ago.   Vaping Use    Vaping Use: Never used   Substance and Sexual Activity    Alcohol use: No    Drug use: No    Sexual activity: Yes     Partners: Female     Birth control/protection: None     Social Determinants of Health     Financial Resource Strain: Low Risk  (2023)    Financial Resource Strain     Within the past 12 months, have you or your family members you live with been unable to get utilities (heat, electricity) when it was really needed?: No   Food Insecurity: Low Risk  (2023)    Food Insecurity     Within the past 12 months, did you worry that your food would run out before you got money to buy more?: No     Within the past 12 months, did the food you bought just not last and you didn t have money to get more?: No   Transportation Needs: Low Risk  (2023)    Transportation Needs     Within the past 12 months, has lack of transportation kept you from medical appointments, getting your medicines, non-medical meetings or appointments, work, or from getting things that you need?: No   Interpersonal Safety: Low Risk  (2023)    Interpersonal Safety     Do you feel physically and emotionally safe where you currently live?: Yes     Within the past 12 months, have you been hit, slapped, kicked or otherwise physically hurt by someone?: No     Within the past 12 months, have you been humiliated or emotionally abused in other ways by your partner or ex-partner?: No   Housing Stability: Low Risk  (2023)    Housing Stability     Do you have housing? : Yes     Are you worried about losing your housing?: No       Current Outpatient Medications   Medication Sig Dispense Refill    acetaminophen (TYLENOL) 500 MG tablet Take 650 mg by mouth       Ascorbic Acid (VITAMIN C) 500 MG CAPS Take 1,000 mg by mouth daily       aspirin 81 MG EC tablet Take 81 mg by mouth   "    atorvastatin (LIPITOR) 80 MG tablet TAKE 1 TABLET DAILY 90 tablet 3    blood glucose monitoring (FREESTYLE) lancets USE 1 LANCET DAILY AS NEEDED 100 each 3    Cholecalciferol (VITAMIN D3) 25 MCG (1000 UT) CAPS Take 1,000 Units by mouth      clopidogrel (PLAVIX) 75 MG tablet TAKE 1 TABLET DAILY 90 tablet 3    dulaglutide (TRULICITY) 1.5 MG/0.5ML pen Inject 1.5 mg Subcutaneous every 7 days 6 mL 1    empagliflozin (JARDIANCE) 25 MG TABS tablet TAKE 1 TABLET DAILY 90 tablet 2    FREESTYLE LITE test strip USE 1 STRIP FOUR TIMES A DAY AS DIRECTED 200 strip 6    insulin pen needle (BD PEN NEEDLE JERRY 2ND GEN) 32G X 4 MM miscellaneous INJECT 1 NEEDLE UNDER THE SKIN DAILY 100 each 3    LANTUS SOLOSTAR 100 UNIT/ML soln INJECT 12 UNITS UNDER THE SKIN AT BEDTIME (Patient taking differently: Inject 16 Units) 15 mL 2    losartan (COZAAR) 25 MG tablet TAKE ONE-HALF (1/2) TABLET DAILY 90 tablet 3    metFORMIN (GLUCOPHAGE) 500 MG tablet TAKE 2 TABLETS TWICE A DAY WITH MEALS 360 tablet 0    multivitamin w/minerals (THERA-VIT-M) tablet Take 1 tablet by mouth      Omega-3 1000 MG capsule Take 2 g by mouth      Semaglutide, 1 MG/DOSE, (OZEMPIC) 4 MG/3ML pen Inject 1 mg Subcutaneous every 7 days 3 mL 11    venlafaxine (EFFEXOR) 100 MG tablet Take 150 mg by mouth Taking 150 mg      venlafaxine (EFFEXOR) 37.5 MG tablet Take 37.5 mg by mouth       No current facility-administered medications for this visit.          Allergies   Allergen Reactions    Alteplase      Muscle Tightness    Sulfa Antibiotics        Physical Exam  /64   Pulse 91   Ht 1.778 m (5' 10\")   Wt 71.2 kg (157 lb)   SpO2 99%   BMI 22.53 kg/m    GENERAL: healthy, alert and no distress  RESP: no audible wheeze, cough, or visible cyanosis.  No visible retractions or increased work of breathing.  Able to speak fully in complete sentences.  PSYCH: mentation appears normal, affect normal/bright, judgement and insight intact, normal speech and appearance " well-groomed    RESULTS  Lab Results   Component Value Date    A1C 8.0 (H) 04/15/2024    A1C 7.7 (H) 12/06/2023    A1C 8.0 (H) 05/23/2023    A1C 8.0 (H) 12/28/2022    A1C 7.2 (H) 09/28/2022    A1C 6.9 (H) 04/26/2022       TSH   Date Value Ref Range Status   12/06/2023 2.48 0.30 - 4.20 uIU/mL Final   04/26/2022 1.40 0.30 - 5.00 uIU/mL Final   09/02/2021 1.64 0.30 - 5.00 uIU/mL Final   06/02/2021 1.89 0.30 - 5.00 uIU/mL Final   08/11/2020 1.39 0.30 - 5.00 uIU/mL Final   05/09/2019 2.22 0.30 - 5.00 uIU/mL Final       ALT   Date Value Ref Range Status   12/06/2023 48 0 - 70 U/L Final     Comment:     Reference intervals for this test were updated on 6/12/2023 to more accurately reflect our healthy population. There may be differences in the flagging of prior results with similar values performed with this method. Interpretation of those prior results can be made in the context of the updated reference intervals.     05/23/2023 37 10 - 50 U/L Final   06/10/2021 135 (H) 0 - 70 U/L Final   ]    Recent Labs   Lab Test 12/06/23  0847 08/21/23  1439   CHOL 132 102   HDL 41 37*   LDL 74 35   TRIG 83 150*       Lab Results   Component Value Date     12/06/2023     06/10/2021      Lab Results   Component Value Date    POTASSIUM 5.0 12/06/2023    POTASSIUM 4.7 04/26/2022    POTASSIUM 4.5 06/10/2021     Lab Results   Component Value Date    CHLORIDE 101 12/06/2023    CHLORIDE 104 04/26/2022    CHLORIDE 103 06/10/2021     Lab Results   Component Value Date    KATHLEEN 9.9 12/06/2023    KATHLEEN 9.7 06/10/2021     Lab Results   Component Value Date    CO2 30 12/06/2023    CO2 27 04/26/2022    CO2 21 06/10/2021     Lab Results   Component Value Date    BUN 17.6 12/06/2023    BUN 17 04/26/2022    BUN 23 06/10/2021     Lab Results   Component Value Date    CR 0.68 12/06/2023    CR 0.87 06/10/2021       GFR Estimate   Date Value Ref Range Status   12/06/2023 >90 >60 mL/min/1.73m2 Final   05/23/2023 >90 >60 mL/min/1.73m2 Final      "Comment:     eGFR calculated using 2021 CKD-EPI equation.   12/28/2022 >90 >60 mL/min/1.73m2 Final     Comment:     Effective December 21, 2021 eGFRcr in adults is calculated using the 2021 CKD-EPI creatinine equation which includes age and gender (Gladys james al., [place], DOI: 10.1056/PKMYdi8064538)   06/15/2021 >60 >60 mL/min/1.73m2 Final   06/10/2021 85 >60 mL/min/[1.73_m2] Final     Comment:     Non  GFR Calc  Starting 12/18/2018, serum creatinine based estimated GFR (eGFR) will be   calculated using the Chronic Kidney Disease Epidemiology Collaboration   (CKD-EPI) equation.     06/02/2021 >60 >60 mL/min/1.73m2 Final   01/28/2021 >60 >60 mL/min/1.73m2 Final     GFR Estimate If Black   Date Value Ref Range Status   06/15/2021 >60 >60 mL/min/1.73m2 Final   06/10/2021 >90 >60 mL/min/[1.73_m2] Final     Comment:      GFR Calc  Starting 12/18/2018, serum creatinine based estimated GFR (eGFR) will be   calculated using the Chronic Kidney Disease Epidemiology Collaboration   (CKD-EPI) equation.     06/02/2021 >60 >60 mL/min/1.73m2 Final   01/28/2021 >60 >60 mL/min/1.73m2 Final       Lab Results   Component Value Date    MICROL <12.0 12/06/2023     No results found for: \"MICROALBUMIN\"  No results found for: \"CPEPT\", \"GADAB\", \"ISCAB\"    Vitamin B12   Date Value Ref Range Status   04/26/2022 >2,000 (H) 213 - 816 pg/mL Final   08/11/2020 331 213 - 816 pg/mL Final   ]    Most recent eye exam date: : Not Found         Assessment/Plan:     1.  Type 2 diabetes- A1c is in target at 8.0% (goal <8%).  Increase ozempic to 2 mg weekly.  He does have some high readings after eating cookies, etc, but otherwise under good control.  He will continue using the [employer] 3 sensor for glucose monitoring. We made the following plan today (instructions given to patient):    Increase Ozempic to 2 mg weekly.     Try working on going up and down the stairs after eating.     Please let me know if you are having low blood " sugars less than 70 or over 250 mg/dL.      If you have concerns, please send me a [employer] message M-Th, call the clinic at [phone], or call [phone] after hours/weekends and ask to speak with the endocrinologist on call.      2.  Risk factors-     Retinopathy:  Yes. Mild, non-proliferative retinopathy. 6/2023.    Nephropathy:  BP well controlled. No microalbuminuria.  Creatinine stable.   Neuropathy: No.    Feet: OK, no ulcers.   Lipids:  LDL at target.  Patient taking statin    3.  F/U in 6 mos with Rachel Courtney or myself.    22 minutes spent on the date of the encounter doing chart review, review of test results, review and interpretation of glucose data, patient visit and documentation, counseling/coordination of care, and discussion of follow up plan for worsening hyper and hypoglycemia.  The patient understood and is satisfied with today's visit.          Kristen Doss PA-C, MPAS   [facility]  Department of Medicine  Division of Endocrinology and Diabetes

## 2024-03-29 NOTE — TELEPHONE ENCOUNTER
Semaglutide, 1 MG/DOSE, (OZEMPIC) 4 MG/3ML pen     Resent order to updated pharmacy for Pt care.  3 mL, 11 Refills sent to pharm     Nata Cuba RN  Central Triage Red Flags/Med Refills

## 2024-04-08 DIAGNOSIS — E11.8 TYPE 2 DIABETES MELLITUS WITH COMPLICATION, WITHOUT LONG-TERM CURRENT USE OF INSULIN (H): ICD-10-CM

## 2024-04-08 RX ORDER — EMPAGLIFLOZIN 25 MG/1
25 TABLET, FILM COATED ORAL DAILY
Qty: 90 TABLET | Refills: 2 | Status: SHIPPED | OUTPATIENT
Start: 2024-04-08

## 2024-04-15 ENCOUNTER — OFFICE VISIT (OUTPATIENT)
Dept: ENDOCRINOLOGY | Facility: CLINIC | Age: 79
End: 2024-04-15
Payer: MEDICARE

## 2024-04-15 VITALS
DIASTOLIC BLOOD PRESSURE: 64 MMHG | HEIGHT: 70 IN | SYSTOLIC BLOOD PRESSURE: 116 MMHG | OXYGEN SATURATION: 99 % | HEART RATE: 91 BPM | WEIGHT: 157 LBS | BODY MASS INDEX: 22.48 KG/M2

## 2024-04-15 DIAGNOSIS — E11.9 WELL CONTROLLED TYPE 2 DIABETES MELLITUS (H): Primary | ICD-10-CM

## 2024-04-15 LAB — HBA1C MFR BLD: 8 %

## 2024-04-15 PROCEDURE — 99213 OFFICE O/P EST LOW 20 MIN: CPT | Performed by: PHYSICIAN ASSISTANT

## 2024-04-15 PROCEDURE — 83036 HEMOGLOBIN GLYCOSYLATED A1C: CPT | Performed by: PATHOLOGY

## 2024-04-15 ASSESSMENT — PAIN SCALES - GENERAL: PAINLEVEL: NO PAIN (0)

## 2024-04-15 NOTE — PATIENT INSTRUCTIONS
Increase Ozempic to 2 mg weekly.     Cut your portions in half.  Listen to your body.  Stop eating as soon as you feel full.   Call or send [employer] message if you have side effects, nausea, or hypoglycemia.     Try working on going up and down the stairs after eating.     Please let me know if you are having low blood sugars less than 70 or over 250 mg/dL.      If you have concerns, please send me a [employer] message M-Th, call the clinic at [phone], or call [phone] after hours/weekends and ask to speak with the endocrinologist on call.

## 2024-04-15 NOTE — LETTER
"4/15/2024       RE: Yong Guillermo  [address]  [facility] [address]     Dear Colleague,    Thank you for referring your patient, Yong Guillermo, to the [facility] [place] at [facility]. Please see a copy of my visit note below.    03/29/24 10:31 AM  PATIENT LAB/IMAGING STATUS : No pending lab orders   Patient is showing 5/5 MNCM met.   Outcome for 04/12/24 11:29 AM: Shoeboxed message sent  Katie Vo CMA        HPI:   Yong is a pleasant 79 yo man here for follow up of type 2 diabetes, diagnosed around 1994. He also has a history of CAD, s/p right carotid endarterectomy, CVA x 2, moderate aortic regurgitation, hyperlipidemia, sleep apnea, major depression, benign essential tremor, osteopenia, thrombocytopenia, Herpes Zoster and COVID infection in Jan 2023. Yong states his diabetes was initially treated with diet and exercise and lost 40 pounds and cut out almost all carbs. He was later started on Metformin, Januvia and Jardiance and reports he has been on insulin for a couple of years.    At his visit with Rachel Courtney, he switched from trulicity to ozempic.  He is tolerating this well. Appetite is the same.  No nausea/vomiting.  150#.  5'10\"    Current treatment:   Lantus- 16 units  Jardiance 25 mg  Ozempic 1 mg  Metformin- 1000 mg bid.     Previous treatments:  Januvia- switched to Trulicity 2023.   Trulicity 1.5 mg    Feeling tired. Has some pain in shoulder from tendonitis.      Typical day:   Cereal in AM with milk, melvina jon, coffee with creamer (no sugar), fruit  Cookie cart on Thursday- buys 10 and has one a day. Keeps in the freezer.   Lunch- sandwich- meat or PB/banana, PB/jam, potato chips, melvina jon.   Supper- has dinners from the place he lives.  Independent living.  Wife has parkinson's, but really well controlled.   Sets up and works in the [employer] where he lives.   Not much physical activity. "   Likes doing construction projects around.     Recent glucose:   7 day average- 117, range     Diabetes Control:   Lab Results   Component Value Date    A1C 7.7 12/06/2023    A1C 8.0 05/23/2023    A1C 8.0 12/28/2022    A1C 7.2 09/28/2022    A1C 6.9 04/26/2022         Past Medical History:   Diagnosis Date    Benign essential tremor 09/29/2016    BPH (benign prostatic hyperplasia) 05/26/2015    Coronary artery disease     CVA (cerebral vascular accident) (H) 01/14/2009    Right arm clumsiness.  MRI showed 2 infarcts in the left hemisphere.    CVA (cerebral vascular accident) (H) 06/22/2006    Left arm weakness.    Depression     Diabetes mellitus, type 2 (H)     Hyperlipidemia     Infection due to 2019 novel coronavirus 1/4/2023    Obstructive sleep apnea 07/22/2013    Shingles        Past Surgical History:   Procedure Laterality Date    ARTHROSCOPY SHOULDER ROTATOR CUFF REPAIR Right     CAROTID ENDARTERECTOMY Right 09/27/2006    CERVICAL DISC SURGERY  07/29/2005    C5/6 and C6/7 lamina foraminotomy, scope with partial facetectomy and excision of hard disc herniation.    HERNIA REPAIR  5/21/104    Right inguinal hernia and umbilical hernia right cheek cyst on the face.    IR AORTIC ARCH 4 VESSEL ANGIOGRAM  8/8/2006    IR CAROTID ANGIOGRAM  8/8/2006    IR CAROTID ANGIOGRAM  8/8/2006    HI EXCIS TENDON SHEATH LESION, HAND/FINGER      Description: Hand Excision Of A Tendon Cyst;  Recorded: 04/29/2008;    TONSILLECTOMY      VASECTOMY      ZZC EXCIS CERV DISK,ONE LEVEL      Description: Laminectomy With Disc Removal;  Recorded: 04/29/2008;  Comments: Lumbar level    ZZC EXCISION,BENIGN TUMOR,MANDIBLE      Description: Jaw Excision Benign Cyst / Tumor;  Proc Date: 05/01/2004;       Family History   Problem Relation Age of Onset    Heart Disease Mother     Snoring Mother     Diabetes Mother     Cancer Mother         Brain    Diabetes Paternal Grandmother     Heart Disease Brother     Heart Disease Brother      Diabetes Brother        Social History     Socioeconomic History    Marital status:      Spouse name: None    Number of children: None    Years of education: None    Highest education level: None   Tobacco Use    Smoking status: Former     Types: Cigars, Pipe     Quit date: 1980     Years since quittin.7    Smokeless tobacco: Former     Types: Chew    Tobacco comments:     Quit 40 years ago.   Vaping Use    Vaping Use: Never used   Substance and Sexual Activity    Alcohol use: No    Drug use: No    Sexual activity: Yes     Partners: Female     Birth control/protection: None     Social Determinants of Health     Financial Resource Strain: Low Risk  (2023)    Financial Resource Strain     Within the past 12 months, have you or your family members you live with been unable to get utilities (heat, electricity) when it was really needed?: No   Food Insecurity: Low Risk  (2023)    Food Insecurity     Within the past 12 months, did you worry that your food would run out before you got money to buy more?: No     Within the past 12 months, did the food you bought just not last and you didn t have money to get more?: No   Transportation Needs: Low Risk  (2023)    Transportation Needs     Within the past 12 months, has lack of transportation kept you from medical appointments, getting your medicines, non-medical meetings or appointments, work, or from getting things that you need?: No   Interpersonal Safety: Low Risk  (2023)    Interpersonal Safety     Do you feel physically and emotionally safe where you currently live?: Yes     Within the past 12 months, have you been hit, slapped, kicked or otherwise physically hurt by someone?: No     Within the past 12 months, have you been humiliated or emotionally abused in other ways by your partner or ex-partner?: No   Housing Stability: Low Risk  (2023)    Housing Stability     Do you have housing? : Yes     Are you worried about losing  "your housing?: No       Current Outpatient Medications   Medication Sig Dispense Refill    acetaminophen (TYLENOL) 500 MG tablet Take 650 mg by mouth       Ascorbic Acid (VITAMIN C) 500 MG CAPS Take 1,000 mg by mouth daily       aspirin 81 MG EC tablet Take 81 mg by mouth      atorvastatin (LIPITOR) 80 MG tablet TAKE 1 TABLET DAILY 90 tablet 3    blood glucose monitoring (FREESTYLE) lancets USE 1 LANCET DAILY AS NEEDED 100 each 3    Cholecalciferol (VITAMIN D3) 25 MCG (1000 UT) CAPS Take 1,000 Units by mouth      clopidogrel (PLAVIX) 75 MG tablet TAKE 1 TABLET DAILY 90 tablet 3    dulaglutide (TRULICITY) 1.5 MG/0.5ML pen Inject 1.5 mg Subcutaneous every 7 days 6 mL 1    empagliflozin (JARDIANCE) 25 MG TABS tablet TAKE 1 TABLET DAILY 90 tablet 2    FREESTYLE LITE test strip USE 1 STRIP FOUR TIMES A DAY AS DIRECTED 200 strip 6    insulin pen needle (BD PEN NEEDLE JERRY 2ND GEN) 32G X 4 MM miscellaneous INJECT 1 NEEDLE UNDER THE SKIN DAILY 100 each 3    LANTUS SOLOSTAR 100 UNIT/ML soln INJECT 12 UNITS UNDER THE SKIN AT BEDTIME (Patient taking differently: Inject 16 Units) 15 mL 2    losartan (COZAAR) 25 MG tablet TAKE ONE-HALF (1/2) TABLET DAILY 90 tablet 3    metFORMIN (GLUCOPHAGE) 500 MG tablet TAKE 2 TABLETS TWICE A DAY WITH MEALS 360 tablet 0    multivitamin w/minerals (THERA-VIT-M) tablet Take 1 tablet by mouth      Omega-3 1000 MG capsule Take 2 g by mouth      Semaglutide, 1 MG/DOSE, (OZEMPIC) 4 MG/3ML pen Inject 1 mg Subcutaneous every 7 days 3 mL 11    venlafaxine (EFFEXOR) 100 MG tablet Take 150 mg by mouth Taking 150 mg      venlafaxine (EFFEXOR) 37.5 MG tablet Take 37.5 mg by mouth       No current facility-administered medications for this visit.          Allergies   Allergen Reactions    Alteplase      Muscle Tightness    Sulfa Antibiotics        Physical Exam  /64   Pulse 91   Ht 1.778 m (5' 10\")   Wt 71.2 kg (157 lb)   SpO2 99%   BMI 22.53 kg/m    GENERAL: healthy, alert and no distress  RESP: " no audible wheeze, cough, or visible cyanosis.  No visible retractions or increased work of breathing.  Able to speak fully in complete sentences.  PSYCH: mentation appears normal, affect normal/bright, judgement and insight intact, normal speech and appearance well-groomed    RESULTS  Lab Results   Component Value Date    A1C 8.0 (H) 04/15/2024    A1C 7.7 (H) 12/06/2023    A1C 8.0 (H) 05/23/2023    A1C 8.0 (H) 12/28/2022    A1C 7.2 (H) 09/28/2022    A1C 6.9 (H) 04/26/2022       TSH   Date Value Ref Range Status   12/06/2023 2.48 0.30 - 4.20 uIU/mL Final   04/26/2022 1.40 0.30 - 5.00 uIU/mL Final   09/02/2021 1.64 0.30 - 5.00 uIU/mL Final   06/02/2021 1.89 0.30 - 5.00 uIU/mL Final   08/11/2020 1.39 0.30 - 5.00 uIU/mL Final   05/09/2019 2.22 0.30 - 5.00 uIU/mL Final       ALT   Date Value Ref Range Status   12/06/2023 48 0 - 70 U/L Final     Comment:     Reference intervals for this test were updated on 6/12/2023 to more accurately reflect our healthy population. There may be differences in the flagging of prior results with similar values performed with this method. Interpretation of those prior results can be made in the context of the updated reference intervals.     05/23/2023 37 10 - 50 U/L Final   06/10/2021 135 (H) 0 - 70 U/L Final   ]    Recent Labs   Lab Test 12/06/23  0847 08/21/23  1439   CHOL 132 102   HDL 41 37*   LDL 74 35   TRIG 83 150*       Lab Results   Component Value Date     12/06/2023     06/10/2021      Lab Results   Component Value Date    POTASSIUM 5.0 12/06/2023    POTASSIUM 4.7 04/26/2022    POTASSIUM 4.5 06/10/2021     Lab Results   Component Value Date    CHLORIDE 101 12/06/2023    CHLORIDE 104 04/26/2022    CHLORIDE 103 06/10/2021     Lab Results   Component Value Date    KATHLEEN 9.9 12/06/2023    KATHLEEN 9.7 06/10/2021     Lab Results   Component Value Date    CO2 30 12/06/2023    CO2 27 04/26/2022    CO2 21 06/10/2021     Lab Results   Component Value Date    BUN 17.6 12/06/2023     "BUN 17 04/26/2022    BUN 23 06/10/2021     Lab Results   Component Value Date    CR 0.68 12/06/2023    CR 0.87 06/10/2021       GFR Estimate   Date Value Ref Range Status   12/06/2023 >90 >60 mL/min/1.73m2 Final   05/23/2023 >90 >60 mL/min/1.73m2 Final     Comment:     eGFR calculated using 2021 CKD-EPI equation.   12/28/2022 >90 >60 mL/min/1.73m2 Final     Comment:     Effective December 21, 2021 eGFRcr in adults is calculated using the 2021 CKD-EPI creatinine equation which includes age and gender (Gladys et al., NE, DOI: 10.1056/FMOClk9002893)   06/15/2021 >60 >60 mL/min/1.73m2 Final   06/10/2021 85 >60 mL/min/[1.73_m2] Final     Comment:     Non  GFR Calc  Starting 12/18/2018, serum creatinine based estimated GFR (eGFR) will be   calculated using the Chronic Kidney Disease Epidemiology Collaboration   (CKD-EPI) equation.     06/02/2021 >60 >60 mL/min/1.73m2 Final   01/28/2021 >60 >60 mL/min/1.73m2 Final     GFR Estimate If Black   Date Value Ref Range Status   06/15/2021 >60 >60 mL/min/1.73m2 Final   06/10/2021 >90 >60 mL/min/[1.73_m2] Final     Comment:      GFR Calc  Starting 12/18/2018, serum creatinine based estimated GFR (eGFR) will be   calculated using the Chronic Kidney Disease Epidemiology Collaboration   (CKD-EPI) equation.     06/02/2021 >60 >60 mL/min/1.73m2 Final   01/28/2021 >60 >60 mL/min/1.73m2 Final       Lab Results   Component Value Date    MICROL <12.0 12/06/2023     No results found for: \"MICROALBUMIN\"  No results found for: \"CPEPT\", \"GADAB\", \"ISCAB\"    Vitamin B12   Date Value Ref Range Status   04/26/2022 >2,000 (H) 213 - 816 pg/mL Final   08/11/2020 331 213 - 816 pg/mL Final   ]    Most recent eye exam date: : Not Found         Assessment/Plan:     1.  Type 2 diabetes- A1c is in target at 8.0% (goal <8%).  Increase ozempic to 2 mg weekly.  He does have some high readings after eating cookies, etc, but otherwise under good control.  We made the following " plan today (instructions given to patient):    Increase Ozempic to 2 mg weekly.     Try working on going up and down the stairs after eating.     Please let me know if you are having low blood sugars less than 70 or over 250 mg/dL.      If you have concerns, please send me a Kiddie Kist message M-Th, call the clinic at [phone], or call [phone] after hours/weekends and ask to speak with the endocrinologist on call.      2.  Risk factors-     Retinopathy:  Yes. Mild, non-proliferative retinopathy. 6/2023.    Nephropathy:  BP well controlled. No microalbuminuria.  Creatinine stable.   Neuropathy: No.    Feet: OK, no ulcers.   Lipids:  LDL at target.  Patient taking statin    3.  F/U in 6 mos with Rachel Courtney or myself.    22 minutes spent on the date of the encounter doing chart review, review of test results, review and interpretation of glucose data, patient visit and documentation, counseling/coordination of care, and discussion of follow up plan for worsening hyper and hypoglycemia.  The patient understood and is satisfied with today's visit.          Kristen Doss PA-C, MPAS   [facility]  Department of Medicine  Division of Endocrinology and Diabetes

## 2024-05-01 DIAGNOSIS — E11.9 TYPE 2 DIABETES MELLITUS (H): ICD-10-CM

## 2024-05-08 ENCOUNTER — TELEPHONE (OUTPATIENT)
Dept: INTERNAL MEDICINE | Facility: CLINIC | Age: 79
End: 2024-05-08
Payer: MEDICARE

## 2024-05-08 NOTE — TELEPHONE ENCOUNTER
Reason for Call:  Appointment Request    Patient requesting this type of appt:  Office visit    Requested provider: Anita Pineda    Reason patient unable to be scheduled: Not within requested timeframe    When does patient want to be seen/preferred time: 3-7 days    Comments: Pt is scheduled for 05/23, but would like to be seen sooner if possible. Fatigue and low energy    Could we send this information to you in [facility] or would you prefer to receive a phone call?:   Patient would prefer a phone call   Okay to leave a detailed message?: Yes at Home number on file [phone] (home)    Call taken on 5/8/2024 at 12:46 PM by Madhavi Duran

## 2024-05-08 NOTE — TELEPHONE ENCOUNTER
Outgoing call to patient who states he has been really tired and no energy for the past 2-3 weeks. The only change was that he started taking ozempic starting recently. Prefers to see Dr. Pineda and not someone else. Scheduled an appt for 5/17.

## 2024-05-17 ENCOUNTER — OFFICE VISIT (OUTPATIENT)
Dept: INTERNAL MEDICINE | Facility: CLINIC | Age: 79
End: 2024-05-17
Payer: MEDICARE

## 2024-05-17 VITALS
BODY MASS INDEX: 22.05 KG/M2 | SYSTOLIC BLOOD PRESSURE: 100 MMHG | DIASTOLIC BLOOD PRESSURE: 60 MMHG | HEIGHT: 70 IN | RESPIRATION RATE: 16 BRPM | OXYGEN SATURATION: 96 % | WEIGHT: 154 LBS | HEART RATE: 94 BPM | TEMPERATURE: 97.8 F

## 2024-05-17 DIAGNOSIS — F33.9 RECURRENT MAJOR DEPRESSIVE DISORDER, REMISSION STATUS UNSPECIFIED (H): ICD-10-CM

## 2024-05-17 DIAGNOSIS — R63.4 WEIGHT LOSS: ICD-10-CM

## 2024-05-17 DIAGNOSIS — I25.84 CORONARY ARTERY DISEASE DUE TO CALCIFIED CORONARY LESION: ICD-10-CM

## 2024-05-17 DIAGNOSIS — E55.9 VITAMIN D DEFICIENCY: ICD-10-CM

## 2024-05-17 DIAGNOSIS — R41.3 MEMORY LOSS: ICD-10-CM

## 2024-05-17 DIAGNOSIS — R14.2 FLATULENCE, ERUCTATION AND GAS PAIN: ICD-10-CM

## 2024-05-17 DIAGNOSIS — I95.1 ORTHOSTATIC HYPOTENSION: ICD-10-CM

## 2024-05-17 DIAGNOSIS — R79.9 ABNORMAL FINDING OF BLOOD CHEMISTRY, UNSPECIFIED: ICD-10-CM

## 2024-05-17 DIAGNOSIS — G47.39 COMPLEX SLEEP APNEA SYNDROME: ICD-10-CM

## 2024-05-17 DIAGNOSIS — R14.3 FLATULENCE, ERUCTATION AND GAS PAIN: ICD-10-CM

## 2024-05-17 DIAGNOSIS — I25.10 CORONARY ARTERY DISEASE DUE TO CALCIFIED CORONARY LESION: ICD-10-CM

## 2024-05-17 DIAGNOSIS — I35.1 AORTIC VALVE REGURGITATION DUE TO AORTIC DILATION (H): ICD-10-CM

## 2024-05-17 DIAGNOSIS — R14.1 FLATULENCE, ERUCTATION AND GAS PAIN: ICD-10-CM

## 2024-05-17 DIAGNOSIS — Z12.5 ENCOUNTER FOR SCREENING FOR MALIGNANT NEOPLASM OF PROSTATE: ICD-10-CM

## 2024-05-17 DIAGNOSIS — E11.9 TYPE 2 DIABETES, HBA1C GOAL < 8% (H): ICD-10-CM

## 2024-05-17 DIAGNOSIS — R53.83 OTHER FATIGUE: Primary | ICD-10-CM

## 2024-05-17 DIAGNOSIS — I77.819 AORTIC VALVE REGURGITATION DUE TO AORTIC DILATION (H): ICD-10-CM

## 2024-05-17 DIAGNOSIS — R10.13 DYSPEPSIA: ICD-10-CM

## 2024-05-17 DIAGNOSIS — D69.6 THROMBOCYTOPENIA (H): ICD-10-CM

## 2024-05-17 LAB
ALBUMIN UR-MCNC: NEGATIVE MG/DL
APPEARANCE UR: CLEAR
BILIRUB UR QL STRIP: NEGATIVE
COLOR UR AUTO: YELLOW
ERYTHROCYTE [DISTWIDTH] IN BLOOD BY AUTOMATED COUNT: 11.8 % (ref 10–15)
GLUCOSE UR STRIP-MCNC: >=1000 MG/DL
HCT VFR BLD AUTO: 41 % (ref 40–53)
HGB BLD-MCNC: 14.3 G/DL (ref 13.3–17.7)
HGB UR QL STRIP: NEGATIVE
KETONES UR STRIP-MCNC: NEGATIVE MG/DL
LEUKOCYTE ESTERASE UR QL STRIP: NEGATIVE
MCH RBC QN AUTO: 34.3 PG (ref 26.5–33)
MCHC RBC AUTO-ENTMCNC: 34.9 G/DL (ref 31.5–36.5)
MCV RBC AUTO: 98 FL (ref 78–100)
NITRATE UR QL: NEGATIVE
PH UR STRIP: 5 [PH] (ref 5–8)
PLATELET # BLD AUTO: 131 10E3/UL (ref 150–450)
RBC # BLD AUTO: 4.17 10E6/UL (ref 4.4–5.9)
RBC #/AREA URNS AUTO: NORMAL /HPF
SP GR UR STRIP: <=1.005 (ref 1–1.03)
UROBILINOGEN UR STRIP-ACNC: 1 E.U./DL
WBC # BLD AUTO: 3.9 10E3/UL (ref 4–11)
WBC #/AREA URNS AUTO: NORMAL /HPF

## 2024-05-17 PROCEDURE — G0103 PSA SCREENING: HCPCS | Performed by: INTERNAL MEDICINE

## 2024-05-17 PROCEDURE — 82306 VITAMIN D 25 HYDROXY: CPT | Performed by: INTERNAL MEDICINE

## 2024-05-17 PROCEDURE — G2211 COMPLEX E/M VISIT ADD ON: HCPCS | Performed by: INTERNAL MEDICINE

## 2024-05-17 PROCEDURE — 99215 OFFICE O/P EST HI 40 MIN: CPT | Performed by: INTERNAL MEDICINE

## 2024-05-17 PROCEDURE — 84443 ASSAY THYROID STIM HORMONE: CPT | Performed by: INTERNAL MEDICINE

## 2024-05-17 PROCEDURE — 36415 COLL VENOUS BLD VENIPUNCTURE: CPT | Performed by: INTERNAL MEDICINE

## 2024-05-17 PROCEDURE — 81001 URINALYSIS AUTO W/SCOPE: CPT | Performed by: INTERNAL MEDICINE

## 2024-05-17 PROCEDURE — 83735 ASSAY OF MAGNESIUM: CPT | Performed by: INTERNAL MEDICINE

## 2024-05-17 PROCEDURE — 80053 COMPREHEN METABOLIC PANEL: CPT | Performed by: INTERNAL MEDICINE

## 2024-05-17 PROCEDURE — 82607 VITAMIN B-12: CPT | Performed by: INTERNAL MEDICINE

## 2024-05-17 PROCEDURE — 82728 ASSAY OF FERRITIN: CPT | Performed by: INTERNAL MEDICINE

## 2024-05-17 PROCEDURE — 85027 COMPLETE CBC AUTOMATED: CPT | Performed by: INTERNAL MEDICINE

## 2024-05-17 PROCEDURE — 83921 ORGANIC ACID SINGLE QUANT: CPT | Performed by: INTERNAL MEDICINE

## 2024-05-17 ASSESSMENT — PATIENT HEALTH QUESTIONNAIRE - PHQ9
10. IF YOU CHECKED OFF ANY PROBLEMS, HOW DIFFICULT HAVE THESE PROBLEMS MADE IT FOR YOU TO DO YOUR WORK, TAKE CARE OF THINGS AT HOME, OR GET ALONG WITH OTHER PEOPLE: NOT DIFFICULT AT ALL
SUM OF ALL RESPONSES TO PHQ QUESTIONS 1-9: 6
SUM OF ALL RESPONSES TO PHQ QUESTIONS 1-9: 6

## 2024-05-17 ASSESSMENT — ENCOUNTER SYMPTOMS: FATIGUE: 1

## 2024-05-17 NOTE — PATIENT INSTRUCTIONS
Increase fluid intake.   Wear compression stocking daily.    [facility].    To schedule Neuropsych testing.      Labs today.    To schedule MRI head and CT chest, abdomen, pelvis [phone].    To schedule upper GI endoscopy [facility].    Follow-up with Cardiologist in August.

## 2024-05-17 NOTE — PROGRESS NOTES
Assessment & Plan     Other fatigue  Weight loss    Yong is continuing losing weight and feeling extremely tired. He is using the CPAP every night, he takes the nap almost every day. Feels weak and tired, but denies worsening depression and suicidal ideas. [facility] and venlafaxine was recently increased.Not sure if taking venlafaxine ER.  But he noticed more issues with memory and difficulty recalling some words.  He had normal ECHO and CTA chest last summer.  He has diabetes, is on Lantus 16 U at bedtime, Ozempic 2mg weekly, Jardiance, metformin. Last HgbA1c 8.0 month ago. He denies hypoglycemia. Ozempic was just increased few weeks ago from 1mg to 2 mg.  He noticed frequent dyspepsia, belching, weight loss, often discomfort in the sub epigastric area, like hunger pain.  No cough, fever, chills, dyspnea, night sweats, wheezing, rash, blood in urine or stool, black stool. He had normal colonoscopy in 8/2023.    - CT Chest/Abdomen/Pelvis w Contrast; Future  - Adult GI  Referral - Procedure Only; Future  - CBC with platelets; Future  - Comprehensive metabolic panel; Future  - Prostate Specific Antigen Screen; Future  - Vitamin D Deficiency; Future  - TSH with free T4 reflex; Future  - Ferritin; Future  - Vitamin B12; Future  - Magnesium; Future  - Methylmalonic Acid; Future  - CBC with platelets  - Comprehensive metabolic panel  - Prostate Specific Antigen Screen  - Vitamin D Deficiency  - TSH with free T4 reflex  - Ferritin  - Vitamin B12  - Magnesium  - Methylmalonic Acid      Type 2 diabetes, HbA1C goal < 8% (H)  He has diabetes, is on Lantus 16 U at bedtime, Ozempic 2mg weekly, Jardiance, metformin. Last HgbA1c 8.0 month ago. He denies hypoglycemia. Ozempic was just increased few weeks ago from 1mg to 2 mg.  Seeing Endocrinologist.  - Adult Eye  Referral; Future  - UA with Microscopic reflex to Culture - Clinic Collect    Thrombocytopenia (H24)  Chronic, stable.  He does not drink alcohol.    Component      Latest Ref Rng 12/6/2023  8:47 AM   WBC      4.0 - 11.0 10e3/uL 3.9 (L)    RBC Count      4.40 - 5.90 10e6/uL 4.29 (L)    Hemoglobin      13.3 - 17.7 g/dL 14.9    Hematocrit      40.0 - 53.0 % 43.0    MCV      78 - 100 fL 100    MCH      26.5 - 33.0 pg 34.7 (H)    MCHC      31.5 - 36.5 g/dL 34.7    RDW      10.0 - 15.0 % 11.8    Platelet Count      150 - 450 10e3/uL 141 (L)           Recurrent major depressive disorder, remission status unspecified (H24)  denies worsening depression and suicidal ideas. [facility] and venlafaxine was recently increased.Not sure if taking venlafaxine ER.     Coronary artery disease due to calcified coronary lesion  Aortic valve regurgitation due to aortic dilation (H24)  He saw Cardiologist last summer. Had normal ECHO and CTA.  Coronary calcification - on aspirin and high intensity statin. Diet and exercise discussed.     Bicuspid aortic valve with mild regurgitation and mild enlargement of the aorta at the sinuses - BP controlled. Repeat echo in 2 years if asymptomatic.      Hyperlipidemia - on high intensity statin. LDL check today.     History of strokes - quiescent on aspirin and clopidogrel s/p right CEA      Complex sleep apnea syndrome  Using CPAP every night.    Memory loss  denies worsening depression and suicidal ideas. [facility] and venlafaxine was recently increased.Not sure if taking venlafaxine ER.  But he noticed more issues with memory and difficulty recalling some words.  H/O Cerebrovascular accident (CVA), unspecified mechanism (H)  Comment: on ASA, statin   in 2002, H/O carotid endarterectomy, On Plavix, asa, statin   - Adult Neuropsychology  Referral; Future  - MR Brain w/o & w Contrast; Future    Orthostatic hypotension  Occasional dizziness when stands up. He is taking 12.5 mg losartan daily.   - Compression Sleeve/Stocking Order for DME - ONLY FOR  DME    Dyspepsia  Belching  Stomach pain  He noticed frequent dyspepsia, belching, weight loss, often discomfort in the sub epigastric area, like hunger pain.  - Adult GI  Referral - Procedure Only; Future        - Adult GI  Referral - Procedure Only; Future    Encounter for screening for malignant neoplasm of prostate    - Prostate Specific Antigen Screen; Future  - Prostate Specific Antigen Screen    Vitamin D deficiency    - Vitamin D Deficiency; Future  - Vitamin D Deficiency    Abnormal finding of blood chemistry, unspecified    - Ferritin; Future  - Ferritin  Patient Instructions   Increase fluid intake.   Wear compression stocking daily.    [facility].    To schedule Neuropsych testing.      Labs today.    To schedule MRI head and CT chest, abdomen, pelvis [phone].    To schedule upper GI endoscopy [facility].    Follow-up with Cardiologist in August.       The longitudinal plan of care for the diagnosis(es)/condition(s) as documented were addressed during this visit. Due to the added complexity in care, I will continue to support Yong in the subsequent management and with ongoing continuity of care.        Total time spent on the date of this encounter doing: chart review, review of test results, patient visit, physical exam, education, counseling, developing plan of care, and documenting = 45    minutes.       Subjective   Yong is a 78 year old, presenting for the following health issues:  Fatigue (Fatigue/Low Energy )      5/17/2024    11:15 AM   Additional Questions   Roomed by Diane     Fatigue  Associated symptoms include fatigue.   History of Present Illness       Reason for visit:  Feeling tired no energy sorek chest    He eats 0-1 servings of fruits and vegetables daily.He consumes 0 sweetened beverage(s) daily.He exercises with enough effort to increase his heart rate 9 or less minutes per day.  He exercises with enough effort to increase his heart rate 3 or  "less days per week.   He is taking medications regularly.                     Objective    /60   Pulse 94   Temp 97.8  F (36.6  C)   Resp 16   Ht 1.778 m (5' 10\")   Wt 69.9 kg (154 lb)   SpO2 96%   BMI 22.10 kg/m    Body mass index is 22.1 kg/m .  Physical Exam       Constitutional:  oriented to person, place, and time, appears well-nourished. No distress.   HENT:   Head: Normocephalic.   Mouth/Throat: Oropharynx is clear and moist.   Eyes: Conjunctivae are normal. Pupils are equal, round, and reactive to light.   Neck: Normal range of motion. Neck supple.   Cardiovascular: Normal rate, regular rhythm and normal heart sounds.    Pulmonary/Chest: Effort normal and breath sounds normal.   Abdominal: Soft. Bowel sounds are normal.   Musculoskeletal: Normal range of motion.   Neurological: alert and oriented to person, place, and time. Skin: Skin is warm.   Psychiatric: normal mood and affect.         Signed Electronically by: Anita Pineda MD    "

## 2024-05-18 LAB
ALBUMIN SERPL BCG-MCNC: 4.6 G/DL (ref 3.5–5.2)
ALP SERPL-CCNC: 78 U/L (ref 40–150)
ALT SERPL W P-5'-P-CCNC: 45 U/L (ref 0–70)
ANION GAP SERPL CALCULATED.3IONS-SCNC: 12 MMOL/L (ref 7–15)
AST SERPL W P-5'-P-CCNC: 30 U/L (ref 0–45)
BILIRUB SERPL-MCNC: 0.4 MG/DL
BUN SERPL-MCNC: 22.4 MG/DL (ref 8–23)
CALCIUM SERPL-MCNC: 9.9 MG/DL (ref 8.8–10.2)
CHLORIDE SERPL-SCNC: 101 MMOL/L (ref 98–107)
CREAT SERPL-MCNC: 0.66 MG/DL (ref 0.67–1.17)
DEPRECATED HCO3 PLAS-SCNC: 28 MMOL/L (ref 22–29)
EGFRCR SERPLBLD CKD-EPI 2021: >90 ML/MIN/1.73M2
FERRITIN SERPL-MCNC: 94 NG/ML (ref 31–409)
GLUCOSE SERPL-MCNC: 153 MG/DL (ref 70–99)
MAGNESIUM SERPL-MCNC: 1.9 MG/DL (ref 1.7–2.3)
POTASSIUM SERPL-SCNC: 5.1 MMOL/L (ref 3.4–5.3)
PROT SERPL-MCNC: 7 G/DL (ref 6.4–8.3)
PSA SERPL DL<=0.01 NG/ML-MCNC: 0.41 NG/ML (ref 0–6.5)
SODIUM SERPL-SCNC: 141 MMOL/L (ref 135–145)
TSH SERPL DL<=0.005 MIU/L-ACNC: 1.29 UIU/ML (ref 0.3–4.2)
VIT B12 SERPL-MCNC: 372 PG/ML (ref 232–1245)
VIT D+METAB SERPL-MCNC: 37 NG/ML (ref 20–50)

## 2024-05-20 ENCOUNTER — TELEPHONE (OUTPATIENT)
Dept: NEUROPSYCHOLOGY | Facility: CLINIC | Age: 79
End: 2024-05-20
Payer: MEDICARE

## 2024-05-20 NOTE — CONFIDENTIAL NOTE
Schedule per protocol    Timeline: First Available  Location: [facility], [facility],  NEUROPSYCHOLOGY, or [facility]UR[facility]Y  Preferred Provider: Nawaf Barbosa, David, Carol, Mallorie, or Sridhar    Visit Type: NEUROPSYCH EVAL or NEUROPSYCH EVAL or Neuropsych Interview Only (testing scheduled shortly after interview)  Special Instructions:      Eirca Manjarrez   Psychometrist

## 2024-05-20 NOTE — TELEPHONE ENCOUNTER
[facility] Call Center    Phone Message    May a detailed message be left on voicemail: yes     Reason for Call: Appointment Intake    Referring Provider Name: Dr. Anita Pineda  Diagnosis and/or Symptoms: cognitive impairment    Please review referral as Dx of cognitive impairment requires review per scheduling protocols    Please contact patient with appointment options    Action Taken: Message routed to:  [facility] Neuropsychology    Travel Screening: Not Applicable

## 2024-05-20 NOTE — TELEPHONE ENCOUNTER
Patient confirmed scheduled appointment:  Date: 8/13  Time: 9 AM  Visit type:   Provider: NEUROPSYCH INTERVIEW ONLY  Location: Video visit - pt aware   Testing/imaging: n/a  Additional notes: In basket message       Monique Doss on 5/20/2024 at 3:38 PM

## 2024-05-21 LAB — METHYLMALONATE SERPL-SCNC: 0.31 UMOL/L (ref 0–0.4)

## 2024-06-04 ENCOUNTER — TRANSFERRED RECORDS (OUTPATIENT)
Dept: HEALTH INFORMATION MANAGEMENT | Facility: CLINIC | Age: 79
End: 2024-06-04
Payer: MEDICARE

## 2024-06-04 DIAGNOSIS — G47.33 OBSTRUCTIVE SLEEP APNEA (ADULT) (PEDIATRIC): Primary | ICD-10-CM

## 2024-06-04 LAB — RETINOPATHY: POSITIVE

## 2024-06-13 ENCOUNTER — HOSPITAL ENCOUNTER (OUTPATIENT)
Dept: CT IMAGING | Facility: CLINIC | Age: 79
Discharge: HOME | End: 2024-06-13
Attending: INTERNAL MEDICINE
Payer: MEDICARE

## 2024-06-13 ENCOUNTER — HOSPITAL ENCOUNTER (OUTPATIENT)
Dept: MRI IMAGING | Facility: CLINIC | Age: 79
Discharge: HOME | End: 2024-06-13
Attending: INTERNAL MEDICINE
Payer: MEDICARE

## 2024-06-13 DIAGNOSIS — R63.4 WEIGHT LOSS: ICD-10-CM

## 2024-06-13 DIAGNOSIS — R41.3 MEMORY LOSS: ICD-10-CM

## 2024-06-13 DIAGNOSIS — R53.83 OTHER FATIGUE: ICD-10-CM

## 2024-06-13 PROCEDURE — 255N000002 HC RX 255 OP 636: Mod: JZ | Performed by: INTERNAL MEDICINE

## 2024-06-13 PROCEDURE — 71260 CT THORAX DX C+: CPT | Mod: MG

## 2024-06-13 PROCEDURE — 70553 MRI BRAIN STEM W/O & W/DYE: CPT | Mod: MG

## 2024-06-13 PROCEDURE — A9585 GADOBUTROL INJECTION: HCPCS | Mod: JZ | Performed by: INTERNAL MEDICINE

## 2024-06-13 PROCEDURE — 250N000011 HC RX IP 250 OP 636: Mod: JZ | Performed by: INTERNAL MEDICINE

## 2024-06-13 RX ORDER — IOPAMIDOL 755 MG/ML
90 INJECTION, SOLUTION INTRAVASCULAR ONCE
Status: COMPLETED | OUTPATIENT
Start: 2024-06-13 | End: 2024-06-13

## 2024-06-13 RX ORDER — GADOBUTROL 604.72 MG/ML
7 INJECTION INTRAVENOUS ONCE
Status: COMPLETED | OUTPATIENT
Start: 2024-06-13 | End: 2024-06-13

## 2024-06-13 RX ADMIN — IOPAMIDOL 90 ML: 755 INJECTION, SOLUTION INTRAVENOUS at 17:46

## 2024-06-13 RX ADMIN — GADOBUTROL 7 ML: 604.72 INJECTION INTRAVENOUS at 17:13

## 2024-06-18 DIAGNOSIS — E11.9 WELL CONTROLLED TYPE 2 DIABETES MELLITUS (H): Primary | ICD-10-CM

## 2024-06-25 ENCOUNTER — OFFICE VISIT (OUTPATIENT)
Dept: INTERNAL MEDICINE | Facility: CLINIC | Age: 79
End: 2024-06-25
Payer: MEDICARE

## 2024-06-25 VITALS
DIASTOLIC BLOOD PRESSURE: 64 MMHG | TEMPERATURE: 97.2 F | RESPIRATION RATE: 16 BRPM | OXYGEN SATURATION: 98 % | BODY MASS INDEX: 21.53 KG/M2 | WEIGHT: 150.4 LBS | SYSTOLIC BLOOD PRESSURE: 100 MMHG | HEART RATE: 91 BPM | HEIGHT: 70 IN

## 2024-06-25 DIAGNOSIS — R63.4 WEIGHT LOSS: ICD-10-CM

## 2024-06-25 DIAGNOSIS — E11.9 TYPE 2 DIABETES, HBA1C GOAL < 8% (H): ICD-10-CM

## 2024-06-25 DIAGNOSIS — R93.89 ABNORMAL MRI: ICD-10-CM

## 2024-06-25 DIAGNOSIS — R41.89 COGNITIVE CHANGES: Primary | ICD-10-CM

## 2024-06-25 DIAGNOSIS — G93.89 ENCEPHALOMALACIA ON IMAGING STUDY: ICD-10-CM

## 2024-06-25 DIAGNOSIS — I77.810 DILATATION OF THORACIC AORTA (H): ICD-10-CM

## 2024-06-25 DIAGNOSIS — F33.9 RECURRENT MAJOR DEPRESSIVE DISORDER, REMISSION STATUS UNSPECIFIED (H): ICD-10-CM

## 2024-06-25 DIAGNOSIS — D61.818 PANCYTOPENIA (H): ICD-10-CM

## 2024-06-25 PROCEDURE — 96127 BRIEF EMOTIONAL/BEHAV ASSMT: CPT | Performed by: INTERNAL MEDICINE

## 2024-06-25 PROCEDURE — G2211 COMPLEX E/M VISIT ADD ON: HCPCS | Performed by: INTERNAL MEDICINE

## 2024-06-25 PROCEDURE — 99214 OFFICE O/P EST MOD 30 MIN: CPT | Performed by: INTERNAL MEDICINE

## 2024-06-25 ASSESSMENT — PATIENT HEALTH QUESTIONNAIRE - PHQ9
SUM OF ALL RESPONSES TO PHQ QUESTIONS 1-9: 4
SUM OF ALL RESPONSES TO PHQ QUESTIONS 1-9: 4
10. IF YOU CHECKED OFF ANY PROBLEMS, HOW DIFFICULT HAVE THESE PROBLEMS MADE IT FOR YOU TO DO YOUR WORK, TAKE CARE OF THINGS AT HOME, OR GET ALONG WITH OTHER PEOPLE: SOMEWHAT DIFFICULT

## 2024-06-25 NOTE — PROGRESS NOTES
Assessment & Plan     Cognitive changes  Abnormal MRI  Encephalomalacia on imaging study  he noticed more issues with memory and difficulty recalling some words.   MRI 6/13/24:  IMPRESSION:  1.  No acute intracranial abnormality.  2.  Numerous chronic findings, including multifocal encephalomalacia    He will see Neurologist for further evaluation.    Weight loss  Yong is continuing losing weight and feeling extremely tired.  CT chest, abdomen and pelvis 6/13/24:  IMPRESSION:  1.  Dilatation of the thoracic aorta at the root/sinuses of Valsalva measuring approximately 4.2 cm on coronal imaging, similar to prior when remeasured at similar positions. Stable fusiform dilatation of the ascending thoracic aorta and proximal   descending thoracic aorta. No aortic dissection.     2.  No lymphadenopathy. Normal splenic size. No evidence for an etiology for the patient's fatigue/weight loss.     3.  Hepatic steatosis.     4.  Minor vascular calcification.      Type 2 diabetes, HbA1C goal < 8% (H)  He has diabetes, is on Lantus 16 U at bedtime, Ozempic 2mg weekly, Jardiance, metformin. Last HgbA1c 8.0  on 4/15/24.. He denies hypoglycemia. Ozempic was just increased in April from 1mg to 2 mg. Since he lost significant amount of weight, Endocrinologist stopped Ozempic last week.  He reports FBG , during the day 150.    Recurrent major depressive disorder, remission status unspecified (H24)  denies worsening depression and suicidal ideas. [facility] and venlafaxine was recently increased.     Pancytopenia (H)  Chronic. Will follow-up with hematology.  Component      Latest Ref [facility] 5/17/2024  12:12 PM   WBC      4.0 - 11.0 10e3/uL 3.9 (L)    RBC Count      4.40 - 5.90 10e6/uL 4.17 (L)    Hemoglobin      13.3 - 17.7 g/dL 14.3    Hematocrit      40.0 - 53.0 % 41.0    MCV      78 - 100 fL 98    MCH      26.5 - 33.0 pg 34.3 (H)    MCHC      31.5 - 36.5 g/dL 34.9    RDW      10.0 - 15.0 % 11.8     Platelet Count      150 - 450 10e3/uL 131 (L)       Component      Latest Ref Rng 5/17/2024  12:12 PM   TSH      0.30 - 4.20 uIU/mL 1.29    Ferritin      31 - 409 ng/mL 94    Vitamin B12      232 - 1,245 pg/mL 372    Magnesium      1.7 - 2.3 mg/dL 1.9    Methylmalonic Acid      0.00 - 0.40 umol/L 0.31          - Adult Oncology/Hematology  Referral; Future    Coronary artery disease due to calcified coronary lesion  Aortic valve regurgitation due to aortic dilation (H24)  He saw Cardiologist last summer. Had normal ECHO and CTA.  Coronary calcification - on aspirin and high intensity statin. Diet and exercise discussed.     Bicuspid aortic valve with mild regurgitation and mild enlargement of the aorta at the sinuses - BP controlled. Repeat echo in 2 years if asymptomatic.      Hyperlipidemia - on high intensity statin. LDL check today.     History of strokes - quiescent on aspirin and clopidogrel s/p right CEA     He is using the CPAP every night, he takes the nap almost every day. Feels weak and tired, but denies worsening depression and suicidal ideas.       The longitudinal plan of care for the diagnosis(es)/condition(s) as documented were addressed during this visit. Due to the added complexity in care, I will continue to support Yong in the subsequent management and with ongoing continuity of care.    Dominique Beach is a 78 year old, presenting for the following health issues:  Follow Up (4 Month F/U)      6/25/2024     2:48 PM   Additional Questions   Roomed by Diane     History of Present Illness       Diabetes:   He presents for follow up of diabetes.  He is checking home blood glucose two times daily.   He checks blood glucose before meals.  Blood glucose is never over 200 and never under 70.  When his blood glucose is low, the patient is asymptomatic for confusion, blurred vision, lethargy and reports not feeling dizzy, shaky, or weak.   He has no concerns regarding his diabetes at this time.   "He is having weight loss.            Reason for visit:  Follwo up on tests    He eats 2-3 servings of fruits and vegetables daily.He consumes 0 sweetened beverage(s) daily.He exercises with enough effort to increase his heart rate 9 or less minutes per day.  He exercises with enough effort to increase his heart rate 3 or less days per week.   He is taking medications regularly.                     Objective    /64   Pulse 91   Temp 97.2  F (36.2  C)   Resp 16   Ht 1.778 m (5' 10\")   Wt 68.2 kg (150 lb 6.4 oz)   SpO2 98%   BMI 21.58 kg/m    Body mass index is 21.58 kg/m .  Physical Exam               Signed Electronically by: Anita Pineda MD    "

## 2024-06-25 NOTE — PATIENT INSTRUCTIONS
They will call to help to schedule visit with Neurology to discuss MRI brain result.    To see Hematologist to discuss pancytopenia.

## 2024-06-26 ENCOUNTER — PATIENT OUTREACH (OUTPATIENT)
Dept: ONCOLOGY | Facility: CLINIC | Age: 79
End: 2024-06-26
Payer: MEDICARE

## 2024-06-26 NOTE — PROGRESS NOTES
New Patient Oncology Nurse Navigator Note     Referral Received: 06/26/24      Referring provider: Anita Pineda MD     Referring Clinic/Organization: [facility]     Referred to: Benign Hematology    Requested provider (if applicable): First available - did not specify      Evaluation for :   Diagnosis   D61.818 (ICD-10-CM) - Pancytopenia (H)        Clinical History (per Nurse review of records provided):      Chronic pancytopenia.  Last seen by hematology 6/10/2019 at [facility].     Latest Reference Range & Units 05/17/24 12:12   Sodium 135 - 145 mmol/L 141   Potassium 3.4 - 5.3 mmol/L 5.1   Chloride 98 - 107 mmol/L 101   Carbon Dioxide (CO2) 22 - 29 mmol/L 28   Urea Nitrogen 8.0 - 23.0 mg/dL 22.4   Creatinine 0.67 - 1.17 mg/dL 0.66 (L)   GFR Estimate >60 mL/min/1.73m2 >90   Calcium 8.8 - 10.2 mg/dL 9.9   Anion Gap 7 - 15 mmol/L 12   Magnesium 1.7 - 2.3 mg/dL 1.9   Albumin 3.5 - 5.2 g/dL 4.6   Protein Total 6.4 - 8.3 g/dL 7.0   Alkaline Phosphatase 40 - 150 U/L 78   ALT 0 - 70 U/L 45   AST 0 - 45 U/L 30   Bilirubin Total <=1.2 mg/dL 0.4   Ferritin 31 - 409 ng/mL 94   Glucose 70 - 99 mg/dL 153 (H)   Methylmalonic Acid 0.00 - 0.40 umol/L 0.31   PSA Tumor Marker 0.00 - 6.50 ng/mL 0.41   TSH 0.30 - 4.20 uIU/mL 1.29   Vitamin B12 232 - 1,245 pg/mL 372   Vitamin D, Total (25-Hydroxy) 20 - 50 ng/mL 37   WBC 4.0 - 11.0 10e3/uL 3.9 (L)   Hemoglobin 13.3 - 17.7 g/dL 14.3   Hematocrit 40.0 - 53.0 % 41.0   Platelet Count 150 - 450 10e3/uL 131 (L)   RBC Count 4.40 - 5.90 10e6/uL 4.17 (L)   MCV 78 - 100 fL 98   MCH 26.5 - 33.0 pg 34.3 (H)   MCHC 31.5 - 36.5 g/dL 34.9   RDW 10.0 - 15.0 % 11.8   (L): Data is abnormally low  (H): Data is abnormally high    Records Location: [facility]     Records Needed:     N/A    Additional testing needed prior to consult:     ?    Referral updates and Plan:     06/26/2024 9:36 AM -  Referral received and reviewed. Called pt at this time and left message, introduced my role as nurse  navigator with [facility] Hematology/Oncology dept and that we have recd the referral for dx of Pancytopenia from Dr Pineda.    Provided contact information if future questions arise.     -Explained to pt that they will receive a call from our scheduling intake team and provided our call-back number below if needed.        Edna Locke, ANNIEN, RN  Hematology/Oncology Nurse Navigator  [facility]  [phone] / [phone]

## 2024-07-05 DIAGNOSIS — E11.8 TYPE 2 DIABETES MELLITUS WITH COMPLICATION, WITHOUT LONG-TERM CURRENT USE OF INSULIN (H): ICD-10-CM

## 2024-07-05 RX ORDER — BLOOD-GLUCOSE METER
KIT MISCELLANEOUS
Qty: 400 STRIP | Refills: 0 | Status: SHIPPED | OUTPATIENT
Start: 2024-07-05

## 2024-07-05 RX ORDER — BLOOD-GLUCOSE METER
KIT MISCELLANEOUS
Qty: 200 STRIP | Refills: 6 | OUTPATIENT
Start: 2024-07-05

## 2024-07-05 RX ORDER — BLOOD-GLUCOSE METER
KIT MISCELLANEOUS
Qty: 90 STRIP | Refills: 0 | Status: SHIPPED | OUTPATIENT
Start: 2024-07-05

## 2024-07-05 NOTE — TELEPHONE ENCOUNTER
7-5-24  Pt out of meds  (Medication qued up twice, pt wants this called into 2 different pharmacies, since pt is out of meds he needs this called into a retail pharmacy ASAP to use right away& also wants a script called into his mail in order pharmacy like normal)  Fabiana

## 2024-07-05 NOTE — TELEPHONE ENCOUNTER
Called to pt to confirm gap in usage of test strip.     Pt states he stopped using it at one point due to not needing it.   Now he has been using it again, 3 to 4 times a day.      Pt is out and needing a refill.   Routing to pcp to review pended order and sign.   This is to be sent to mail order while the other order was to be sent to [facility].     Angelo SUAREZ RN

## 2024-07-05 NOTE — TELEPHONE ENCOUNTER
7-5-24  (Medication qued up twice, pt wants this called into 2 different pharmacies, since pt is out of meds he needs this called into a retail pharmacy ASAP to use right away& also wants a script called into his mail in order pharmacy like normal)  Nadege

## 2024-07-08 ENCOUNTER — TELEPHONE (OUTPATIENT)
Dept: NEUROPSYCHOLOGY | Facility: CLINIC | Age: 79
End: 2024-07-08
Payer: MEDICARE

## 2024-07-08 NOTE — TELEPHONE ENCOUNTER
Left Voicemail (1st Attempt) and Sent Mychart (1st Attempt) for the patient to call back and schedule the following:    Appointment type: NEUROPSYCHOLOGICAL TEST ONLY  Provider: Sridhar   Return date: same week, after 8/13   Specialty phone number: [phone]  Additional appointment(s) needed: n/a  Additonal Notes: Schedule for the in person Neuropsych testing same week as the interview appointment but not before.        Monique Doss on 7/8/2024 at 12:59 PM

## 2024-07-09 ENCOUNTER — TRANSFERRED RECORDS (OUTPATIENT)
Dept: HEALTH INFORMATION MANAGEMENT | Facility: CLINIC | Age: 79
End: 2024-07-09
Payer: MEDICARE

## 2024-07-10 ENCOUNTER — VIRTUAL VISIT (OUTPATIENT)
Dept: EDUCATION SERVICES | Facility: CLINIC | Age: 79
End: 2024-07-10
Attending: PHYSICIAN ASSISTANT
Payer: MEDICARE

## 2024-07-10 DIAGNOSIS — E11.9 WELL CONTROLLED TYPE 2 DIABETES MELLITUS (H): ICD-10-CM

## 2024-07-10 PROCEDURE — G0108 DIAB MANAGE TRN  PER INDIV: HCPCS | Mod: 95 | Performed by: DIETITIAN, REGISTERED

## 2024-07-10 NOTE — Clinical Note
7/10/2024         RE: Yong Guillermo  [address]  [facility] [address]        Dear Colleague,    Thank you for referring your patient, Yong Guillermo, to the [facility] DIABETES EDUCATION. Please see a copy of my visit note below.    Diabetes Self-Management Education & Support  Presents for: Individual review  Type of service:  Video Visit  Originating Location (pt. Location): Home  Distant Location (provider location): Offsite  Mode of Communication:  Video Conference via [employer]  Joined the call at 7/10/2024, 12:58:58 pm.  Left the call at 7/10/2024, 1:30:09 pm.  You were on the call for 31 minutes 10 seconds  How would patient like to obtain AVS? MyChart      ASSESSMENT:      Significant fatigue    Labs, scans, MRIs; no etiology for fatigue     [facility] and will [facility] that in     Went off Ozempic about 2-3 weeks ago and maybe is feeling more energy than beofre.  Not as much as he would have liked     Is concerned with what blood sugars will do off of this     Ran out of test strips but got new ones     Has blood sugars written down:   Fasting: low of 107/109 and to a high of 147 (after a big meal).  Averaging 130  Later in the day before meals: 143, 133, 137, 117, 153.  Post meal / bedtime: 202, 257  (after marshmallow treats) , 140, 199,     Lives at a [facility] coop and had a meal downstairs     These are failry similar to when on ozempic - hasn't seen a big jump     Has a 4 month supply of ozempic in fridge and will keep this.  Talked about half life - 5 full weeks to eleminate from the system     Appetite down / slow weight loss.  Not very hungry.   Not trying to lose weight   Never eats until full     Breakfast: tea and lemonade - sugar free or only a small amount diluted into a gallon.  Decaff coffee all day until bedtime   Milk + flake cereal from whole foods + grapes / banana / bluberries + [employer] and coffee   Small cookie but not often   Coffee  downstairs & workshop (no cookies / treats down there)   11-1pm: ham sandwich or peanut butter and banana sandwich, sometimes potato chips   No snacks   Supper 6:30-7pm - wife cooks.  Not a huge meal   Small container of yogurt + nuts     Diet root beer,   Uses bathroom frequently      Weighs self at home 145-142 (when at the office has pockets full with stuff + shoes)     BMI 21    Wife makes hard boiled eggs and could add before breakfast       Lantus is at 16 units =      Jardiance for at least 2 yerar    Has had Diabetes for along time at least 20 years     Reitred         30 minutes - first video of 3 minutes dropped      Jardiance -     Patient's most recent   {:867390} meeting goal of {A1C GOALS:024765}  Lab Results   Component Value Date    A1C 8.0 04/15/2024    A1C 7.7 12/06/2023    A1C 8.0 05/23/2023    A1C 8.0 12/28/2022    A1C 7.2 09/28/2022    A1C 6.9 04/26/2022       Diabetes knowledge and skills assessment:   Patient is knowledgeable in diabetes management concepts related to: {AADE 7:576810}  Continue education with the following diabetes management concepts: {AADE 7:571653}  Based on learning assessment above, most appropriate setting for further diabetes education would be: {:435982} setting.      PLAN    See Care Plan for co-developed, patient-state behavior change goals.  AVS provided for patient today.    SUBJECTIVE/OBJECTIVE:  Presents for: Individual review  Accompanied by: Self  Diabetes education in the past 24mo: No  Diabetes type: Type 2  Disease course: Improving  How confident are you filling out medical forms by yourself:: Quite a bit  Other concerns:: None  Cultural Influences/Ethnic Background:  Choose not to answer    Diabetes Symptoms & Complications:  Diabetes Related Symptoms: Fatigue  Weight trend: Decreasing  Symptom course: Improving  Disease course: Improving       Patient Problem List and Family Medical History reviewed for relevant medical history, current medical status,  "and diabetes risk factors.    Vitals:  There were no vitals taken for this visit.  Estimated body mass index is 21.58 kg/m  as calculated from the following:    Height as of 6/25/24: 1.778 m (5' 10\").    Weight as of 6/25/24: 68.2 kg (150 lb 6.4 oz).   Last 3 BP:   BP Readings from Last 3 Encounters:   06/25/24 100/64   05/17/24 100/60   04/15/24 116/64       History   Smoking Status    Former    Types: Cigars, Pipe   Smokeless Tobacco    Former    Types: Chew       Labs:  Lab Results   Component Value Date    A1C 8.0 04/15/2024    A1C 7.7 12/06/2023     Lab Results   Component Value Date     05/17/2024     04/26/2022     06/10/2021     Lab Results   Component Value Date    LDL 74 12/06/2023    LDL 72 06/02/2021     Direct Measure HDL   Date Value Ref Range Status   12/06/2023 41 >=40 mg/dL Final   ]  GFR Estimate   Date Value Ref Range Status   05/17/2024 >90 >60 mL/min/1.73m2 Final   06/15/2021 >60 >60 mL/min/1.73m2 Final   06/10/2021 85 >60 mL/min/[1.73_m2] Final     Comment:     Non  GFR Calc  Starting 12/18/2018, serum creatinine based estimated GFR (eGFR) will be   calculated using the Chronic Kidney Disease Epidemiology Collaboration   (CKD-EPI) equation.       GFR Estimate If Black   Date Value Ref Range Status   06/15/2021 >60 >60 mL/min/1.73m2 Final   06/10/2021 >90 >60 mL/min/[1.73_m2] Final     Comment:      GFR Calc  Starting 12/18/2018, serum creatinine based estimated GFR (eGFR) will be   calculated using the Chronic Kidney Disease Epidemiology Collaboration   (CKD-EPI) equation.       Lab Results   Component Value Date    CR 0.66 05/17/2024    CR 0.87 06/10/2021     No results found for: \"MICROALBUMIN\"    Healthy Eating:  Cultural/Buddhist diet restrictions?: No  How many times a week on average do you eat food made away from home (restaurant/take-out)?: 3  Meals include: Breakfast, Lunch, Dinner, Evening Snack  Beverages: Water, Coffee, " Milk    Being Active:  Barrier to exercise: None    Monitoring:  Blood Glucose Meter: FreeStyle  Times checking blood sugar at home (number): 2  Times checking blood sugar at home (per): Day    Taking Medications:  Diabetes Medication(s)       Biguanides       metFORMIN (GLUCOPHAGE) 500 MG tablet TAKE 2 TABLETS TWICE A DAY WITH MEALS       Insulin       LANTUS SOLOSTAR 100 UNIT/ML soln INJECT 12 UNITS UNDER THE SKIN AT BEDTIME     Patient taking differently: Inject 16 Units       Sodium-Glucose Co-Transporter 2 (SGLT2) Inhibitors       empagliflozin (JARDIANCE) 25 MG TABS tablet TAKE 1 TABLET DAILY            Current Treatments: Insulin Injections, Oral Medication (taken by mouth)    Problem Solving:  Problem Solving Assessed Today: Yes    Reducing Risks:  Has dilated eye exam at least once a year?: Yes  Sees dentist every 6 months?: Yes  Feet checked by healthcare provider in the last year?: No    Healthy Coping:  Healthy Coping Assessed Today: Yes  Emotional response to diabetes: Ready to learn  Informal Support system:: Significant other  Stage of change: ACTION (Actively working towards change)  Patient Activation Measure Survey Score:       No data to display              Care Plan and Education Provided:  {Care Plan and Eduction Provided:276386}    Elizabeth Georges RD, LD, [facility]  Diabetes Education      Time Spent: 30 minutes  Encounter Type: Individual    Any diabetes medication dose changes were made via the CDE Protocol per the patient's endocrinology provider. A copy of this encounter was shared with the provider.      Diabetes Self-Management Education & Support  Presents for: Individual review  Type of service:  Video Visit  Originating Location (pt. Location): Home  Distant Location (provider location): Offsite  Mode of Communication:  Video Conference via fl3ur  Joined the call at 7/10/2024, 12:58:58 pm.  Left the call at 7/10/2024, 1:30:09 pm.  You were on the call for 31 minutes 10  seconds  How would patient like to obtain AVS? MyChart      ASSESSMENT:  Referral from endocrinology for follow up on recent medication changes.  Yong is doing a trial of stopping Ozempic to see if this medication was contributing to his fatigue.  Has been off Ozempic about 2-3 weeks and is maybe feeling more energy than before, however not as much as he would have liked.  Additionally has lost weight over the last year which was not needed with BMI of 21.   Appetite slightly improved, but still remains low even off of the Ozempic.  Talked about half life - 5 full weeks to eliminate completley from the system.  Has a 4 month supply of ozempic in fridge and will keep this incase wanting to do a re-trial with it (could consider a lower dose instead of the 2mg) .   Currently taking 16 units of Lantus and discussed medication action and use of this.  Has a self titration plan from endocrinology if needing to work this dose up higher as the Ozempic fades out.  Reviewed blood sugars goals, when to check and adding a few post prandial checks (start with checking a few after cereal at breakfast).   Reviewed diet / food ideas, macronutrient balance and getting adequate nutrition.      Monitoring:   Fasting: low of 107/109 and to a high of 147 (after a big meal).  Averaging 130  Later in the day before meals: 143, 133, 137, 117, 153.  Post meal / bedtime: 202, 257  (after marshmallow treats) , 140, 199,   Notes that blood sugars didn't change much off of the Ozempic and that these numbers are lower than this past April when A1c was 8.     Food / schedule / other notes:   Lives at a senoir co-op and had a meal downstairs  Appetite down / slow weight loss (not trying), Never eats until full - has no desire to   Breakfast: tea and lemonade - sugar free or only a small amount diluted into a gallon.  Decaff coffee all day until bedtime   Milk + flake cereal from whole foods + grapes / banana / bluberries + tea and coffee (Wife  makes hard boiled eggs and could add before breakfast)  Small cookie but not often   Coffee downstairs & workshop (no cookies / treats down there)   11-1pm: ham sandwich or peanut butter and banana sandwich, sometimes potato chips   No snacks   Supper 6:30-7pm - wife cooks.  Not a huge meal   Small container of yogurt + nuts   Diet root beer    Weighs self at home 145-142 (when at the office has pockets full with stuff + shoes)   BMI 21      Patient's most recent   is not meeting goal of <8.0  Lab Results   Component Value Date    A1C 8.0 04/15/2024    A1C 7.7 12/06/2023    A1C 8.0 05/23/2023    A1C 8.0 12/28/2022    A1C 7.2 09/28/2022    A1C 6.9 04/26/2022       Diabetes knowledge and skills assessment:   Patient is knowledgeable in diabetes management concepts related to: Healthy Eating, Being Active, Monitoring, Taking Medication, Problem Solving, Reducing Risks, and Healthy Coping  Continue education with the following diabetes management concepts: long term management, medication changes, blood sugar review, diet review as needed 2  Based on learning assessment above, most appropriate setting for further diabetes education would be: Individual setting.      PLAN  Increase calories to prevent weight loss; added snacks, healthy fats, proteins etc.   Continue to check blood sugars at rotating times; try adding post prandial readings after breakfast as cereal commonly increases blood sugars.  Consider adding a protein before the cereal   Ongoing communication; reviewed updating on mychart with blood sugar readings as needed before endocrinology appointment which is in 3 months   Reviewed Lantus self titration plan given by endocrinology    See Care Plan for co-developed, patient-state behavior change goals.  AVS provided for patient today.    SUBJECTIVE/OBJECTIVE:  Presents for: Individual review  Accompanied by: Self  Diabetes education in the past 24mo: No  Diabetes type: Type 2  Disease course: Improving  How  "confident are you filling out medical forms by yourself:: Quite a bit  Other concerns:: None  Cultural Influences/Ethnic Background:  Choose not to answer    Diabetes Symptoms & Complications:  Diabetes Related Symptoms: Fatigue  Weight trend: Decreasing  Symptom course: Improving  Disease course: Improving       Patient Problem List and Family Medical History reviewed for relevant medical history, current medical status, and diabetes risk factors.    Vitals:  There were no vitals taken for this visit.  Estimated body mass index is 21.58 kg/m  as calculated from the following:    Height as of 6/25/24: 1.778 m (5' 10\").    Weight as of 6/25/24: 68.2 kg (150 lb 6.4 oz).   Last 3 BP:   BP Readings from Last 3 Encounters:   06/25/24 100/64   05/17/24 100/60   04/15/24 116/64       History   Smoking Status     Former     Types: Cigars, Pipe   Smokeless Tobacco     Former     Types: Chew       Labs:  Lab Results   Component Value Date    A1C 8.0 04/15/2024    A1C 7.7 12/06/2023     Lab Results   Component Value Date     05/17/2024     04/26/2022     06/10/2021     Lab Results   Component Value Date    LDL 74 12/06/2023    LDL 72 06/02/2021     Direct Measure HDL   Date Value Ref Range Status   12/06/2023 41 >=40 mg/dL Final   ]  GFR Estimate   Date Value Ref Range Status   05/17/2024 >90 >60 mL/min/1.73m2 Final   06/15/2021 >60 >60 mL/min/1.73m2 Final   06/10/2021 85 >60 mL/min/[1.73_m2] Final     Comment:     Non  GFR Calc  Starting 12/18/2018, serum creatinine based estimated GFR (eGFR) will be   calculated using the Chronic Kidney Disease Epidemiology Collaboration   (CKD-EPI) equation.       GFR Estimate If Black   Date Value Ref Range Status   06/15/2021 >60 >60 mL/min/1.73m2 Final   06/10/2021 >90 >60 mL/min/[1.73_m2] Final     Comment:      GFR Calc  Starting 12/18/2018, serum creatinine based estimated GFR (eGFR) will be   calculated using the Chronic Kidney " "Disease Epidemiology Collaboration   (CKD-EPI) equation.       Lab Results   Component Value Date    CR 0.66 05/17/2024    CR 0.87 06/10/2021     No results found for: \"MICROALBUMIN\"    Healthy Eating:  Cultural/Pentecostal diet restrictions?: No  How many times a week on average do you eat food made away from home (restaurant/take-out)?: 3  Meals include: Breakfast, Lunch, Dinner, Evening Snack  Beverages: Water, Coffee, Milk    Being Active:  Barrier to exercise: None    Monitoring:  Blood Glucose Meter: FreeStyle  Times checking blood sugar at home (number): 2  Times checking blood sugar at home (per): Day    Taking Medications:  Diabetes Medication(s)       Biguanides       metFORMIN (GLUCOPHAGE) 500 MG tablet TAKE 2 TABLETS TWICE A DAY WITH MEALS       Insulin       LANTUS SOLOSTAR 100 UNIT/ML soln INJECT 12 UNITS UNDER THE SKIN AT BEDTIME     Patient taking differently: Inject 16 Units       Sodium-Glucose Co-Transporter 2 (SGLT2) Inhibitors       empagliflozin (JARDIANCE) 25 MG TABS tablet TAKE 1 TABLET DAILY            Current Treatments: Insulin Injections, Oral Medication (taken by mouth)    Problem Solving:  Problem Solving Assessed Today: Yes    Reducing Risks:  Has dilated eye exam at least once a year?: Yes  Sees dentist every 6 months?: Yes  Feet checked by healthcare provider in the last year?: No    Healthy Coping:  Healthy Coping Assessed Today: Yes  Emotional response to diabetes: Ready to learn  Informal Support system:: Significant other  Stage of change: ACTION (Actively working towards change)  Patient Activation Measure Survey Score:       No data to display              Care Plan and Education Provided:  Healthy Eating: Balanced meals and increasing healthy snacks / portions sizes, Monitoring: Frequency of monitoring, Individual glucose targets, and Log and interpret results, and Taking Medication: Action of prescribed medication(s) and When to take medication(s)    Elizabeth Georges RD, LD, " [facility]  Diabetes Education    Time Spent: 30 minutes  Encounter Type: Individual    Any diabetes medication dose changes were made via the CDE Protocol per the patient's endocrinology provider. A copy of this encounter was shared with the provider.

## 2024-07-10 NOTE — PROGRESS NOTES
Diabetes Self-Management Education & Support  Presents for: Individual review  Type of service:  Video Visit  Originating Location (pt. Location): Home  Distant Location (provider location): Offsite  Mode of Communication:  Video Conference via GotVoice  Joined the call at 7/10/2024, 12:58:58 pm.  Left the call at 7/10/2024, 1:30:09 pm.  You were on the call for 31 minutes 10 seconds  How would patient like to obtain AVS? MyChart      ASSESSMENT:  Referral from endocrinology for follow up on recent medication changes.  Yong is doing a trial of stopping Ozempic to see if this medication was contributing to his fatigue.  Has been off Ozempic about 2-3 weeks and is maybe feeling more energy than before, however not as much as he would have liked.  Additionally has lost weight over the last year which was not needed with BMI of 21.   Appetite slightly improved, but still remains low even off of the Ozempic.  Talked about half life - 5 full weeks to eliminate completley from the system.  Has a 4 month supply of ozempic in fridge and will keep this incase wanting to do a re-trial with it (could consider a lower dose instead of the 2mg) .   Currently taking 16 units of Lantus and discussed medication action and use of this.  Has a self titration plan from endocrinology if needing to work this dose up higher as the Ozempic fades out.  Reviewed blood sugars goals, when to check and adding a few post prandial checks (start with checking a few after cereal at breakfast).   Reviewed diet / food ideas, macronutrient balance and getting adequate nutrition.      Monitoring:   Fasting: low of 107/109 and to a high of 147 (after a big meal).  Averaging 130  Later in the day before meals: 143, 133, 137, 117, 153.  Post meal / bedtime: 202, 257  (after marshmallow treats) , 140, 199,   Notes that blood sugars didn't change much off of the Ozempic and that these numbers are lower than this past April when A1c was 8.     Food /  schedule / other notes:   Lives at a [facility] co-op and had a meal downstairs  Appetite down / slow weight loss (not trying), Never eats until full - has no desire to   Breakfast: tea and lemonade - sugar free or only a small amount diluted into a gallon.  Decaff coffee all day until bedtime   Milk + flake cereal from [employer] foods + grapes / banana / bluberries + tea and coffee (Wife makes hard boiled eggs and could add before breakfast)  Small cookie but not often   Coffee downstairs & workshop (no cookies / treats down there)   11-1pm: ham sandwich or peanut butter and banana sandwich, sometimes potato chips   No snacks   Supper 6:30-7pm - wife cooks.  Not a huge meal   Small container of yogurt + nuts   Diet root beer    Weighs self at home 145-142 (when at the office has pockets full with a lot of stuff + shoes)   BMI 21      Patient's most recent   is not meeting goal of <8.0  Lab Results   Component Value Date    A1C 8.0 04/15/2024    A1C 7.7 12/06/2023    A1C 8.0 05/23/2023    A1C 8.0 12/28/2022    A1C 7.2 09/28/2022    A1C 6.9 04/26/2022       Diabetes knowledge and skills assessment:   Patient is knowledgeable in diabetes management concepts related to: Healthy Eating, Being Active, Monitoring, Taking Medication, Problem Solving, Reducing Risks, and Healthy Coping  Continue education with the following diabetes management concepts: long term management, medication changes, blood sugar review, diet review as needed 2  Based on learning assessment above, most appropriate setting for further diabetes education would be: Individual setting.      PLAN  Increase calories to prevent weight loss; added snacks, healthy fats, proteins etc.   Continue to check blood sugars at rotating times; try adding post prandial readings after breakfast as cereal commonly increases blood sugars.  Consider adding a protein before the cereal   Ongoing communication; reviewed updating on mychart with blood sugar readings as needed  "before endocrinology appointment which is in 3 months   Reviewed Lantus self titration plan given by endocrinology    See Care Plan for co-developed, patient-state behavior change goals.  AVS provided for patient today.    SUBJECTIVE/OBJECTIVE:  Presents for: Individual review  Accompanied by: Self  Diabetes education in the past 24mo: No  Diabetes type: Type 2  Disease course: Improving  How confident are you filling out medical forms by yourself:: Quite a bit  Other concerns:: None  Cultural Influences/Ethnic Background:  Choose not to answer    Diabetes Symptoms & Complications:  Diabetes Related Symptoms: Fatigue  Weight trend: Decreasing  Symptom course: Improving  Disease course: Improving       Patient Problem List and Family Medical History reviewed for relevant medical history, current medical status, and diabetes risk factors.    Vitals:  There were no vitals taken for this visit.  Estimated body mass index is 21.58 kg/m  as calculated from the following:    Height as of 6/25/24: 1.778 m (5' 10\").    Weight as of 6/25/24: 68.2 kg (150 lb 6.4 oz).   Last 3 BP:   BP Readings from Last 3 Encounters:   06/25/24 100/64   05/17/24 100/60   04/15/24 116/64       History   Smoking Status    Former    Types: Cigars, Pipe   Smokeless Tobacco    Former    Types: Chew       Labs:  Lab Results   Component Value Date    A1C 8.0 04/15/2024    A1C 7.7 12/06/2023     Lab Results   Component Value Date     05/17/2024     04/26/2022     06/10/2021     Lab Results   Component Value Date    LDL 74 12/06/2023    LDL 72 06/02/2021     Direct Measure HDL   Date Value Ref Range Status   12/06/2023 41 >=40 mg/dL Final   ]  GFR Estimate   Date Value Ref Range Status   05/17/2024 >90 >60 mL/min/1.73m2 Final   06/15/2021 >60 >60 mL/min/1.73m2 Final   06/10/2021 85 >60 mL/min/[1.73_m2] Final     Comment:     Non  GFR Calc  Starting 12/18/2018, serum creatinine based estimated GFR (eGFR) will be " "  calculated using the Chronic Kidney Disease Epidemiology Collaboration   (CKD-EPI) equation.       GFR Estimate If Black   Date Value Ref Range Status   06/15/2021 >60 >60 mL/min/1.73m2 Final   06/10/2021 >90 >60 mL/min/[1.73_m2] Final     Comment:      GFR Calc  Starting 12/18/2018, serum creatinine based estimated GFR (eGFR) will be   calculated using the Chronic Kidney Disease Epidemiology Collaboration   (CKD-EPI) equation.       Lab Results   Component Value Date    CR 0.66 05/17/2024    CR 0.87 06/10/2021     No results found for: \"MICROALBUMIN\"    Healthy Eating:  Cultural/Sabianist diet restrictions?: No  How many times a week on average do you eat food made away from home (restaurant/take-out)?: 3  Meals include: Breakfast, Lunch, Dinner, Evening Snack  Beverages: Water, Coffee, Milk    Being Active:  Barrier to exercise: None    Monitoring:  Blood Glucose Meter: FreeStyle  Times checking blood sugar at home (number): 2  Times checking blood sugar at home (per): Day    Taking Medications:  Diabetes Medication(s)       Biguanides       metFORMIN (GLUCOPHAGE) 500 MG tablet TAKE 2 TABLETS TWICE A DAY WITH MEALS       Insulin       LANTUS SOLOSTAR 100 UNIT/ML soln INJECT 12 UNITS UNDER THE SKIN AT BEDTIME     Patient taking differently: Inject 16 Units       Sodium-Glucose Co-Transporter 2 (SGLT2) Inhibitors       empagliflozin (JARDIANCE) 25 MG TABS tablet TAKE 1 TABLET DAILY            Current Treatments: Insulin Injections, Oral Medication (taken by mouth)    Problem Solving:  Problem Solving Assessed Today: Yes    Reducing Risks:  Has dilated eye exam at least once a year?: Yes  Sees dentist every 6 months?: Yes  Feet checked by healthcare provider in the last year?: No    Healthy Coping:  Healthy Coping Assessed Today: Yes  Emotional response to diabetes: Ready to learn  Informal Support system:: Significant other  Stage of change: ACTION (Actively working towards change)  Patient " Activation Measure Survey Score:       No data to display              Care Plan and Education Provided:  Healthy Eating: Balanced meals and increasing healthy snacks / portions sizes, Monitoring: Frequency of monitoring, Individual glucose targets, and Log and interpret results, and Taking Medication: Action of prescribed medication(s) and When to take medication(s)    Elizabeth Georges RD, VIPIN, [facility]  Diabetes Education    Time Spent: 30 minutes  Encounter Type: Individual    Any diabetes medication dose changes were made via the CDE Protocol per the patient's endocrinology provider. A copy of this encounter was shared with the provider.

## 2024-07-22 NOTE — PROGRESS NOTES
Yong Guillermo is a 78 year old year old male is being evaluated via a billable video visit.      If the video visit is dropped, the invitation should be resent by: Text to cell phone: [phone]    Will anyone else be joining your video visit? No    Video-Visit Details    Type of service:  Video Visit    Originating Location (pt. Location): Home    Distant Location (provider location):  Off-site    Platform used for Video Visit: AmWell    Patient has given verbal consent for Video visit? Yes. Empirical studies have demonstrated that video/hybrid formats are appropriate and effective means for delivering neuropsychological services to the patient.    Video Start Time (time video started): 8:47 AM    Video End Time (time video stopped): 9:11 AM    RE: Yong Guillermo  MR#: [id number]  : 1945  DOS: Aug 13, 2024    CLINICAL INTERVIEW    REASON FOR REFERRAL:  Yong Guillermo is a 78 year old male with 12 years of education. The patient was referred for a neuropsychological evaluation by Dr Pineda. to assess his cognitive and emotional functioning secondary to memory concerns. The evaluation was requested in order to document his current functioning, assist with the differential diagnosis, and provide appropriate recommendations. The patient was informed that the evaluation included multiple measures of performance and symptom validity, assist with the differential diagnosis, and provide appropriate recommendations. The patient was informed that the evaluation included multiple measures of performance and symptom validity, and he was encouraged to provide his best effort at all times.  The nature of the neuropsychological evaluation was also discussed, including limits of confidentiality (for suspected child or elder abuse, potential homicide or suicide, and court orders). The patient was also informed that the report would be placed on the electronic medical records system.  The patient was also given an  "opportunity to ask questions. The patient indicated that he understood the information and consented to participate in the evaluation. This interview was conducted using telehealth methods     PROCEDURES:   Clinical interview    CLINICAL INTERVIEW: On a medical history questionnaire completed by the patient, he endorsed \"loss of memory.\"  He also noted difficulty with decreased energy, and has worked with a psychiatrist but not a psychotherapist.  On this form, he endorsed a history of a stroke, but denied any history of head injury, central nervous system infection, seizure, brain tumor, brain surgery, or chronic pain/fatigue, but noted a history of sleep apnea and use of his CPAP machine.  He characterized his appetite as \"poor\" on this form and noted that he no longer uses alcohol but uses tobacco and caffeine.  He denied any history of developmental delays or learning/attention disorders in school.  He denied any history of special education services.  On this form he indicated that he is retired and completed \"12+\" years of education.    The patient was interviewed via video platform and he was interviewed alone.  On interview, he confirmed that he noticed increasing memory problems over the past 3-4 years.  He said he has noticed difficulty remembering words as well.  He reported he has always had difficulty with remembering names.  Functionally, he denied significant problems with driving.  He said that he uses a GPS map more often now.  He reported he does not manage finances, which his wife does but this is not a change.  He denied any difficulty with medication management or daily household chores or self-care activities.  The patient also denied difficulty with problem-solving.  He stated he is able to fix and repair devices, although he is not quite as good as he was in the past.    The patient characterized his mood as \"low average\" and acknowledged a [facility]  He said he has not noticed a drastic change in his mood recently.  The patient said that he has had more difficulty with low energy and decreased desire to do things.  He added that he started on Ozempic for diabetes, which resulted in weight loss and significant fatigue.  He stated that his fatigue has improved since stopping Ozempic.  In terms of his sleep, he reported that it is generally good and he uses a CPAP machine for 4-7 hours per night for sleep apnea.  He said that he sleeps an average of 6-7 hours per night, or more.  He reported that his appetite has returned since stopping Ozempic.  The patient said that [facility] for his depressive symptoms.  He reported he is currently not engaged in psychotherapy.  He denied any suicidal or homicidal ideation, denied any history of suicide attempts.  He also denied any hallucinations or delusions.  The patient said that he stopped drinking alcohol about 2 years ago because of his medication regime.  Prior to that, he said he was drinking about one shot or one beer per day.  He said he drank more when he was working for [employer].  The patient said that he continues to chew tobacco, but has not smoked cigarettes for several years.  He denied any use of illicit substances.    Medically, he acknowledged a history of pain from arthritis.  He stated that the pain is typically about a \"2\" on a 1-10 scale.  The patient confirmed that he has had a history of 2 strokes, the first occurring in the early 2000's, and the second a couple of years later.  He also noted that he contracted COVID-19 about one year ago but was not hospitalized.  He stated he was treated with Paxlovid.  He also reported history of hypercholesterolemia and diabetes.  He denied any history of hypothyroidism, heart disease, cancer, hypertension, liver disease, or kidney disease.  He said he wears eyeglasses for " reading and has hearing aids, but he feels that he can often hear fine without them.  He noted a significant history of smoking and diabetes in his family.  Indicated the medications listed in the records are up-to-date.    Academically, the patient reported that he graduated from high school and completed several college courses but did not earn a degree.  He denied ever needing to be in special-education classes or having to repeat a grade.  The patient reported that he worked in the [employer] in various capacities, most recently as a  for 12-14 years.  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  The patient reported he has been  for over 50 years and has 2 sons, one of whom lives in [place] and one of whom lives in [place].  He said he lives at home with his wife.    PLAN: [facility] on  [facility]14/24. Full report to follow.    Diagnosis Codes: R41.3, F32.0. I 63.9  Procedure Code: 58466    Thank you for referring this interesting individual. If you have any questions, please feel free to contact me.      Pedro Waller, PhD, ABPP, LP  Professor and Licensed Psychologist [id number]  Board Certified Clinical Neuropsychologist

## 2024-07-30 DIAGNOSIS — E11.9 DIABETES MELLITUS, TYPE 2 (H): ICD-10-CM

## 2024-07-30 DIAGNOSIS — E11.9 TYPE 2 DIABETES MELLITUS (H): ICD-10-CM

## 2024-07-30 RX ORDER — INSULIN GLARGINE 100 [IU]/ML
INJECTION, SOLUTION SUBCUTANEOUS
Qty: 15 ML | Refills: 2 | Status: SHIPPED | OUTPATIENT
Start: 2024-07-30

## 2024-07-31 ENCOUNTER — OFFICE VISIT (OUTPATIENT)
Dept: INTERNAL MEDICINE | Facility: CLINIC | Age: 79
End: 2024-07-31
Payer: MEDICARE

## 2024-07-31 VITALS
HEIGHT: 70 IN | BODY MASS INDEX: 21.52 KG/M2 | HEART RATE: 69 BPM | DIASTOLIC BLOOD PRESSURE: 64 MMHG | WEIGHT: 150.3 LBS | SYSTOLIC BLOOD PRESSURE: 100 MMHG | RESPIRATION RATE: 16 BRPM | OXYGEN SATURATION: 97 % | TEMPERATURE: 97.2 F

## 2024-07-31 DIAGNOSIS — I25.10 CORONARY ARTERY DISEASE DUE TO CALCIFIED CORONARY LESION: ICD-10-CM

## 2024-07-31 DIAGNOSIS — R93.0 ABNORMAL MRI OF THE HEAD: Primary | ICD-10-CM

## 2024-07-31 DIAGNOSIS — D61.818 PANCYTOPENIA (H): ICD-10-CM

## 2024-07-31 DIAGNOSIS — E11.9 TYPE 2 DIABETES, HBA1C GOAL < 8% (H): ICD-10-CM

## 2024-07-31 DIAGNOSIS — G47.39 COMPLEX SLEEP APNEA SYNDROME: ICD-10-CM

## 2024-07-31 DIAGNOSIS — I25.84 CORONARY ARTERY DISEASE DUE TO CALCIFIED CORONARY LESION: ICD-10-CM

## 2024-07-31 DIAGNOSIS — E78.2 MIXED HYPERLIPIDEMIA: ICD-10-CM

## 2024-07-31 LAB
ALBUMIN SERPL BCG-MCNC: 4.4 G/DL (ref 3.5–5.2)
ALP SERPL-CCNC: 77 U/L (ref 40–150)
ALT SERPL W P-5'-P-CCNC: 100 U/L (ref 0–70)
ANION GAP SERPL CALCULATED.3IONS-SCNC: 9 MMOL/L (ref 7–15)
AST SERPL W P-5'-P-CCNC: 47 U/L (ref 0–45)
BILIRUB SERPL-MCNC: 0.4 MG/DL
BUN SERPL-MCNC: 18.1 MG/DL (ref 8–23)
CALCIUM SERPL-MCNC: 9.2 MG/DL (ref 8.8–10.4)
CHLORIDE SERPL-SCNC: 103 MMOL/L (ref 98–107)
CHOLEST SERPL-MCNC: 116 MG/DL
CREAT SERPL-MCNC: 0.66 MG/DL (ref 0.67–1.17)
EGFRCR SERPLBLD CKD-EPI 2021: >90 ML/MIN/1.73M2
ERYTHROCYTE [DISTWIDTH] IN BLOOD BY AUTOMATED COUNT: 12.1 % (ref 10–15)
FASTING STATUS PATIENT QL REPORTED: ABNORMAL
FASTING STATUS PATIENT QL REPORTED: ABNORMAL
GLUCOSE SERPL-MCNC: 169 MG/DL (ref 70–99)
HBA1C MFR BLD: 7.5 % (ref 0–5.6)
HCO3 SERPL-SCNC: 29 MMOL/L (ref 22–29)
HCT VFR BLD AUTO: 40.6 % (ref 40–53)
HDLC SERPL-MCNC: 37 MG/DL
HGB BLD-MCNC: 14.1 G/DL (ref 13.3–17.7)
LDLC SERPL CALC-MCNC: 61 MG/DL
MCH RBC QN AUTO: 34.9 PG (ref 26.5–33)
MCHC RBC AUTO-ENTMCNC: 34.7 G/DL (ref 31.5–36.5)
MCV RBC AUTO: 101 FL (ref 78–100)
NONHDLC SERPL-MCNC: 79 MG/DL
PLATELET # BLD AUTO: 131 10E3/UL (ref 150–450)
POTASSIUM SERPL-SCNC: 4.6 MMOL/L (ref 3.4–5.3)
PROT SERPL-MCNC: 6.9 G/DL (ref 6.4–8.3)
RBC # BLD AUTO: 4.04 10E6/UL (ref 4.4–5.9)
SODIUM SERPL-SCNC: 141 MMOL/L (ref 135–145)
TRIGL SERPL-MCNC: 91 MG/DL
WBC # BLD AUTO: 3.8 10E3/UL (ref 4–11)

## 2024-07-31 PROCEDURE — 99214 OFFICE O/P EST MOD 30 MIN: CPT | Performed by: INTERNAL MEDICINE

## 2024-07-31 PROCEDURE — 80053 COMPREHEN METABOLIC PANEL: CPT | Performed by: INTERNAL MEDICINE

## 2024-07-31 PROCEDURE — 83036 HEMOGLOBIN GLYCOSYLATED A1C: CPT | Performed by: INTERNAL MEDICINE

## 2024-07-31 PROCEDURE — 80061 LIPID PANEL: CPT | Performed by: INTERNAL MEDICINE

## 2024-07-31 PROCEDURE — G2211 COMPLEX E/M VISIT ADD ON: HCPCS | Performed by: INTERNAL MEDICINE

## 2024-07-31 PROCEDURE — 36415 COLL VENOUS BLD VENIPUNCTURE: CPT | Performed by: INTERNAL MEDICINE

## 2024-07-31 PROCEDURE — 85027 COMPLETE CBC AUTOMATED: CPT | Performed by: INTERNAL MEDICINE

## 2024-07-31 RX ORDER — FLASH GLUCOSE SENSOR
1 KIT MISCELLANEOUS
Qty: 2 EACH | Refills: 5 | Status: SHIPPED | OUTPATIENT
Start: 2024-07-31

## 2024-07-31 RX ORDER — FLASH GLUCOSE SCANNING READER
1 EACH MISCELLANEOUS ONCE
Qty: 1 EACH | Refills: 0 | Status: SHIPPED | OUTPATIENT
Start: 2024-07-31 | End: 2024-07-31

## 2024-07-31 NOTE — PROGRESS NOTES
Assessment & Plan     Cognitive changes  Abnormal MRI  Encephalomalacia on imaging study  he noticed more issues with memory and difficulty recalling some words.   MRI 6/13/24:  IMPRESSION:  1.  No acute intracranial abnormality.  2.  Numerous chronic findings, including multifocal encephalomalacia     He will see Neurologist for further evaluation.     - Adult Neurology  Referral; Future    Type 2 diabetes, HbA1C goal < 8% (H)  on Lantus 16 U at bedtime, Jardiance, metformin. Last HgbA1c 8.0  on 4/15/24.. He denies hypoglycemia. Ozempic was just increased in April from 1mg to 2 mg. Since he lost significant amount of weight, Endocrinologist stopped Ozempic in mid June. His weight is now stable, did not loose more.  He reports , sometimes higher to 150.  I ordered continuous monitor. He has follow-up with Endo in October.  - Hemoglobin A1c; Future  - Continuous Glucose  (FREESTYLE LIZ 14 DAY READER) LOUIE; 1 each once for 1 dose Use to read blood sugars per 's instructions.  - Continuous Glucose Sensor (FREESTYLE LIZ 14 DAY SENSOR) MISC; 1 each every 14 days Use 1 Sensor every 14 days. Use to read blood sugars per 's instructions.  - Hemoglobin A1c    Mixed hyperlipidemia  On statin  - Lipid panel reflex to direct LDL Fasting; Future  - Comprehensive metabolic panel; Future  - Lipid panel reflex to direct LDL Fasting  - Comprehensive metabolic panel    Pancytopenia (H)  Weight loss stopped since Ozempic stopped, but he feels tired all the time.  CT chest, abdomen and pelvis 6/13/24:  IMPRESSION:  1.  Dilatation of the thoracic aorta at the root/sinuses of Valsalva measuring approximately 4.2 cm on coronal imaging, similar to prior when remeasured at similar positions. Stable fusiform dilatation of the ascending thoracic aorta and proximal   descending thoracic aorta. No aortic dissection.     2.  No lymphadenopathy. Normal splenic size. No evidence for an etiology  for the patient's fatigue/weight loss.     3.  Hepatic steatosis.     4.  Minor vascular calcification.    He will see hem/onc in August.  - CBC with platelets; Future  - CBC with platelets    Recurrent major depressive disorder, remission status unspecified (H24)  denies worsening depression and suicidal ideas. [facility] and [facility] was recently increased.     Coronary artery disease due to calcified coronary lesion  Aortic valve regurgitation due to aortic dilation (H24)  He saw Cardiologist last summer. Had normal ECHO and CTA.  Coronary calcification - on aspirin and high intensity statin. Diet and exercise discussed.     Bicuspid aortic valve with mild regurgitation and mild enlargement of the aorta at the sinuses - BP controlled. Repeat echo in 2 years if asymptomatic.      Hyperlipidemia - on high intensity statin. LDL check today.     History of strokes - quiescent on aspirin and clopidogrel s/p right CEA     He is using the CPAP every night, he takes the nap almost every day. Feels weak and tired, but denies worsening depression and suicidal ideas.    The longitudinal plan of care for the diagnosis(es)/condition(s) as documented were addressed during this visit. Due to the added complexity in care, I will continue to support Yong in the subsequent management and with ongoing continuity of care.    Dominique Beach is a 78 year old, presenting for the following health issues:  Follow Up (F/U)      7/31/2024     8:50 AM   Additional Questions   Roomed by Diane     History of Present Illness       Diabetes:   He presents for follow up of diabetes.  He is checking home blood glucose three times daily.   He checks blood glucose before meals and after meals.  Blood glucose is sometimes over 200 and never under 70.  When his blood glucose is low, the patient is asymptomatic for confusion, blurred vision, lethargy and reports not feeling dizzy, shaky, or weak.  He is concerned  "about other.   He is having redness, sores, or blisters on feet.            He eats 2-3 servings of fruits and vegetables daily.He consumes 0 sweetened beverage(s) daily.He exercises with enough effort to increase his heart rate 10 to 19 minutes per day.  He exercises with enough effort to increase his heart rate 3 or less days per week.   He is taking medications regularly.                     Objective    /64   Pulse 69   Temp 97.2  F (36.2  C)   Resp 16   Ht 1.778 m (5' 10\")   Wt 68.2 kg (150 lb 4.8 oz)   SpO2 97%   BMI 21.57 kg/m    Body mass index is 21.57 kg/m .  Physical Exam               Signed Electronically by: Anita Pineda MD    "

## 2024-08-01 DIAGNOSIS — Z11.59 ENCOUNTER FOR SCREENING FOR OTHER VIRAL DISEASES: ICD-10-CM

## 2024-08-01 DIAGNOSIS — R74.8 ELEVATED LIVER ENZYMES: Primary | ICD-10-CM

## 2024-08-04 ENCOUNTER — MYC REFILL (OUTPATIENT)
Dept: INTERNAL MEDICINE | Facility: CLINIC | Age: 79
End: 2024-08-04
Payer: MEDICARE

## 2024-08-04 DIAGNOSIS — E11.8 TYPE 2 DIABETES MELLITUS WITH COMPLICATION, WITHOUT LONG-TERM CURRENT USE OF INSULIN (H): ICD-10-CM

## 2024-08-05 RX ORDER — LANCETS 28 GAUGE
EACH MISCELLANEOUS
Qty: 100 EACH | Refills: 3 | OUTPATIENT
Start: 2024-08-05

## 2024-08-05 RX ORDER — LANCETS 28 GAUGE
EACH MISCELLANEOUS
Qty: 100 EACH | Refills: 2 | Status: SHIPPED | OUTPATIENT
Start: 2024-08-05

## 2024-08-13 ENCOUNTER — VIRTUAL VISIT (OUTPATIENT)
Dept: NEUROPSYCHOLOGY | Facility: CLINIC | Age: 79
End: 2024-08-13
Payer: MEDICARE

## 2024-08-13 DIAGNOSIS — F32.0 CURRENT MILD EPISODE OF MAJOR DEPRESSIVE DISORDER, UNSPECIFIED WHETHER RECURRENT (H): Primary | ICD-10-CM

## 2024-08-13 DIAGNOSIS — I63.9 CEREBROVASCULAR ACCIDENT (CVA), UNSPECIFIED MECHANISM (H): ICD-10-CM

## 2024-08-13 DIAGNOSIS — R41.3 MEMORY LOSS: ICD-10-CM

## 2024-08-13 PROCEDURE — 99207 PR PSYCHIATRIC DIAGNOSTIC EVALUATION: CPT | Mod: 95

## 2024-08-13 NOTE — NURSING NOTE
Pt was seen for neuropsychological evaluation at the request of Dr. Anita Pineda for the purposes of diagnostic clarification and treatment planning. 15 minutes of video test administration and scoring were provided by this writer. Please see Dr. Pedro Waller's report for a full interpretation of the findings.    Anmol Esposito MA, CSP

## 2024-08-13 NOTE — PROGRESS NOTES
RE: Yong Guillermo  MR#: [id number]  : 1945  DOS: Aug 14, 2024    NEUROPSYCHOLOGICAL CONSULTATION    REASON FOR REFERRAL:  Yong Guillemro is a 78 year old male with 12 years of education. The patient was referred for a neuropsychological evaluation by Dr Pineda. to assess his cognitive and emotional functioning secondary to memory concerns. The evaluation was requested in order to document his current functioning, assist with the differential diagnosis, and provide appropriate recommendations. The patient was informed that the evaluation included multiple measures of performance and symptom validity, assist with the differential diagnosis, and provide appropriate recommendations. The patient was informed that the evaluation included multiple measures of performance and symptom validity, and he was encouraged to provide his best effort at all times.  The nature of the neuropsychological evaluation was also discussed, including limits of confidentiality (for suspected child or elder abuse, potential homicide or suicide, and court orders). The patient was also informed that the report would be placed on the electronic medical records system.  The patient was also given an opportunity to ask questions. The patient indicated that he understood the information and consented to participate in the evaluation.    PROCEDURES:   Review of records, clinical interview,  Mental Status-orientation, Wide Range Achievement Test-4, Wechsler Adult Intelligence Scale-IV, Santos Verbal Learning Test-Revised, Clock Drawing Test, Verbal Fluency Test, Geriatric Depression Scale, Geriatric Anxiety Scale, Aidan Complex Figure Test, Trailmaking Test, NAB Naming Test, TOMM, Judgment of Line Orientation Test, Stroop Test and Wechsler Memory Scale-IV (selected subtests)    REVIEW OF RECORDS: Records stated that the patient  has a past medical history of Benign essential tremor (2016), BPH (benign prostatic hyperplasia) (2015),  Coronary artery disease, CVA (cerebral vascular accident) (H) (01/14/2009), CVA (cerebral vascular accident) (H) (06/22/2006), Depression, Diabetes mellitus, type 2 (H), Hyperlipidemia, Infection due to 2019 novel coronavirus (1/4/2023), Obstructive sleep apnea (07/22/2013), and Shingles.. Records noted that the patient has a current medication list which includes the following prescription(s): acetaminophen, vitamin c, aspirin, atorvastatin, freestyle lite, freestyle lite, blood glucose monitoring, vitamin d3, clopidogrel, freestyle gonzalo 14 day sensor, jardiance, lantus solostar, bd pen needle brady 2nd gen, losartan, metformin, multivitamin w/minerals, omega-3, venlafaxine, and venlafaxine. Records from 5/17/24 noted that the patient was  continuing losing weight and feeling extremely tired. He is using the CPAP every night, he takes the nap almost every day. Feels weak and tired, but denies worsening depression and suicidal ideas. [facility] and [facility] recently increased. Not sure if taking venlafaxine ER.  But he noticed more issues with memory and difficulty recalling some words. He had normal ECHO and CTA chest last summer. He has diabetes, is on Lantus 16 U at bedtime, Ozempic 2mg weekly, Jardiance, metformin. Last HgbA1c 8.0 month ago. He denies hypoglycemia. Ozempic was just increased few weeks ago from 1mg to 2 mg. He noticed frequent dyspepsia, belching, weight loss, often discomfort in the sub epigastric area, like hunger pain.No cough, fever, chills, dyspnea, night sweats, wheezing, rash, blood in urine or stool, black stool. He had normal colonoscopy in 8/2023.  The records also stated that he  denies worsening depression and suicidal ideas. [facility] and venlafaxine was recently increased. Not sure if taking venlafaxine ER.  But he noticed more issues with memory and difficulty recalling some words. H/O Cerebrovascular accident  "(CVA), unspecified mechanism (H).  An MRI scan of the brain report from 6/13/24 noted  No acute or subacute infarct. No mass, acute hemorrhage, or extra-axial fluid collections. Patchy nonspecific T2/FLAIR hyperintensities within the cerebral white matter most consistent with mild to moderate chronic microvascular  ischemic change. Right frontal and temporooccipital encephalomalacia, likely sequela of prior infarct. Old right caudate head lacunar infarcts. Moderate generalized cerebral atrophy. No hydrocephalus. Normal position of the cerebellar tonsils. No pathologic contrast enhancement.  Records from 6/25/24 noted that he has  Bicuspid aortic valve with mild regurgitation and mild enlargement of the aorta at the sinuses - BP controlled. Repeat echo in 2 years if asymptomatic. Hyperlipidemia - on high intensity statin. LDL check today.H istory of strokes - quiescent on aspirin and clopidogrel s/p right CEA. He is using the CPAP every night, he takes the nap almost every day. Feels weak and tired, but denies worsening depression and suicidal ideas.     From 8/13/24:  CLINICAL INTERVIEW: On a medical history questionnaire completed by the patient, he endorsed \"loss of memory.\"  He also noted difficulty with decreased energy, and has worked with a psychiatrist but not a psychotherapist.  On this form, he endorsed a history of a stroke, but denied any history of head injury, central nervous system infection, seizure, brain tumor, brain surgery, or chronic pain/fatigue, but noted a history of sleep apnea and use of his CPAP machine.  He characterized his appetite as \"poor\" on this form and noted that he no longer uses alcohol but uses tobacco and caffeine.  He denied any history of developmental delays or learning/attention disorders in school.  He denied any history of special education services.  On this form he indicated that he is retired and completed \"12+\" years of education.    The patient was interviewed via " "video platform and he was interviewed alone.  On interview, he confirmed that he noticed increasing memory problems over the past 3-4 years.  He said he has noticed difficulty remembering words as well.  He reported he has always had difficulty with remembering names.  Functionally, he denied significant problems with driving.  He said that he uses a GPS map more often now.  He reported he does not manage finances, which his wife does but this is not a change.  He denied any difficulty with medication management or daily household chores or self-care activities.  The patient also denied difficulty with problem-solving.  He stated he is able to fix and repair devices, although he is not quite as good as he was in the past.    The patient characterized his mood as \"low average\" and acknowledged a history of treatment for depression [facility].  He said he has not noticed a drastic change in his mood recently.  The patient said that he has had more difficulty with low energy and decreased desire to do things.  He added that he started on Ozempic for diabetes, which resulted in weight loss and significant fatigue.  He stated that his fatigue has improved since stopping Ozempic.  In terms of his sleep, he reported that it is generally good and he uses a CPAP machine for 4-7 hours per night for sleep apnea.  He said that he sleeps an average of 6-7 hours per night, or more.  He reported that his appetite has returned since stopping Ozempic.  The patient said that [facility] his depressive symptoms.  He reported he is currently not engaged in psychotherapy.  He denied any suicidal or homicidal ideation, denied any history of suicide attempts.  He also denied any hallucinations or delusions.  The patient said that he stopped drinking alcohol about 2 years ago because of his medication regime.  Prior to that, he said he was " "drinking about one shot or one beer per day.  He said he drank more when he was working for [employer].  The patient said that he continues to chew tobacco, but has not smoked cigarettes for several years.  He denied any use of illicit substances.    Medically, he acknowledged a history of pain from arthritis.  He stated that the pain is typically about a \"2\" on a 1-10 scale.  The patient confirmed that he has had a history of 2 strokes, the first occurring in the early 2000's, and the second a couple of years later.  He also noted that he contracted COVID-19 about one year ago but was not hospitalized.  He stated he was treated with Paxlovid.  He also reported history of hypercholesterolemia and diabetes.  He denied any history of hypothyroidism, heart disease, cancer, hypertension, liver disease, or kidney disease.  He said he wears eyeglasses for reading and has hearing aids, but he feels that he can often hear fine without them.  He noted a significant history of smoking and diabetes in his family.  Indicated the medications listed in the records are up-to-date.    Academically, the patient reported that he graduated from high school and completed several college courses but did not earn a degree.  He denied ever needing to be in special-education classes or having to repeat a grade.  The patient reported that he worked in the [employer] in various capacities, most recently as a  for 12-14 years.  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  The patient reported he has been  for over 50 years and has 2 sons, one of whom lives in [place] and one of whom lives in [place].  He said he lives at home with his wife.    BEHAVIORAL OBSERVATIONS:  On examination, the patient was casually but appropriately dressed and groomed. The patient was cooperative with the evaluation and was talkative.  Speech was normal in volume, rate and tone.  The patient " also appeared to put forth his best effort on all tasks. Thus, the results of this evaluation are a reasonably valid reflection of the patient's current level of functioning. There were no indications of hallucinations, delusions or unusual thought processes and the patient was generally well oriented to personal information, place, and time. There were no demonstrations of a practical memory problem. The patient was a good historian, with a self-report consistent with medical records. Affect was also generally appropriate.  During testing, the patient reported that his right hand experienced chronic numbness, which likely contributed to his difficulties on fine motor dexterity.    RESULTS: Formal testing was conducted in person by a psychometrist. Formal performance validity measures were variable, so results were interpreted with caution.  Psychometric estimates of the patient's premorbid global cognitive functioning based upon single word reading ability were in the average range, which is consistent with his educational and occupational history.    Measures of attention/concentration were poorer than expected.  For example, a digit span task was in the low average range, while a visual scanning task that integrates attention was in the below average range.  Speed of information processing was variable as well.  For example, psychomotor speed was in the average range.  However, motor-free processing speed was more variable, ranging from low average to average.    Measures of the patient's memory functioning were broadly within expected limits, except for isolated scores that were slightly poorer than expected.  For example, immediate recall on a story memory task was in the high average range, while delayed recall was in the average range.  However, on a word list learning task, immediate recall was in the low average range, while delayed recall was better and in the average range.  Verbal recognition memory was  variable as well.  In terms of visual memory, immediate and delayed visual recall was in the average range.  Additionally, visual recognition memory was in the average range.  Thus, there was no consistent evidence for deficits with encoding, storing, retrieving previously learned verbal or visual information.    Measures of expressive language functioning were also variable.  For example, confrontation naming was in the high average range and completed without error.  Further, semantic fluency was in the average range, but phonemic fluency was in the low average range.  Verbal abstract reasoning was in the above average range and a personal strength.  Receptive language functioning, based upon his performance during interview, was within expected limits.    Visual-spatial and visual constructional abilities were variable as well.  For example, motor-free visual reasoning was in the average range.  However, motor-free line orientation judgment was in the low average range.  His copying of a complex figure drawing was significantly below expected limits, although his motor functioning difficulties and planning/organizational difficulties likely contributed to this finding.    Measures of the patient's executive functioning were also variable, but there was evidence for particularly difficulty with cognitive flexibility, and planning/organizational ability.  For example, a measure of response inhibtion that integrates processing speed was in the average range.  However, a visual scanning task that integrates cognitive flexibility was in the exceptionally low range.  Verbal abstract reasoning was in the above average range and a personal strength, while visual reasoning was in the average range.  There was also evidence for mild planning and organizational difficulties on a complex figure drawing task.  In terms of motor functioning, a measure of fine motor dexterity was in the below average range and poorer than  expected for his right, dominant, hand and in the average range for his left, nondominant hand.  Thus, there was evidence for mild lateralized motor deficits.    Assessment of the patient's emotional functioning was completed utilizing the clinical interview and self-report measures of depression and anxiety.  On the self-report measures, the patient endorsed minimal depressive and anxiety symptoms.    Assessment of the patient's emotional functioning was completed utilizing the clinical interview and self-report measures of depression and anxiety.  On the self-report measures, the patient endorsed minimal depressive and anxiety symptoms, which is generally consistent with his report during the interview.    SUMMARY:  In summary, the neuropsychological assessment results indicated no evidence for significant change or decline in global cognitive functioning.  However, there was evidence for variability in several areas, including attention/concentration and processing speed, which likely resulted in very mild variability with encoding of new verbal information.  Otherwise, memory functioning was within expected limits and there was no evidence for rapid forgetting of previously learned information.  Language functioning and visual-spatial abilities were also variable.  Aspects of executive functioning, including planning/organizational ability and cognitive flexibility, were in the mildly impaired range.  There was also evidence for mild right sided lateralized motor deficits in fine motor dexterity, which is not consistent with his history of right cerebral hemisphere CVA, but he reported numbness in his right hand, so peripheral issues are likely contributed to this finding.  The patient did not endorse significant anxiety or depressive symptoms currently, although he acknowledged ongoing psychotropic medication treatment for depression.  The pattern of results indicated that the patient met criteria for mild  cognitive impairment (MCI)-not amnestic type.  However, the patient did not meet criteria for dementia.  In terms of etiology, his history of vascular issues is likely to be contributing, but the pattern of results indicated that an Alzheimer's-type process was unlikely.  Other factors, including sleep difficulties and his history of depression are also likely contributing to the cognitive concerns although they are not the primary factors.    RECOMMENDATIONS:   1.  Given these results, referral for psychotherapeutic intervention may be beneficial. A highly structured cognitive-behavioral intervention focused on coping and stress management would likely be the most helpful. One option for this service is through the [facility] at [email or web address] or [phone].    2. Addressing sleep related difficulties may also be useful focus of psychotherapy.  Working with a health psychologist with expertise in helping individuals with long-term health and medical conditions would likely be particular helpful.  3.  Additionally, continued psychiatric consultation to address medication management is recommended.  4. Addressing sleep hygiene to improve the quality and duration of sleep may be beneficial. The following are recommendations from the National Sleep Foundation that may be helpful:   ? Avoid napping during the day; it can disturb the normal pattern of sleep and wakefulness.  ? Avoid stimulants such as caffeine, nicotine, and alcohol too close to bedtime. While alcohol is well known to speed the onset of sleep, it disrupts sleep in the second half as the body begins to metabolize the alcohol, causing arousal.  ? Exercise can promote good sleep. Vigorous exercise should be taken in the morning or late afternoon. A relaxing exercise, like yoga, can be done before bed to help initiate a restful night's sleep.  ? Food can be disruptive right before  sleep; stay away from large meals close to bedtime. Also dietary changes can cause sleep problems, if someone is struggling with a sleep problem, it's not a good time to start experimenting with spicy dishes. And, remember, chocolate has caffeine.  ? Ensure adequate exposure to natural light. This is particularly important for older people who may not venture outside as frequently as children and adults. Light exposure helps maintain a healthy sleep-wake cycle.  ? Establish a regular relaxing bedtime routine. Try to avoid emotionally upsetting conversations and activities before trying to go to sleep. Don't dwell on, or bring your problems to bed.  ? Associate your bed with sleep. It's not a good idea to use your bed to watch TV, listen to the radio, or read.  ? Make sure that the sleep environment is pleasant and relaxing. The bed should be comfortable, the room should not be too hot or cold, or too bright.  5. The patient is strongly encouraged to work closely with treating healthcare providers to closely manage vascular risk factors.   6. A referral for a sleep medicine reevaluation may also be beneficial to assess for optimal treatment for his obstructive sleep apnea.  7. Given the evidence for cognitive changes, neurological consultation likely would be helpful to assist with the differntial diagnosis and treatment planning.  8. Working closely with treating healthcare providers to develop a medically appropriate exercise program is recommended, given the mental health and physical benefits of regular exercise.  9.  Given the evidence for mild cognitive changes, including processing speed and executive functioning, a referral for driving evaluation is recommended. One option is through [employer] ([email or web address]://[email or web address]/services/583).  The patient may find the following attention and organizational strategies helpful:   ? Use cell phone reminders to help monitor upcoming appointments and due dates  as well as other important tasks (e.g., when to take medications). Set the reminder to go off several times prior to the event with advanced notice (e.g., one week before, two days before, the day before, and the morning of the event).   ? Attempt to reduce distractions at home. Having a clutter-free work environment and removing or at least making it harder to access potential distractions would help optimize attention. Also consider facing away from high traffic areas and using earplugs or noise cancellation headphones when desiring a quiet work environment.  ? Taking short breaks during the day may help optimize and maintain concentration.   ? Make to-do lists and prioritize tasks. Break down large tasks into smaller steps and check off each small step when completed.   10.  A referral for cognitive rehabilitation therapy, such as with a speech therapist, may be helpful to assist with compensatory strategies for the variability in attention and other cognitive domains.  One option for this service is  [facility] [employer] at [email or web address]  11. A referral for occupational therapy evaluation and possible intervention may also be beneficial, given the evidence for lateralized deficits in fine motor dexterity noted on this evaluation.    12. The results of the evaluation now constitute a baseline of the patient's cognitive and emotional functioning.  Given his history, a referral for neuropsychological reevaluation in approximately one year is recommended in order to clarify the differential diagnosis, document changes in his cognitive and emotional functioning, and provide further recommendations and prognosis to the patient, family, and treatment team.     Results and recommendations were discussed with the patient via telephone on 8/14/2024 and his questions were answered.  I encouraged him to follow-up with his treating  healthcare providers to facilitate the recommendations noted in this report.  Thank you for referring this interesting individual. If you have any questions, please feel free to contact me.      Pedro Waller, PhD, ABPP, LP  Professor and Licensed Psychologist [id number]  Board Certified Clinical Neuropsychologist    Time Spent:      Minutes Date Code Units   Total Time-Neuropsychologist Professional 224 7/14/24 84440 1     7/14/24 06355 3   Neuropsychologist Admin/scoring 0 7/14/24 50952 0     7/14/24 78771 0   Diagnostic Interview (billed on 7/13/24) 24 7/13/24 93409 1   Psychometrician Time-Test Administration/Score 138 7/14/24 35979 1   (15 min on 7/13/24 and 123 min on 7/14/24)  7/14/24 94268 4   Diagnosis R41.3 I63.9 F32.0 G47.30

## 2024-08-13 NOTE — TELEPHONE ENCOUNTER
RECORDS STATUS - ALL OTHER DIAGNOSIS      RECORDS RECEIVED FROM: [facility] - Internal records   DATE RECEIVED: 8/13

## 2024-08-14 ENCOUNTER — OFFICE VISIT (OUTPATIENT)
Dept: NEUROPSYCHOLOGY | Facility: CLINIC | Age: 79
End: 2024-08-14
Attending: PSYCHOLOGIST
Payer: MEDICARE

## 2024-08-14 DIAGNOSIS — I63.9 CEREBROVASCULAR ACCIDENT (CVA), UNSPECIFIED MECHANISM (H): ICD-10-CM

## 2024-08-14 DIAGNOSIS — R41.3 MEMORY LOSS: Primary | ICD-10-CM

## 2024-08-14 DIAGNOSIS — F32.0 CURRENT MILD EPISODE OF MAJOR DEPRESSIVE DISORDER, UNSPECIFIED WHETHER RECURRENT (H): ICD-10-CM

## 2024-08-14 DIAGNOSIS — G47.30 SLEEP APNEA, UNSPECIFIED TYPE: ICD-10-CM

## 2024-08-14 PROCEDURE — 96133 NRPSYC TST EVAL PHYS/QHP EA: CPT | Performed by: PSYCHOLOGIST

## 2024-08-14 PROCEDURE — 96138 PSYCL/NRPSYC TECH 1ST: CPT | Performed by: PSYCHOLOGIST

## 2024-08-14 PROCEDURE — 96139 PSYCL/NRPSYC TST TECH EA: CPT | Performed by: PSYCHOLOGIST

## 2024-08-14 PROCEDURE — 90791 PSYCH DIAGNOSTIC EVALUATION: CPT | Performed by: PSYCHOLOGIST

## 2024-08-14 PROCEDURE — 96132 NRPSYC TST EVAL PHYS/QHP 1ST: CPT | Performed by: PSYCHOLOGIST

## 2024-08-14 NOTE — NURSING NOTE
Pt was seen for neuropsychological evaluation at the request of Dr. Pineda for the purposes of diagnostic clarification and treatment planning. 207 minutes of test administration and scoring were provided by this writer. Please see Dr. Waller's report for a full interpretation of the findings.    Liz Tay, PhD  Neuropsychology Fellow

## 2024-08-14 NOTE — LETTER
2024      RE: Yong Guillermo  [address]  [facility] [address]   RE: Yong Guillermo  MR#: [id number]  : 1945  DOS: Aug 14, 2024    NEUROPSYCHOLOGICAL CONSULTATION    REASON FOR REFERRAL:  Yong Guillermo is a 78 year old male with 12 years of education. The patient was referred for a neuropsychological evaluation by Dr Pineda. to assess his cognitive and emotional functioning secondary to memory concerns. The evaluation was requested in order to document his current functioning, assist with the differential diagnosis, and provide appropriate recommendations. The patient was informed that the evaluation included multiple measures of performance and symptom validity, assist with the differential diagnosis, and provide appropriate recommendations. The patient was informed that the evaluation included multiple measures of performance and symptom validity, and he was encouraged to provide his best effort at all times.  The nature of the neuropsychological evaluation was also discussed, including limits of confidentiality (for suspected child or elder abuse, potential homicide or suicide, and court orders). The patient was also informed that the report would be placed on the electronic medical records system.  The patient was also given an opportunity to ask questions. The patient indicated that he understood the information and consented to participate in the evaluation.    PROCEDURES:   Review of records, clinical interview,  Mental Status-orientation, Wide Range Achievement Test-4, Wechsler Adult Intelligence Scale-IV, Santos Verbal Learning Test-Revised, Clock Drawing Test, Verbal Fluency Test, Geriatric Depression Scale, Geriatric Anxiety Scale, Aidan Complex Figure Test, Trailmaking Test, NAB Naming Test, TOMM, Judgment of Line Orientation Test, Stroop Test and Wechsler Memory Scale-IV (selected subtests)    REVIEW OF RECORDS: Records stated that the patient  has a past medical history  of Benign essential tremor (09/29/2016), BPH (benign prostatic hyperplasia) (05/26/2015), Coronary artery disease, CVA (cerebral vascular accident) (H) (01/14/2009), CVA (cerebral vascular accident) (H) (06/22/2006), Depression, Diabetes mellitus, type 2 (H), Hyperlipidemia, Infection due to 2019 novel coronavirus (1/4/2023), Obstructive sleep apnea (07/22/2013), and Shingles.. Records noted that the patient has a current medication list which includes the following prescription(s): acetaminophen, vitamin c, aspirin, atorvastatin, freestyle lite, freestyle lite, blood glucose monitoring, vitamin d3, clopidogrel, freestyle gonzalo 14 day sensor, jardiance, lantus solostar, bd pen needle brady 2nd gen, losartan, metformin, multivitamin w/minerals, omega-3, venlafaxine, and venlafaxine. Records from 5/17/24 noted that the patient was  continuing losing weight and feeling extremely tired. He is using the CPAP every night, he takes the nap almost every day. Feels weak and tired, but denies worsening depression and suicidal ideas. [facility] and ven[facility]afaxine was recently increased. Not sure if taking venlafaxine ER.  But he noticed more issues with memory and difficulty recalling some words. He had normal ECHO and CTA chest last summer. He has diabetes, is on Lantus 16 U at bedtime, Ozempic 2mg weekly, Jardiance, metformin. Last HgbA1c 8.0 month ago. He denies hypoglycemia. Ozempic was just increased few weeks ago from 1mg to 2 mg. He noticed frequent dyspepsia, belching, weight loss, often discomfort in the sub epigastric area, like hunger pain.No cough, fever, chills, dyspnea, night sweats, wheezing, rash, blood in urine or stool, black stool. He had normal colonoscopy in 8/2023.  The records also stated that he  denies worsening depression and suicidal ideas. [facility] and venlafaxine was recently increased. Not sure if taking venlafaxine ER.  But he noticed more  "issues with memory and difficulty recalling some words. H/O Cerebrovascular accident (CVA), unspecified mechanism (H).  An MRI scan of the brain report from 6/13/24 noted  No acute or subacute infarct. No mass, acute hemorrhage, or extra-axial fluid collections. Patchy nonspecific T2/FLAIR hyperintensities within the cerebral white matter most consistent with mild to moderate chronic microvascular  ischemic change. Right frontal and temporooccipital encephalomalacia, likely sequela of prior infarct. Old right caudate head lacunar infarcts. Moderate generalized cerebral atrophy. No hydrocephalus. Normal position of the cerebellar tonsils. No pathologic contrast enhancement.  Records from 6/25/24 noted that he has  Bicuspid aortic valve with mild regurgitation and mild enlargement of the aorta at the sinuses - BP controlled. Repeat echo in 2 years if asymptomatic. Hyperlipidemia - on high intensity statin. LDL check today.H istory of strokes - quiescent on aspirin and clopidogrel s/p right CEA. He is using the CPAP every night, he takes the nap almost every day. Feels weak and tired, but denies worsening depression and suicidal ideas.     From 8/13/24:  CLINICAL INTERVIEW: On a medical history questionnaire completed by the patient, he endorsed \"loss of memory.\"  He also noted difficulty with decreased energy, and has worked with a psychiatrist but not a psychotherapist.  On this form, he endorsed a history of a stroke, but denied any history of head injury, central nervous system infection, seizure, brain tumor, brain surgery, or chronic pain/fatigue, but noted a history of sleep apnea and use of his CPAP machine.  He characterized his appetite as \"poor\" on this form and noted that he no longer uses alcohol but uses tobacco and caffeine.  He denied any history of developmental delays or learning/attention disorders in school.  He denied any history of special education services.  On this form he indicated that he is " "retired and completed \"12+\" years of education.    The patient was interviewed via video platform and he was interviewed alone.  On interview, he confirmed that he noticed increasing memory problems over the past 3-4 years.  He said he has noticed difficulty remembering words as well.  He reported he has always had difficulty with remembering names.  Functionally, he denied significant problems with driving.  He said that he uses a GPS map more often now.  He reported he does not manage finances, which his wife does but this is not a change.  He denied any difficulty with medication management or daily household chores or self-care activities.  The patient also denied difficulty with problem-solving.  He stated he is able to fix and repair devices, although he is not quite as good as he was in the past.    The patient characterized his mood as \"low average\" and acknowledged a history of treatment for [facility].  He said he has not noticed a drastic change in his mood recently.  The patient said that he has had more difficulty with low energy and decreased desire to do things.  He added that he started on Ozempic for diabetes, which resulted in weight loss and significant fatigue.  He stated that his fatigue has improved since stopping Ozempic.  In terms of his sleep, he reported that it is generally good and he uses a CPAP machine for 4-7 hours per night for sleep apnea.  He said that he sleeps an average of 6-7 hours per night, or more.  He reported that his appetite has returned since stopping Ozempic.  The patient said that [facility] for his depressive symptoms.  He reported he is currently not engaged in psychotherapy.  He denied any suicidal or homicidal ideation, denied any history of suicide attempts.  He also denied any hallucinations or delusions.  The patient said that he stopped drinking alcohol about " "2 years ago because of his medication regime.  Prior to that, he said he was drinking about one shot or one beer per day.  He said he drank more when he was working for [email or web address].  The patient said that he continues to chew tobacco, but has not smoked cigarettes for several years.  He denied any use of illicit substances.    Medically, he acknowledged a history of pain from arthritis.  He stated that the pain is typically about a \"2\" on a 1-10 scale.  The patient confirmed that he has had a history of 2 strokes, the first occurring in the early 2000's, and the second a couple of years later.  He also noted that he contracted COVID-19 about one year ago but was not hospitalized.  He stated he was treated with Paxlovid.  He also reported history of hypercholesterolemia and diabetes.  He denied any history of hypothyroidism, heart disease, cancer, hypertension, liver disease, or kidney disease.  He said he wears eyeglasses for reading and has hearing aids, but he feels that he can often hear fine without them.  He noted a significant history of smoking and diabetes in his family.  Indicated the medications listed in the records are up-to-date.    Academically, the patient reported that he graduated from high school and completed several college courses but did not earn a degree.  He denied ever needing to be in special-education classes or having to repeat a grade.  The patient reported that he worked in the [employer] in various capacities, most recently as a  for 12-14 years.  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  The patient reported he has been  for over 50 years and has 2 sons, one of whom lives in [place] and one of whom lives in [place].  He said he lives at home with his wife.    BEHAVIORAL OBSERVATIONS:  On examination, the patient was casually but appropriately dressed and groomed. The patient was cooperative with the evaluation " and was talkative.  Speech was normal in volume, rate and tone.  The patient also appeared to put forth his best effort on all tasks. Thus, the results of this evaluation are a reasonably valid reflection of the patient's current level of functioning. There were no indications of hallucinations, delusions or unusual thought processes and the patient was generally well oriented to personal information, place, and time. There were no demonstrations of a practical memory problem. The patient was a good historian, with a self-report consistent with medical records. Affect was also generally appropriate.  During testing, the patient reported that his right hand experienced chronic numbness, which likely contributed to his difficulties on fine motor dexterity.    RESULTS: Formal testing was conducted in person by a psychometrist. Formal performance validity measures were variable, so results were interpreted with caution.  Psychometric estimates of the patient's premorbid global cognitive functioning based upon single word reading ability were in the average range, which is consistent with his educational and occupational history.    Measures of attention/concentration were poorer than expected.  For example, a digit span task was in the low average range, while a visual scanning task that integrates attention was in the below average range.  Speed of information processing was variable as well.  For example, psychomotor speed was in the average range.  However, motor-free processing speed was more variable, ranging from low average to average.    Measures of the patient's memory functioning were broadly within expected limits, except for isolated scores that were slightly poorer than expected.  For example, immediate recall on a story memory task was in the high average range, while delayed recall was in the average range.  However, on a word list learning task, immediate recall was in the low average range, while  delayed recall was better and in the average range.  Verbal recognition memory was variable as well.  In terms of visual memory, immediate and delayed visual recall was in the average range.  Additionally, visual recognition memory was in the average range.  Thus, there was no consistent evidence for deficits with encoding, storing, retrieving previously learned verbal or visual information.    Measures of expressive language functioning were also variable.  For example, confrontation naming was in the high average range and completed without error.  Further, semantic fluency was in the average range, but phonemic fluency was in the low average range.  Verbal abstract reasoning was in the above average range and a personal strength.  Receptive language functioning, based upon his performance during interview, was within expected limits.    Visual-spatial and visual constructional abilities were variable as well.  For example, motor-free visual reasoning was in the average range.  However, motor-free line orientation judgment was in the low average range.  His copying of a complex figure drawing was significantly below expected limits, although his motor functioning difficulties and planning/organizational difficulties likely contributed to this finding.    Measures of the patient's executive functioning were also variable, but there was evidence for particularly difficulty with cognitive flexibility, and planning/organizational ability.  For example, a measure of response inhibtion that integrates processing speed was in the average range.  However, a visual scanning task that integrates cognitive flexibility was in the exceptionally low range.  Verbal abstract reasoning was in the above average range and a personal strength, while visual reasoning was in the average range.  There was also evidence for mild planning and organizational difficulties on a complex figure drawing task.  In terms of motor functioning, a  measure of fine motor dexterity was in the below average range and poorer than expected for his right, dominant, hand and in the average range for his left, nondominant hand.  Thus, there was evidence for mild lateralized motor deficits.    Assessment of the patient's emotional functioning was completed utilizing the clinical interview and self-report measures of depression and anxiety.  On the self-report measures, the patient endorsed minimal depressive and anxiety symptoms.    Assessment of the patient's emotional functioning was completed utilizing the clinical interview and self-report measures of depression and anxiety.  On the self-report measures, the patient endorsed minimal depressive and anxiety symptoms, which is generally consistent with his report during the interview.    SUMMARY:  In summary, the neuropsychological assessment results indicated no evidence for significant change or decline in global cognitive functioning.  However, there was evidence for variability in several areas, including attention/concentration and processing speed, which likely resulted in very mild variability with encoding of new verbal information.  Otherwise, memory functioning was within expected limits and there was no evidence for rapid forgetting of previously learned information.  Language functioning and visual-spatial abilities were also variable.  Aspects of executive functioning, including planning/organizational ability and cognitive flexibility, were in the mildly impaired range.  There was also evidence for mild right sided lateralized motor deficits in fine motor dexterity, which is not consistent with his history of right cerebral hemisphere CVA, but he reported numbness in his right hand, so peripheral issues are likely contributed to this finding.  The patient did not endorse significant anxiety or depressive symptoms currently, although he acknowledged ongoing psychotropic medication treatment for  depression.  The pattern of results indicated that the patient met criteria for mild cognitive impairment (MCI)-not amnestic type.  However, the patient did not meet criteria for dementia.  In terms of etiology, his history of vascular issues is likely to be contributing, but the pattern of results indicated that an Alzheimer's-type process was unlikely.  Other factors, including sleep difficulties and his history of depression are also likely contributing to the cognitive concerns although they are not the primary factors.    RECOMMENDATIONS:   1.  Given these results, referral for psychotherapeutic intervention may be beneficial. A highly structured cognitive-behavioral intervention focused on coping and stress management would likely be the most helpful. One option for this service is through the [facility] at [email or web address] or [phone].    2. Addressing sleep related difficulties may also be useful focus of psychotherapy.  Working with a health psychologist with expertise in helping individuals with long-term health and medical conditions would likely be particular helpful.  3.  Additionally, continued psychiatric consultation to address medication management is recommended.  4. Addressing sleep hygiene to improve the quality and duration of sleep may be beneficial. The following are recommendations from the National Sleep Foundation that may be helpful:   ? Avoid napping during the day; it can disturb the normal pattern of sleep and wakefulness.  ? Avoid stimulants such as caffeine, nicotine, and alcohol too close to bedtime. While alcohol is well known to speed the onset of sleep, it disrupts sleep in the second half as the body begins to metabolize the alcohol, causing arousal.  ? Exercise can promote good sleep. Vigorous exercise should be taken in the morning or late afternoon. A relaxing exercise, like yoga, can be done before  bed to help initiate a restful night's sleep.  ? Food can be disruptive right before sleep; stay away from large meals close to bedtime. Also dietary changes can cause sleep problems, if someone is struggling with a sleep problem, it's not a good time to start experimenting with spicy dishes. And, remember, chocolate has caffeine.  ? Ensure adequate exposure to natural light. This is particularly important for older people who may not venture outside as frequently as children and adults. Light exposure helps maintain a healthy sleep-wake cycle.  ? Establish a regular relaxing bedtime routine. Try to avoid emotionally upsetting conversations and activities before trying to go to sleep. Don't dwell on, or bring your problems to bed.  ? Associate your bed with sleep. It's not a good idea to use your bed to watch TV, listen to the radio, or read.  ? Make sure that the sleep environment is pleasant and relaxing. The bed should be comfortable, the room should not be too hot or cold, or too bright.  5. The patient is strongly encouraged to work closely with treating healthcare providers to closely manage vascular risk factors.   6. A referral for a sleep medicine reevaluation may also be beneficial to assess for optimal treatment for his obstructive sleep apnea.  7. Given the evidence for cognitive changes, neurological consultation likely would be helpful to assist with the differntial diagnosis and treatment planning.  8. Working closely with treating healthcare providers to develop a medically appropriate exercise program is recommended, given the mental health and physical benefits of regular exercise.  9.  Given the evidence for mild cognitive changes, including processing speed and executive functioning, a referral for driving evaluation is recommended. One option is through [facility] [employer] [facility] ([email or web address]).  The patient may find the following attention and organizational strategies  helpful:   ? Use cell phone reminders to help monitor upcoming appointments and due dates as well as other important tasks (e.g., when to take medications). Set the reminder to go off several times prior to the event with advanced notice (e.g., one week before, two days before, the day before, and the morning of the event).   ? Attempt to reduce distractions at home. Having a clutter-free work environment and removing or at least making it harder to access potential distractions would help optimize attention. Also consider facing away from high traffic areas and using earplugs or noise cancellation headphones when desiring a quiet work environment.  ? Taking short breaks during the day may help optimize and maintain concentration.   ? Make to-do lists and prioritize tasks. Break down large tasks into smaller steps and check off each small step when completed.   10.  A referral for cognitive rehabilitation therapy, such as with a speech therapist, may be helpful to assist with compensatory strategies for the variability in attention and other cognitive domains.  One option for this service is  [facility] at [email or web address]  11. A referral for occupational therapy evaluation and possible intervention may also be beneficial, given the evidence for lateralized deficits in fine motor dexterity noted on this evaluation.    12. The results of the evaluation now constitute a baseline of the patient's cognitive and emotional functioning.  Given his history, a referral for neuropsychological reevaluation in approximately one year is recommended in order to clarify the differential diagnosis, document changes in his cognitive and emotional functioning, and provide further recommendations and prognosis to the patient, family, and treatment team.     Results and recommendations were discussed with the patient via telephone on  8/14/2024 and his questions were answered.  I encouraged him to follow-up with his treating healthcare providers to facilitate the recommendations noted in this report.  Thank you for referring this interesting individual. If you have any questions, please feel free to contact me.      Pedro Waller, PhD, ABPP, LP  Professor and Licensed Psychologist [id number]  Board Certified Clinical Neuropsychologist    Time Spent:      Minutes Date Code Units   Total Time-Neuropsychologist Professional 224 7/14/24 35462 1     7/14/24 71332 3   Neuropsychologist Admin/scoring 0 7/14/24 32538 0     7/14/24 54493 0   Diagnostic Interview (billed on 7/13/24) 24 7/13/24 81932 1   Psychometrician Time-Test Administration/Score 138 7/14/24 87525 1   (15 min on 7/13/24 and 123 min on 7/14/24)  7/14/24 52497 4   Diagnosis R41.3 I63.9 F32.0 G47.30

## 2024-08-14 NOTE — PROGRESS NOTES
NAME  Yong Guillermo     MRN  [id number]      [birth year]     AGE  78     SEX  Male     HANDEDNESS Left     EDUCATION 12     SOLIS  24     PROVIDER  DW     TECH  MG/SH     STATION  OP            ORIENTATION      Time  0     Personal Info.     Place  1.5     Presidents             TOMM       Trial 1  50     Trial 2  0     Trial 3  0            DCT       E-Score  13            WAIS-IV       Digit Span RDS 6            WRAT READING   Blue   SS  101     %ile  53     Grade Equivalence >12.9            WAIS-IV         Raw ScS    Digit Span  18 7    Similarities 31 15    Matrix Reasoning 7 8    Coding  54 12           TRAIL MAKING TEST       Time Errors ScS T   A 78 0 3 30   B 309 4 1 22           HVLT   1     Raw T    Trial 1  6     Trial 2  7     Trial 3  6     Learning  1     Total Recall 19 42    Delayed Recall 8 49    Percent Retention 110% 61    True Positives 10     False Positives 2     Disc. Index  8 37           SHEELA-O COMPLEX FIGURE       Raw T %ile   Time to Copy 190  >16   Copy  23  <=1   Short Delay Recall 12 51 54   Long Delay Recall 12.5 52 58   Recognition Total 20 50 50           WMS-IV LOGICAL MEMORY OA     Raw ScS/%ile    LM I  39 14    LM II  21 12    Recognition 22 >75            NAB NAMING  1   Raw 31 /31     z 0.33      T 60             COWAT FAS       Raw 18      ScS 5      T 35             ANIMAL FLUENCY      Raw 20      ScS 10      T 57              ANGELICA   18   Raw 18      %ile 22                    STROOP        Raw T     Word 80 44     Color 55 48     C/W 28 49     Intf. -4 53                   GDS       Raw  8     Interpretation Minimal            GAS        Raw Interpretation    Somatic 3 Minimal     Cognitive 3 Minimal     Affective 1 Minimal     Total 7 Minimal            GROOVED PEGBOARD      Raw Drops ScS T    3 1 31    0 4 44          SDMT        Raw Z-score Errors    Oral 23 -1.69 17

## 2024-08-16 ENCOUNTER — PRE VISIT (OUTPATIENT)
Dept: ONCOLOGY | Facility: HOSPITAL | Age: 79
End: 2024-08-16
Payer: MEDICARE

## 2024-08-16 ENCOUNTER — LAB (OUTPATIENT)
Dept: INFUSION THERAPY | Facility: HOSPITAL | Age: 79
End: 2024-08-16
Payer: MEDICARE

## 2024-08-16 ENCOUNTER — ONCOLOGY VISIT (OUTPATIENT)
Dept: ONCOLOGY | Facility: HOSPITAL | Age: 79
End: 2024-08-16
Attending: INTERNAL MEDICINE
Payer: MEDICARE

## 2024-08-16 VITALS
HEIGHT: 70 IN | RESPIRATION RATE: 18 BRPM | DIASTOLIC BLOOD PRESSURE: 68 MMHG | HEART RATE: 82 BPM | BODY MASS INDEX: 21.9 KG/M2 | OXYGEN SATURATION: 97 % | WEIGHT: 153 LBS | TEMPERATURE: 98.4 F | SYSTOLIC BLOOD PRESSURE: 126 MMHG

## 2024-08-16 DIAGNOSIS — D61.818 PANCYTOPENIA (H): ICD-10-CM

## 2024-08-16 DIAGNOSIS — Z11.59 ENCOUNTER FOR SCREENING FOR OTHER VIRAL DISEASES: ICD-10-CM

## 2024-08-16 DIAGNOSIS — R74.8 ELEVATED LIVER ENZYMES: ICD-10-CM

## 2024-08-16 LAB
ALBUMIN SERPL BCG-MCNC: 4.4 G/DL (ref 3.5–5.2)
ALP SERPL-CCNC: 82 U/L (ref 40–150)
ALT SERPL W P-5'-P-CCNC: 60 U/L (ref 0–70)
ANION GAP SERPL CALCULATED.3IONS-SCNC: 10 MMOL/L (ref 7–15)
AST SERPL W P-5'-P-CCNC: 31 U/L (ref 0–45)
BASOPHILS # BLD AUTO: 0 10E3/UL (ref 0–0.2)
BASOPHILS NFR BLD AUTO: 1 %
BILIRUB DIRECT SERPL-MCNC: <0.2 MG/DL (ref 0–0.3)
BILIRUB SERPL-MCNC: 0.2 MG/DL
BUN SERPL-MCNC: 18.3 MG/DL (ref 8–23)
CALCIUM SERPL-MCNC: 9.4 MG/DL (ref 8.8–10.4)
CHLORIDE SERPL-SCNC: 103 MMOL/L (ref 98–107)
CREAT SERPL-MCNC: 0.63 MG/DL (ref 0.67–1.17)
EGFRCR SERPLBLD CKD-EPI 2021: >90 ML/MIN/1.73M2
EOSINOPHIL # BLD AUTO: 0.2 10E3/UL (ref 0–0.7)
EOSINOPHIL NFR BLD AUTO: 4 %
ERYTHROCYTE [DISTWIDTH] IN BLOOD BY AUTOMATED COUNT: 12.1 % (ref 10–15)
GLUCOSE SERPL-MCNC: 154 MG/DL (ref 70–99)
HAV IGM SERPL QL IA: NONREACTIVE
HBV CORE IGM SERPL QL IA: NONREACTIVE
HBV SURFACE AG SERPL QL IA: NONREACTIVE
HCO3 SERPL-SCNC: 30 MMOL/L (ref 22–29)
HCT VFR BLD AUTO: 40.5 % (ref 40–53)
HCV AB SERPL QL IA: NONREACTIVE
HGB BLD-MCNC: 14 G/DL (ref 13.3–17.7)
IMM GRANULOCYTES # BLD: 0 10E3/UL
IMM GRANULOCYTES NFR BLD: 0 %
LYMPHOCYTES # BLD AUTO: 1.3 10E3/UL (ref 0.8–5.3)
LYMPHOCYTES NFR BLD AUTO: 29 %
MCH RBC QN AUTO: 35 PG (ref 26.5–33)
MCHC RBC AUTO-ENTMCNC: 34.6 G/DL (ref 31.5–36.5)
MCV RBC AUTO: 101 FL (ref 78–100)
MONOCYTES # BLD AUTO: 0.3 10E3/UL (ref 0–1.3)
MONOCYTES NFR BLD AUTO: 7 %
NEUTROPHILS # BLD AUTO: 2.6 10E3/UL (ref 1.6–8.3)
NEUTROPHILS NFR BLD AUTO: 59 %
NRBC # BLD AUTO: 0 10E3/UL
NRBC BLD AUTO-RTO: 0 /100
PLATELET # BLD AUTO: 138 10E3/UL (ref 150–450)
POTASSIUM SERPL-SCNC: 5 MMOL/L (ref 3.4–5.3)
PROT SERPL-MCNC: 6.9 G/DL (ref 6.4–8.3)
RBC # BLD AUTO: 4 10E6/UL (ref 4.4–5.9)
RETICS # AUTO: 0.06 10E6/UL (ref 0.03–0.1)
RETICS/RBC NFR AUTO: 1.4 % (ref 0.5–2)
SODIUM SERPL-SCNC: 143 MMOL/L (ref 135–145)
WBC # BLD AUTO: 4.4 10E3/UL (ref 4–11)

## 2024-08-16 PROCEDURE — 85045 AUTOMATED RETICULOCYTE COUNT: CPT

## 2024-08-16 PROCEDURE — 99204 OFFICE O/P NEW MOD 45 MIN: CPT | Mod: FS | Performed by: INTERNAL MEDICINE

## 2024-08-16 PROCEDURE — 82248 BILIRUBIN DIRECT: CPT

## 2024-08-16 PROCEDURE — G2211 COMPLEX E/M VISIT ADD ON: HCPCS | Performed by: INTERNAL MEDICINE

## 2024-08-16 PROCEDURE — 99207 BLOOD MORPHOLOGY PATHOLOGIST REVIEW: CPT | Performed by: PATHOLOGY

## 2024-08-16 PROCEDURE — 80053 COMPREHEN METABOLIC PANEL: CPT

## 2024-08-16 PROCEDURE — 80074 ACUTE HEPATITIS PANEL: CPT

## 2024-08-16 PROCEDURE — 36415 COLL VENOUS BLD VENIPUNCTURE: CPT

## 2024-08-16 PROCEDURE — 85025 COMPLETE CBC W/AUTO DIFF WBC: CPT

## 2024-08-16 PROCEDURE — G0463 HOSPITAL OUTPT CLINIC VISIT: HCPCS | Performed by: INTERNAL MEDICINE

## 2024-08-16 ASSESSMENT — PAIN SCALES - GENERAL: PAINLEVEL: MILD PAIN (2)

## 2024-08-16 NOTE — PROGRESS NOTES
Infusion Nursing Note:  Yong ENCINAS Roneykehindede presents today for Labs.      Intravenous Access:  Lab draw site Left AC, Needle type BD, Gauge 23.    Discharge Plan:   Patient discharged in stable condition accompanied by: wife.  Departure Mode: Ambulatory.      Jud Yang RN

## 2024-08-16 NOTE — LETTER
"8/16/2024      Yong Guillermo  [address]  [facility] [address]      Dear Colleague,    Thank you for referring your patient, Yong Guillermo, to the [facility] [place]. Please see a copy of my visit note below.    Oncology Rooming Note    August 16, 2024 2:16 PM   Yong Guillermo is a 78 year old male who presents for:    Chief Complaint   Patient presents with     Hematology     New patient consult: Pancytopenia     Initial Vitals: /68   Pulse 82   Temp 98.4  F (36.9  C) (Tympanic)   Resp 18   Ht 1.778 m (5' 10\")   Wt 69.4 kg (153 lb)   SpO2 97%   BMI 21.95 kg/m   Estimated body mass index is 21.95 kg/m  as calculated from the following:    Height as of this encounter: 1.778 m (5' 10\").    Weight as of this encounter: 69.4 kg (153 lb). Body surface area is 1.85 meters squared.  Mild Pain (2) Comment: Data Unavailable   No LMP for male patient.  Allergies reviewed: Yes  Medications reviewed: Yes    Medications: Medication refills not needed today.  Pharmacy name entered into EPIC:    [facility] - [facility], [place] - [address] [place] [address] DRUG STORE #[id number] - [place], [place] - [address] AT [facility] [address]    Frailty Screening:   Is the patient here for a new oncology consult visit in cancer care? 2. No      Clinical concerns:  NEW NON-MALIGNANT ONCOLOGY CONSULT.      Paulina Lucas MA              [facility] Consult Note    Patient: Yong Giullermo  MRN: [id number]  Date of Service: Aug 16, 2024      Reason for Visit    Chief Complaint   Patient presents with     Hematology     New patient consult: Pancytopenia       Assessment/Plan    #. Long standing thrombocytopenia, PLT ~ 130  #. Long standing intermittent leukopenia   #. Two CVAs in 2006, 2009 on long term plavix and asa  #. History of alcohol abuse [id number]  #. Hepatic steatosis on imaging    Yong is a 78 year old male referred by his PCP to our " clinic due to longstanding thrombocytopenia and leukopenia. His PLTs have been around 130 as far back as 5 years and his WBC fluctuates from 3.8-5 in our documentation. However, pt and his wife note this has been ongoing for as long as they can remember. Hemoglobin has been normal. Slightly macrocytic. B12 mildly low. Normal MMA and ferritin.  He does not get frequent infections. He does not have any bleeding issues. He had two strokes around 2006 and 2009 after which he has been on Plavix and asa. He is overall feeling well. His appetite is fine. No other ongoing symptoms. He did have a 5 year period with heavy drinking following his stroke but has not had alcohol since. Discussed with pt that his labs do not seem concerning for bone marrow cancer as it has been stable and his hemoglobin is unaffected. Additionally he is not symptomatic. He does have hepatic steatosis on imaging, but that would not affect the platelets. Will check blood morphology with differential along with hepatic panel. If there are no abnormalities can have follow up on an as needed basis. Pt agreeable to this.     ECOG Performance    1 - Can't do physically strenuous work, but fully ambyulatory and can do light sedentary work    Problem List    Patient Active Problem List   Diagnosis     Benign essential tremor     Complex sleep apnea syndrome     CVA (cerebral vascular accident) (H)     Type 2 diabetes, HbA1C goal < 8% (H)     H/O carotid endarterectomy     Hyperlipemia     Osteopenia of multiple sites     BPH (benign prostatic hyperplasia)     Macrocytosis without anemia     Major depression, recurrent (H24)     Thrombocytopenia (H24)     Coronary artery disease due to calcified coronary lesion     Aortic valve regurgitation due to aortic dilation (H24)     Chronic right shoulder pain     Dilatation of thoracic aorta (H24)     ______________________________________________________________________________    History    Yong is a 78 year old  male with a history of two strokes prior to 2009 that was referred by his primary to see hematology for longstanding thrombocytopenia and leukopenia. He has had this for as long as he can remember. Our documentation shows stable thrombocytopenia of ~130 as far back as 2019. The WBC is intermittently low at 3.8-5.3. He does not get frequent infections or illnesses. He does not having any bleeding issues. He denies hematuria or hematochezia.  He was previously feeling tired and low appetite last month, but after stopping Ozempic is noting increased energy and appetite. Weight is improving. Otherwise he is well without new headaches, breathing changes, abdominal pain, bone pain, or leg swelling. He has regular PSA screening and last one in May was normal. He had a colonoscopy 8/2023 and gets the every 5 years. He had a period from 3527-3324 where he drank heavily, greater than 4 drinks, but has not had alcohol since. He is a never smoker. He is retired  and also worked at [employer].     Past History    Past Medical History:   Diagnosis Date     Benign essential tremor 09/29/2016     BPH (benign prostatic hyperplasia) 05/26/2015     Coronary artery disease      CVA (cerebral vascular accident) (H) 01/14/2009    Right arm clumsiness.  MRI showed 2 infarcts in the left hemisphere.     CVA (cerebral vascular accident) (H) 06/22/2006    Left arm weakness.     Depression      Diabetes mellitus, type 2 (H)      Hyperlipidemia      Infection due to 2019 novel coronavirus 1/4/2023     Obstructive sleep apnea 07/22/2013     Shingles     Family History   Problem Relation Age of Onset     Heart Disease Mother      Snoring Mother      Diabetes Mother      Cancer Mother         Brain     Diabetes Paternal Grandmother      Heart Disease Brother      Heart Disease Brother      Diabetes Brother       Past Surgical History:   Procedure Laterality Date     ARTHROSCOPY SHOULDER ROTATOR CUFF REPAIR Right      CAROTID ENDARTERECTOMY Right  2006     CERVICAL DISC SURGERY  2005    C5/6 and C6/7 lamina foraminotomy, scope with partial facetectomy and excision of hard disc herniation.     HERNIA REPAIR      Right inguinal hernia and umbilical hernia right cheek cyst on the face.     IR AORTIC ARCH 4 VESSEL ANGIOGRAM  2006     IR CAROTID ANGIOGRAM  2006     IR CAROTID ANGIOGRAM  2006     CA EXCIS TENDON SHEATH LESION, HAND/FINGER      Description: Hand Excision Of A Tendon Cyst;  Recorded: 2008;     TONSILLECTOMY       VASECTOMY       ZZC EXCIS CERV DISK,ONE LEVEL      Description: Laminectomy With Disc Removal;  Recorded: 2008;  Comments: Lumbar level     ZZC EXCISION,BENIGN TUMOR,MANDIBLE      Description: Jaw Excision Benign Cyst / Tumor;  Proc Date: 2004;    Social History     Socioeconomic History     Marital status:      Spouse name: Not on file     Number of children: Not on file     Years of education: Not on file     Highest education level: Not on file   Occupational History     Not on file   Tobacco Use     Smoking status: Former     Types: Cigars, Pipe     Quit date: 1980     Years since quittin.4     Passive exposure: Never     Smokeless tobacco: Former     Types: Chew     Tobacco comments:     Quit 40 years ago.   Vaping Use     Vaping status: Never Used   Substance and Sexual Activity     Alcohol use: No     Drug use: No     Sexual activity: Yes     Partners: Female     Birth control/protection: None   Other Topics Concern     Parent/sibling w/ CABG, MI or angioplasty before 65F 55M? Not Asked   Social History Narrative     Not on file     Social Determinants of Health     Financial Resource Strain: Low Risk  (2023)    Financial Resource Strain      Within the past 12 months, have you or your family members you live with been unable to get utilities (heat, electricity) when it was really needed?: No   Food Insecurity: Low Risk  (2023)    Food Insecurity      Within  the past 12 months, did you worry that your food would run out before you got money to buy more?: No      Within the past 12 months, did the food you bought just not last and you didn t have money to get more?: No   Transportation Needs: Low Risk  (11/30/2023)    Transportation Needs      Within the past 12 months, has lack of transportation kept you from medical appointments, getting your medicines, non-medical meetings or appointments, work, or from getting things that you need?: No   Physical Activity: Not on file   Stress: Not on file   Social Connections: Not on file   Interpersonal Safety: Low Risk  (6/25/2024)    Interpersonal Safety      Do you feel physically and emotionally safe where you currently live?: Yes      Within the past 12 months, have you been hit, slapped, kicked or otherwise physically hurt by someone?: No      Within the past 12 months, have you been humiliated or emotionally abused in other ways by your partner or ex-partner?: No   Housing Stability: Low Risk  (11/30/2023)    Housing Stability      Do you have housing? : Yes      Are you worried about losing your housing?: No        Allergies    Allergies   Allergen Reactions     Alteplase      Muscle Tightness     Sulfa Antibiotics        Review of Systems    Pertinent items are noted in HPI.      Physical Exam        8/16/2024     2:05 PM   Oncology Vitals   Height 178 cm   Weight 69.4 kg   BSA (m2) 1.85 m2   /68   Pulse 82   Temp 98.4  F (36.9  C)   Temp src Tympanic   SpO2 97 %   Pain Score 2 (Mild)   Pain Loc OTHER - SEE        Physicals Exam   HEENT: Head: Normal, normocephalic, atraumatic.  Lymphatics: No cervical, supraclavicular, or axillary lymphadenopathy.   Chest: Normal respirations. Wheezing throughout auscultation. Otherwise clear.   Cardiac: regular rate and rhythm  Abdomen: no distended  Extremities: no lower extremity edema   Skin: warm, dry, intact  CNS: grossly non focal    Lab Results    Recent Results (from the  past 168 hour(s))   Comprehensive metabolic panel   Result Value Ref Range    Sodium 143 135 - 145 mmol/L    Potassium 5.0 3.4 - 5.3 mmol/L    Carbon Dioxide (CO2) 30 (H) 22 - 29 mmol/L    Anion Gap 10 7 - 15 mmol/L    Urea Nitrogen 18.3 8.0 - 23.0 mg/dL    Creatinine 0.63 (L) 0.67 - 1.17 mg/dL    GFR Estimate >90 >60 mL/min/1.73m2    Calcium 9.4 8.8 - 10.4 mg/dL    Chloride 103 98 - 107 mmol/L    Glucose 154 (H) 70 - 99 mg/dL    Alkaline Phosphatase 82 40 - 150 U/L    AST 31 0 - 45 U/L    ALT 60 0 - 70 U/L    Protein Total 6.9 6.4 - 8.3 g/dL    Albumin 4.4 3.5 - 5.2 g/dL    Bilirubin Total 0.2 <=1.2 mg/dL   CBC with platelets and differential   Result Value Ref Range    WBC Count 4.4 4.0 - 11.0 10e3/uL    RBC Count 4.00 (L) 4.40 - 5.90 10e6/uL    Hemoglobin 14.0 13.3 - 17.7 g/dL    Hematocrit 40.5 40.0 - 53.0 %     (H) 78 - 100 fL    MCH 35.0 (H) 26.5 - 33.0 pg    MCHC 34.6 31.5 - 36.5 g/dL    RDW 12.1 10.0 - 15.0 %    Platelet Count 138 (L) 150 - 450 10e3/uL    % Neutrophils 59 %    % Lymphocytes 29 %    % Monocytes 7 %    % Eosinophils 4 %    % Basophils 1 %    % Immature Granulocytes 0 %    NRBCs per 100 WBC 0 <1 /100    Absolute Neutrophils 2.6 1.6 - 8.3 10e3/uL    Absolute Lymphocytes 1.3 0.8 - 5.3 10e3/uL    Absolute Monocytes 0.3 0.0 - 1.3 10e3/uL    Absolute Eosinophils 0.2 0.0 - 0.7 10e3/uL    Absolute Basophils 0.0 0.0 - 0.2 10e3/uL    Absolute Immature Granulocytes 0.0 <=0.4 10e3/uL    Absolute NRBCs 0.0 10e3/uL   Reticulocyte count   Result Value Ref Range    % Reticulocyte 1.4 0.5 - 2.0 %    Absolute Reticulocyte 0.055 0.025 - 0.095 10e6/uL   Bilirubin direct   Result Value Ref Range    Bilirubin Direct <0.20 0.00 - 0.30 mg/dL       Imaging Results    No results found.    Signed by: Carmenza Maher PA-C    Attestation signed by Bertha Lucas MD at 8/18/2024 12:27 AM:      Physician Attestation    I saw and evaluated Yong Guillermo as part of a shared APRN/PA visit.     I personally reviewed  the vital signs, medications, labs, and imaging.    I personally provided a substantive portion of care for this patient and I approve the care plan as written by the MARIBEL.  I was involved with Medical Decision Making includin MINUTES SPENT BY ME on the date of service doing chart review, history, exam, documentation & further activities per the note.    The longitudinal plan of care for the diagnosis(es)/condition(s) as documented were addressed during this visit. Due to the added complexity in care, I will continue to support Yong in the subsequent management and with ongoing continuity of care.     Bertha Lucas MD  Date of Service (when I saw the patient): 24      Again, thank you for allowing me to participate in the care of your patient.        Sincerely,        Bertha Lucas MD

## 2024-08-16 NOTE — PROGRESS NOTES
"Oncology Rooming Note    August 16, 2024 2:16 PM   Yong Guillermo is a 78 year old male who presents for:    Chief Complaint   Patient presents with    Hematology     New patient consult: Pancytopenia     Initial Vitals: /68   Pulse 82   Temp 98.4  F (36.9  C) (Tympanic)   Resp 18   Ht 1.778 m (5' 10\")   Wt 69.4 kg (153 lb)   SpO2 97%   BMI 21.95 kg/m   Estimated body mass index is 21.95 kg/m  as calculated from the following:    Height as of this encounter: 1.778 m (5' 10\").    Weight as of this encounter: 69.4 kg (153 lb). Body surface area is 1.85 meters squared.  Mild Pain (2) Comment: Data Unavailable   No LMP for male patient.  Allergies reviewed: Yes  Medications reviewed: Yes    Medications: Medication refills not needed today.  Pharmacy name entered into EPIC:    [facility] - [address] [facility] STORE #[id number] - [place], [place] - [address] AT [facility] [address]    Frailty Screening:   Is the patient here for a new oncology consult visit in cancer care? 2. No      Clinical concerns:  NEW NON-MALIGNANT ONCOLOGY CONSULT.      Paulina Lucas MA            "

## 2024-08-16 NOTE — PROGRESS NOTES
[facility] Consult Note    Patient: Yong Guillermo  MRN: [id number]  Date of Service: Aug 16, 2024      Reason for Visit    Chief Complaint   Patient presents with    Hematology     New patient consult: Pancytopenia       Assessment/Plan    #. Long standing thrombocytopenia, PLT ~ 130  #. Long standing intermittent leukopenia   #. Two CVAs in 2006, 2009 on long term plavix and asa  #. History of alcohol abuse [id number]  #. Hepatic steatosis on imaging    Yong is a 78 year old male referred by his PCP to our clinic due to longstanding thrombocytopenia and leukopenia. His PLTs have been around 130 as far back as 5 years and his WBC fluctuates from 3.8-5 in our documentation. However, pt and his wife note this has been ongoing for as long as they can remember. Hemoglobin has been normal. Slightly macrocytic. B12 mildly low. Normal MMA and ferritin.  He does not get frequent infections. He does not have any bleeding issues. He had two strokes around 2006 and 2009 after which he has been on Plavix and asa. He is overall feeling well. His appetite is fine. No other ongoing symptoms. He did have a 5 year period with heavy drinking following his stroke but has not had alcohol since. Discussed with pt that his labs do not seem concerning for bone marrow cancer as it has been stable and his hemoglobin is unaffected. Additionally he is not symptomatic. He does have hepatic steatosis on imaging, but that would not affect the platelets. Will check blood morphology with differential along with hepatic panel. If there are no abnormalities can have follow up on an as needed basis. Pt agreeable to this.     ECOG Performance    1 - Can't do physically strenuous work, but fully ambyulatory and can do light sedentary work    Problem List    Patient Active Problem List   Diagnosis    Benign essential tremor    Complex sleep apnea syndrome    CVA (cerebral vascular accident) (H)    Type 2 diabetes, HbA1C  goal < 8% (H)    H/O carotid endarterectomy    Hyperlipemia    Osteopenia of multiple sites    BPH (benign prostatic hyperplasia)    Macrocytosis without anemia    Major depression, recurrent (H24)    Thrombocytopenia (H24)    Coronary artery disease due to calcified coronary lesion    Aortic valve regurgitation due to aortic dilation (H24)    Chronic right shoulder pain    Dilatation of thoracic aorta (H24)     ______________________________________________________________________________    History    Yong is a 78 year old male with a history of two strokes prior to 2009 that was referred by his primary to see hematology for longstanding thrombocytopenia and leukopenia. He has had this for as long as he can remember. Our documentation shows stable thrombocytopenia of ~130 as far back as 2019. The WBC is intermittently low at 3.8-5.3. He does not get frequent infections or illnesses. He does not having any bleeding issues. He denies hematuria or hematochezia.  He was previously feeling tired and low appetite last month, but after stopping Ozempic is noting increased energy and appetite. Weight is improving. Otherwise he is well without new headaches, breathing changes, abdominal pain, bone pain, or leg swelling. He has regular PSA screening and last one in May was normal. He had a colonoscopy 8/2023 and gets the every 5 years. He had a period from [id number] where he drank heavily, greater than 4 drinks, but has not had alcohol since. He is a never smoker. He is retired  and also worked at [employer].     Past History    Past Medical History:   Diagnosis Date    Benign essential tremor 09/29/2016    BPH (benign prostatic hyperplasia) 05/26/2015    Coronary artery disease     CVA (cerebral vascular accident) (H) 01/14/2009    Right arm clumsiness.  MRI showed 2 infarcts in the left hemisphere.    CVA (cerebral vascular accident) (H) 06/22/2006    Left arm weakness.    Depression     Diabetes mellitus, type 2 (H)      Hyperlipidemia     Infection due to 2019 novel coronavirus 2023    Obstructive sleep apnea 2013    Shingles     Family History   Problem Relation Age of Onset    Heart Disease Mother     Snoring Mother     Diabetes Mother     Cancer Mother         Brain    Diabetes Paternal Grandmother     Heart Disease Brother     Heart Disease Brother     Diabetes Brother       Past Surgical History:   Procedure Laterality Date    ARTHROSCOPY SHOULDER ROTATOR CUFF REPAIR Right     CAROTID ENDARTERECTOMY Right 2006    CERVICAL DISC SURGERY  2005    C5/6 and C6/7 lamina foraminotomy, scope with partial facetectomy and excision of hard disc herniation.    HERNIA REPAIR      Right inguinal hernia and umbilical hernia right cheek cyst on the face.    IR AORTIC ARCH 4 VESSEL ANGIOGRAM  2006    IR CAROTID ANGIOGRAM  2006    IR CAROTID ANGIOGRAM  2006    CT EXCIS TENDON SHEATH LESION, HAND/FINGER      Description: Hand Excision Of A Tendon Cyst;  Recorded: 2008;    TONSILLECTOMY      VASECTOMY      ZZC EXCIS CERV DISK,ONE LEVEL      Description: Laminectomy With Disc Removal;  Recorded: 2008;  Comments: Lumbar level    ZZC EXCISION,BENIGN TUMOR,MANDIBLE      Description: Jaw Excision Benign Cyst / Tumor;  Proc Date: 2004;    Social History     Socioeconomic History    Marital status:      Spouse name: Not on file    Number of children: Not on file    Years of education: Not on file    Highest education level: Not on file   Occupational History    Not on file   Tobacco Use    Smoking status: Former     Types: Cigars, Pipe     Quit date: 1980     Years since quittin.4     Passive exposure: Never    Smokeless tobacco: Former     Types: Chew    Tobacco comments:     Quit 40 years ago.   Vaping Use    Vaping status: Never Used   Substance and Sexual Activity    Alcohol use: No    Drug use: No    Sexual activity: Yes     Partners: Female     Birth control/protection:  None   Other Topics Concern    Parent/sibling w/ CABG, MI or angioplasty before 65F 55M? Not Asked   Social History Narrative    Not on file     Social Determinants of Health     Financial Resource Strain: Low Risk  (11/30/2023)    Financial Resource Strain     Within the past 12 months, have you or your family members you live with been unable to get utilities (heat, electricity) when it was really needed?: No   Food Insecurity: Low Risk  (11/30/2023)    Food Insecurity     Within the past 12 months, did you worry that your food would run out before you got money to buy more?: No     Within the past 12 months, did the food you bought just not last and you didn t have money to get more?: No   Transportation Needs: Low Risk  (11/30/2023)    Transportation Needs     Within the past 12 months, has lack of transportation kept you from medical appointments, getting your medicines, non-medical meetings or appointments, work, or from getting things that you need?: No   Physical Activity: Not on file   Stress: Not on file   Social Connections: Not on file   Interpersonal Safety: Low Risk  (6/25/2024)    Interpersonal Safety     Do you feel physically and emotionally safe where you currently live?: Yes     Within the past 12 months, have you been hit, slapped, kicked or otherwise physically hurt by someone?: No     Within the past 12 months, have you been humiliated or emotionally abused in other ways by your partner or ex-partner?: No   Housing Stability: Low Risk  (11/30/2023)    Housing Stability     Do you have housing? : Yes     Are you worried about losing your housing?: No        Allergies    Allergies   Allergen Reactions    Alteplase      Muscle Tightness    Sulfa Antibiotics        Review of Systems    Pertinent items are noted in HPI.      Physical Exam        8/16/2024     2:05 PM   Oncology Vitals   Height 178 cm   Weight 69.4 kg   BSA (m2) 1.85 m2   /68   Pulse 82   Temp 98.4  F (36.9  C)   Temp src  Tympanic   SpO2 97 %   Pain Score 2 (Mild)   Pain Loc OTHER - SEE        Physicals Exam   HEENT: Head: Normal, normocephalic, atraumatic.  Lymphatics: No cervical, supraclavicular, or axillary lymphadenopathy.   Chest: Normal respirations. Wheezing throughout auscultation. Otherwise clear.   Cardiac: regular rate and rhythm  Abdomen: no distended  Extremities: no lower extremity edema   Skin: warm, dry, intact  CNS: grossly non focal    Lab Results    Recent Results (from the past 168 hour(s))   Comprehensive metabolic panel   Result Value Ref Range    Sodium 143 135 - 145 mmol/L    Potassium 5.0 3.4 - 5.3 mmol/L    Carbon Dioxide (CO2) 30 (H) 22 - 29 mmol/L    Anion Gap 10 7 - 15 mmol/L    Urea Nitrogen 18.3 8.0 - 23.0 mg/dL    Creatinine 0.63 (L) 0.67 - 1.17 mg/dL    GFR Estimate >90 >60 mL/min/1.73m2    Calcium 9.4 8.8 - 10.4 mg/dL    Chloride 103 98 - 107 mmol/L    Glucose 154 (H) 70 - 99 mg/dL    Alkaline Phosphatase 82 40 - 150 U/L    AST 31 0 - 45 U/L    ALT 60 0 - 70 U/L    Protein Total 6.9 6.4 - 8.3 g/dL    Albumin 4.4 3.5 - 5.2 g/dL    Bilirubin Total 0.2 <=1.2 mg/dL   CBC with platelets and differential   Result Value Ref Range    WBC Count 4.4 4.0 - 11.0 10e3/uL    RBC Count 4.00 (L) 4.40 - 5.90 10e6/uL    Hemoglobin 14.0 13.3 - 17.7 g/dL    Hematocrit 40.5 40.0 - 53.0 %     (H) 78 - 100 fL    MCH 35.0 (H) 26.5 - 33.0 pg    MCHC 34.6 31.5 - 36.5 g/dL    RDW 12.1 10.0 - 15.0 %    Platelet Count 138 (L) 150 - 450 10e3/uL    % Neutrophils 59 %    % Lymphocytes 29 %    % Monocytes 7 %    % Eosinophils 4 %    % Basophils 1 %    % Immature Granulocytes 0 %    NRBCs per 100 WBC 0 <1 /100    Absolute Neutrophils 2.6 1.6 - 8.3 10e3/uL    Absolute Lymphocytes 1.3 0.8 - 5.3 10e3/uL    Absolute Monocytes 0.3 0.0 - 1.3 10e3/uL    Absolute Eosinophils 0.2 0.0 - 0.7 10e3/uL    Absolute Basophils 0.0 0.0 - 0.2 10e3/uL    Absolute Immature Granulocytes 0.0 <=0.4 10e3/uL    Absolute NRBCs 0.0 10e3/uL    Reticulocyte count   Result Value Ref Range    % Reticulocyte 1.4 0.5 - 2.0 %    Absolute Reticulocyte 0.055 0.025 - 0.095 10e6/uL   Bilirubin direct   Result Value Ref Range    Bilirubin Direct <0.20 0.00 - 0.30 mg/dL       Imaging Results    No results found.    Signed by: Carmenza Maher PA-C

## 2024-08-19 ENCOUNTER — MYC MEDICAL ADVICE (OUTPATIENT)
Dept: EDUCATION SERVICES | Facility: CLINIC | Age: 79
End: 2024-08-19
Payer: MEDICARE

## 2024-08-19 DIAGNOSIS — E11.9 WELL CONTROLLED TYPE 2 DIABETES MELLITUS (H): Primary | ICD-10-CM

## 2024-08-19 LAB
PATH REPORT.COMMENTS IMP SPEC: NORMAL
PATH REPORT.COMMENTS IMP SPEC: NORMAL
PATH REPORT.FINAL DX SPEC: NORMAL
PATH REPORT.MICROSCOPIC SPEC OTHER STN: NORMAL
PATH REPORT.RELEVANT HX SPEC: NORMAL

## 2024-08-20 RX ORDER — KETOROLAC TROMETHAMINE 30 MG/ML
1 INJECTION, SOLUTION INTRAMUSCULAR; INTRAVENOUS ONCE
Qty: 1 EACH | Refills: 0 | Status: SHIPPED | OUTPATIENT
Start: 2024-08-20 | End: 2024-08-20

## 2024-08-20 RX ORDER — BLOOD-GLUCOSE SENSOR
1 EACH MISCELLANEOUS SEE ADMIN INSTRUCTIONS
Qty: 6 EACH | Refills: 5 | Status: SHIPPED | OUTPATIENT
Start: 2024-08-20

## 2024-08-21 ENCOUNTER — PATIENT OUTREACH (OUTPATIENT)
Dept: ONCOLOGY | Facility: HOSPITAL | Age: 79
End: 2024-08-21
Payer: MEDICARE

## 2024-08-21 NOTE — PROGRESS NOTES
[facility]: Cancer Care Follow-Up Note                                    Discussion with Patient:                                                      Dr. Lucas reviewed lab results from 8/16/24. She said nothing concerning in the blood smear for cancer/blood disease. She said no further testing is needed.  Follow-up, as needed.       Dates of Treatment:                                                      Infusion given in last 28 days       None            Assessment:                                                      Refer to lab results from 8/16/24.    Intervention/Education provided during outreach:                                                       Called patient with message above from Dr. Lucas. Patient verbalized understanding and thanked me for calling.    Signature:  Joy Kumar RN

## 2024-08-23 ENCOUNTER — TELEPHONE (OUTPATIENT)
Dept: ENDOCRINOLOGY | Facility: CLINIC | Age: 79
End: 2024-08-23
Payer: MEDICARE

## 2024-08-23 NOTE — TELEPHONE ENCOUNTER
Hello,     This is [facility] requesting a few things for this patient's Medicare order of the Freestyle Perry 3     The first is requesting an addendum to the clinic notes from 4/15/24 to include CGM usage per Medicare's guidelines below:     VISIT NOTE REQUIREMENTS: (must state the following)  Patient must be seen/clinical notes must be written in the last 6 months by the prescribing provider.  Must state that the patient is injecting insulin 1 time daily or more (Can be written that patient is injecting with insulin pump) We cannot accept medication list as insulin regimen.  Must state that the patient is a type 1 or type 2 diabetic.  Must state that patient is using CGM       As a reminder, Medicare requires patients to be seen every 3 months for insulin supplies and every 6 months for CGMs. The provider who sees the patient must be the provider on the prescription, must be written on the day of or after office visit date and office visit must include notes about diabetes and insulin regimen. The Diabetes Care Services team at [facility] may request new prescriptions before renewal dates of the prescription is filled over the allowable amount. Writing the order to match what is above will help ensure we will only need to request every 3 or 6 months.    If you have any questions regarding these requests please call us at [phone] or send a message to the [employer]     Thank you!     [facility]  Diabetes Care Services Team  [address] [place] [address]  Provider Phone: [phone]  Patient Line: [phone]  Fax: [phone]  E-mail: [email or web address]

## 2024-08-27 NOTE — TELEPHONE ENCOUNTER
Hello,    Just following on the request below - Please see required documentation for Medicare coverage of CGM:    Addendum required - Must state that patient is using CGM (or will be starting CGM)    Please also note that due to Medicare's requirement of every 6 month visits, pt will need to be seen again by Jennifer/TIMMY Doss by 10/15/2024 for further coverage of CGM (and new rx's once pt has visit). Thank you!    Diabetes Care Services Pharmacy Team  [facility]  Phone: [phone]  Fax: [phone]  Pool: Pharm Diabetes

## 2024-08-29 NOTE — TELEPHONE ENCOUNTER
Hello!    This message is to notify you that the Diabetes Care Services team has completed their benefit check for this patient's Freestyle Perry 3+ Sensor/Pelican. We have notified the patient of their approval. If you have any questions, please let us know!    Thank you!    Diabetes Care Services Team  [facility]  [address] [place] [address]  Phone: [phone]  Fax; [phone]

## 2024-09-10 ENCOUNTER — MYC MEDICAL ADVICE (OUTPATIENT)
Dept: EDUCATION SERVICES | Facility: CLINIC | Age: 79
End: 2024-09-10

## 2024-09-14 ENCOUNTER — TRANSFERRED RECORDS (OUTPATIENT)
Dept: HEALTH INFORMATION MANAGEMENT | Facility: CLINIC | Age: 79
End: 2024-09-14
Payer: MEDICARE

## 2024-09-16 ENCOUNTER — TRANSFERRED RECORDS (OUTPATIENT)
Dept: HEALTH INFORMATION MANAGEMENT | Facility: CLINIC | Age: 79
End: 2024-09-16
Payer: MEDICARE

## 2024-09-26 ENCOUNTER — TRANSFERRED RECORDS (OUTPATIENT)
Dept: HEALTH INFORMATION MANAGEMENT | Facility: CLINIC | Age: 79
End: 2024-09-26
Payer: MEDICARE

## 2024-09-27 NOTE — PROGRESS NOTES
"Patient is showing 5/5 MNCM met.     03/29/24 10:31 AM  PATIENT LAB/IMAGING STATUS : No pending lab orders   Patient is showing 5/5 MNCM met.   Outcome for 04/12/24 11:29 AM: [employer] message sent  Katie Vo CMA        HPI:   Yong is a pleasant 78 yo man here for follow up of type 2 diabetes, diagnosed around 1994. He also has a history of CAD, s/p right carotid endarterectomy, CVA x 2, moderate aortic regurgitation, hyperlipidemia, sleep apnea, major depression, benign essential tremor, osteopenia, thrombocytopenia, Herpes Zoster and COVID infection in Jan 2023. Yong states his diabetes was initially treated with diet and exercise and lost 40 pounds and cut out almost all carbs. He was later started on Metformin, Januvia and Jardiance and reports he has been on insulin for a couple of years.    He is doing ok.  Had an episode of \"passing out\" recently.  Broke his fibula. No hypoglycemia. Wearing Perry 3 sensor.  Had brain MRI and it was \"normal.\"  No known cause, but Yong thinks it was dehydration.  Drinks decaffeinated coffee and water all the time. No further episodes. Stopped the ozempic in June. Very tired.      Having   Current treatment:   Lantus- 16 units  Jardiance 25 mg  Metformin- 1000 mg bid.     Previous treatments:  Januvia- switched to Trulicity 2023.   Trulicity 1.5 mg- switched to ozempic 2024  Ozempic- 2 mg- stopped it due to tiredness and too much weight loss.     Feeling tired. Has some pain in shoulder from tendonitis.      Typical day:   Cereal in AM with milk, melvina jon, coffee with creamer (no sugar), fruit  Cookie cart on Thursday- buys 10 and has one a day. Keeps in the freezer.   Lunch- sandwich- meat or PB/banana, PB/jam, potato chips, melvina jon.   Supper- has dinners from the place he lives.  Independent living.  Wife has parkinson's, but really well controlled.   Sets up and works in the [employer] where he lives.   Not much physical activity.   Likes doing construction " projects around.     Patient uses gonzalo 3 CGM and checks his glucose 6 times daily.    Recent glucose:   7 day average- 117, range     Diabetes Control:   Lab Results   Component Value Date    A1C 7.7 12/06/2023    A1C 8.0 05/23/2023    A1C 8.0 12/28/2022    A1C 7.2 09/28/2022    A1C 6.9 04/26/2022         Past Medical History:   Diagnosis Date    Benign essential tremor 09/29/2016    BPH (benign prostatic hyperplasia) 05/26/2015    Coronary artery disease     CVA (cerebral vascular accident) (H) 01/14/2009    Right arm clumsiness.  MRI showed 2 infarcts in the left hemisphere.    CVA (cerebral vascular accident) (H) 06/22/2006    Left arm weakness.    Depression     Diabetes mellitus, type 2 (H)     Hyperlipidemia     Infection due to 2019 novel coronavirus 1/4/2023    Obstructive sleep apnea 07/22/2013    Shingles        Past Surgical History:   Procedure Laterality Date    ARTHROSCOPY SHOULDER ROTATOR CUFF REPAIR Right     CAROTID ENDARTERECTOMY Right 09/27/2006    CERVICAL DISC SURGERY  07/29/2005    C5/6 and C6/7 lamina foraminotomy, scope with partial facetectomy and excision of hard disc herniation.    HERNIA REPAIR  5/21/104    Right inguinal hernia and umbilical hernia right cheek cyst on the face.    IR AORTIC ARCH 4 VESSEL ANGIOGRAM  8/8/2006    IR CAROTID ANGIOGRAM  8/8/2006    IR CAROTID ANGIOGRAM  8/8/2006    NM EXCIS TENDON SHEATH LESION, HAND/FINGER      Description: Hand Excision Of A Tendon Cyst;  Recorded: 04/29/2008;    TONSILLECTOMY      VASECTOMY      ZZC EXCIS CERV DISK,ONE LEVEL      Description: Laminectomy With Disc Removal;  Recorded: 04/29/2008;  Comments: Lumbar level    ZZC EXCISION,BENIGN TUMOR,MANDIBLE      Description: Jaw Excision Benign Cyst / Tumor;  Proc Date: 05/01/2004;       Family History   Problem Relation Age of Onset    Heart Disease Mother     Snoring Mother     Diabetes Mother     Cancer Mother         Brain    Diabetes Paternal Grandmother     Heart Disease  Brother     Heart Disease Brother     Diabetes Brother        Social History     Socioeconomic History    Marital status:      Spouse name: None    Number of children: None    Years of education: None    Highest education level: None   Tobacco Use    Smoking status: Former     Types: Cigars, Pipe     Quit date: 1980     Years since quittin.7    Smokeless tobacco: Former     Types: Chew    Tobacco comments:     Quit 40 years ago.   Vaping Use    Vaping Use: Never used   Substance and Sexual Activity    Alcohol use: No    Drug use: No    Sexual activity: Yes     Partners: Female     Birth control/protection: None     Social Determinants of Health     Financial Resource Strain: Low Risk  (2023)    Financial Resource Strain     Within the past 12 months, have you or your family members you live with been unable to get utilities (heat, electricity) when it was really needed?: No   Food Insecurity: Low Risk  (2023)    Food Insecurity     Within the past 12 months, did you worry that your food would run out before you got money to buy more?: No     Within the past 12 months, did the food you bought just not last and you didn t have money to get more?: No   Transportation Needs: Low Risk  (2023)    Transportation Needs     Within the past 12 months, has lack of transportation kept you from medical appointments, getting your medicines, non-medical meetings or appointments, work, or from getting things that you need?: No   Interpersonal Safety: Low Risk  (2023)    Interpersonal Safety     Do you feel physically and emotionally safe where you currently live?: Yes     Within the past 12 months, have you been hit, slapped, kicked or otherwise physically hurt by someone?: No     Within the past 12 months, have you been humiliated or emotionally abused in other ways by your partner or ex-partner?: No   Housing Stability: Low Risk  (2023)    Housing Stability     Do you have housing? :  Yes     Are you worried about losing your housing?: No       Current Outpatient Medications   Medication Sig Dispense Refill    acetaminophen (TYLENOL) 500 MG tablet Take 650 mg by mouth       Ascorbic Acid (VITAMIN C) 500 MG CAPS Take 1,000 mg by mouth daily       aspirin 81 MG EC tablet Take 81 mg by mouth      atorvastatin (LIPITOR) 80 MG tablet TAKE 1 TABLET DAILY 90 tablet 2    blood glucose (FREESTYLE LITE) test strip Use to test blood sugar 4 times daily or as directed. 400 strip 0    blood glucose (FREESTYLE LITE) test strip Use to test blood sugar 4 times daily or as directed. 90 strip 0    blood glucose monitoring (FREESTYLE) lancets USE 1 LANCET DAILY AS NEEDED 100 each 2    Cholecalciferol (VITAMIN D3) 25 MCG (1000 UT) CAPS Take 1,000 Units by mouth      clopidogrel (PLAVIX) 75 MG tablet TAKE 1 TABLET DAILY 90 tablet 3    Continuous Glucose Sensor (FREESTYLE LIZ 14 DAY SENSOR) MISC 1 each every 14 days Use 1 Sensor every 14 days. Use to read blood sugars per 's instructions. 2 each 5    Continuous Glucose Sensor (FREESTYLE LIZ 3 PLUS SENSOR) MISC 1 Device See Admin Instructions (Use 1 sensor every 15 days) 6 each 5    empagliflozin (JARDIANCE) 25 MG TABS tablet TAKE 1 TABLET DAILY 90 tablet 2    insulin glargine (LANTUS SOLOSTAR) 100 UNIT/ML pen Inject 16 Units 15 mL 2    insulin pen needle (BD PEN NEEDLE JERRY 2ND GEN) 32G X 4 MM miscellaneous INJECT 1 NEEDLE UNDER THE SKIN DAILY 100 each 3    losartan (COZAAR) 25 MG tablet TAKE ONE-HALF (1/2) TABLET DAILY 90 tablet 3    metFORMIN (GLUCOPHAGE) 500 MG tablet TAKE 2 TABLETS TWICE A DAY WITH MEALS 360 tablet 3    multivitamin w/minerals (THERA-VIT-M) tablet Take 1 tablet by mouth      Omega-3 1000 MG capsule Take 2 g by mouth      venlafaxine (EFFEXOR XR) 150 MG 24 hr capsule Take 150 mg by mouth daily      venlafaxine (EFFEXOR XR) 75 MG 24 hr capsule Take 75 mg by mouth daily       No current facility-administered medications for this  "visit.          Allergies   Allergen Reactions    Alteplase      Muscle Tightness    Sulfa Antibiotics        Physical Exam  /62   Pulse 77   Ht 1.778 m (5' 10\")   Wt 68.5 kg (151 lb)   SpO2 98%   BMI 21.67 kg/m    GENERAL: healthy, alert and no distress  RESP: no audible wheeze, cough, or visible cyanosis.  No visible retractions or increased work of breathing.  Able to speak fully in complete sentences.  PSYCH: mentation appears normal, affect normal/bright, judgement and insight intact, normal speech and appearance well-groomed    RESULTS  Lab Results   Component Value Date    A1C 7.3 (H) 10/14/2024    A1C 7.5 (H) 07/31/2024    A1C 8.0 (H) 04/15/2024    A1C 7.7 (H) 12/06/2023    A1C 8.0 (H) 05/23/2023    A1C 8.0 (H) 12/28/2022    A1C 7.2 (H) 09/28/2022       TSH   Date Value Ref Range Status   05/17/2024 1.29 0.30 - 4.20 uIU/mL Final   12/06/2023 2.48 0.30 - 4.20 uIU/mL Final   04/26/2022 1.40 0.30 - 5.00 uIU/mL Final   09/02/2021 1.64 0.30 - 5.00 uIU/mL Final   06/02/2021 1.89 0.30 - 5.00 uIU/mL Final   08/11/2020 1.39 0.30 - 5.00 uIU/mL Final   05/09/2019 2.22 0.30 - 5.00 uIU/mL Final       ALT   Date Value Ref Range Status   08/16/2024 60 0 - 70 U/L Final   07/31/2024 100 (H) 0 - 70 U/L Final   06/10/2021 135 (H) 0 - 70 U/L Final   ]    Recent Labs   Lab Test 07/31/24  0939 12/06/23  0847   CHOL 116 132   HDL 37* 41   LDL 61 74   TRIG 91 83       Lab Results   Component Value Date     08/16/2024     06/10/2021      Lab Results   Component Value Date    POTASSIUM 5.0 08/16/2024    POTASSIUM 4.7 04/26/2022    POTASSIUM 4.5 06/10/2021     Lab Results   Component Value Date    CHLORIDE 103 08/16/2024    CHLORIDE 104 04/26/2022    CHLORIDE 103 06/10/2021     Lab Results   Component Value Date    KATHLEEN 9.4 08/16/2024    KATHLEEN 9.7 06/10/2021     Lab Results   Component Value Date    CO2 30 08/16/2024    CO2 27 04/26/2022    CO2 21 06/10/2021     Lab Results   Component Value Date    BUN 18.3 " "08/16/2024    BUN 17 04/26/2022    BUN 23 06/10/2021     Lab Results   Component Value Date    CR 0.63 08/16/2024    CR 0.87 06/10/2021       GFR Estimate   Date Value Ref Range Status   08/16/2024 >90 >60 mL/min/1.73m2 Final     Comment:     eGFR calculated using 2021 CKD-EPI equation.   07/31/2024 >90 >60 mL/min/1.73m2 Final     Comment:     eGFR calculated using 2021 CKD-EPI equation.   05/17/2024 >90 >60 mL/min/1.73m2 Final   06/15/2021 >60 >60 mL/min/1.73m2 Final   06/10/2021 85 >60 mL/min/[1.73_m2] Final     Comment:     Non  GFR Calc  Starting 12/18/2018, serum creatinine based estimated GFR (eGFR) will be   calculated using the Chronic Kidney Disease Epidemiology Collaboration   (CKD-EPI) equation.     06/02/2021 >60 >60 mL/min/1.73m2 Final   01/28/2021 >60 >60 mL/min/1.73m2 Final     GFR Estimate If Black   Date Value Ref Range Status   06/15/2021 >60 >60 mL/min/1.73m2 Final   06/10/2021 >90 >60 mL/min/[1.73_m2] Final     Comment:      GFR Calc  Starting 12/18/2018, serum creatinine based estimated GFR (eGFR) will be   calculated using the Chronic Kidney Disease Epidemiology Collaboration   (CKD-EPI) equation.     06/02/2021 >60 >60 mL/min/1.73m2 Final   01/28/2021 >60 >60 mL/min/1.73m2 Final       Lab Results   Component Value Date    MICROL <12.0 12/06/2023     No results found for: \"MICROALBUMIN\"  No results found for: \"CPEPT\", \"GADAB\", \"ISCAB\"    Vitamin B12   Date Value Ref Range Status   05/17/2024 372 232 - 1,245 pg/mL Final   04/26/2022 >2,000 (H) 213 - 816 pg/mL Final     Most recent eye exam date: : Not Found       Assessment/Plan:     1.  Type 2 diabetes- A1c is in target at 7.3% (goal <8%).  Having some hypoglycemia periodically.  Will reduce Lantus to 14 units (was 16).  Discussed more liberal goals of A1c <8% in older populations with risk factors for CVD. Achieving his goals currently.  Could consider restarting lower dose GLP-1 agonist in the future, or oral " semaglutide, if prandial coverage required.  We made the following plan today (instructions given to patient):      Lower Lantus to 14 units daily (was 16).     Try adding more protein with the higher carb foods.     Goals: sensor >70% in target range- most glucose under 180 mg/dL.  A1c goal: <8%.     Jardiance: Stop this medication during times of illness or dehydration.  Drink plenty of water.     Please let me know if you are having low blood sugars less than 70 or over 250 mg/dL.      If you have concerns, please send me a [employer] message M-Th, call the clinic at [phone], or call [phone] after hours/weekends and ask to speak with the endocrinologist on call.         2.  Risk factors-     Retinopathy:  Yes. Mild, non-proliferative retinopathy. 6/2024.    Nephropathy:  BP well controlled. No microalbuminuria.  Creatinine stable.   Neuropathy: No.    Feet: OK, no ulcers.   Lipids:  LDL at target.  Patient taking statin    3.  F/U prn.  Patient has met criteria for clinic graduation with stable glucose control and will follow up with with PCP for diabetes management.  Alternatively, we would be happy to see him/her again, as needed, if questions or concerns arise, including rising hemoglobin A1c, concern for new diabetes or medication related complications or need for adjustment.     32 minutes spent on the date of the encounter doing chart review, review of test results, review of continuous glucose sensor, interpretation of glucose data, patient visit and documentation, counseling/coordination of care, and discussion of follow up plan for worsening hyper and hypoglycemia.  The patient understood and is satisfied with today's visit.        Kristen Doss PA-C, MPAS   [facility]  Department of Medicine  Division of Endocrinology and Diabetes

## 2024-10-03 DIAGNOSIS — I63.9 CVA (CEREBRAL VASCULAR ACCIDENT) (H): ICD-10-CM

## 2024-10-03 DIAGNOSIS — E78.5 HYPERLIPIDEMIA, UNSPECIFIED HYPERLIPIDEMIA TYPE: ICD-10-CM

## 2024-10-03 RX ORDER — ATORVASTATIN CALCIUM 80 MG/1
80 TABLET, FILM COATED ORAL DAILY
Qty: 90 TABLET | Refills: 2 | Status: SHIPPED | OUTPATIENT
Start: 2024-10-03

## 2024-10-03 RX ORDER — CLOPIDOGREL BISULFATE 75 MG/1
75 TABLET ORAL DAILY
Qty: 90 TABLET | Refills: 3 | Status: SHIPPED | OUTPATIENT
Start: 2024-10-03

## 2024-10-10 ENCOUNTER — TRANSFERRED RECORDS (OUTPATIENT)
Dept: HEALTH INFORMATION MANAGEMENT | Facility: CLINIC | Age: 79
End: 2024-10-10
Payer: MEDICARE

## 2024-10-11 ENCOUNTER — MYC MEDICAL ADVICE (OUTPATIENT)
Dept: ENDOCRINOLOGY | Facility: CLINIC | Age: 79
End: 2024-10-11
Payer: MEDICARE

## 2024-10-11 DIAGNOSIS — E11.9 WELL CONTROLLED TYPE 2 DIABETES MELLITUS (H): ICD-10-CM

## 2024-10-14 ENCOUNTER — OFFICE VISIT (OUTPATIENT)
Dept: ENDOCRINOLOGY | Facility: CLINIC | Age: 79
End: 2024-10-14
Payer: MEDICARE

## 2024-10-14 VITALS
OXYGEN SATURATION: 98 % | HEART RATE: 77 BPM | DIASTOLIC BLOOD PRESSURE: 62 MMHG | HEIGHT: 70 IN | SYSTOLIC BLOOD PRESSURE: 107 MMHG | BODY MASS INDEX: 21.62 KG/M2 | WEIGHT: 151 LBS

## 2024-10-14 DIAGNOSIS — E11.8 TYPE 2 DIABETES MELLITUS WITH COMPLICATION, WITHOUT LONG-TERM CURRENT USE OF INSULIN (H): ICD-10-CM

## 2024-10-14 LAB
EST. AVERAGE GLUCOSE BLD GHB EST-MCNC: 163 MG/DL
HBA1C MFR BLD: 7.3 %

## 2024-10-14 PROCEDURE — 83036 HEMOGLOBIN GLYCOSYLATED A1C: CPT | Performed by: PATHOLOGY

## 2024-10-14 PROCEDURE — 99214 OFFICE O/P EST MOD 30 MIN: CPT | Performed by: PHYSICIAN ASSISTANT

## 2024-10-14 RX ORDER — CYANOCOBALAMIN (VITAMIN B-12) 1000 MCG
500 TABLET, SUBLINGUAL SUBLINGUAL DAILY
COMMUNITY

## 2024-10-14 RX ORDER — INSULIN GLARGINE 100 [IU]/ML
INJECTION, SOLUTION SUBCUTANEOUS
Qty: 15 ML | Refills: 2 | Status: SHIPPED | OUTPATIENT
Start: 2024-10-14 | End: 2024-10-23

## 2024-10-14 ASSESSMENT — PAIN SCALES - GENERAL: PAINLEVEL: MILD PAIN (3)

## 2024-10-14 NOTE — LETTER
"10/14/2024       RE: Yong Guillermo  [address]  [facility] [address]     Dear Colleague,    Thank you for referring your patient, Yong Guillermo, to the [facility] [place] [facility]. Please see a copy of my visit note below.    Patient is showing 5/5 MNCM met.     03/29/24 10:31 AM  PATIENT LAB/IMAGING STATUS : No pending lab orders   Patient is showing 5/5 MNCM met.   Outcome for 04/12/24 11:29 AM: [employer] message sent  Katie Vo CMA        HPI:   Yong is a pleasant 78 yo man here for follow up of type 2 diabetes, diagnosed around 1994. He also has a history of CAD, s/p right carotid endarterectomy, CVA x 2, moderate aortic regurgitation, hyperlipidemia, sleep apnea, major depression, benign essential tremor, osteopenia, thrombocytopenia, Herpes Zoster and COVID infection in Jan 2023. Yong states his diabetes was initially treated with diet and exercise and lost 40 pounds and cut out almost all carbs. He was later started on Metformin, Januvia and Jardiance and reports he has been on insulin for a couple of years.    He is doing ok.  Had an episode of \"passing out\" recently.  Broke his fibula. No hypoglycemia. Wearing Perry 3 sensor.  Had brain MRI and it was \"normal.\"  No known cause, but Yong thinks it was dehydration.  Drinks decaffeinated coffee and water all the time. No further episodes. Stopped the ozempic in June. Very tired.      Having   Current treatment:   Lantus- 16 units  Jardiance 25 mg  Metformin- 1000 mg bid.     Previous treatments:  Januvia- switched to Trulicity 2023.   Trulicity 1.5 mg- switched to ozempic 2024  Ozempic- 2 mg- stopped it due to tiredness and too much weight loss.     Feeling tired. Has some pain in shoulder from tendonitis.      Typical day:   Cereal in AM with milk, melvina jon, coffee with creamer (no sugar), fruit  Cookie cart on Thursday- buys 10 and has one a day. " Keeps in the freezer.   Lunch- sandwich- meat or PB/banana, PB/jam, potato chips, melvina jon.   Supper- has dinners from the place he lives.  Independent living.  Wife has parkinson's, but really well controlled.   Sets up and works in the [employer] where he lives.   Not much physical activity.   Likes doing construction projects around.     Patient uses freee 3 CGM and checks his glucose 6 times daily.    Recent glucose:   7 day average- 117, range     Diabetes Control:   Lab Results   Component Value Date    A1C 7.7 12/06/2023    A1C 8.0 05/23/2023    A1C 8.0 12/28/2022    A1C 7.2 09/28/2022    A1C 6.9 04/26/2022         Past Medical History:   Diagnosis Date     Benign essential tremor 09/29/2016     BPH (benign prostatic hyperplasia) 05/26/2015     Coronary artery disease      CVA (cerebral vascular accident) (H) 01/14/2009    Right arm clumsiness.  MRI showed 2 infarcts in the left hemisphere.     CVA (cerebral vascular accident) (H) 06/22/2006    Left arm weakness.     Depression      Diabetes mellitus, type 2 (H)      Hyperlipidemia      Infection due to 2019 novel coronavirus 1/4/2023     Obstructive sleep apnea 07/22/2013     Shingles        Past Surgical History:   Procedure Laterality Date     ARTHROSCOPY SHOULDER ROTATOR CUFF REPAIR Right      CAROTID ENDARTERECTOMY Right 09/27/2006     CERVICAL DISC SURGERY  07/29/2005    C5/6 and C6/7 lamina foraminotomy, scope with partial facetectomy and excision of hard disc herniation.     HERNIA REPAIR  5/21/104    Right inguinal hernia and umbilical hernia right cheek cyst on the face.     IR AORTIC ARCH 4 VESSEL ANGIOGRAM  8/8/2006     IR CAROTID ANGIOGRAM  8/8/2006     IR CAROTID ANGIOGRAM  8/8/2006     OH EXCIS TENDON SHEATH LESION, HAND/FINGER      Description: Hand Excision Of A Tendon Cyst;  Recorded: 04/29/2008;     TONSILLECTOMY       VASECTOMY       ZZC EXCIS CERV DISK,ONE LEVEL      Description: Laminectomy With Disc Removal;  Recorded:  2008;  Comments: Lumbar level     ZZC EXCISION,BENIGN TUMOR,MANDIBLE      Description: Jaw Excision Benign Cyst / Tumor;  Proc Date: 2004;       Family History   Problem Relation Age of Onset     Heart Disease Mother      Snoring Mother      Diabetes Mother      Cancer Mother         Brain     Diabetes Paternal Grandmother      Heart Disease Brother      Heart Disease Brother      Diabetes Brother        Social History     Socioeconomic History     Marital status:      Spouse name: None     Number of children: None     Years of education: None     Highest education level: None   Tobacco Use     Smoking status: Former     Types: Cigars, Pipe     Quit date: 1980     Years since quittin.7     Smokeless tobacco: Former     Types: Chew     Tobacco comments:     Quit 40 years ago.   Vaping Use     Vaping Use: Never used   Substance and Sexual Activity     Alcohol use: No     Drug use: No     Sexual activity: Yes     Partners: Female     Birth control/protection: None     Social Determinants of Health     Financial Resource Strain: Low Risk  (2023)    Financial Resource Strain      Within the past 12 months, have you or your family members you live with been unable to get utilities (heat, electricity) when it was really needed?: No   Food Insecurity: Low Risk  (2023)    Food Insecurity      Within the past 12 months, did you worry that your food would run out before you got money to buy more?: No      Within the past 12 months, did the food you bought just not last and you didn t have money to get more?: No   Transportation Needs: Low Risk  (2023)    Transportation Needs      Within the past 12 months, has lack of transportation kept you from medical appointments, getting your medicines, non-medical meetings or appointments, work, or from getting things that you need?: No   Interpersonal Safety: Low Risk  (2023)    Interpersonal Safety      Do you feel physically and  emotionally safe where you currently live?: Yes      Within the past 12 months, have you been hit, slapped, kicked or otherwise physically hurt by someone?: No      Within the past 12 months, have you been humiliated or emotionally abused in other ways by your partner or ex-partner?: No   Housing Stability: Low Risk  (11/30/2023)    Housing Stability      Do you have housing? : Yes      Are you worried about losing your housing?: No       Current Outpatient Medications   Medication Sig Dispense Refill     acetaminophen (TYLENOL) 500 MG tablet Take 650 mg by mouth        Ascorbic Acid (VITAMIN C) 500 MG CAPS Take 1,000 mg by mouth daily        aspirin 81 MG EC tablet Take 81 mg by mouth       atorvastatin (LIPITOR) 80 MG tablet TAKE 1 TABLET DAILY 90 tablet 2     blood glucose (FREESTYLE LITE) test strip Use to test blood sugar 4 times daily or as directed. 400 strip 0     blood glucose (FREESTYLE LITE) test strip Use to test blood sugar 4 times daily or as directed. 90 strip 0     blood glucose monitoring (FREESTYLE) lancets USE 1 LANCET DAILY AS NEEDED 100 each 2     Cholecalciferol (VITAMIN D3) 25 MCG (1000 UT) CAPS Take 1,000 Units by mouth       clopidogrel (PLAVIX) 75 MG tablet TAKE 1 TABLET DAILY 90 tablet 3     Continuous Glucose Sensor (FREESTYLE LIZ 14 DAY SENSOR) MISC 1 each every 14 days Use 1 Sensor every 14 days. Use to read blood sugars per 's instructions. 2 each 5     Continuous Glucose Sensor (FREESTYLE LIZ 3 PLUS SENSOR) MISC 1 Device See Admin Instructions (Use 1 sensor every 15 days) 6 each 5     empagliflozin (JARDIANCE) 25 MG TABS tablet TAKE 1 TABLET DAILY 90 tablet 2     insulin glargine (LANTUS SOLOSTAR) 100 UNIT/ML pen Inject 16 Units 15 mL 2     insulin pen needle (BD PEN NEEDLE JERRY 2ND GEN) 32G X 4 MM miscellaneous INJECT 1 NEEDLE UNDER THE SKIN DAILY 100 each 3     losartan (COZAAR) 25 MG tablet TAKE ONE-HALF (1/2) TABLET DAILY 90 tablet 3     metFORMIN (GLUCOPHAGE) 500  "MG tablet TAKE 2 TABLETS TWICE A DAY WITH MEALS 360 tablet 3     multivitamin w/minerals (THERA-VIT-M) tablet Take 1 tablet by mouth       Omega-3 1000 MG capsule Take 2 g by mouth       venlafaxine (EFFEXOR XR) 150 MG 24 hr capsule Take 150 mg by mouth daily       venlafaxine (EFFEXOR XR) 75 MG 24 hr capsule Take 75 mg by mouth daily       No current facility-administered medications for this visit.          Allergies   Allergen Reactions     Alteplase      Muscle Tightness     Sulfa Antibiotics        Physical Exam  /62   Pulse 77   Ht 1.778 m (5' 10\")   Wt 68.5 kg (151 lb)   SpO2 98%   BMI 21.67 kg/m    GENERAL: healthy, alert and no distress  RESP: no audible wheeze, cough, or visible cyanosis.  No visible retractions or increased work of breathing.  Able to speak fully in complete sentences.  PSYCH: mentation appears normal, affect normal/bright, judgement and insight intact, normal speech and appearance well-groomed    RESULTS  Lab Results   Component Value Date    A1C 7.3 (H) 10/14/2024    A1C 7.5 (H) 07/31/2024    A1C 8.0 (H) 04/15/2024    A1C 7.7 (H) 12/06/2023    A1C 8.0 (H) 05/23/2023    A1C 8.0 (H) 12/28/2022    A1C 7.2 (H) 09/28/2022       TSH   Date Value Ref Range Status   05/17/2024 1.29 0.30 - 4.20 uIU/mL Final   12/06/2023 2.48 0.30 - 4.20 uIU/mL Final   04/26/2022 1.40 0.30 - 5.00 uIU/mL Final   09/02/2021 1.64 0.30 - 5.00 uIU/mL Final   06/02/2021 1.89 0.30 - 5.00 uIU/mL Final   08/11/2020 1.39 0.30 - 5.00 uIU/mL Final   05/09/2019 2.22 0.30 - 5.00 uIU/mL Final       ALT   Date Value Ref Range Status   08/16/2024 60 0 - 70 U/L Final   07/31/2024 100 (H) 0 - 70 U/L Final   06/10/2021 135 (H) 0 - 70 U/L Final   ]    Recent Labs   Lab Test 07/31/24  0939 12/06/23  0847   CHOL 116 132   HDL 37* 41   LDL 61 74   TRIG 91 83       Lab Results   Component Value Date     08/16/2024     06/10/2021      Lab Results   Component Value Date    POTASSIUM 5.0 08/16/2024    POTASSIUM 4.7 " "04/26/2022    POTASSIUM 4.5 06/10/2021     Lab Results   Component Value Date    CHLORIDE 103 08/16/2024    CHLORIDE 104 04/26/2022    CHLORIDE 103 06/10/2021     Lab Results   Component Value Date    KATHLEEN 9.4 08/16/2024    KATHLEEN 9.7 06/10/2021     Lab Results   Component Value Date    CO2 30 08/16/2024    CO2 27 04/26/2022    CO2 21 06/10/2021     Lab Results   Component Value Date    BUN 18.3 08/16/2024    BUN 17 04/26/2022    BUN 23 06/10/2021     Lab Results   Component Value Date    CR 0.63 08/16/2024    CR 0.87 06/10/2021       GFR Estimate   Date Value Ref Range Status   08/16/2024 >90 >60 mL/min/1.73m2 Final     Comment:     eGFR calculated using 2021 CKD-EPI equation.   07/31/2024 >90 >60 mL/min/1.73m2 Final     Comment:     eGFR calculated using 2021 CKD-EPI equation.   05/17/2024 >90 >60 mL/min/1.73m2 Final   06/15/2021 >60 >60 mL/min/1.73m2 Final   06/10/2021 85 >60 mL/min/[1.73_m2] Final     Comment:     Non  GFR Calc  Starting 12/18/2018, serum creatinine based estimated GFR (eGFR) will be   calculated using the Chronic Kidney Disease Epidemiology Collaboration   (CKD-EPI) equation.     06/02/2021 >60 >60 mL/min/1.73m2 Final   01/28/2021 >60 >60 mL/min/1.73m2 Final     GFR Estimate If Black   Date Value Ref Range Status   06/15/2021 >60 >60 mL/min/1.73m2 Final   06/10/2021 >90 >60 mL/min/[1.73_m2] Final     Comment:      GFR Calc  Starting 12/18/2018, serum creatinine based estimated GFR (eGFR) will be   calculated using the Chronic Kidney Disease Epidemiology Collaboration   (CKD-EPI) equation.     06/02/2021 >60 >60 mL/min/1.73m2 Final   01/28/2021 >60 >60 mL/min/1.73m2 Final       Lab Results   Component Value Date    MICROL <12.0 12/06/2023     No results found for: \"MICROALBUMIN\"  No results found for: \"CPEPT\", \"GADAB\", \"ISCAB\"    Vitamin B12   Date Value Ref Range Status   05/17/2024 372 232 - 1,245 pg/mL Final   04/26/2022 >2,000 (H) 213 - 816 pg/mL Final     Most " recent eye exam date: : Not Found       Assessment/Plan:     1.  Type 2 diabetes- A1c is in target at 7.3% (goal <8%).  Having some hypoglycemia periodically.  Will reduce Lantus to 14 units (was 16).  Discussed more liberal goals of A1c <8% in older populations with risk factors for CVD. Achieving his goals currently.  Could consider restarting lower dose GLP-1 agonist in the future, or oral semaglutide, if prandial coverage required.  We made the following plan today (instructions given to patient):      Lower Lantus to 14 units daily (was 16).     Try adding more protein with the higher carb foods.     Goals: sensor >70% in target range- most glucose under 180 mg/dL.  A1c goal: <8%.     Jardiance: Stop this medication during times of illness or dehydration.  Drink plenty of water.     Please let me know if you are having low blood sugars less than 70 or over 250 mg/dL.      If you have concerns, please send me a [facility] message M-Th, call the clinic at [phone], or call [phone] after hours/weekends and ask to speak with the endocrinologist on call.         2.  Risk factors-     Retinopathy:  Yes. Mild, non-proliferative retinopathy. 6/2024.    Nephropathy:  BP well controlled. No microalbuminuria.  Creatinine stable.   Neuropathy: No.    Feet: OK, no ulcers.   Lipids:  LDL at target.  Patient taking statin    3.  F/U prn.  Patient has met criteria for clinic graduation with stable glucose control and will follow up with with PCP for diabetes management.  Alternatively, we would be happy to see him/her again, as needed, if questions or concerns arise, including rising hemoglobin A1c, concern for new diabetes or medication related complications or need for adjustment.     32 minutes spent on the date of the encounter doing chart review, review of test results, review of continuous glucose sensor, interpretation of glucose data, patient visit and documentation, counseling/coordination of care, and  discussion of follow up plan for worsening hyper and hypoglycemia.  The patient understood and is satisfied with today's visit.        Kristen Doss PA-C, MPAS   [facility]  Department of Medicine  Division of Endocrinology and Diabetes                                          Again, thank you for allowing me to participate in the care of your patient.      Sincerely,    Kristen Doss PA-C

## 2024-10-14 NOTE — PATIENT INSTRUCTIONS
Lower Lantus to 14 units daily (was 16).     Try adding more protein with the higher carb foods.     Goals: sensor >70% in target range- most glucose under 180 mg/dL.  A1c goal: <8%.     Jardiance: Stop this medication during times of illness or dehydration.  Drink plenty of water.     Please let me know if you are having low blood sugars less than 70 or over 250 mg/dL.      If you have concerns, please send me a Aibo message M-Th, call the clinic at [phone], or call [phone] after hours/weekends and ask to speak with the endocrinologist on call.            You have earned this graduation by achieving diabetes related goals and stability.    It has been a pleasure serving you.  Please use the lessons learned in the diabetes clinic as a base on which to build a lifetime of better health and wellness.  You should continue to regularly follow up with your primary care provider for diabetes management.    Please schedule appointment with your primary care provider within the next 3 to 6 months of your graduation.   We have refilled all of your diabetes-related medication for the next year.    Future changes and refills should come from your primary care provider.     The diabetes care team remains available to you for future consultation should you and your doctor have new questions or concerns.

## 2024-10-23 ENCOUNTER — MYC REFILL (OUTPATIENT)
Dept: ENDOCRINOLOGY | Facility: CLINIC | Age: 79
End: 2024-10-23
Payer: MEDICARE

## 2024-10-23 DIAGNOSIS — E11.8 TYPE 2 DIABETES MELLITUS WITH COMPLICATION, WITHOUT LONG-TERM CURRENT USE OF INSULIN (H): Primary | ICD-10-CM

## 2024-10-24 RX ORDER — INSULIN GLARGINE 100 [IU]/ML
INJECTION, SOLUTION SUBCUTANEOUS
Qty: 15 ML | Refills: 2 | Status: SHIPPED | OUTPATIENT
Start: 2024-10-24 | End: 2024-10-29

## 2024-10-29 ENCOUNTER — TELEPHONE (OUTPATIENT)
Dept: ENDOCRINOLOGY | Facility: CLINIC | Age: 79
End: 2024-10-29
Payer: MEDICARE

## 2024-10-29 DIAGNOSIS — E11.8 TYPE 2 DIABETES MELLITUS WITH COMPLICATION, WITHOUT LONG-TERM CURRENT USE OF INSULIN (H): ICD-10-CM

## 2024-10-29 DIAGNOSIS — E11.9 WELL CONTROLLED TYPE 2 DIABETES MELLITUS (H): ICD-10-CM

## 2024-10-29 RX ORDER — HYDROCHLOROTHIAZIDE 12.5 MG/1
1 CAPSULE ORAL SEE ADMIN INSTRUCTIONS
Qty: 6 EACH | Refills: 5 | OUTPATIENT
Start: 2024-10-29 | End: 2024-10-29

## 2024-10-29 RX ORDER — INSULIN GLARGINE 100 [IU]/ML
INJECTION, SOLUTION SUBCUTANEOUS
Qty: 15 ML | Refills: 2 | Status: SHIPPED | OUTPATIENT
Start: 2024-10-29

## 2024-10-29 RX ORDER — HYDROCHLOROTHIAZIDE 12.5 MG/1
1 CAPSULE ORAL SEE ADMIN INSTRUCTIONS
Qty: 6 EACH | Refills: 1 | Status: SHIPPED | OUTPATIENT
Start: 2024-10-29

## 2024-10-29 NOTE — TELEPHONE ENCOUNTER
[facility] Call Center    Phone Message    May a detailed message be left on voicemail: yes     Reason for Call: Medication Question or concern regarding medication   Prescription Clarification  Name of Medication:   insulin glargine (LANTUS SOLOSTAR) 100 UNIT/ML pen   Prescribing Provider: Kristen Doss   [facility]   What on the order needs clarification? Pharmacy needing call back to verify directions on this medication

## 2024-10-29 NOTE — TELEPHONE ENCOUNTER
The order just had Inject 14 units it didn't say daily or subcutaneous. New orders written. Stephania Burdick RN on 10/29/2024 at 3:01 PM

## 2024-10-29 NOTE — TELEPHONE ENCOUNTER
Gradually improving but continues to be intubated with difficulty to wean during spontaneous breathing trials. He has had paroxysmal VT requiring repeat IV amiodarone boluses. He had a fever to 38.1 last night at 20:00.       Medications:  sodium chloride 0.9%. 1000milliLiter(s) IV Continuous <Continuous>  docusate sodium 100milliGRAM(s) Oral three times a day  dextrose 5%. 1000milliLiter(s) IV Continuous <Continuous>  dextrose 50% Injectable 25Gram(s) IV Push once  chlorhexidine 0.12% Liquid 15milliLiter(s) Swish and Spit two times a day  propofol Infusion 11.111MICROgram(s)/kG/Min IV Continuous <Continuous>  amiodarone Infusion 0.5mG/Min IV Continuous <Continuous>  amiodarone    Tablet 400milliGRAM(s) Oral every 8 hours  vasopressin Infusion 0.067Unit(s)/Min IV Continuous <Continuous>  pantoprazole  Injectable 40milliGRAM(s) IV Push every 24 hours  furosemide Infusion 7.6mG/Hr IV Continuous <Continuous>  argatroban Infusion 0.2MICROgram(s)/kG/Min IV Continuous <Continuous>  meropenem IVPB 500milliGRAM(s) IV Intermittent every 12 hours  insulin lispro (HumaLOG) corrective regimen sliding scale  SubCutaneous every 4 hours  milrinone Infusion 0.375MICROgram(s)/kG/Min IV Continuous <Continuous>      Vitals:  T(C): 37.4, Max: 38.1 (06-16 @ 20:00)  HR: 88 (61 - 111)  BP: --  BP(mean): --  ABP: 69/62 (58/50 - 107/86)  ABP(mean): 65 (54 - 95)  RR: 15 (5 - 25)  SpO2: 96% (86% - 100%)  Wt(kg): --  CVP(cm H2O): --  CO: 5.7 (3.3 - 6.4)  CI: 3 (1.7 - 3.4)  PA: 43/20 (28/11 - 246/214)  PA(mean): 27 (18 - 231)  PCWP: --  SVR: 911 (858 - 1376)  PVR: --  Vital Signs Last 24 Hrs  T(C): 37.4, Max: 38.1 (-16 @ 20:00)  T(F): 99.3, Max: 100.6 (06-16 @ 20:00)  HR: 88 (61 - 111)  BP: --  BP(mean): --  RR: 15 (5 - 25)  SpO2: 96% (86% - 100%)    Daily     Daily Weight in k.1 (2017 00:00)    I&O's Summary    I & Os for current day (as of 2017 07:50)  =============================================  IN: 4604.9 ml / OUT: 5810 ml / NET: -1205.1 ml    I&O's Detail    I & Os for current day (as of 2017 07:50)  =============================================  IN:    IV PiggyBack: 1550 ml    Nepro with Carb Steady: 1200 ml    amiodarone Infusion: 400.8 ml    Packed Red Blood Cells: 320 ml    Albumin 5%  - 250 mL: 250 ml    sodium chloride 0.9%.: 240 ml    milrinone  Infusion: 176.4 ml    propofol Infusion: 115.9 ml    Enteral Tube Flush: 90 ml    furosemide Infusion: 55 ml    vasopressin Infusion: 55 ml    furosemide Infusion: 51.4 ml    freetext medication -  Infusion: 33.6 ml    dexmedetomidine Infusion: 26.2 ml    argatroban Infusion: 21.6 ml    furosemide Infusion: 11.4 ml    furosemide Infusion: 7.6 ml    Total IN: 4604.9 ml  ---------------------------------------------  OUT:    Indwelling Catheter - Urethral: 5510 ml    Chest Tube: 230 ml    Chest Tube: 70 ml    Total OUT: 5810 ml  ---------------------------------------------  Total NET: -1205.1 ml    Physical Exam:     General: No distress. Comfortable.  HEENT   Neck: Neck supple. JVP not elevated. No massess  Chest: Clear to auscultation bilaterally  CV: Typical LVAD sounds. No distal pulses  Abdomen: Soft, non-distended, non-tender  Driveline exit site: Clean, dry, and intact  Skin: No rashes or skin breakdown  Neurology: Alert and oriented times three. Sensation intact  Psych: Affect normal    LVAD Interrogation: Heart Mate II  RPMs:  Power:  Flow:  PI:  Event:  No programming changes were made    Labs:                        8.6    13.9  )-----------( 52       ( 2017 03:06 )             26.8         130<L>  |  93<L>  |  41<H>  ----------------------------<  113<H>  4.3   |  22  |  2.60<H>    Ca    7.9<L>      2017 03:06  Phos  3.8       Mg     2.7     06-15    TPro  5.8<L>  /  Alb  3.0<L>  /  TBili  5.5<H>  /  DBili  x   /  AST  83<H>  /  ALT  54<H>  /  AlkPhos  94      PT/INR - ( 2017 03:06 )   PT: 19.1 sec;   INR: 1.73 ratio         PTT - ( 2017 00:39 )  PTT:59.2 sec        Oxygen Saturation, Mixed: 73 % ( @ 03:03)  Oxygen Saturation, Mixed: 63 % ( @ 20:00)  Oxygen Saturation, Mixed: 61 % ( @ 14:40)      Lactate Dehydrogenase, Serum: 687 U/L ( @ 03:06)  Lactate Dehydrogenase, Serum: 814 U/L ( @ 01:54)  Lactate Dehydrogenase, Serum: 889 U/L (06-15 @ 02:06) This message is to inform you that we are in need of Medicare compliant prescriptions for Freestyle Perry 3 Plus Sensors.     Prescription must be written by: Kristen Doss.  Must include full supply name: Freestyle Perry 3 PLUS Sensor  Quantity: 6   Refills: 1  SIG: Change every 15 days    As a reminder, Medicare requires patients to be seen every 3 months for insulin supplies and every 6 months for CGMs. The provider who sees the patient must be the provider on the prescription, must be written on the day of or after office visit date and office visit must include notes about diabetes and insulin regimen. The Diabetes Care Services team at [facility] may request new prescriptions before renewal dates of the prescription is filled over the allowable amount. Writing the order to match what is above will help ensure we will only need to request every 3 or 6 months.    Thank you!    [facility]  Diabetes Care Services Team  [address] [place] [address]  Provider Phone: [phone]  Patient Line: [phone]  Fax: [phone]  E-mail: [email or web address]       Gradually improving but continues to be intubated with difficulty to wean during spontaneous breathing trials. He has had paroxysmal VT requiring repeat IV amiodarone boluses. He had a fever to 38.1 last night at 20:00.       Medications:  sodium chloride 0.9%. 1000milliLiter(s) IV Continuous <Continuous>  docusate sodium 100milliGRAM(s) Oral three times a day  dextrose 5%. 1000milliLiter(s) IV Continuous <Continuous>  dextrose 50% Injectable 25Gram(s) IV Push once  chlorhexidine 0.12% Liquid 15milliLiter(s) Swish and Spit two times a day  propofol Infusion 11.111MICROgram(s)/kG/Min IV Continuous <Continuous>  amiodarone Infusion 0.5mG/Min IV Continuous <Continuous>  amiodarone    Tablet 400milliGRAM(s) Oral every 8 hours  vasopressin Infusion 0.067Unit(s)/Min IV Continuous <Continuous>  pantoprazole  Injectable 40milliGRAM(s) IV Push every 24 hours  furosemide Infusion 7.6mG/Hr IV Continuous <Continuous>  argatroban Infusion 0.2MICROgram(s)/kG/Min IV Continuous <Continuous>  meropenem IVPB 500milliGRAM(s) IV Intermittent every 12 hours  insulin lispro (HumaLOG) corrective regimen sliding scale  SubCutaneous every 4 hours  milrinone Infusion 0.375MICROgram(s)/kG/Min IV Continuous <Continuous>      Vitals:  T(C): 37.4, Max: 38.1 (06-16 @ 20:00)  HR: 88 (61 - 111)  BP: --  BP(mean): --  ABP: 69/62 (58/50 - 107/86)  ABP(mean): 65 (54 - 95)  RR: 15 (5 - 25)  SpO2: 96% (86% - 100%)  Wt(kg): --  CVP(cm H2O): --  CO: 5.7 (3.3 - 6.4)  CI: 3 (1.7 - 3.4)  PA: 43/20 (28/11 - 246/214)  PA(mean): 27 (18 - 231)  PCWP: --  SVR: 911 (858 - 1376)  PVR: --  Vital Signs Last 24 Hrs  T(C): 37.4, Max: 38.1 (-16 @ 20:00)  T(F): 99.3, Max: 100.6 (06-16 @ 20:00)  HR: 88 (61 - 111)  BP: --  BP(mean): --  RR: 15 (5 - 25)  SpO2: 96% (86% - 100%)    Daily     Daily Weight in k.1 (2017 00:00)    I&O's Summary    I & Os for current day (as of 2017 07:50)  =============================================  IN: 4604.9 ml / OUT: 5810 ml / NET: -1205.1 ml    I&O's Detail    I & Os for current day (as of 2017 07:50)  =============================================  IN:    IV PiggyBack: 1550 ml    Nepro with Carb Steady: 1200 ml    amiodarone Infusion: 400.8 ml    Packed Red Blood Cells: 320 ml    Albumin 5%  - 250 mL: 250 ml    sodium chloride 0.9%.: 240 ml    milrinone  Infusion: 176.4 ml    propofol Infusion: 115.9 ml    Enteral Tube Flush: 90 ml    furosemide Infusion: 55 ml    vasopressin Infusion: 55 ml    furosemide Infusion: 51.4 ml    freetext medication -  Infusion: 33.6 ml    dexmedetomidine Infusion: 26.2 ml    argatroban Infusion: 21.6 ml    furosemide Infusion: 11.4 ml    furosemide Infusion: 7.6 ml    Total IN: 4604.9 ml  ---------------------------------------------  OUT:    Indwelling Catheter - Urethral: 5510 ml    Chest Tube: 230 ml    Chest Tube: 70 ml    Total OUT: 5810 ml  ---------------------------------------------  Total NET: -1205.1 ml    Physical Exam:     General: No distress. Comfortable.  HEENT: PERRL  Neck: Neck supple. JVP not elevated. No masses  Chest: Clear to auscultation bilaterally  CV: Typical LVAD sounds. No distal pulses  Abdomen: Soft, non-distended, non-tender  Driveline exit site: Clean, dry, and intact  Skin: No rashes or skin breakdown  Neurology: Sedated  Psych: Sedated    LVAD Interrogation: Heart Mate II  RPMs: 9200  Power: 5.4  Flow: 4.8  PI: 5.3  Event: No significant events  No programming changes were made    Labs:                        8.6    13.9  )-----------( 52       ( 2017 03:06 )             26.8         130<L>  |  93<L>  |  41<H>  ----------------------------<  113<H>  4.3   |  22  |  2.60<H>    Ca    7.9<L>      2017 03:06  Phos  3.8     -  Mg     2.7     06-15    TPro  5.8<L>  /  Alb  3.0<L>  /  TBili  5.5<H>  /  DBili  x   /  AST  83<H>  /  ALT  54<H>  /  AlkPhos  94      PT/INR - ( 2017 03:06 )   PT: 19.1 sec;   INR: 1.73 ratio         PTT - ( 2017 00:39 )  PTT:59.2 sec        Oxygen Saturation, Mixed: 73 % ( @ 03:03)  Oxygen Saturation, Mixed: 63 % ( @ 20:00)  Oxygen Saturation, Mixed: 61 % ( @ 14:40)      Lactate Dehydrogenase, Serum: 687 U/L ( @ 03:06)  Lactate Dehydrogenase, Serum: 814 U/L ( @ 01:54)  Lactate Dehydrogenase, Serum: 889 U/L (06-15 @ 02:06)

## 2024-10-31 ENCOUNTER — TRANSFERRED RECORDS (OUTPATIENT)
Dept: HEALTH INFORMATION MANAGEMENT | Facility: CLINIC | Age: 79
End: 2024-10-31
Payer: MEDICARE

## 2024-11-21 NOTE — TELEPHONE ENCOUNTER
RECORDS RECEIVED FROM: Internal    REASON FOR VISIT: Abnormal MRI of the head   PROVIDER: Ramesh Ann DO   DATE OF APPT: 1/10/25 @ 8:00 am    NOTES (FOR ALL VISITS) STATUS DETAILS   OFFICE NOTE from referring provider Internal 7/31/24, 6/25/24, 5/17/24 Anita Pineda MD @[facility]     OFFICE NOTE from other specialist Internal 8/13/24 Anmol Esposito MA  @[facility]    6/4/24 (Transferred Recs) Yuly Hightower MD @[facility]     MEDICATION LIST Internal    IMAGING  (FOR ALL VISITS)     MRI (HEAD, NECK, SPINE) Internal [facility]  6/13/24 MR Brain

## 2024-12-07 SDOH — HEALTH STABILITY: PHYSICAL HEALTH: ON AVERAGE, HOW MANY DAYS PER WEEK DO YOU ENGAGE IN MODERATE TO STRENUOUS EXERCISE (LIKE A BRISK WALK)?: 0 DAYS

## 2024-12-07 ASSESSMENT — SOCIAL DETERMINANTS OF HEALTH (SDOH): HOW OFTEN DO YOU GET TOGETHER WITH FRIENDS OR RELATIVES?: MORE THAN THREE TIMES A WEEK

## 2024-12-12 ENCOUNTER — OFFICE VISIT (OUTPATIENT)
Dept: INTERNAL MEDICINE | Facility: CLINIC | Age: 79
End: 2024-12-12
Attending: INTERNAL MEDICINE
Payer: MEDICARE

## 2024-12-12 VITALS
SYSTOLIC BLOOD PRESSURE: 120 MMHG | DIASTOLIC BLOOD PRESSURE: 64 MMHG | BODY MASS INDEX: 22.08 KG/M2 | HEIGHT: 71 IN | TEMPERATURE: 97.1 F | HEART RATE: 88 BPM | RESPIRATION RATE: 16 BRPM | WEIGHT: 157.7 LBS | OXYGEN SATURATION: 97 %

## 2024-12-12 DIAGNOSIS — I63.9 CEREBROVASCULAR ACCIDENT (CVA), UNSPECIFIED MECHANISM (H): ICD-10-CM

## 2024-12-12 DIAGNOSIS — D69.6 THROMBOCYTOPENIA (H): ICD-10-CM

## 2024-12-12 DIAGNOSIS — Z79.4 TYPE 2 DIABETES MELLITUS WITHOUT COMPLICATION, WITH LONG-TERM CURRENT USE OF INSULIN (H): ICD-10-CM

## 2024-12-12 DIAGNOSIS — E11.9 TYPE 2 DIABETES MELLITUS WITHOUT COMPLICATION, WITH LONG-TERM CURRENT USE OF INSULIN (H): ICD-10-CM

## 2024-12-12 DIAGNOSIS — I25.10 CORONARY ARTERY DISEASE DUE TO CALCIFIED CORONARY LESION: ICD-10-CM

## 2024-12-12 DIAGNOSIS — I35.1 AORTIC VALVE REGURGITATION DUE TO AORTIC DILATION (H): ICD-10-CM

## 2024-12-12 DIAGNOSIS — G47.39 COMPLEX SLEEP APNEA SYNDROME: ICD-10-CM

## 2024-12-12 DIAGNOSIS — Z00.00 ENCOUNTER FOR ANNUAL WELLNESS VISIT (AWV) IN MEDICARE PATIENT: Primary | ICD-10-CM

## 2024-12-12 DIAGNOSIS — Z12.5 SCREENING FOR PROSTATE CANCER: ICD-10-CM

## 2024-12-12 DIAGNOSIS — D61.818 PANCYTOPENIA (H): ICD-10-CM

## 2024-12-12 DIAGNOSIS — Z98.890 H/O CAROTID ENDARTERECTOMY: ICD-10-CM

## 2024-12-12 DIAGNOSIS — Z87.898 HISTORY OF SYNCOPE: ICD-10-CM

## 2024-12-12 DIAGNOSIS — E78.2 MIXED HYPERLIPIDEMIA: ICD-10-CM

## 2024-12-12 DIAGNOSIS — M85.89 OSTEOPENIA OF MULTIPLE SITES: ICD-10-CM

## 2024-12-12 DIAGNOSIS — I77.810 DILATATION OF THORACIC AORTA (H): ICD-10-CM

## 2024-12-12 DIAGNOSIS — I25.84 CORONARY ARTERY DISEASE DUE TO CALCIFIED CORONARY LESION: ICD-10-CM

## 2024-12-12 DIAGNOSIS — F33.9 RECURRENT MAJOR DEPRESSIVE DISORDER, REMISSION STATUS UNSPECIFIED (H): ICD-10-CM

## 2024-12-12 DIAGNOSIS — I77.819 AORTIC VALVE REGURGITATION DUE TO AORTIC DILATION (H): ICD-10-CM

## 2024-12-12 LAB
ALBUMIN SERPL BCG-MCNC: 4.5 G/DL (ref 3.5–5.2)
ALBUMIN UR-MCNC: NEGATIVE MG/DL
ALP SERPL-CCNC: 82 U/L (ref 40–150)
ALT SERPL W P-5'-P-CCNC: 57 U/L (ref 0–70)
ANION GAP SERPL CALCULATED.3IONS-SCNC: 11 MMOL/L (ref 7–15)
APPEARANCE UR: CLEAR
AST SERPL W P-5'-P-CCNC: 29 U/L (ref 0–45)
BILIRUB SERPL-MCNC: 0.3 MG/DL
BILIRUB UR QL STRIP: NEGATIVE
BUN SERPL-MCNC: 26.1 MG/DL (ref 8–23)
CALCIUM SERPL-MCNC: 9.8 MG/DL (ref 8.8–10.4)
CHLORIDE SERPL-SCNC: 101 MMOL/L (ref 98–107)
COLOR UR AUTO: YELLOW
CREAT SERPL-MCNC: 0.67 MG/DL (ref 0.67–1.17)
CREAT UR-MCNC: 49.6 MG/DL
EGFRCR SERPLBLD CKD-EPI 2021: >90 ML/MIN/1.73M2
ERYTHROCYTE [DISTWIDTH] IN BLOOD BY AUTOMATED COUNT: 11.8 % (ref 10–15)
EST. AVERAGE GLUCOSE BLD GHB EST-MCNC: 174 MG/DL
GLUCOSE SERPL-MCNC: 167 MG/DL (ref 70–99)
GLUCOSE UR STRIP-MCNC: 500 MG/DL
HBA1C MFR BLD: 7.7 % (ref 0–5.6)
HCO3 SERPL-SCNC: 27 MMOL/L (ref 22–29)
HCT VFR BLD AUTO: 41.6 % (ref 40–53)
HGB BLD-MCNC: 14.3 G/DL (ref 13.3–17.7)
HGB UR QL STRIP: NEGATIVE
KETONES UR STRIP-MCNC: ABNORMAL MG/DL
LDLC SERPL DIRECT ASSAY-MCNC: 74 MG/DL
LEUKOCYTE ESTERASE UR QL STRIP: NEGATIVE
MCH RBC QN AUTO: 34 PG (ref 26.5–33)
MCHC RBC AUTO-ENTMCNC: 34.4 G/DL (ref 31.5–36.5)
MCV RBC AUTO: 99 FL (ref 78–100)
MICROALBUMIN UR-MCNC: <12 MG/L
MICROALBUMIN/CREAT UR: NORMAL MG/G{CREAT}
NITRATE UR QL: NEGATIVE
PH UR STRIP: 5.5 [PH] (ref 5–8)
PLATELET # BLD AUTO: 157 10E3/UL (ref 150–450)
POTASSIUM SERPL-SCNC: 4.4 MMOL/L (ref 3.4–5.3)
PROT SERPL-MCNC: 7.3 G/DL (ref 6.4–8.3)
PSA SERPL DL<=0.01 NG/ML-MCNC: 0.39 NG/ML (ref 0–6.5)
RBC # BLD AUTO: 4.2 10E6/UL (ref 4.4–5.9)
SODIUM SERPL-SCNC: 139 MMOL/L (ref 135–145)
SP GR UR STRIP: 1.02 (ref 1–1.03)
TSH SERPL DL<=0.005 MIU/L-ACNC: 1.32 UIU/ML (ref 0.3–4.2)
UROBILINOGEN UR STRIP-ACNC: 0.2 E.U./DL
VIT D+METAB SERPL-MCNC: 45 NG/ML (ref 20–50)
WBC # BLD AUTO: 5.6 10E3/UL (ref 4–11)

## 2024-12-12 RX ORDER — PEN NEEDLE, DIABETIC 32GX 5/32"
NEEDLE, DISPOSABLE MISCELLANEOUS
Qty: 100 EACH | Refills: 3 | Status: SHIPPED | OUTPATIENT
Start: 2024-12-12

## 2024-12-12 ASSESSMENT — PATIENT HEALTH QUESTIONNAIRE - PHQ9: SUM OF ALL RESPONSES TO PHQ QUESTIONS 1-9: 0

## 2024-12-12 NOTE — PATIENT INSTRUCTIONS
To see Neurologist in January, discuss MRI, previous strokes, fainting and tremor.    Schedule follow-up with Cardiology in summer of 2025.    Schedule ECHO now -     Labs today.

## 2024-12-12 NOTE — PROGRESS NOTES
Preventive Care Visit  [facility]  Anita Pineda MD, Internal Medicine  Dec 12, 2024      Assessment & Plan     Encounter for annual wellness visit (AWV) in Medicare patient      Type 2 diabetes mellitus without complication, with long-term current use of insulin (H)  Lantus 14 unit(s) at bedtime, metformin 1000 mg bid, Jardiance 25 mg. No reported hypoglycemia. Seeing endocrinologist.   Since he lost significant amount of weight, Endocrinologist stopped Ozempic in mid June 2024. His weight is now stable, gained 5-10 lbs.   - Albumin Random Urine Quantitative with Creat Ratio; Future  - insulin pen needle (BD PEN NEEDLE JERRY 2ND GEN) 32G X 4 MM miscellaneous; Use 1 pen needles daily or as directed.  - Comprehensive metabolic panel; Future  - Hemoglobin A1c; Future  - TSH with free T4 reflex; Future  - UA Macroscopic with reflex to Microscopic and Culture; Future  - Albumin Random Urine Quantitative with Creat Ratio  - Comprehensive metabolic panel  - Hemoglobin A1c  - TSH with free T4 reflex  - UA Macroscopic with reflex to Microscopic and Culture    Cerebrovascular accident (CVA), unspecified mechanism (H)  H/O carotid endarterectomy  Abnormal MRI  Encephalomalacia on imaging study  he noticed more issues with memory and difficulty recalling some words.   MRI 6/13/24:  IMPRESSION:  1.  No acute intracranial abnormality.  2.  Numerous chronic findings, including multifocal encephalomalacia    Recurrent major depressive disorder, remission status unspecified (H)  denies worsening depression and suicidal ideas. [facility] and venlafaxine was recently increased.     Thrombocytopenia (H)  Pancytopenia (H)  He is seeing hematologist. Stable.  - CBC with platelets; Future  - CBC with platelets    Aortic valve regurgitation due to aortic dilation (H)  Dilatation of thoracic aorta (H)  We will repeat ECHO now since had syncopal episode. He will follow-up with  Cardiology.  - Echocardiogram Complete; Future    Coronary artery disease due to calcified coronary lesion  He saw Cardiologist last summer. Had normal ECHO and CTA.  Coronary calcification - on aspirin and high intensity statin. Diet and exercise discussed.     Bicuspid aortic valve with mild regurgitation and mild enlargement of the aorta at the sinuses - BP controlled. Repeat echo in 2 years if asymptomatic.   - Echocardiogram Complete; Future    Osteopenia of multiple sites  Stable, last DXA in 1/2024.  - TSH with free T4 reflex; Future  - Vitamin D Deficiency; Future  - TSH with free T4 reflex  - Vitamin D Deficiency    Mixed hyperlipidemia  On statin   - LDL cholesterol direct; Future  - LDL cholesterol direct    Complex sleep apnea syndrome  He is using the CPAP every night     Pancytopenia (H)    - CBC with platelets; Future  - CBC with platelets    Screening for prostate cancer    - Prostate Specific Antigen Screen; Future  - Prostate Specific Antigen Screen    History of syncope  Fainting episode in September, caused the left ankle fracture.  He saw Neurologist at [facility]. Had MRI done as also EEG.  He is on ASA and Plavix since has h/o 2 strokes.  - Echocardiogram Complete; Future            Counseling  Appropriate preventive services were addressed with this patient via screening, questionnaire, or discussion as appropriate for fall prevention, nutrition, physical activity, Tobacco-use cessation, social engagement, weight loss and cognition.  Checklist reviewing preventive services available has been given to the patient.  Reviewed patient's diet, addressing concerns and/or questions.   The patient was provided with written information regarding signs of hearing loss.   Information on urinary incontinence and treatment options given to patient.           Dominique Beach is a 79 year old, presenting for the following:  Wellness Visit (DXA 1/10/24 Colon 8/17/23-Has Question About Insuline, Can He Go Up A  Dose-Still Has Sore On L Foot )        12/12/2024    12:09 PM   Additional Questions   Roomed by Diane QURESHI          Health Care Directive  Patient does not have a Health Care Directive: Discussed advance care planning with patient; however, patient declined at this time.      12/7/2024   General Health   How would you rate your overall physical health? (!) FAIR   Feel stress (tense, anxious, or unable to sleep) Not at all            12/7/2024   Nutrition   Diet: Diabetic    Carbohydrate counting       Multiple values from one day are sorted in reverse-chronological order         12/7/2024   Exercise   Days per week of moderate/strenous exercise 0 days      (!) EXERCISE CONCERN      12/7/2024   Social Factors   Frequency of gathering with friends or relatives More than three times a week   Worry food won't last until get money to buy more No   Food not last or not have enough money for food? No   Do you have housing? (Housing is defined as stable permanent housing and does not include staying ouside in a car, in a tent, in an abandoned building, in an overnight shelter, or couch-surfing.) Yes   Are you worried about losing your housing? No   Lack of transportation? No   Unable to get utilities (heat,electricity)? No            12/12/2024   Fall Risk   Fallen 2 or more times in the past year? No   Trouble with walking or balance? No   Gait Speed Test Interpretation Less than or equal to 5.00 seconds - PASS             12/7/2024   Activities of Daily Living- Home Safety   Needs help with the following daily activites None of the above   Safety concerns in the home None of the above            12/7/2024   Dental   Dentist two times every year? Yes            12/7/2024   Hearing Screening   Hearing concerns? (!) IT'S HARD TO FOLLOW A CONVERSATION IN A NOISY RESTAURANT OR CROWDED ROOM.            12/7/2024   Driving Risk Screening   Patient/family members have concerns about driving No            12/7/2024    General Alertness/Fatigue Screening   Have you been more tired than usual lately? No            2024   Urinary Incontinence Screening   Bothered by leaking urine in past 6 months Yes            2024   TB Screening   Were you born outside of the [place]? No          Today's PHQ-9 Score:       2024    12:18 PM   PHQ-9 SCORE   PHQ-9 Total Score 0         2024   Substance Use   Alcohol more than 3/day or more than 7/wk No   Do you have a current opioid prescription? No   How severe/bad is pain from 1 to 10? 2/10   Do you use any other substances recreationally? No        Social History     Tobacco Use    Smoking status: Former     Types: Cigars, Pipe     Quit date: 1980     Years since quittin.7     Passive exposure: Never    Smokeless tobacco: Former     Types: Chew    Tobacco comments:     Quit 40 years ago.   Vaping Use    Vaping status: Never Used   Substance Use Topics    Alcohol use: No    Drug use: No       ASCVD Risk   The ASCVD Risk score (Crystal OLVERA, et al., 2019) failed to calculate for the following reasons:    Risk score cannot be calculated because patient has a medical history suggesting prior/existing ASCVD            Reviewed and updated as needed this visit by Provider                      Current providers sharing in care for this patient include:  Patient Care Team:  Anita Pineda MD as PCP - General (Internal Medicine)  Pat Solano MD as MD (Hematology & Oncology)  Anita Pineda MD as Assigned PCP  Olive Street MD as Assigned Heart and Vascular Provider  Kishore Glover MD as Physician (Urology)  Kathya Courtney PA-C as Physician Assistant (Endocrinology, Diabetes, and Metabolism)  Kristen Doss PA-C as Assigned Endocrinology Provider  Elizabeth Georges RD as Diabetes Educator (Dietitian, Registered)  Bertha Lucas MD as MD (Hematology)  Ramesh Ann DO as Physician (Neurology)  Bertha Lucas MD as Assigned  "Cancer Care Provider  Pedro Waller, PhD as Assigned Behavioral Health Provider    The following health maintenance items are reviewed in Epic and correct as of today:  Health Maintenance   Topic Date Due    DEPRESSION ACTION PLAN  Never done    ZOSTER IMMUNIZATION (3 of 3) 12/04/2019    DIABETIC FOOT EXAM  10/02/2024    MICROALBUMIN  12/06/2024    MEDICARE ANNUAL WELLNESS VISIT  12/06/2024    A1C  04/14/2025    ANNUAL REVIEW OF HM ORDERS  05/17/2025    EYE EXAM  06/04/2025    PHQ-9  06/12/2025    LIPID  07/31/2025    BMP  08/16/2025    FALL RISK ASSESSMENT  12/12/2025    DTAP/TDAP/TD IMMUNIZATION (3 - Td or Tdap) 04/27/2028    ADVANCE CARE PLANNING  12/06/2028    HEPATITIS C SCREENING  Completed    INFLUENZA VACCINE  Completed    Pneumococcal Vaccine: 65+ Years  Completed    RSV VACCINE  Completed    COVID-19 Vaccine  Completed    HPV IMMUNIZATION  Aged Out    MENINGITIS IMMUNIZATION  Aged Out    RSV MONOCLONAL ANTIBODY  Aged Out    COLORECTAL CANCER SCREENING  Discontinued    LUNG CANCER SCREENING  Discontinued            Objective    Exam  /64   Pulse 88   Temp 97.1  F (36.2  C) (Temporal)   Resp 16   Ht 1.792 m (5' 10.55\")   Wt 71.5 kg (157 lb 11.2 oz)   SpO2 97%   BMI 22.28 kg/m     Estimated body mass index is 22.28 kg/m  as calculated from the following:    Height as of this encounter: 1.792 m (5' 10.55\").    Weight as of this encounter: 71.5 kg (157 lb 11.2 oz).    Physical Exam  General Appearance: Alert, cooperative, no distress, appears stated age.  Head: Normocephalic, without obvious abnormality, atraumatic  Eyes: PERRL, conjunctiva/corneas clear, EOM's intact  Ears: Normal TM's and external ear canals, both ears  Nose: Nares normal, septum midline,mucosa normal, no drainage  Throat: Lips, mucosa, and tongue normal; teeth and gums normal  Neck: Supple, symmetrical, trachea midline, no adenopathy;  thyroid: not enlarged, symmetric, no tenderness/mass/nodules; no carotid bruit or " JVD  Back: Symmetric, no curvature, ROM normal, no CVA tenderness.  Lungs: Clear to auscultation bilaterally, respirations unlabored.  Breasts: No breast masses, tenderness, asymmetry, or nipple discharge.  Heart: Regular rate and rhythm, S1 and S2 normal, no murmur, rub, or gallop.  Abdomen: Soft, non-tender, bowel sounds active all four quadrants,  no masses, no organomegaly.  Extremities: Extremities normal, atraumatic, no cyanosis or edema.  Skin: Skin color, texture, turgor normal, no rashes or lesions.  Lymph nodes: Cervical, supraclavicular, and axillary nodes normal.  Neurologic: No focal neurological findings.          12/12/2024   Mini Cog   Clock Draw Score 2 Normal   3 Item Recall 2 objects recalled   Mini Cog Total Score 4                 Signed Electronically by: Anita Pineda MD    Answers submitted by the patient for this visit:  Patient Health Questionnaire (Submitted on 12/12/2024)  PHQ9 TOTAL SCORE: Incomplete

## 2025-01-02 ENCOUNTER — HOSPITAL ENCOUNTER (OUTPATIENT)
Dept: CARDIOLOGY | Facility: CLINIC | Age: 80
End: 2025-01-02
Attending: INTERNAL MEDICINE
Payer: MEDICARE

## 2025-01-02 DIAGNOSIS — I77.819 AORTIC VALVE REGURGITATION DUE TO AORTIC DILATION (H): ICD-10-CM

## 2025-01-02 DIAGNOSIS — I25.10 CORONARY ARTERY DISEASE DUE TO CALCIFIED CORONARY LESION: ICD-10-CM

## 2025-01-02 DIAGNOSIS — I77.810 DILATATION OF THORACIC AORTA (H): ICD-10-CM

## 2025-01-02 DIAGNOSIS — I35.1 AORTIC VALVE REGURGITATION DUE TO AORTIC DILATION (H): ICD-10-CM

## 2025-01-02 DIAGNOSIS — I25.84 CORONARY ARTERY DISEASE DUE TO CALCIFIED CORONARY LESION: ICD-10-CM

## 2025-01-02 DIAGNOSIS — Z87.898 HISTORY OF SYNCOPE: ICD-10-CM

## 2025-01-02 LAB — LVEF ECHO: NORMAL

## 2025-01-02 PROCEDURE — 93306 TTE W/DOPPLER COMPLETE: CPT

## 2025-01-10 ENCOUNTER — PRE VISIT (OUTPATIENT)
Dept: NEUROLOGY | Facility: CLINIC | Age: 80
End: 2025-01-10

## 2025-01-13 ENCOUNTER — LAB (OUTPATIENT)
Dept: LAB | Facility: CLINIC | Age: 80
End: 2025-01-13
Payer: MEDICARE

## 2025-01-13 ENCOUNTER — OFFICE VISIT (OUTPATIENT)
Dept: DERMATOLOGY | Facility: CLINIC | Age: 80
End: 2025-01-13
Payer: MEDICARE

## 2025-01-13 DIAGNOSIS — L82.1 SEBORRHEIC KERATOSIS: ICD-10-CM

## 2025-01-13 DIAGNOSIS — L72.0 EPIDERMAL CYST: ICD-10-CM

## 2025-01-13 DIAGNOSIS — L81.4 LENTIGO: ICD-10-CM

## 2025-01-13 DIAGNOSIS — D22.9 MULTIPLE NEVI: ICD-10-CM

## 2025-01-13 DIAGNOSIS — L57.0 ACTINIC KERATOSIS: ICD-10-CM

## 2025-01-13 DIAGNOSIS — D18.01 CHERRY ANGIOMA: ICD-10-CM

## 2025-01-13 DIAGNOSIS — Z12.83 SKIN CANCER SCREENING: Primary | ICD-10-CM

## 2025-01-13 DIAGNOSIS — E11.8 TYPE 2 DIABETES MELLITUS WITH COMPLICATION, WITHOUT LONG-TERM CURRENT USE OF INSULIN (H): ICD-10-CM

## 2025-01-13 DIAGNOSIS — G62.9 NEUROPATHY: ICD-10-CM

## 2025-01-13 DIAGNOSIS — D48.9 NEOPLASM OF UNCERTAIN BEHAVIOR: ICD-10-CM

## 2025-01-13 LAB — TOTAL PROTEIN SERUM FOR ELP: 7.5 G/DL (ref 6.4–8.3)

## 2025-01-13 PROCEDURE — 86334 IMMUNOFIX E-PHORESIS SERUM: CPT | Performed by: PATHOLOGY

## 2025-01-13 PROCEDURE — 84155 ASSAY OF PROTEIN SERUM: CPT

## 2025-01-13 PROCEDURE — 36415 COLL VENOUS BLD VENIPUNCTURE: CPT

## 2025-01-13 PROCEDURE — 84165 PROTEIN E-PHORESIS SERUM: CPT | Performed by: PATHOLOGY

## 2025-01-13 RX ORDER — LOSARTAN POTASSIUM 25 MG/1
12.5 TABLET ORAL DAILY
Qty: 45 TABLET | Refills: 2 | Status: SHIPPED | OUTPATIENT
Start: 2025-01-13

## 2025-01-13 ASSESSMENT — PAIN SCALES - GENERAL: PAINLEVEL_OUTOF10: NO PAIN (0)

## 2025-01-13 NOTE — PROGRESS NOTES
[facility] Note  Encounter Date: Jan 13, 2025  Office Visit     Reviewed patients past medical history and pertinent chart review prior to patients visit today.     Dermatology Problem List:  Last skin check: 1/13/2025  # Neoplasm of uncertain behavior: left upper antihelix, s/p shave biopsy 1/13/2025   # Aks  -s/p cryo  -s/p bluelight PDT x2    Personal Hx: No personal history of skin cancer  Family Hx: Negative for family history of skin cancer.  _________________________________________    Assessment & Plan:     # Neoplasm of uncertain behavior:  left upper antihelix.  DDx includes CNCH vs hypertrophic AK vs SCCIS. Shave biopsy today.  Photograph obtained.  Procedure Note: Biopsy by shave technique  The risks and benefits of the procedure were described to the patient. These include but are not limited to bleeding, infection, scar, incomplete removal, and non-diagnostic biopsy. Verbal informed consent was obtained. The above site(s) was cleansed with an alcohol pad and injected with 1% lidocaine with epinephrine. Once anesthesia was obtained, a biopsy(ies) was performed with Gilette blade. The tissue(s) was placed in a labeled container(s) with formalin and sent to pathology. Hemostasis was achieved with aluminum chloride. Vaseline and a bandage were applied to the wound(s). The patient tolerated the procedure well and was given post biopsy care instructions.    # Actinic keratoses, forehead, bilateral temples, bilateral preauricular region, right submental neck, and left postauricular area  Actinic keratoses are pre-cancerous skin growths caused by sun exposure. Treatment is recommended and medically indicated. Treated with cryotherapy as outlined below.     Procedures performed:   - Cryotherapy procedure note, location(s): See physical exam. After verbal consent and discussion of risks and benefits including, but not limited to, dyspigmentation/scar, blister, and pain, 8  lesion(s) was(were) treated with 1-2 mm freeze border for 1-2 cycles with liquid nitrogen. Post cryotherapy instructions were provided.     # Multiple nevi, trunk and extremities  # Solar lentigines  - Continued observation recommended.   - Nevi demonstrate no concerning features on dermoscopy. We discussed the importance of self exams at home.   - ABCDEs: Counseled ABCDEs of melanoma: Asymmetry, Border (irregularity), Color (not uniform, changes in color), Diameter (greater than 6 mm which is about the size of a pencil eraser), and Evolving (any changes in preexisting moles).  - Sun protection: Counseled SPF 30+ sunscreen, UPF clothing, sun avoidance, tanning bed avoidance.    # Cherry angiomas  # Seborrheic keratoses  # Epidermal inclusion cyst, right mid back  - We discussed the benign nature of the skin lesions. No treatment required. Continued observation recommended. Follow up with any concerns.      Follow-up: 1 year(s) for follow up full body skin exam, prn for new or changing lesions or new concerns    All risks, benefits and alternatives were discussed with patient.  Patient is in agreement and understands the assessment and plan.  All questions were answered.  Agatha Liriano PA-C  [facility]  ____________________________________________    CC: Skin cancer screening exam    HPI:  Mr. Yong Guillermo is a(n) 79 year old male who presents today as a return patient for a full body skin cancer screening. Patient has concerns today about a lesion on the left upper antihelix. This lesion has been present for several years. It does become sore/painful. It is scaly.     Patient is diligent with photoprotection. Patient is otherwise feeling well, without additional skin concerns.     Physical Exam:  Vitals: There were no vitals taken for this visit.  SKIN: Total skin excluding the genitalia areas was performed. The exam included the head/face, neck, both arms, chest, back, abdomen, both legs, digits,  mons pubis, buttock and nails.   -[place] I  -forehead x2, bilateral temples, bilateral preauricular region, right submental neck, and left postauricular area, pink macule(s) with overlying adherent scale consistent with an actinic keratosis   -left upper antihelix, 4 mm pink hyperkeratotic papule  -multiple tan/brown flat round macules and raised papules scattered throughout trunk, extremities, and face. No worrisome features for malignancy noted on examination.  -scattered tan, homogenous macules scattered on sun exposed areas of trunk, extremities, and face  -scattered waxy, stuck on tan/brown papules and patches on the trunk and extremities  -several 1-2mm red dome shaped symmetric papules scattered on the trunk and extremities  -right mid back, firm, mobile, nodule with an overlying dilated punctum     - No other lesions of concern on areas examined.         Medications:  Current Outpatient Medications   Medication Sig Dispense Refill    acetaminophen (TYLENOL) 500 MG tablet Take 650 mg by mouth       aspirin 81 MG EC tablet Take 81 mg by mouth      atorvastatin (LIPITOR) 80 MG tablet TAKE 1 TABLET DAILY 90 tablet 2    blood glucose (FREESTYLE LITE) test strip Use to test blood sugar 4 times daily or as directed. 400 strip 0    blood glucose (FREESTYLE LITE) test strip Use to test blood sugar 4 times daily or as directed. 90 strip 0    blood glucose monitoring (FREESTYLE) lancets USE 1 LANCET DAILY AS NEEDED 100 each 2    Cholecalciferol (VITAMIN D3) 25 MCG (1000 UT) CAPS Take 1,000 Units by mouth      clopidogrel (PLAVIX) 75 MG tablet TAKE 1 TABLET DAILY 90 tablet 3    Continuous Glucose Sensor (FREESTYLE LIZ 3 PLUS SENSOR) MISC 1 Device See Admin Instructions. (Use 1 sensor every 15 days) 6 each 1    Cyanocobalamin (VITAMIN B-12) 500 MCG SUBL Place 500 mcg under the tongue daily.      empagliflozin (JARDIANCE) 25 MG TABS tablet Take 1 tablet (25 mg) by mouth daily. Stop this medication during times of  illness or dehydration.  Drink plenty of water. 90 tablet 3    insulin glargine (LANTUS SOLOSTAR) 100 UNIT/ML pen Inject 14 Units daily subcutaneously 15 mL 2    insulin pen needle (BD PEN NEEDLE JERRY 2ND GEN) 32G X 4 MM miscellaneous Use 1 pen needles daily or as directed. 100 each 3    losartan (COZAAR) 25 MG tablet TAKE ONE-HALF (1/2) TABLET DAILY 90 tablet 3    metFORMIN (GLUCOPHAGE) 500 MG tablet TAKE 2 TABLETS TWICE A DAY WITH MEALS 360 tablet 3    multivitamin w/minerals (THERA-VIT-M) tablet Take 1 tablet by mouth      Omega-3 1000 MG capsule Take 1 g by mouth.      venlafaxine (EFFEXOR XR) 150 MG 24 hr capsule Take 150 mg by mouth daily      venlafaxine (EFFEXOR XR) 75 MG 24 hr capsule Take 75 mg by mouth daily       No current facility-administered medications for this visit.      Past Medical History:   Patient Active Problem List   Diagnosis    Benign essential tremor    Complex sleep apnea syndrome    CVA (cerebral vascular accident) (H)    Type 2 diabetes mellitus without complication, with long-term current use of insulin (H)    H/O carotid endarterectomy    Hyperlipemia    Osteopenia of multiple sites    BPH (benign prostatic hyperplasia)    Macrocytosis without anemia    Major depression, recurrent    Thrombocytopenia    Coronary artery disease due to calcified coronary lesion    Aortic valve regurgitation due to aortic dilation    Chronic right shoulder pain    Dilatation of thoracic aorta     Past Medical History:   Diagnosis Date    Benign essential tremor 09/29/2016    BPH (benign prostatic hyperplasia) 05/26/2015    Coronary artery disease     CVA (cerebral vascular accident) (H) 01/14/2009    Right arm clumsiness.  MRI showed 2 infarcts in the left hemisphere.    CVA (cerebral vascular accident) (H) 06/22/2006    Left arm weakness.    Depression     Diabetes mellitus, type 2 (H)     Hyperlipidemia     Infection due to 2019 novel coronavirus 01/04/2023    Obstructive sleep apnea 07/22/2013     Shingles        CC Referred Self, MD  No address on file on close of this encounter.

## 2025-01-13 NOTE — PATIENT INSTRUCTIONS
Wound Care After a Biopsy    What is a skin biopsy?  A skin biopsy allows the doctor to examine a very small piece of tissue under the microscope to determine the diagnosis and the best treatment for the skin condition. A local anesthetic (numbing medicine)  is injected with a very small needle into the skin area to be tested. A small piece of skin is taken from the area. Sometimes a suture (stitch) is used.     What are the risks of a skin biopsy?  I will experience scar, bleeding, swelling, pain, crusting and redness. I may experience incomplete removal or recurrence. Risks of this procedure are excessive bleeding, bruising, infection, nerve damage, numbness, thick (hypertrophic or keloidal) scar and non-diagnostic biopsy.    How should I care for my wound for the first 24 hours?  Keep the wound dry and covered for 24 hours  If it bleeds, hold direct pressure on the area for 15 minutes. If bleeding does not stop then go to the emergency room  Avoid strenuous exercise the first 1-2 days or as your doctor instructs you    How should I care for the wound after 24 hours?  After 24 hours, remove the bandage  You may bathe or shower as normal  If you had a scalp biopsy, you can shampoo as usual and can use shower water to clean the biopsy site daily  Clean the wound twice a day with gentle soap and water  Do not scrub, be gentle  Apply white petroleum/Vaseline after cleaning the wound with a cotton swab or a clean finger, and keep the site covered with a Bandaid /bandage. Bandages are not necessary with a scalp biopsy  If you are unable to cover the site with a Bandaid /bandage, re-apply ointment 2-3 times a day to keep the site moist. Moisture will help with healing  Avoid strenuous activity for first 1-2 days  Avoid lakes, rivers, pools, and oceans until the stitches are removed or the site is healed    How do I clean my wound?  Wash hands thoroughly with soap or use hand  before all wound care  Clean the  wound with gentle soap and water  Apply white petroleum/Vaseline  to wound after it is clean  Replace the Bandaid /bandage to keep the wound covered for the first few days or as instructed by your doctor  If you had a scalp biopsy, warm shower water to the area on a daily basis should suffice    What should I use to clean my wound?   Cotton-tipped applicators (Qtips )  White petroleum jelly (Vaseline ). Use a clean new container and use Q-tips to apply.  Bandaids   as needed  Gentle soap     How should I care for my wound long term?  Do not get your wound dirty  Keep up with wound care for one week or until the area is healed.  A small scab will form and fall off by itself when the area is completely healed. The area will be red and will become pink in color as it heals. Sun protection is very important for how your scar will turn out. Sunscreen with an SPF 30 or greater is recommended once the area is healed.  You should have some soreness but it should be mild and slowly go away over several days. Talk to your doctor about using tylenol for pain,    When should I call my doctor?  If you have increased:   Pain or swelling  Pus or drainage (clear or slightly yellow drainage is ok)  Temperature over 100F  Spreading redness or warmth around wound    When will I hear about my results?  The biopsy results can take 2 weeks to come back.  Your results will automatically release to [employer] before your provider has even reviewed them.  The clinic will call you with the results, send you a [place] message, or have you schedule a follow-up clinic or phone time to discuss the results.  Contact our clinics if you do not hear from us in 2 weeks. If further treatment is needed for a skin cancer, this will be done at either our [place] or [place] office.    Who should I call with questions?  [facility]: [phone]  [facility]: [phone]  For urgent  needs outside of business hours call the [facility] at [phone] and ask for the dermatology resident on call  Cryotherapy    What is it?  Use of a very cold liquid, such as liquid nitrogen, to freeze and destroy abnormal skin cells that need to be removed    What should I expect?  Tenderness and redness  A small blister that might grow and fill with dark purple blood. There may be crusting.  More than one treatment may be needed if the lesions do not go away.    How do I care for the treated area?  Gently wash the area with your hands when bathing.  Use a thin layer of Vaseline to help with healing. You may use a Band-Aid.   The area should heal within 7-10 days and may leave behind a pink or lighter color.   Do not use an antibiotic or Neosporin ointment.   You may take acetaminophen (Tylenol) for pain.     Call your doctor if you have:  Severe pain  Signs of infection (warmth, redness, cloudy yellow drainage, and or a bad smell)  Questions or concerns    Who should I call with questions?      [facility]: [phone]      [facility]: [phone]      For urgent needs outside of business hours call the [facility] at [phone] and ask for the dermatology resident on call        Proper skin care from [facility]:    -Eliminate harsh soaps as they strip the natural oils from the skin, often resulting in dry itchy skin ( i.e. Dial, Zest, [place] Spring)  -Use mild soaps such as Cetaphil or Dove Sensitive Skin in the shower. You do not need to use soap on arms, legs, and trunk every time you shower unless visibly soiled.   -Avoid hot or cold showers.  -After showering, lightly dry off and apply moisturizing within 2-3 minutes. This will help trap moisture in the skin.   -Aggressive use of a moisturizer at least 1-2 times a day to the entire body (including -Vanicream, Cetaphil, Aquaphor or Cerave) and moisturize hands after every  washing.  -We recommend using moisturizers that come in a tub that needs to be scooped out, not a pump. This has more of an oil base. It will hold moisture in your skin much better than a water base moisturizer. The above recommended are non-pore clogging.      Wear a sunscreen with at least SPF 30 on your face, ears, neck and V of the chest daily. Wear sunscreen on other areas of the body if those areas are exposed to the sun throughout the day. Sunscreens can contain physical and/or chemical blockers. Physical blockers are less likely to clog pores, these include zinc oxide and titanium dioxide. Reapply every two hour and after swimming.     Sunscreen examples: https://www.ewg.org/sunscreen/    UV radiation  UVA radiation remains constant throughout the day and throughout the year. It is a longer wavelength than UVB and therefore penetrates deeper into the skin leading to immediate and delayed tanning, photoaging, and skin cancer. 70-80% of UVA and UVB radiation occurs between the hours of 10am-2pm.  UVB radiation  UVB radiation causes the most harmful effects and is more significant during the summer months. However, snow and ice can reflect UVB radiation leading to skin damage during the winter months as well. UVB radiation is responsible for tanning, burning, inflammation, delayed erythema (pinkness), pigmentation (brown spots), and skin cancer.     I recommend self monthly full body exams and yearly full body exams with a dermatology provider. If you develop a new or changing lesion please follow up for examination. Most skin cancers are pink and scaly or pink and pearly. However, we do see blue/brown/black skin cancers.  Consider the ABCDEs of melanoma when giving yourself your monthly full body exam ( don't forget the groin, buttocks, feet, toes, etc). A-asymmetry, B-borders, C-color, D-diameter, E-elevation or evolving. If you see any of these changes please follow up in clinic. If you cannot see your back  I recommend purchasing a hand held mirror to use with a larger wall mirror.       Checking for Skin Cancer  You can find cancer early by checking your skin each month. There are 3 kinds of skin cancer. They are melanoma, basal cell carcinoma, and squamous cell carcinoma. Doing monthly skin checks is the best way to find new marks or skin changes. Follow the instructions below for checking your skin.   The ABCDEs of checking moles for melanoma   Check your moles or growths for signs of melanoma using ABCDE:   Asymmetry: the sides of the mole or growth don t match  Border: the edges are ragged, notched, or blurred  Color: the color within the mole or growth varies  Diameter: the mole or growth is larger than 6 mm (size of a pencil eraser)  Evolving: the size, shape, or color of the mole or growth is changing (evolving is not shown in the images below)    Checking for other types of skin cancer  Basal cell carcinoma or squamous cell carcinoma have symptoms such as:     A spot or mole that looks different from all other marks on your skin  Changes in how an area feels, such as itching, tenderness, or pain  Changes in the skin's surface, such as oozing, bleeding, or scaliness  A sore that does not heal  New swelling or redness beyond the border of a mole    Who s at risk?  Anyone can get skin cancer. But you are at greater risk if you have:   Fair skin, light-colored hair, or light-colored eyes  Many moles or abnormal moles on your skin  A history of sunburns from sunlight or tanning beds  A family history of skin cancer  A history of exposure to radiation or chemicals  A weakened immune system  If you have had skin cancer in the past, you are at risk for recurring skin cancer.   How to check your skin  Do your monthly skin checkups in front of a full-length mirror. Check all parts of your body, including your:   Head (ears, face, neck, and scalp)  Torso (front, back, and sides)  Arms (tops, undersides, upper, and lower  armpits)  Hands (palms, backs, and fingers, including under the nails)  Buttocks and genitals  Legs (front, back, and sides)  Feet (tops, soles, toes, including under the nails, and between toes)  If you have a lot of moles, take digital photos of them each month. Make sure to take photos both up close and from a distance. These can help you see if any moles change over time.   Most skin changes are not cancer. But if you see any changes in your skin, call your doctor right away. Only he or she can diagnose a problem. If you have skin cancer, seeing your doctor can be the first step toward getting the treatment that could save your life.   [employer] last reviewed this educational content on 4/1/2019 2000-2020 [employer]. [address] [place] [address]. All rights reserved. This information is not intended as a substitute for professional medical care. Always follow your healthcare professional's instructions.       When should I call my doctor?  If you are worsening or not improving, please, contact us or seek urgent care as noted below.     Who should I call with questions (adults)?    [facility] [phone]  For urgent needs outside of business hours call the [facility] at [phone] and ask for the dermatology resident on call to be paged  If this is a medical emergency and you are unable to reach an ER, Call [phone]      If you need a prescription refill, please contact your pharmacy. Refills are approved or denied by our Physicians during normal business hours, Monday through Fridays  Per office policy, refills will not be granted if you have not been seen within the past year (or sooner depending on your child's condition)

## 2025-01-13 NOTE — LETTER
1/13/2025      Yong Guillermo  [address]  [facility] [address]      Dear Colleague,    Thank you for referring your patient, Yong Guillermo, to the [facility]. Please see a copy of my visit note below.    [facility] Dermatology Note  Encounter Date: Jan 13, 2025  Office Visit     Reviewed patients past medical history and pertinent chart review prior to patients visit today.     Dermatology Problem List:  Last skin check: 1/13/2025  # Neoplasm of uncertain behavior: left upper antihelix, s/p shave biopsy 1/13/2025   # Aks  -s/p cryo  -s/p bluelight PDT x2    Personal Hx: No personal history of skin cancer  Family Hx: Negative for family history of skin cancer.  _________________________________________    Assessment & Plan:     # Neoplasm of uncertain behavior:  left upper antihelix.  DDx includes CNCH vs hypertrophic AK vs SCCIS. Shave biopsy today.  Photograph obtained.  Procedure Note: Biopsy by shave technique  The risks and benefits of the procedure were described to the patient. These include but are not limited to bleeding, infection, scar, incomplete removal, and non-diagnostic biopsy. Verbal informed consent was obtained. The above site(s) was cleansed with an alcohol pad and injected with 1% lidocaine with epinephrine. Once anesthesia was obtained, a biopsy(ies) was performed with Gilette blade. The tissue(s) was placed in a labeled container(s) with formalin and sent to pathology. Hemostasis was achieved with aluminum chloride. Vaseline and a bandage were applied to the wound(s). The patient tolerated the procedure well and was given post biopsy care instructions.    # Actinic keratoses, forehead, bilateral temples, bilateral preauricular region, right submental neck, and left postauricular area  Actinic keratoses are pre-cancerous skin growths caused by sun exposure. Treatment is recommended and medically indicated. Treated with  cryotherapy as outlined below.     Procedures performed:   - Cryotherapy procedure note, location(s): See physical exam. After verbal consent and discussion of risks and benefits including, but not limited to, dyspigmentation/scar, blister, and pain, 8 lesion(s) was(were) treated with 1-2 mm freeze border for 1-2 cycles with liquid nitrogen. Post cryotherapy instructions were provided.     # Multiple nevi, trunk and extremities  # Solar lentigines  - Continued observation recommended.   - Nevi demonstrate no concerning features on dermoscopy. We discussed the importance of self exams at home.   - ABCDEs: Counseled ABCDEs of melanoma: Asymmetry, Border (irregularity), Color (not uniform, changes in color), Diameter (greater than 6 mm which is about the size of a pencil eraser), and Evolving (any changes in preexisting moles).  - Sun protection: Counseled SPF 30+ sunscreen, UPF clothing, sun avoidance, tanning bed avoidance.    # Cherry angiomas  # Seborrheic keratoses  # Epidermal inclusion cyst, right mid back  - We discussed the benign nature of the skin lesions. No treatment required. Continued observation recommended. Follow up with any concerns.      Follow-up: 1 year(s) for follow up full body skin exam, prn for new or changing lesions or new concerns    All risks, benefits and alternatives were discussed with patient.  Patient is in agreement and understands the assessment and plan.  All questions were answered.  Agatha Liriano PA-C  [facility] Dermatology  ____________________________________________    CC: Skin cancer screening exam    HPI:  Mr. Yong Guillermo is a(n) 79 year old male who presents today as a return patient for a full body skin cancer screening. Patient has concerns today about a lesion on the left upper antihelix. This lesion has been present for several years. It does become sore/painful. It is scaly.     Patient is diligent with photoprotection. Patient is otherwise feeling well, without  additional skin concerns.     Physical Exam:  Vitals: There were no vitals taken for this visit.  SKIN: Total skin excluding the genitalia areas was performed. The exam included the head/face, neck, both arms, chest, back, abdomen, both legs, digits, mons pubis, buttock and nails.   -[place] I  -forehead x2, bilateral temples, bilateral preauricular region, right submental neck, and left postauricular area, pink macule(s) with overlying adherent scale consistent with an actinic keratosis   -left upper antihelix, 4 mm pink hyperkeratotic papule  -multiple tan/brown flat round macules and raised papules scattered throughout trunk, extremities, and face. No worrisome features for malignancy noted on examination.  -scattered tan, homogenous macules scattered on sun exposed areas of trunk, extremities, and face  -scattered waxy, stuck on tan/brown papules and patches on the trunk and extremities  -several 1-2mm red dome shaped symmetric papules scattered on the trunk and extremities  -right mid back, firm, mobile, nodule with an overlying dilated punctum     - No other lesions of concern on areas examined.         Medications:  Current Outpatient Medications   Medication Sig Dispense Refill     acetaminophen (TYLENOL) 500 MG tablet Take 650 mg by mouth        aspirin 81 MG EC tablet Take 81 mg by mouth       atorvastatin (LIPITOR) 80 MG tablet TAKE 1 TABLET DAILY 90 tablet 2     blood glucose (FREESTYLE LITE) test strip Use to test blood sugar 4 times daily or as directed. 400 strip 0     blood glucose (FREESTYLE LITE) test strip Use to test blood sugar 4 times daily or as directed. 90 strip 0     blood glucose monitoring (FREESTYLE) lancets USE 1 LANCET DAILY AS NEEDED 100 each 2     Cholecalciferol (VITAMIN D3) 25 MCG (1000 UT) CAPS Take 1,000 Units by mouth       clopidogrel (PLAVIX) 75 MG tablet TAKE 1 TABLET DAILY 90 tablet 3     Continuous Glucose Sensor (FREESTYLE LIZ 3 PLUS SENSOR) MISC 1 Device See Admin  Instructions. (Use 1 sensor every 15 days) 6 each 1     Cyanocobalamin (VITAMIN B-12) 500 MCG SUBL Place 500 mcg under the tongue daily.       empagliflozin (JARDIANCE) 25 MG TABS tablet Take 1 tablet (25 mg) by mouth daily. Stop this medication during times of illness or dehydration.  Drink plenty of water. 90 tablet 3     insulin glargine (LANTUS SOLOSTAR) 100 UNIT/ML pen Inject 14 Units daily subcutaneously 15 mL 2     insulin pen needle (BD PEN NEEDLE JERRY 2ND GEN) 32G X 4 MM miscellaneous Use 1 pen needles daily or as directed. 100 each 3     losartan (COZAAR) 25 MG tablet TAKE ONE-HALF (1/2) TABLET DAILY 90 tablet 3     metFORMIN (GLUCOPHAGE) 500 MG tablet TAKE 2 TABLETS TWICE A DAY WITH MEALS 360 tablet 3     multivitamin w/minerals (THERA-VIT-M) tablet Take 1 tablet by mouth       Omega-3 1000 MG capsule Take 1 g by mouth.       venlafaxine (EFFEXOR XR) 150 MG 24 hr capsule Take 150 mg by mouth daily       venlafaxine (EFFEXOR XR) 75 MG 24 hr capsule Take 75 mg by mouth daily       No current facility-administered medications for this visit.      Past Medical History:   Patient Active Problem List   Diagnosis     Benign essential tremor     Complex sleep apnea syndrome     CVA (cerebral vascular accident) (H)     Type 2 diabetes mellitus without complication, with long-term current use of insulin (H)     H/O carotid endarterectomy     Hyperlipemia     Osteopenia of multiple sites     BPH (benign prostatic hyperplasia)     Macrocytosis without anemia     Major depression, recurrent     Thrombocytopenia     Coronary artery disease due to calcified coronary lesion     Aortic valve regurgitation due to aortic dilation     Chronic right shoulder pain     Dilatation of thoracic aorta     Past Medical History:   Diagnosis Date     Benign essential tremor 09/29/2016     BPH (benign prostatic hyperplasia) 05/26/2015     Coronary artery disease      CVA (cerebral vascular accident) (H) 01/14/2009    Right arm  clumsiness.  MRI showed 2 infarcts in the left hemisphere.     CVA (cerebral vascular accident) (H) 06/22/2006    Left arm weakness.     Depression      Diabetes mellitus, type 2 (H)      Hyperlipidemia      Infection due to 2019 novel coronavirus 01/04/2023     Obstructive sleep apnea 07/22/2013     Shingles        CC Referred Self, MD  No address on file on close of this encounter.      Again, thank you for allowing me to participate in the care of your patient.        Sincerely,        Rosario Liriano PA-C    Electronically signed

## 2025-01-14 ENCOUNTER — TELEPHONE (OUTPATIENT)
Dept: DERMATOLOGY | Facility: CLINIC | Age: 80
End: 2025-01-14
Payer: MEDICARE

## 2025-01-14 LAB
ALBUMIN SERPL ELPH-MCNC: 4.7 G/DL (ref 3.7–5.1)
ALPHA1 GLOB SERPL ELPH-MCNC: 0.2 G/DL (ref 0.2–0.4)
ALPHA2 GLOB SERPL ELPH-MCNC: 0.8 G/DL (ref 0.5–0.9)
B-GLOBULIN SERPL ELPH-MCNC: 0.9 G/DL (ref 0.6–1)
GAMMA GLOB SERPL ELPH-MCNC: 0.8 G/DL (ref 0.7–1.6)
M PROTEIN SERPL ELPH-MCNC: 0 G/DL
PROT PATTERN SERPL ELPH-IMP: NORMAL
PROT PATTERN SERPL IFE-IMP: NORMAL

## 2025-01-14 NOTE — TELEPHONE ENCOUNTER
[facility] Call Center    Phone Message    May a detailed message be left on voicemail: yes     Reason for Call: Other: The lab is calling to clarify if biopsy is for left upper antihelix or right upper antihelix, please call back thanks!     Action Taken: Other: OX DERM    Travel Screening: Not Applicable     Date of Service:

## 2025-01-15 LAB
PATH REPORT.COMMENTS IMP SPEC: NORMAL
PATH REPORT.COMMENTS IMP SPEC: NORMAL
PATH REPORT.FINAL DX SPEC: NORMAL
PATH REPORT.GROSS SPEC: NORMAL
PATH REPORT.MICROSCOPIC SPEC OTHER STN: NORMAL
PATH REPORT.RELEVANT HX SPEC: NORMAL

## 2025-01-16 NOTE — TELEPHONE ENCOUNTER
RECORDS RECEIVED FROM:    [facility] DATE : 2/5/25    NOTES STATUS DETAILS   OFFICE NOTE from referring provider  [place]12/[place] [place] from other cardiologists  Internal 8/21/23 Olive Street MD @[facility] Cardio     RECORDS from hospital/ED N/A    MEDICATION LIST Internal    GENERAL CARDIO RECORDS   (ALL APPOINTMENT TYPES)     LABS (CBC,BMP,CMP, TSH) Internal    EKG (STRIPS & REPORTS) N/A    MONITORS (STRIPS & REPORTS) N/A    ECHOS (IMAGES AND REPORTS) Internal 1/2/25   STRESS TESTS (IMAGES AND REPORTS) N/A    cMRI (IMAGES AND REPORTS) N/A    Cardiac cath (IMAGES AND REPORTS) N/A    CT/CTA (IMAGES AND REPORTS) Internal 6/14/23     Action Brea Burt on 1/16/2025 at 1:15 PM   Action Taken All records in Epic. -AVILA

## 2025-01-23 ENCOUNTER — OFFICE VISIT (OUTPATIENT)
Dept: DERMATOLOGY | Facility: CLINIC | Age: 80
End: 2025-01-23
Payer: MEDICARE

## 2025-01-23 DIAGNOSIS — L57.0 ACTINIC KERATOSIS: Primary | ICD-10-CM

## 2025-01-23 ASSESSMENT — PAIN SCALES - GENERAL: PAINLEVEL_OUTOF10: NO PAIN (0)

## 2025-01-23 NOTE — PROGRESS NOTES
[facility] Dermatology Note  Encounter Date: Jan 23, 2025  Office Visit     Reviewed patients past medical history and pertinent chart review prior to patients visit today.     Dermatology Problem List:  Last skin check: 1/13/2025  # Hypertrophic actinic keratosis, left upper antihelix, s/p shave biopsy 1/13/2025   -s/p cryo 1/23/2025  # Aks  -s/p cryo  -s/p bluelight PDT x2     Personal Hx: No personal history of skin cancer  Family Hx: Negative for family history of skin cancer.  ____________________________________________    Assessment & Plan:     # Biopsy proved hypertrophic actinic keratosis, left upper antihelix  Actinic keratoses are pre-cancerous skin growths caused by sun exposure. Treatment is recommended and medically indicated. Treated with cryotherapy as outlined below.     Procedures performed:   - Cryotherapy procedure note, location(s): See physical exam. After verbal consent and discussion of risks and benefits including, but not limited to, dyspigmentation/scar, blister, and pain, 1 lesion(s) was(were) treated with 1-2 mm freeze border for 1-2 cycles with liquid nitrogen. Post cryotherapy instructions were provided.     Follow up: January 2026 follow up full body skin exam, prn for new or changing lesions or new concerns    All risks, benefits and alternatives were discussed with patient.  Patient is in agreement and understands the assessment and plan.  All questions were answered.  Agatha Liriano PA-C  [facility] Dermatology  _______________________________________    CC: Follow Up    HPI:  Mr. Yong Guillermo is a(n) 79 year old male who presents today for follow-up  for a biopsy-proven hypertrophic actinic keratosis of the left upper antihelix.  He was last seen in dermatology on 1/13/2025 by myself for a full skin check at which time several actinic keratoses were treated with cryotherapy. A biopsy of the left upper antihelix was also obtained and revealed a hypertrophic  actinic keratosis.  He was instructed to return for further treatment with cryotherapy. He reports the area has been healing well from the biopsy without any issues.    Patient is otherwise feeling well, without additional skin concerns.     Physical Exam:  SKIN: Focused examination of left upper antihelix only was performed per patient request.  - left upper antihelix, well healing biopsy site with adherent crust and without infection.    - No other lesions of concern on areas examined.     Medications:  Current Outpatient Medications   Medication Sig Dispense Refill    acetaminophen (TYLENOL) 500 MG tablet Take 650 mg by mouth       aspirin 81 MG EC tablet Take 81 mg by mouth      atorvastatin (LIPITOR) 80 MG tablet TAKE 1 TABLET DAILY 90 tablet 2    blood glucose (FREESTYLE LITE) test strip Use to test blood sugar 4 times daily or as directed. 400 strip 0    blood glucose (FREESTYLE LITE) test strip Use to test blood sugar 4 times daily or as directed. 90 strip 0    blood glucose monitoring (FREESTYLE) lancets USE 1 LANCET DAILY AS NEEDED 100 each 2    Cholecalciferol (VITAMIN D3) 25 MCG (1000 UT) CAPS Take 1,000 Units by mouth      clopidogrel (PLAVIX) 75 MG tablet TAKE 1 TABLET DAILY 90 tablet 3    Continuous Glucose Sensor (FREESTYLE LIZ 3 PLUS SENSOR) MISC 1 Device See Admin Instructions. (Use 1 sensor every 15 days) 6 each 1    Cyanocobalamin (VITAMIN B-12) 500 MCG SUBL Place 500 mcg under the tongue daily.      empagliflozin (JARDIANCE) 25 MG TABS tablet Take 1 tablet (25 mg) by mouth daily. Stop this medication during times of illness or dehydration.  Drink plenty of water. 90 tablet 3    insulin glargine (LANTUS SOLOSTAR) 100 UNIT/ML pen Inject 14 Units daily subcutaneously 15 mL 2    insulin pen needle (BD PEN NEEDLE JERRY 2ND GEN) 32G X 4 MM miscellaneous Use 1 pen needles daily or as directed. 100 each 3    losartan (COZAAR) 25 MG tablet TAKE ONE-HALF (1/2) TABLET DAILY 45 tablet 2    metFORMIN  (GLUCOPHAGE) 500 MG tablet TAKE 2 TABLETS TWICE A DAY WITH MEALS 360 tablet 3    multivitamin w/minerals (THERA-VIT-M) tablet Take 1 tablet by mouth      Omega-3 1000 MG capsule Take 1 g by mouth.      venlafaxine (EFFEXOR XR) 150 MG 24 hr capsule Take 150 mg by mouth daily      venlafaxine (EFFEXOR XR) 75 MG 24 hr capsule Take 75 mg by mouth daily       No current facility-administered medications for this visit.      Past Medical History:   Patient Active Problem List   Diagnosis    Benign essential tremor    Complex sleep apnea syndrome    CVA (cerebral vascular accident) (H)    Type 2 diabetes mellitus without complication, with long-term current use of insulin (H)    H/O carotid endarterectomy    Hyperlipemia    Osteopenia of multiple sites    BPH (benign prostatic hyperplasia)    Macrocytosis without anemia    Major depression, recurrent    Thrombocytopenia    Coronary artery disease due to calcified coronary lesion    Aortic valve regurgitation due to aortic dilation    Chronic right shoulder pain    Dilatation of thoracic aorta     Past Medical History:   Diagnosis Date    Benign essential tremor 09/29/2016    BPH (benign prostatic hyperplasia) 05/26/2015    Coronary artery disease     CVA (cerebral vascular accident) (H) 01/14/2009    Right arm clumsiness.  MRI showed 2 infarcts in the left hemisphere.    CVA (cerebral vascular accident) (H) 06/22/2006    Left arm weakness.    Depression     Diabetes mellitus, type 2 (H)     Hyperlipidemia     Infection due to 2019 novel coronavirus 01/04/2023    Obstructive sleep apnea 07/22/2013    Shingles        CC Referred Self, MD  No address on file on close of this encounter.

## 2025-01-23 NOTE — PATIENT INSTRUCTIONS
Cryotherapy    What is it?  Use of a very cold liquid, such as liquid nitrogen, to freeze and destroy abnormal skin cells that need to be removed    What should I expect?  Tenderness and redness  A small blister that might grow and fill with dark purple blood. There may be crusting.  More than one treatment may be needed if the lesions do not go away.    How do I care for the treated area?  Gently wash the area with your hands when bathing.  Use a thin layer of Vaseline to help with healing. You may use a Band-Aid.   The area should heal within 7-10 days and may leave behind a pink or lighter color.   Do not use an antibiotic or Neosporin ointment.   You may take acetaminophen (Tylenol) for pain.     Call your doctor if you have:  Severe pain  Signs of infection (warmth, redness, cloudy yellow drainage, and or a bad smell)  Questions or concerns    Who should I call with questions?      [facility]: [phone]      [facility]: [phone]      For urgent needs outside of business hours call the [facility] at [phone] and ask for the dermatology resident on call

## 2025-01-23 NOTE — LETTER
1/23/2025      Yong Guillermo  [address]  [place] [address]      Dear Colleague,    Thank you for referring your patient, Yong Guillermo, to the [facility]. Please see a copy of my visit note below.    [facility] Dermatology Note  Encounter Date: Jan 23, 2025  Office Visit     Reviewed patients past medical history and pertinent chart review prior to patients visit today.     Dermatology Problem List:  Last skin check: 1/13/2025  # Hypertrophic actinic keratosis, left upper antihelix, s/p shave biopsy 1/13/2025   -s/p cryo 1/23/2025  # Aks  -s/p cryo  -s/p bluelight PDT x2     Personal Hx: No personal history of skin cancer  Family Hx: Negative for family history of skin cancer.  ____________________________________________    Assessment & Plan:     # Biopsy proved hypertrophic actinic keratosis, left upper antihelix  Actinic keratoses are pre-cancerous skin growths caused by sun exposure. Treatment is recommended and medically indicated. Treated with cryotherapy as outlined below.     Procedures performed:   - Cryotherapy procedure note, location(s): See physical exam. After verbal consent and discussion of risks and benefits including, but not limited to, dyspigmentation/scar, blister, and pain, 1 lesion(s) was(were) treated with 1-2 mm freeze border for 1-2 cycles with liquid nitrogen. Post cryotherapy instructions were provided.     Follow up: January 2026 follow up full body skin exam, prn for new or changing lesions or new concerns    All risks, benefits and alternatives were discussed with patient.  Patient is in agreement and understands the assessment and plan.  All questions were answered.  Agatha Liriano PA-C  [facility] Dermatology  _______________________________________    CC: Follow Up    HPI:  Mr. Yong Guillermo is a(n) 79 year old male who presents today for follow-up  for a biopsy-proven hypertrophic actinic keratosis of  the left upper antihelix.  He was last seen in dermatology on 1/13/2025 by myself for a full skin check at which time several actinic keratoses were treated with cryotherapy. A biopsy of the left upper antihelix was also obtained and revealed a hypertrophic actinic keratosis.  He was instructed to return for further treatment with cryotherapy. He reports the area has been healing well from the biopsy without any issues.    Patient is otherwise feeling well, without additional skin concerns.     Physical Exam:  SKIN: Focused examination of left upper antihelix only was performed per patient request.  - left upper antihelix, well healing biopsy site with adherent crust and without infection.    - No other lesions of concern on areas examined.     Medications:  Current Outpatient Medications   Medication Sig Dispense Refill     acetaminophen (TYLENOL) 500 MG tablet Take 650 mg by mouth        aspirin 81 MG EC tablet Take 81 mg by mouth       atorvastatin (LIPITOR) 80 MG tablet TAKE 1 TABLET DAILY 90 tablet 2     blood glucose (FREESTYLE LITE) test strip Use to test blood sugar 4 times daily or as directed. 400 strip 0     blood glucose (FREESTYLE LITE) test strip Use to test blood sugar 4 times daily or as directed. 90 strip 0     blood glucose monitoring (FREESTYLE) lancets USE 1 LANCET DAILY AS NEEDED 100 each 2     Cholecalciferol (VITAMIN D3) 25 MCG (1000 UT) CAPS Take 1,000 Units by mouth       clopidogrel (PLAVIX) 75 MG tablet TAKE 1 TABLET DAILY 90 tablet 3     Continuous Glucose Sensor (FREESTYLE LIZ 3 PLUS SENSOR) MISC 1 Device See Admin Instructions. (Use 1 sensor every 15 days) 6 each 1     Cyanocobalamin (VITAMIN B-12) 500 MCG SUBL Place 500 mcg under the tongue daily.       empagliflozin (JARDIANCE) 25 MG TABS tablet Take 1 tablet (25 mg) by mouth daily. Stop this medication during times of illness or dehydration.  Drink plenty of water. 90 tablet 3     insulin glargine (LANTUS SOLOSTAR) 100 UNIT/ML pen  Inject 14 Units daily subcutaneously 15 mL 2     insulin pen needle (BD PEN NEEDLE JERRY 2ND GEN) 32G X 4 MM miscellaneous Use 1 pen needles daily or as directed. 100 each 3     losartan (COZAAR) 25 MG tablet TAKE ONE-HALF (1/2) TABLET DAILY 45 tablet 2     metFORMIN (GLUCOPHAGE) 500 MG tablet TAKE 2 TABLETS TWICE A DAY WITH MEALS 360 tablet 3     multivitamin w/minerals (THERA-VIT-M) tablet Take 1 tablet by mouth       Omega-3 1000 MG capsule Take 1 g by mouth.       venlafaxine (EFFEXOR XR) 150 MG 24 hr capsule Take 150 mg by mouth daily       venlafaxine (EFFEXOR XR) 75 MG 24 hr capsule Take 75 mg by mouth daily       No current facility-administered medications for this visit.      Past Medical History:   Patient Active Problem List   Diagnosis     Benign essential tremor     Complex sleep apnea syndrome     CVA (cerebral vascular accident) (H)     Type 2 diabetes mellitus without complication, with long-term current use of insulin (H)     H/O carotid endarterectomy     Hyperlipemia     Osteopenia of multiple sites     BPH (benign prostatic hyperplasia)     Macrocytosis without anemia     Major depression, recurrent     Thrombocytopenia     Coronary artery disease due to calcified coronary lesion     Aortic valve regurgitation due to aortic dilation     Chronic right shoulder pain     Dilatation of thoracic aorta     Past Medical History:   Diagnosis Date     Benign essential tremor 09/29/2016     BPH (benign prostatic hyperplasia) 05/26/2015     Coronary artery disease      CVA (cerebral vascular accident) (H) 01/14/2009    Right arm clumsiness.  MRI showed 2 infarcts in the left hemisphere.     CVA (cerebral vascular accident) (H) 06/22/2006    Left arm weakness.     Depression      Diabetes mellitus, type 2 (H)      Hyperlipidemia      Infection due to 2019 novel coronavirus 01/04/2023     Obstructive sleep apnea 07/22/2013     Shingles        CC Referred Self, MD  No address on file on close of this  encounter.       Again, thank you for allowing me to participate in the care of your patient.        Sincerely,        Rosario Liriano PA-C    Electronically signed

## 2025-02-05 ENCOUNTER — PRE VISIT (OUTPATIENT)
Dept: CARDIOLOGY | Facility: CLINIC | Age: 80
End: 2025-02-05
Payer: MEDICARE

## 2025-02-05 ENCOUNTER — OFFICE VISIT (OUTPATIENT)
Dept: CARDIOLOGY | Facility: CLINIC | Age: 80
End: 2025-02-05
Payer: MEDICARE

## 2025-02-05 VITALS
WEIGHT: 156 LBS | OXYGEN SATURATION: 96 % | DIASTOLIC BLOOD PRESSURE: 66 MMHG | BODY MASS INDEX: 22.04 KG/M2 | HEART RATE: 83 BPM | SYSTOLIC BLOOD PRESSURE: 126 MMHG

## 2025-02-05 DIAGNOSIS — I25.10 CORONARY ARTERY DISEASE DUE TO CALCIFIED CORONARY LESION: ICD-10-CM

## 2025-02-05 DIAGNOSIS — R93.1 HIGH CORONARY ARTERY CALCIUM SCORE: ICD-10-CM

## 2025-02-05 DIAGNOSIS — R55 VASOVAGAL SYNCOPE: ICD-10-CM

## 2025-02-05 DIAGNOSIS — Z86.73 H/O: CVA (CEREBROVASCULAR ACCIDENT): ICD-10-CM

## 2025-02-05 DIAGNOSIS — I25.84 CORONARY ARTERY DISEASE DUE TO CALCIFIED CORONARY LESION: ICD-10-CM

## 2025-02-05 DIAGNOSIS — I77.819 AORTIC VALVE REGURGITATION DUE TO AORTIC DILATION: ICD-10-CM

## 2025-02-05 DIAGNOSIS — E11.9 TYPE 2 DIABETES MELLITUS WITHOUT COMPLICATION, WITH LONG-TERM CURRENT USE OF INSULIN (H): ICD-10-CM

## 2025-02-05 DIAGNOSIS — Z98.890 H/O CAROTID ENDARTERECTOMY: Primary | ICD-10-CM

## 2025-02-05 DIAGNOSIS — E78.2 MIXED HYPERLIPIDEMIA: ICD-10-CM

## 2025-02-05 DIAGNOSIS — G47.39 COMPLEX SLEEP APNEA SYNDROME: ICD-10-CM

## 2025-02-05 DIAGNOSIS — I35.1 AORTIC VALVE REGURGITATION DUE TO AORTIC DILATION: ICD-10-CM

## 2025-02-05 DIAGNOSIS — Q23.81 AORTIC VALVE, BICUSPID: ICD-10-CM

## 2025-02-05 DIAGNOSIS — Z79.4 TYPE 2 DIABETES MELLITUS WITHOUT COMPLICATION, WITH LONG-TERM CURRENT USE OF INSULIN (H): ICD-10-CM

## 2025-02-05 PROCEDURE — G0463 HOSPITAL OUTPT CLINIC VISIT: HCPCS | Performed by: INTERNAL MEDICINE

## 2025-02-05 ASSESSMENT — PAIN SCALES - GENERAL: PAINLEVEL_OUTOF10: NO PAIN (0)

## 2025-02-05 NOTE — NURSING NOTE
Chief Complaint   Patient presents with    bicuspid aortic valve with ascending aortic dilation    Hyperlipidemia    Coronary Artery Disease     Vitals were taken and medications reconciled.    Artemio Wagner, EMT  7:43 AM

## 2025-02-05 NOTE — PATIENT INSTRUCTIONS
In one year : echocardiogram, then follow up with provider afterwards.   You will get a scheduling reminder in advance.

## 2025-02-05 NOTE — PROGRESS NOTES
I am delighted to see Yong ENCINAS Roneykehindede in consultation.The primary encounter diagnosis was H/O carotid endarterectomy. Diagnoses of Mixed hyperlipidemia, H/O: CVA (cerebrovascular accident), Complex sleep apnea syndrome, Type 2 diabetes mellitus without complication, with long-term current use of insulin (H), High coronary artery calcium score, Aortic valve regurgitation due to aortic dilation, Aortic valve, bicuspid, Coronary artery disease due to calcified coronary lesion, and Vasovagal syncope were also pertinent to this visit.   As you know, the patient is a 79 year old  male. He   has a past medical history of Benign essential tremor (09/29/2016), BPH (benign prostatic hyperplasia) (05/26/2015), Coronary artery disease, CVA (cerebral vascular accident) (H) (01/14/2009), CVA (cerebral vascular accident) (H) (06/22/2006), Depression, Diabetes mellitus, type 2 (H), Hyperlipidemia, Infection due to 2019 novel coronavirus (01/04/2023), Obstructive sleep apnea (07/22/2013), Shingles, and Syncope..    On this visit, the patient states that he has had a recent syncopal episode while feeeling ill and heading toward the bathroom.  The patient denies chest pressure/discomfort, dyspnea, palpitations, orthopnea, paroxysmal nocturnal dyspnea, and lower extermity edema.    The patient's cardiovascular risk factors include known cardiac disease, high cholesterol, and diabetes mellitus.    The following portions of the patient's history were reviewed and updated as appropriate: allergies, current medications, past family history, past medical history, past social history, past surgical history, and the problem list.    PMH: The patient's past medical history includes:    Past Medical History:   Diagnosis Date    Benign essential tremor 09/29/2016    BPH (benign prostatic hyperplasia) 05/26/2015    Coronary artery disease     CVA (cerebral vascular accident) (H) 01/14/2009    Right arm clumsiness.  MRI showed 2 infarcts in  the left hemisphere.    CVA (cerebral vascular accident) (H) 06/22/2006    Left arm weakness.    Depression     Diabetes mellitus, type 2 (H)     Hyperlipidemia     Infection due to 2019 novel coronavirus 01/04/2023    Obstructive sleep apnea 07/22/2013    Shingles     Syncope       Past Surgical History:   Procedure Laterality Date    ARTHROSCOPY SHOULDER ROTATOR CUFF REPAIR Right     CAROTID ENDARTERECTOMY Right 09/27/2006    CERVICAL DISC SURGERY  07/29/2005    C5/6 and C6/7 lamina foraminotomy, scope with partial facetectomy and excision of hard disc herniation.    HERNIA REPAIR  5/21/104    Right inguinal hernia and umbilical hernia right cheek cyst on the face.    IR AORTIC ARCH 4 VESSEL ANGIOGRAM  8/8/2006    IR CAROTID ANGIOGRAM  8/8/2006    IR CAROTID ANGIOGRAM  8/8/2006    AZ EXCIS TENDON SHEATH LESION, HAND/FINGER      Description: Hand Excision Of A Tendon Cyst;  Recorded: 04/29/2008;    TONSILLECTOMY      VASECTOMY      ZZC EXCIS CERV DISK,ONE LEVEL      Description: Laminectomy With Disc Removal;  Recorded: 04/29/2008;  Comments: Lumbar level    ZZC EXCISION,BENIGN TUMOR,MANDIBLE      Description: Jaw Excision Benign Cyst / Tumor;  Proc Date: 05/01/2004;       The patient's medications as of the current encounter are:     Current Outpatient Medications   Medication Sig Dispense Refill    acetaminophen (TYLENOL) 500 MG tablet Take 650 mg by mouth       aspirin 81 MG EC tablet Take 81 mg by mouth      atorvastatin (LIPITOR) 80 MG tablet TAKE 1 TABLET DAILY 90 tablet 2    blood glucose (FREESTYLE LITE) test strip Use to test blood sugar 4 times daily or as directed. 400 strip 0    blood glucose (FREESTYLE LITE) test strip Use to test blood sugar 4 times daily or as directed. 90 strip 0    blood glucose monitoring (FREESTYLE) lancets USE 1 LANCET DAILY AS NEEDED 100 each 2    Cholecalciferol (VITAMIN D3) 25 MCG (1000 UT) CAPS Take 1,000 Units by mouth      clopidogrel (PLAVIX) 75 MG tablet TAKE 1 TABLET  DAILY 90 tablet 3    Continuous Glucose Sensor (FREESTYLE LIZ 3 PLUS SENSOR) MISC 1 Device See Admin Instructions. (Use 1 sensor every 15 days) 6 each 1    Cyanocobalamin (VITAMIN B-12) 500 MCG SUBL Place 500 mcg under the tongue daily.      insulin glargine (LANTUS SOLOSTAR) 100 UNIT/ML pen Inject 14 Units daily subcutaneously 15 mL 2    insulin pen needle (BD PEN NEEDLE JERRY 2ND GEN) 32G X 4 MM miscellaneous Use 1 pen needles daily or as directed. 100 each 3    losartan (COZAAR) 25 MG tablet TAKE ONE-HALF (1/2) TABLET DAILY 45 tablet 2    metFORMIN (GLUCOPHAGE) 500 MG tablet TAKE 2 TABLETS TWICE A DAY WITH MEALS 360 tablet 3    multivitamin w/minerals (THERA-VIT-M) tablet Take 1 tablet by mouth      Omega-3 1000 MG capsule Take 1 g by mouth.      venlafaxine (EFFEXOR XR) 150 MG 24 hr capsule Take 150 mg by mouth daily      venlafaxine (EFFEXOR XR) 75 MG 24 hr capsule Take 75 mg by mouth daily      empagliflozin (JARDIANCE) 25 MG TABS tablet Take 1 tablet (25 mg) by mouth daily. Stop this medication during times of illness or dehydration.  Drink plenty of water. (Patient not taking: Reported on 2/5/2025) 90 tablet 3       Labs:     Lab on 01/13/2025   Component Date Value Ref Range Status    Immunofixation ELP 01/13/2025 No monoclonal protein seen on immunofixation. Pathologic significance requires clinical correlation. RONAL Reid M.D., Ph.D., Pathologist   Final    Total Protein Serum for ELP 01/13/2025 7.5  6.4 - 8.3 g/dL Final    Albumin 01/13/2025 4.7  3.7 - 5.1 g/dL Final    Alpha 1 01/13/2025 0.2  0.2 - 0.4 g/dL Final    Alpha 2 01/13/2025 0.8  0.5 - 0.9 g/dL Final    Beta Globulin 01/13/2025 0.9  0.6 - 1.0 g/dL Final    Gamma Globulin 01/13/2025 0.8  0.7 - 1.6 g/dL Final    Monoclonal Peak 01/13/2025 0.0  <=0.0 g/dL Final    ELP Interpretation 01/13/2025 Essentially normal electrophoretic pattern. No obvious monoclonal proteins seen. Pathologic significance requires clinical correlation. RONAL Reid,  M.D., Ph.D., Pathologist.   Final       Allergies:    Allergies   Allergen Reactions    Alteplase      Muscle Tightness    Sulfa Antibiotics        Family History:   Family History   Problem Relation Age of Onset    Heart Disease Mother     Snoring Mother     Diabetes Mother     Cancer Mother         Brain    Lung Cancer Mother     Diabetes Paternal Grandmother     Heart Disease Brother     Heart Failure Brother     No Known Problems Son     No Known Problems Son        Psychosocial history:  reports that he quit smoking about 44 years ago. His smoking use included cigars and pipe. He has never been exposed to tobacco smoke. He has quit using smokeless tobacco.  His smokeless tobacco use included chew. He reports that he does not drink alcohol and does not use drugs.    Review of systems: negative for, palpitations, exertional chest pain or pressure, paroxysmal nocturnal dyspnea, dyspnea on exertion, orthopnea, and lower extremity edema    In addition,   General: No change in weight, sleep or appetite.  Normal energy.  No fever or chills  Eyes: Negative for vision changes or eye problems  ENT: No problems with ears, nose or throat.  No difficulty swallowing.  Resp: No coughing, wheezing or shortness of breath; DANIELLE  GI: No nausea, vomiting,  heartburn, abdominal pain, diarrhea, constipation or change in bowel habits  : No urinary frequency or dysuria, bladder or kidney problems  Musculoskeletal: No significant muscle or joint pains  Neurologic: h/o CVA  Psychiatric: No problems with anxiety, depression or mental health  Heme/immune/allergy: No history of bleeding or clotting problems or anemia.  No allergies or immune system problems  Endocrine: Diabetes  Skin: No rashes,worrisome lesions or skin problems  Vascular:  No claudication, lifestyle limiting or otherwise; no ischemic rest pain; no non-healing ulcers. No weakness, No loss of sensation        Physical examination  Vitals: /66 (BP Location: Right arm,  Patient Position: Chair, Cuff Size: Adult Regular)   Pulse 83   Wt 70.8 kg (156 lb)   SpO2 96%   BMI 22.04 kg/m    BMI= Body mass index is 22.04 kg/m .    In general, the patient is a pleasant male in no apparent distress.    HEENT: Normiocephalic and atraumatic.  PERRLA.  EOMI.  Sclerae white, not injected.    Neck: No adenopathy.  No thyromegaly. Carotids +2/2 bilaterally without bruits.  No jugular venous distension.   Heart:  The PMI is in the 5th ICS in the midclavicular line. There is no heave. Regular rate and rhythm. Normal S1, S2 splits physiologically. 2/6 diastolic murmur long into diastole   Lungs: Clear to asculation.  No ronchi, wheezes, rales.  No dullness to percussion.   Abdomen: Soft, nontender, nondistended. No organomegaly. No AAA.  No bruits.   Extremities: No clubbing, cyanosis, or edema. The pulses were intact bilaterally.   Neurological: The neurological examination reveal a patient who was oriented to person, place, and time.      Cardiac tests include:    1/2/25 - echo - normal EF, mild AI, aorta unchanged at 4.3 cm  12/15/21 - CTA - Ca score 386, LAD 50-60%    Assessment and Plan    CAD - on ASA, statin, plavix  HL - on statin, omega 3  DM - on insulin, metformin, SGLT2  Syncope - likely vasovagal, expectant management  HTN - on losartan  Bicuspid aortic valve with aortic dilation - valve and aorta stable, will follow in 1 year  - recommend screening for children    The patient is to return  in 1 year. The patient understood the treatment plan as outlined above.  There were no barriers to learning. Patient accompanied by wife.      Perry Salinas MD

## 2025-03-13 ENCOUNTER — OFFICE VISIT (OUTPATIENT)
Dept: INTERNAL MEDICINE | Facility: CLINIC | Age: 80
End: 2025-03-13
Payer: MEDICARE

## 2025-03-13 VITALS
BODY MASS INDEX: 22.79 KG/M2 | HEART RATE: 93 BPM | WEIGHT: 162.8 LBS | OXYGEN SATURATION: 93 % | TEMPERATURE: 97.4 F | RESPIRATION RATE: 16 BRPM | HEIGHT: 71 IN | DIASTOLIC BLOOD PRESSURE: 58 MMHG | SYSTOLIC BLOOD PRESSURE: 116 MMHG

## 2025-03-13 DIAGNOSIS — I25.10 CORONARY ARTERY DISEASE DUE TO CALCIFIED CORONARY LESION: ICD-10-CM

## 2025-03-13 DIAGNOSIS — F33.9 RECURRENT MAJOR DEPRESSIVE DISORDER, REMISSION STATUS UNSPECIFIED: ICD-10-CM

## 2025-03-13 DIAGNOSIS — D69.6 THROMBOCYTOPENIA: ICD-10-CM

## 2025-03-13 DIAGNOSIS — M85.89 OSTEOPENIA OF MULTIPLE SITES: ICD-10-CM

## 2025-03-13 DIAGNOSIS — I77.819 AORTIC VALVE REGURGITATION DUE TO AORTIC DILATION: ICD-10-CM

## 2025-03-13 DIAGNOSIS — Z86.73 HISTORY OF STROKE: ICD-10-CM

## 2025-03-13 DIAGNOSIS — D75.89 MACROCYTOSIS WITHOUT ANEMIA: ICD-10-CM

## 2025-03-13 DIAGNOSIS — E78.2 MIXED HYPERLIPIDEMIA: ICD-10-CM

## 2025-03-13 DIAGNOSIS — E11.9 TYPE 2 DIABETES MELLITUS WITHOUT COMPLICATION, WITH LONG-TERM CURRENT USE OF INSULIN (H): Primary | ICD-10-CM

## 2025-03-13 DIAGNOSIS — Z79.4 TYPE 2 DIABETES MELLITUS WITHOUT COMPLICATION, WITH LONG-TERM CURRENT USE OF INSULIN (H): Primary | ICD-10-CM

## 2025-03-13 DIAGNOSIS — Z98.890 H/O CAROTID ENDARTERECTOMY: ICD-10-CM

## 2025-03-13 DIAGNOSIS — I35.1 AORTIC VALVE REGURGITATION DUE TO AORTIC DILATION: ICD-10-CM

## 2025-03-13 DIAGNOSIS — G47.39 COMPLEX SLEEP APNEA SYNDROME: ICD-10-CM

## 2025-03-13 DIAGNOSIS — I25.84 CORONARY ARTERY DISEASE DUE TO CALCIFIED CORONARY LESION: ICD-10-CM

## 2025-03-13 PROBLEM — E11.59 TYPE 2 DIABETES MELLITUS WITH OTHER CIRCULATORY COMPLICATION, WITHOUT LONG-TERM CURRENT USE OF INSULIN (H): Status: RESOLVED | Noted: 2025-03-13 | Resolved: 2025-03-13

## 2025-03-13 PROBLEM — E11.59 TYPE 2 DIABETES MELLITUS WITH OTHER CIRCULATORY COMPLICATION, WITHOUT LONG-TERM CURRENT USE OF INSULIN (H): Status: ACTIVE | Noted: 2025-03-13

## 2025-03-13 PROBLEM — R93.1 HIGH CORONARY ARTERY CALCIUM SCORE: Status: RESOLVED | Noted: 2025-02-05 | Resolved: 2025-03-13

## 2025-03-13 LAB
ERYTHROCYTE [DISTWIDTH] IN BLOOD BY AUTOMATED COUNT: 12.2 % (ref 10–15)
EST. AVERAGE GLUCOSE BLD GHB EST-MCNC: 200 MG/DL
HBA1C MFR BLD: 8.6 % (ref 0–5.6)
HCT VFR BLD AUTO: 41.5 % (ref 40–53)
HGB BLD-MCNC: 14.1 G/DL (ref 13.3–17.7)
MCH RBC QN AUTO: 34.1 PG (ref 26.5–33)
MCHC RBC AUTO-ENTMCNC: 34 G/DL (ref 31.5–36.5)
MCV RBC AUTO: 101 FL (ref 78–100)
PLATELET # BLD AUTO: 138 10E3/UL (ref 150–450)
RBC # BLD AUTO: 4.13 10E6/UL (ref 4.4–5.9)
WBC # BLD AUTO: 5.1 10E3/UL (ref 4–11)

## 2025-03-13 RX ORDER — BUSPIRONE HYDROCHLORIDE 10 MG/1
10 TABLET ORAL 2 TIMES DAILY
COMMUNITY

## 2025-03-13 NOTE — PROGRESS NOTES
Assessment & Plan     Type 2 diabetes mellitus without complication, with long-term current use of insulin (H)  Lantus 14 unit(s) at bedtime, metformin 1000 mg bid, Jardiance 25 mg. No reported hypoglycemia. Seeing endocrinologist.   Since he lost significant amount of weight, Endocrinologist stopped Ozempic in mid June 2024. His weight is now stable, gained 5-10 lbs.   - Comprehensive metabolic panel; Future  - Hemoglobin A1c; Future    History of stroke  H/O carotid endarterectomy  Abnormal MRI  Encephalomalacia on imaging study  History of syncope  Fainting episode in September, caused the left ankle fracture.  He saw Neurologist at [facility]. Had MRI done as also EEG.  He is on ASA and Plavix since has h/o 2 strokes.    he noticed more issues with memory and difficulty recalling some words.   MRI 6/13/24:  IMPRESSION:  1.  No acute intracranial abnormality.  2.  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 Right caudate, inferior cerebellar.   - Hx of R-CEA  - Peripheral neuropathy, suspect to be diabetic. Leading to sensorimotor ataxia     The patient's syncope event didn't have an associated MRI abnormality, or EEG finding through outside w/up. Given his description, I suspect the syncope was vasovagal and partially driven by dehydration in the setting of weight loss through Ozempic and COVID vaccine.  To better address whether alternative etiologies could be the root cause, a ziopatch could be done.       The patient's tremor today is primarily driven by his prior stroke (left arm), but his head titubation is likely some combination of essential tremor.  The tremors aren't impacting his quality of life, but he was aware that he could consider mediations to treat the head tremor if symptoms were worsening.     The patient's imbalance is likely  related to stroke, as well as peripheral neuropathy.  I suspect it is related to diabetes, but he could have a few additional serum studies to look for alternative causes (SPEP, Immunofixation).     Plan:  Dot 14 days  Continue with   SPEP, immunfixation       Aortic valve regurgitation due to aortic dilation  Coronary artery disease due to calcified coronary lesion  He saw Cardiologist in 2/2025:  Assessment and Plan     CAD - on ASA, statin, plavix  HL - on statin, omega 3  DM - on insulin, metformin, SGLT2  Syncope - likely vasovagal, expectant management  HTN - on losartan  Bicuspid aortic valve with aortic dilation - valve and aorta stable, will follow in 1 year  - recommend screening for children    Recurrent major depressive disorder, remission status unspecified  Stable on venlafaxine. [facility]    Thrombocytopenia  He is seeing hematologist. Stable.   - CBC with platelets; Future    Macrocytosis without anemia    - CBC with platelets; Future    Mixed hyperlipidemia  On statin    Complex sleep apnea syndrome  Using CPAP every night    Osteopenia of multiple sites  Stable, last DXA in 1/2024.       The longitudinal plan of care for the diagnosis(es)/condition(s) as documented were addressed during this visit. Due to the added complexity in care, I will continue to support Yong in the subsequent management and with ongoing continuity of care.          Dominique Beach is a 79 year old, presenting for the following health issues:  Follow Up (F/U)      3/13/2025    12:58 PM   Additional Questions   Roomed by Diane     History of Present Illness       Diabetes:   He presents for follow up of diabetes.    He is not checking blood glucose.        He is concerned about blood sugar frequently over 200.    He is not experiencing numbness or burning in feet, excessive thirst, blurry vision, weight changes or redness, sores or blisters on feet.           He  "eats 0-1 servings of fruits and vegetables daily.He consumes 0 sweetened beverage(s) daily.He exercises with enough effort to increase his heart rate 9 or less minutes per day.  He exercises with enough effort to increase his heart rate 3 or less days per week.   He is taking medications regularly.                      Objective    /58   Pulse 93   Temp 97.4  F (36.3  C) (Temporal)   Resp 16   Ht 1.792 m (5' 10.55\")   Wt 73.8 kg (162 lb 12.8 oz)   SpO2 93%   BMI 23.00 kg/m    Body mass index is 23 kg/m .  Physical Exam               Signed Electronically by: Anita Pineda MD    "

## 2025-05-01 ENCOUNTER — TELEPHONE (OUTPATIENT)
Dept: INTERNAL MEDICINE | Facility: CLINIC | Age: 80
End: 2025-05-01
Payer: MEDICARE

## 2025-05-01 DIAGNOSIS — Z79.4 TYPE 2 DIABETES MELLITUS WITHOUT COMPLICATION, WITH LONG-TERM CURRENT USE OF INSULIN (H): ICD-10-CM

## 2025-05-01 DIAGNOSIS — E11.9 TYPE 2 DIABETES MELLITUS WITHOUT COMPLICATION, WITH LONG-TERM CURRENT USE OF INSULIN (H): ICD-10-CM

## 2025-05-01 RX ORDER — HYDROCHLOROTHIAZIDE 12.5 MG/1
CAPSULE ORAL
Qty: 6 EACH | Refills: 1 | Status: SHIPPED | OUTPATIENT
Start: 2025-05-01

## 2025-05-01 NOTE — TELEPHONE ENCOUNTER
Hello,     This is [facility] requesting a few things for this patient's Medicare renewal of the Freestyle Perry 3 Plus. These requests are coming to you because the patient doesn't have any recent or upcoming diabetes appointments scheduled with endocrinology.     The first is requesting an addendum to the clinic notes from 3/13/25 to state the patient is using Freestyle Perry per Medicare's guidelines below:     VISIT NOTE REQUIREMENTS: (must state the following)  Patient must be seen/clinical notes must be written in the last 6 months by the prescribing provider.  Must state that the patient is injecting insulin 1 time daily or more (Can be written that patient is injecting with insulin pump) We cannot accept medication list as insulin regimen.  Must state that the patient is a type 1 or type 2 diabetic.  Must state that patient is using CGM     The second request is for new prescriptions for Freestyle Perry 3 Plus Sensors . The prescription needs to be written by the provider in which the patient was seen for their diabetes.      Prescription must be written by: Anita Pineda  Must include full supply name: Freestyle Perry 3 Plus Sensor  Quantity: 6  Refills: 1  SIG: Change every 15 days     As a reminder, Medicare requires patients to be seen every 3 months for insulin supplies and every 6 months for CGMs. The provider who sees the patient must be the provider on the prescription, must be written on the day of or after office visit date and office visit must include notes about diabetes and insulin regimen. The Diabetes Care Services team at [facility] may request new prescriptions before renewal dates of the prescription is filled over the allowable amount. Writing the order to match what is above will help ensure we will only need to request every 3 or 6 months.    If you have any questions regarding these requests please call us at [phone] or send a  message to the [email or web address] pool     Thank you!     [facility]  Diabetes Care Services Team  [address] [place] [address]  Provider Phone: [phone]  Patient Line: [phone]  Fax: [phone]  E-mail: [email or web address]

## 2025-05-01 NOTE — TELEPHONE ENCOUNTER
Resent FL3+ sensors to [place] per patient request.     Tien Liu, PharmD  [facility]

## 2025-06-27 ENCOUNTER — TELEPHONE (OUTPATIENT)
Dept: SLEEP MEDICINE | Facility: CLINIC | Age: 80
End: 2025-06-27
Payer: MEDICARE

## 2025-06-27 DIAGNOSIS — G47.33 OBSTRUCTIVE SLEEP APNEA (ADULT) (PEDIATRIC): Primary | ICD-10-CM

## 2025-06-27 NOTE — TELEPHONE ENCOUNTER
Order/Referral Request    Who is requesting: Pt    Orders being requested: pillows/masks needed    Reason service is needed/diagnosis: worn out, on last one    When are orders needed by: asap    Has this been discussed with Provider: No    Does patient have a preference on a Group/Provider/Facility? [facility]    Does patient have an appointment scheduled?: Yes: 11.14.25    Where to send orders: Fax    Could we send this information to you in [facility] or would you prefer to receive a phone call?:   Patient would prefer a phone call   Okay to leave a detailed message?: Yes at Cell number on file:    Telephone Information:   Mobile [phone]

## 2025-06-30 DIAGNOSIS — E78.5 HYPERLIPIDEMIA, UNSPECIFIED HYPERLIPIDEMIA TYPE: ICD-10-CM

## 2025-06-30 DIAGNOSIS — I63.9 CVA (CEREBRAL VASCULAR ACCIDENT) (H): ICD-10-CM

## 2025-06-30 RX ORDER — ATORVASTATIN CALCIUM 80 MG/1
80 TABLET, FILM COATED ORAL DAILY
Qty: 90 TABLET | Refills: 1 | Status: SHIPPED | OUTPATIENT
Start: 2025-06-30

## 2025-06-30 RX ORDER — CLOPIDOGREL BISULFATE 75 MG/1
75 TABLET ORAL DAILY
Qty: 90 TABLET | Refills: 3 | OUTPATIENT
Start: 2025-06-30

## 2025-06-30 NOTE — TELEPHONE ENCOUNTER
Last ov 5/5/23  Next ov 11/14/25    Patient requesting updated order for CPAP supplies prior to appointment. Order pended and routed to provider for consideration.    MELITA LASSITER RN  [facility]

## 2025-07-23 ENCOUNTER — MYC MEDICAL ADVICE (OUTPATIENT)
Dept: INTERNAL MEDICINE | Facility: CLINIC | Age: 80
End: 2025-07-23
Payer: MEDICARE

## 2025-07-23 DIAGNOSIS — Z86.73 HISTORY OF STROKE: Primary | ICD-10-CM

## 2025-07-23 DIAGNOSIS — I63.9 CVA (CEREBRAL VASCULAR ACCIDENT) (H): ICD-10-CM

## 2025-07-23 RX ORDER — CLOPIDOGREL BISULFATE 75 MG/1
75 TABLET ORAL DAILY
Qty: 90 TABLET | Refills: 3 | Status: SHIPPED | OUTPATIENT
Start: 2025-07-23

## 2025-07-23 NOTE — TELEPHONE ENCOUNTER
6/30/25 medication failed refill protocol      LOV 3/13/25  History of stroke  H/O carotid endarterectomy  Abnormal MRI  Encephalomalacia on imaging study  History of syncope  Fainting episode in September, caused the left ankle fracture.  He saw Neurologist at [facility]. Had MRI done as also EEG.  He is on ASA and Plavix since has h/o 2 strokes.     he noticed more issues with memory and difficulty recalling some words.   MRI 6/13/24:  IMPRESSION:  1.  No acute intracranial abnormality.  2.  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 Right caudate, inferior cerebellar.   - Hx of R-CEA  - Peripheral neuropathy, suspect to be diabetic. Leading to sensorimotor ataxia     The patient's syncope event didn't have an associated MRI abnormality, or EEG finding through outside w/up. Given his description, I suspect the syncope was vasovagal and partially driven by dehydration in the setting of weight loss through Ozempic and COVID vaccine.  To better address whether alternative etiologies could be the root cause, a ziopatch could be done.       The patient's tremor today is primarily driven by his prior stroke (left arm), but his head titubation is likely some combination of essential tremor.  The tremors aren't impacting his quality of life, but he was aware that he could consider mediations to treat the head tremor if symptoms were worsening.     The patient's imbalance is likely related to stroke, as well as peripheral neuropathy.  I suspect it is related to diabetes, but he could have a few additional serum studies to look for alternative causes (SPEP, Immunofixation).     Plan:  Ziopatch 14 days  Continue with   SPEP, immunfixation        Aortic valve regurgitation due to aortic dilation  Coronary artery disease due to calcified coronary lesion  He saw  Cardiologist in 2/2025:  Assessment and Plan     CAD - on ASA, statin, plavix  HL - on statin, omega 3  DM - on insulin, metformin, SGLT2  Syncope - likely vasovagal, expectant management  HTN - on losartan  Bicuspid aortic valve with aortic dilation - valve and aorta stable, will follow in 1 year  - recommend screening for children       Routing to PCP to advise regarding Plavix

## 2025-07-25 DIAGNOSIS — E11.8 TYPE 2 DIABETES MELLITUS WITH COMPLICATION, WITHOUT LONG-TERM CURRENT USE OF INSULIN (H): ICD-10-CM

## 2025-07-28 RX ORDER — INSULIN GLARGINE 100 [IU]/ML
INJECTION, SOLUTION SUBCUTANEOUS
Qty: 15 ML | Refills: 3 | Status: SHIPPED | OUTPATIENT
Start: 2025-07-28

## 2025-07-28 NOTE — TELEPHONE ENCOUNTER
Last Written Prescription:  insulin glargine (LANTUS SOLOSTAR) 100 UNIT/ML pen 15 mL 2 10/29/2024     ----------------------  Last Visit Date: 10/14/24  Future Visit Date: none  ----------------------      Refill decision:     [x] Medication unable to be refilled by RN due to: Medication not included in refill protocol policy     Insulin and insulin pump supplies - refilled per Endocrine clinic.      Request from pharmacy:  Requested Prescriptions   Pending Prescriptions Disp Refills    insulin glargine (LANTUS SOLOSTAR) 100 UNIT/ML pen [Pharmacy Med Name: LANTUS SOLOSTAR PEN 3ML 5'S 100U/ML] 15 mL 3     Sig: INJECT 14 UNITS UNDER THE SKIN DAILY       Insulin Protocol Failed - 7/28/2025  1:46 PM        Failed - HgbA1C in past 3 or 6 months     If HgbA1C is 8 or greater, it needs to be on file within the past 3 months.  If less than 8, must be on file within the past 6 months.     Recent Labs   Lab Test 03/13/25  1348   A1C 8.6*             Failed - Medication is active on med list and the sig matches. RN to manually verify dose and sig if red X/fail.     If the protocol passes (green check), you do not need to verify med dose and sig.    A prescription matches if they are the same clinical intention.    For Example: once daily and every morning are the same.    The protocol can not identify upper and lower case letters as matching and will fail.     For Example: Take 1 tablet (50 mg) by mouth daily     TAKE 1 TABLET (50 MG) BY MOUTH DAILY    For all fails (red x), verify dose and sig.    If the refill does match what is on file, the RN can still proceed to approve the refill request.       If they do not match, route to the appropriate provider.             Failed - Recent (6 month) or future (90 days) visit with the authorizing provider's specialty (provided they have been seen in the past 9 months)     The patient must have completed an in-person or virtual visit within the past 6 months or has a future visit  scheduled within the next 90 days with the authorizing provider s specialty.  Urgent care and e-visits do not quality as an office visit for this protocol.          Failed - Chart review required     Review Chart.    Do not approve if insulin is used in a pump.  Instead, direct refill request to the patient's endocrinologist.  If the patient doesn't have an endocrinologist, then send the refill to the patient's PCP for review            Passed - Medication indicated for associated diagnosis     Medication is associated with one or more of the following diagnoses:   - Type 1 diabetes mellitus  - Type 2 diabetes mellitus  - Diabetic nephropathy; Prophylaxis  - Neuropathy due to diabetes mellitus; Prophylaxis  - Retinopathy due to diabetes mellitus; Prophylaxis  - Diabetes mellitus associated with cystic fibrosis  - Disorder of cardiovascular system; Prophylaxis - Type 1 diabetes mellitus   - Disorder of cardiovascular system; Prophylaxis - Type 2 diabetes mellitus            Passed - Patient is 18 years of age or older

## 2025-08-05 ENCOUNTER — THERAPY VISIT (OUTPATIENT)
Dept: PHYSICAL THERAPY | Facility: CLINIC | Age: 80
End: 2025-08-05
Attending: INTERNAL MEDICINE
Payer: MEDICARE

## 2025-08-05 DIAGNOSIS — R26.89 BALANCE PROBLEMS: ICD-10-CM

## 2025-08-05 DIAGNOSIS — M25.551 BILATERAL HIP PAIN: ICD-10-CM

## 2025-08-05 DIAGNOSIS — M25.552 BILATERAL HIP PAIN: ICD-10-CM

## 2025-08-05 PROCEDURE — 97161 PT EVAL LOW COMPLEX 20 MIN: CPT | Mod: GP

## 2025-08-05 PROCEDURE — 97110 THERAPEUTIC EXERCISES: CPT | Mod: GP

## 2025-08-05 ASSESSMENT — ACTIVITIES OF DAILY LIVING (ADL)
PUTTING ON SOCKS AND SHOES: SLIGHT DIFFICULTY
HOS_ADL_ITEM_SCORE_TOTAL: 42
SWINGING_OBJECTS_LIKE_A_GOLF_CLUB: UNABLE TO DO
ADL_COUNT: 17
GETTING_INTO_AND_OUT_OF_AN_AVERAGE_CAR: SLIGHT DIFFICULTY
HOS_ADL_SCORE(%): 65.63
TWISTING/PIVOTING ON INVOLVED LEG: SLIGHT DIFFICULTY
ADL_SCORE(%): 0
DEEP SQUATTING: EXTREME DIFFICULTY
SITTING_FOR_15_MINUTES: NO DIFFICULTY AT ALL
WALKING_15_MINUTES_OR_GREATER: MODERATE DIFFICULTY
GOING UP 1 FLIGHT OF STAIRS: SLIGHT DIFFICULTY
HOS_ADL_HIGHEST_POTENTIAL_SCORE: 64
HEAVY_WORK: MODERATE DIFFICULTY
STEPPING_UP_AND_DOWN_CURBS: SLIGHT DIFFICULTY
WALKING_APPROXIMATELY_10_MINUTES: MODERATE DIFFICULTY
TWISTING/PIVOTING_ON_INVOLVED_LEG: SLIGHT DIFFICULTY
HOW_WOULD_YOU_RATE_YOUR_CURRENT_LEVEL_OF_FUNCTION_DURING_YOUR_USUAL_ACTIVITIES_OF_DAILY_LIVING_FROM_0_TO_100_WITH_100_BEING_YOUR_LEVEL_OF_FUNCTION_PRIOR_TO_YOUR_HIP_PROBLEM_AND_0_BEING_THE_INABILITY_TO_PERFORM_ANY_OF_YOUR_USUAL_DAILY_ACTIVITIES?: 25
WALKING_INITIALLY: SLIGHT DIFFICULTY
SPORTS_SCORE(%): 0
ROLLING_OVER_IN_BED: SLIGHT DIFFICULTY
WALKING_DOWN_STEEP_HILLS: SLIGHT DIFFICULTY
HOW_WOULD_YOU_RATE_YOUR_CURRENT_LEVEL_OF_FUNCTION?: ABNORMAL
GOING_UP_1_FLIGHT_OF_STAIRS: SLIGHT DIFFICULTY
WALKING_INITIALLY: SLIGHT DIFFICULTY
HEAVY_WORK: MODERATE DIFFICULTY
PLEASE_INDICATE_YOR_PRIMARY_REASON_FOR_REFERRAL_TO_THERAPY:: HIP
WALKING_UP_STEEP_HILLS: MODERATE DIFFICULTY
DEEP_SQUATTING: EXTREME DIFFICULTY
LOW_IMPACT_ACTIVITIES_LIKE_FAST_WALKING: EXTREME DIFFICULTY
WALKING_DOWN_STEEP_HILLS: SLIGHT DIFFICULTY
LANDING: EXTREME DIFFICULTY
ADL_HIGHEST_POTENTIAL_SCORE: 68
STANDING_FOR_15_MINUTES: SLIGHT DIFFICULTY
STEPPING UP AND DOWN CURBS: SLIGHT DIFFICULTY
GETTING INTO AND OUT OF AN AVERAGE CAR: SLIGHT DIFFICULTY
WALKING_15_MINUTES_OR_GREATER: MODERATE DIFFICULTY
SPORTS_HIGHEST_POTENTIAL_SCORE: 36
HOW_WOULD_YOU_RATE_YOUR_CURRENT_LEVEL_OF_FUNCTION_DURING_YOUR_SPORTS_RELATED_ACTIVITIES_FROM_0_TO_100_WITH_100_BEING_YOUR_LEVEL_OF_FUNCTION_PRIOR_TO_YOUR_HIP_PROBLEM_AND_0_BEING_THE_INABILITY_TO_PERFORM_ANY_OF_YOUR_USUAL_DAILY_ACTIVITIES?: 25
ROLLING OVER IN BED: SLIGHT DIFFICULTY
JUMPING: UNABLE TO DO
RECREATIONAL_ACTIVITIES: SLIGHT DIFFICULTY
LIGHT_TO_MODERATE_WORK: SLIGHT DIFFICULTY
SITTING FOR 15 MINUTES: NO DIFFICULTY AT ALL
RUNNING_ONE_MILE: UNABLE TO DO
GOING_DOWN_1_FLIGHT_OF_STAIRS: SLIGHT DIFFICULTY
SPORTS_TOTAL_ITEM_SCORE: 0
RECREATIONAL ACTIVITIES: SLIGHT DIFFICULTY
PUTTING_ON_SOCKS_AND_SHOES: SLIGHT DIFFICULTY
ABILITY_TO_PERFORM_ACTIVITY_WITH_YOUR_NORMAL_TECHNIQUE: MODERATE DIFFICULTY
CUTTING/LATERAL_MOVEMENTS: MODERATE DIFFICULTY
STARTING_AND_STOPPING_QUICKLY: MODERATE DIFFICULTY
SPORTS_COUNT: 9
LIGHT_TO_MODERATE_WORK: SLIGHT DIFFICULTY
STANDING FOR 15 MINUTES: SLIGHT DIFFICULTY
WALKING_FOR_APPROXIMATELY_10_MINUTES: MODERATE DIFFICULTY
GOING DOWN 1 FLIGHT OF STAIRS: SLIGHT DIFFICULTY
ADL_TOTAL_ITEM_SCORE: 0
HOW_WOULD_YOU_RATE_YOUR_CURRENT_LEVEL_OF_FUNCTION_DURING_YOUR_USUAL_ACTIVITIES_OF_DAILY_LIVING_FROM_0_TO_100_WITH_100_BEING_YOUR_LEVEL_OF_FUNCTION_PRIOR_TO_YOUR_HIP_PROBLEM_AND_0_BEING_THE_INABILITY_TO_PERFORM_ANY_OF_YOUR_USUAL_DAILY_ACTIVITIES?: 25
WALKING_UP_STEEP_HILLS: MODERATE DIFFICULTY

## 2025-08-14 ENCOUNTER — TRANSFERRED RECORDS (OUTPATIENT)
Dept: HEALTH INFORMATION MANAGEMENT | Facility: CLINIC | Age: 80
End: 2025-08-14
Payer: MEDICARE

## 2025-08-14 LAB — RETINOPATHY: POSITIVE

## 2025-08-21 ENCOUNTER — OFFICE VISIT (OUTPATIENT)
Dept: PHARMACY | Facility: CLINIC | Age: 80
End: 2025-08-21
Attending: INTERNAL MEDICINE
Payer: MEDICARE

## 2025-08-21 VITALS
HEART RATE: 81 BPM | SYSTOLIC BLOOD PRESSURE: 120 MMHG | DIASTOLIC BLOOD PRESSURE: 52 MMHG | OXYGEN SATURATION: 95 % | BODY MASS INDEX: 23.31 KG/M2 | WEIGHT: 165 LBS

## 2025-08-21 DIAGNOSIS — M25.511 CHRONIC RIGHT SHOULDER PAIN: ICD-10-CM

## 2025-08-21 DIAGNOSIS — Z78.9 TAKES DIETARY SUPPLEMENTS: ICD-10-CM

## 2025-08-21 DIAGNOSIS — G89.29 CHRONIC RIGHT SHOULDER PAIN: ICD-10-CM

## 2025-08-21 DIAGNOSIS — Z79.4 TYPE 2 DIABETES MELLITUS WITHOUT COMPLICATION, WITH LONG-TERM CURRENT USE OF INSULIN (H): Primary | ICD-10-CM

## 2025-08-21 DIAGNOSIS — E78.2 MIXED HYPERLIPIDEMIA: ICD-10-CM

## 2025-08-21 DIAGNOSIS — G25.0 BENIGN ESSENTIAL TREMOR: ICD-10-CM

## 2025-08-21 DIAGNOSIS — E11.9 TYPE 2 DIABETES MELLITUS WITHOUT COMPLICATION, WITH LONG-TERM CURRENT USE OF INSULIN (H): Primary | ICD-10-CM

## 2025-08-28 ENCOUNTER — THERAPY VISIT (OUTPATIENT)
Dept: PHYSICAL THERAPY | Facility: CLINIC | Age: 80
End: 2025-08-28
Payer: MEDICARE

## 2025-08-28 DIAGNOSIS — M25.551 BILATERAL HIP PAIN: ICD-10-CM

## 2025-08-28 DIAGNOSIS — R26.89 BALANCE PROBLEMS: Primary | ICD-10-CM

## 2025-08-28 DIAGNOSIS — M25.552 BILATERAL HIP PAIN: ICD-10-CM
